# Patient Record
Sex: MALE | Race: BLACK OR AFRICAN AMERICAN | NOT HISPANIC OR LATINO
[De-identification: names, ages, dates, MRNs, and addresses within clinical notes are randomized per-mention and may not be internally consistent; named-entity substitution may affect disease eponyms.]

---

## 2019-03-21 PROBLEM — Z00.00 ENCOUNTER FOR PREVENTIVE HEALTH EXAMINATION: Status: ACTIVE | Noted: 2019-03-21

## 2019-04-02 ENCOUNTER — APPOINTMENT (OUTPATIENT)
Dept: MRI IMAGING | Facility: CLINIC | Age: 39
End: 2019-04-02
Payer: MEDICAID

## 2019-04-02 ENCOUNTER — OUTPATIENT (OUTPATIENT)
Dept: OUTPATIENT SERVICES | Facility: HOSPITAL | Age: 39
LOS: 1 days | End: 2019-04-02
Payer: COMMERCIAL

## 2019-04-02 DIAGNOSIS — Z00.8 ENCOUNTER FOR OTHER GENERAL EXAMINATION: ICD-10-CM

## 2019-04-02 PROCEDURE — 75561 CARDIAC MRI FOR MORPH W/DYE: CPT

## 2019-04-02 PROCEDURE — A9585: CPT

## 2019-04-02 PROCEDURE — 75561 CARDIAC MRI FOR MORPH W/DYE: CPT | Mod: 26

## 2019-12-01 ENCOUNTER — OUTPATIENT (OUTPATIENT)
Dept: OUTPATIENT SERVICES | Facility: HOSPITAL | Age: 39
LOS: 1 days | End: 2019-12-01
Payer: MEDICAID

## 2019-12-01 PROCEDURE — G9001: CPT

## 2019-12-04 ENCOUNTER — INPATIENT (INPATIENT)
Facility: HOSPITAL | Age: 39
LOS: 7 days | Discharge: HOME | DRG: 287 | End: 2019-12-12
Attending: HOSPITALIST | Admitting: HOSPITALIST
Payer: MEDICAID

## 2019-12-04 VITALS
SYSTOLIC BLOOD PRESSURE: 170 MMHG | OXYGEN SATURATION: 97 % | TEMPERATURE: 98 F | WEIGHT: 315 LBS | HEIGHT: 71 IN | DIASTOLIC BLOOD PRESSURE: 106 MMHG | RESPIRATION RATE: 18 BRPM | HEART RATE: 119 BPM

## 2019-12-04 DIAGNOSIS — I50.23 ACUTE ON CHRONIC SYSTOLIC (CONGESTIVE) HEART FAILURE: ICD-10-CM

## 2019-12-04 DIAGNOSIS — R06.02 SHORTNESS OF BREATH: ICD-10-CM

## 2019-12-04 DIAGNOSIS — E66.01 MORBID (SEVERE) OBESITY DUE TO EXCESS CALORIES: ICD-10-CM

## 2019-12-04 DIAGNOSIS — Z02.9 ENCOUNTER FOR ADMINISTRATIVE EXAMINATIONS, UNSPECIFIED: ICD-10-CM

## 2019-12-04 DIAGNOSIS — Z29.9 ENCOUNTER FOR PROPHYLACTIC MEASURES, UNSPECIFIED: ICD-10-CM

## 2019-12-04 DIAGNOSIS — R07.9 CHEST PAIN, UNSPECIFIED: ICD-10-CM

## 2019-12-04 DIAGNOSIS — I10 ESSENTIAL (PRIMARY) HYPERTENSION: ICD-10-CM

## 2019-12-04 DIAGNOSIS — I50.9 HEART FAILURE, UNSPECIFIED: ICD-10-CM

## 2019-12-04 DIAGNOSIS — J45.909 UNSPECIFIED ASTHMA, UNCOMPLICATED: ICD-10-CM

## 2019-12-04 LAB
ALBUMIN SERPL ELPH-MCNC: 3.9 G/DL — SIGNIFICANT CHANGE UP (ref 3.3–5)
ALP SERPL-CCNC: 72 U/L — SIGNIFICANT CHANGE UP (ref 40–120)
ALT FLD-CCNC: 46 U/L — HIGH (ref 10–45)
ANION GAP SERPL CALC-SCNC: 12 MMOL/L — SIGNIFICANT CHANGE UP (ref 5–17)
APTT BLD: 23 SEC — LOW (ref 27.5–36.3)
AST SERPL-CCNC: 31 U/L — SIGNIFICANT CHANGE UP (ref 10–40)
BASOPHILS # BLD AUTO: 0.03 K/UL — SIGNIFICANT CHANGE UP (ref 0–0.2)
BASOPHILS NFR BLD AUTO: 0.3 % — SIGNIFICANT CHANGE UP (ref 0–2)
BILIRUB SERPL-MCNC: 0.5 MG/DL — SIGNIFICANT CHANGE UP (ref 0.2–1.2)
BUN SERPL-MCNC: 11 MG/DL — SIGNIFICANT CHANGE UP (ref 7–23)
CALCIUM SERPL-MCNC: 9.1 MG/DL — SIGNIFICANT CHANGE UP (ref 8.4–10.5)
CHLORIDE SERPL-SCNC: 103 MMOL/L — SIGNIFICANT CHANGE UP (ref 96–108)
CO2 SERPL-SCNC: 26 MMOL/L — SIGNIFICANT CHANGE UP (ref 22–31)
CREAT SERPL-MCNC: 1.23 MG/DL — SIGNIFICANT CHANGE UP (ref 0.5–1.3)
EOSINOPHIL # BLD AUTO: 0.22 K/UL — SIGNIFICANT CHANGE UP (ref 0–0.5)
EOSINOPHIL NFR BLD AUTO: 2.1 % — SIGNIFICANT CHANGE UP (ref 0–6)
FLU A RESULT: SIGNIFICANT CHANGE UP
FLU A RESULT: SIGNIFICANT CHANGE UP
FLUAV AG NPH QL: SIGNIFICANT CHANGE UP
FLUBV AG NPH QL: SIGNIFICANT CHANGE UP
GLUCOSE SERPL-MCNC: 112 MG/DL — HIGH (ref 70–99)
HCT VFR BLD CALC: 45.3 % — SIGNIFICANT CHANGE UP (ref 39–50)
HGB BLD-MCNC: 14.5 G/DL — SIGNIFICANT CHANGE UP (ref 13–17)
IMM GRANULOCYTES NFR BLD AUTO: 0.6 % — SIGNIFICANT CHANGE UP (ref 0–1.5)
INR BLD: 1.08 RATIO — SIGNIFICANT CHANGE UP (ref 0.88–1.16)
LYMPHOCYTES # BLD AUTO: 2.25 K/UL — SIGNIFICANT CHANGE UP (ref 1–3.3)
LYMPHOCYTES # BLD AUTO: 21.2 % — SIGNIFICANT CHANGE UP (ref 13–44)
MCHC RBC-ENTMCNC: 28.7 PG — SIGNIFICANT CHANGE UP (ref 27–34)
MCHC RBC-ENTMCNC: 32 GM/DL — SIGNIFICANT CHANGE UP (ref 32–36)
MCV RBC AUTO: 89.5 FL — SIGNIFICANT CHANGE UP (ref 80–100)
MONOCYTES # BLD AUTO: 0.99 K/UL — HIGH (ref 0–0.9)
MONOCYTES NFR BLD AUTO: 9.3 % — SIGNIFICANT CHANGE UP (ref 2–14)
NEUTROPHILS # BLD AUTO: 7.06 K/UL — SIGNIFICANT CHANGE UP (ref 1.8–7.4)
NEUTROPHILS NFR BLD AUTO: 66.5 % — SIGNIFICANT CHANGE UP (ref 43–77)
NRBC # BLD: 0 /100 WBCS — SIGNIFICANT CHANGE UP (ref 0–0)
NT-PROBNP SERPL-SCNC: 1121 PG/ML — HIGH (ref 0–300)
PLATELET # BLD AUTO: 189 K/UL — SIGNIFICANT CHANGE UP (ref 150–400)
POTASSIUM SERPL-MCNC: 4.5 MMOL/L — SIGNIFICANT CHANGE UP (ref 3.5–5.3)
POTASSIUM SERPL-SCNC: 4.5 MMOL/L — SIGNIFICANT CHANGE UP (ref 3.5–5.3)
PROT SERPL-MCNC: 7.4 G/DL — SIGNIFICANT CHANGE UP (ref 6–8.3)
PROTHROM AB SERPL-ACNC: 12.5 SEC — SIGNIFICANT CHANGE UP (ref 10–12.9)
RBC # BLD: 5.06 M/UL — SIGNIFICANT CHANGE UP (ref 4.2–5.8)
RBC # FLD: 15.2 % — HIGH (ref 10.3–14.5)
RSV RESULT: SIGNIFICANT CHANGE UP
RSV RNA RESP QL NAA+PROBE: SIGNIFICANT CHANGE UP
SODIUM SERPL-SCNC: 141 MMOL/L — SIGNIFICANT CHANGE UP (ref 135–145)
TROPONIN T, HIGH SENSITIVITY RESULT: 30 NG/L — SIGNIFICANT CHANGE UP (ref 0–51)
TROPONIN T, HIGH SENSITIVITY RESULT: 33 NG/L — SIGNIFICANT CHANGE UP (ref 0–51)
WBC # BLD: 10.61 K/UL — HIGH (ref 3.8–10.5)
WBC # FLD AUTO: 10.61 K/UL — HIGH (ref 3.8–10.5)

## 2019-12-04 PROCEDURE — 99223 1ST HOSP IP/OBS HIGH 75: CPT | Mod: GC

## 2019-12-04 PROCEDURE — 93010 ELECTROCARDIOGRAM REPORT: CPT

## 2019-12-04 PROCEDURE — 99285 EMERGENCY DEPT VISIT HI MDM: CPT

## 2019-12-04 PROCEDURE — 71046 X-RAY EXAM CHEST 2 VIEWS: CPT | Mod: 26

## 2019-12-04 RX ORDER — FUROSEMIDE 40 MG
40 TABLET ORAL ONCE
Refills: 0 | Status: COMPLETED | OUTPATIENT
Start: 2019-12-04 | End: 2019-12-04

## 2019-12-04 RX ORDER — HYDRALAZINE HCL 50 MG
25 TABLET ORAL ONCE
Refills: 0 | Status: COMPLETED | OUTPATIENT
Start: 2019-12-04 | End: 2019-12-04

## 2019-12-04 RX ORDER — HYDRALAZINE HCL 50 MG
1 TABLET ORAL
Qty: 0 | Refills: 0 | DISCHARGE

## 2019-12-04 RX ORDER — LEVALBUTEROL 1.25 MG/.5ML
0.63 SOLUTION, CONCENTRATE RESPIRATORY (INHALATION) EVERY 4 HOURS
Refills: 0 | Status: DISCONTINUED | OUTPATIENT
Start: 2019-12-04 | End: 2019-12-06

## 2019-12-04 RX ORDER — LOSARTAN POTASSIUM 100 MG/1
1 TABLET, FILM COATED ORAL
Qty: 0 | Refills: 0 | DISCHARGE

## 2019-12-04 RX ORDER — CARVEDILOL PHOSPHATE 80 MG/1
1 CAPSULE, EXTENDED RELEASE ORAL
Qty: 0 | Refills: 0 | DISCHARGE

## 2019-12-04 RX ORDER — CARVEDILOL PHOSPHATE 80 MG/1
6.25 CAPSULE, EXTENDED RELEASE ORAL EVERY 12 HOURS
Refills: 0 | Status: DISCONTINUED | OUTPATIENT
Start: 2019-12-04 | End: 2019-12-05

## 2019-12-04 RX ORDER — HYDRALAZINE HCL 50 MG
50 TABLET ORAL EVERY 8 HOURS
Refills: 0 | Status: DISCONTINUED | OUTPATIENT
Start: 2019-12-04 | End: 2019-12-05

## 2019-12-04 RX ORDER — IPRATROPIUM/ALBUTEROL SULFATE 18-103MCG
3 AEROSOL WITH ADAPTER (GRAM) INHALATION ONCE
Refills: 0 | Status: COMPLETED | OUTPATIENT
Start: 2019-12-04 | End: 2019-12-04

## 2019-12-04 RX ORDER — LOSARTAN POTASSIUM 100 MG/1
100 TABLET, FILM COATED ORAL DAILY
Refills: 0 | Status: DISCONTINUED | OUTPATIENT
Start: 2019-12-04 | End: 2019-12-12

## 2019-12-04 RX ORDER — SPIRONOLACTONE 25 MG/1
25 TABLET, FILM COATED ORAL DAILY
Refills: 0 | Status: DISCONTINUED | OUTPATIENT
Start: 2019-12-04 | End: 2019-12-12

## 2019-12-04 RX ORDER — INFLUENZA VIRUS VACCINE 15; 15; 15; 15 UG/.5ML; UG/.5ML; UG/.5ML; UG/.5ML
0.5 SUSPENSION INTRAMUSCULAR ONCE
Refills: 0 | Status: DISCONTINUED | OUTPATIENT
Start: 2019-12-04 | End: 2019-12-12

## 2019-12-04 RX ORDER — HEPARIN SODIUM 5000 [USP'U]/ML
5000 INJECTION INTRAVENOUS; SUBCUTANEOUS EVERY 8 HOURS
Refills: 0 | Status: DISCONTINUED | OUTPATIENT
Start: 2019-12-04 | End: 2019-12-12

## 2019-12-04 RX ORDER — NITROGLYCERIN 6.5 MG
0.4 CAPSULE, EXTENDED RELEASE ORAL ONCE
Refills: 0 | Status: COMPLETED | OUTPATIENT
Start: 2019-12-04 | End: 2019-12-04

## 2019-12-04 RX ADMIN — SPIRONOLACTONE 25 MILLIGRAM(S): 25 TABLET, FILM COATED ORAL at 22:50

## 2019-12-04 RX ADMIN — Medication 3 MILLILITER(S): at 12:50

## 2019-12-04 RX ADMIN — Medication 50 MILLIGRAM(S): at 22:50

## 2019-12-04 RX ADMIN — LEVALBUTEROL 0.63 MILLIGRAM(S): 1.25 SOLUTION, CONCENTRATE RESPIRATORY (INHALATION) at 22:50

## 2019-12-04 RX ADMIN — CARVEDILOL PHOSPHATE 6.25 MILLIGRAM(S): 80 CAPSULE, EXTENDED RELEASE ORAL at 18:39

## 2019-12-04 RX ADMIN — Medication 50 MILLIGRAM(S): at 17:21

## 2019-12-04 RX ADMIN — LOSARTAN POTASSIUM 100 MILLIGRAM(S): 100 TABLET, FILM COATED ORAL at 18:39

## 2019-12-04 RX ADMIN — Medication 40 MILLIGRAM(S): at 13:08

## 2019-12-04 RX ADMIN — Medication 125 MILLIGRAM(S): at 13:08

## 2019-12-04 RX ADMIN — Medication 0.4 MILLIGRAM(S): at 13:26

## 2019-12-04 RX ADMIN — Medication 3 MILLILITER(S): at 12:57

## 2019-12-04 RX ADMIN — HEPARIN SODIUM 5000 UNIT(S): 5000 INJECTION INTRAVENOUS; SUBCUTANEOUS at 22:50

## 2019-12-04 NOTE — H&P ADULT - PROBLEM SELECTOR PLAN 1
-Pt with history of EF 22% complaining of sharp, nonraditing, exertional CP accompanied with SOB worsened with laying flat as well as 2+ bilateral pedal edema. [id number]. Most consistent with acute CHF exacerbation.   - Pt received 40mg IV lasix in ED -Pt with history of EF 22% complaining of sharp, nonraditing, exertional CP accompanied with SOB worsened with laying flat as well as 2+ bilateral pedal edema. [id number]. Most consistent with acute CHF exacerbation.   - Pt received 40mg IV lasix in ED will assess urine output and determine need for further IV lasix in AM  -will need right and left heart cath on admission as per cardiology   -obtain transthoracic echo to evaluate -Pt with history of EF 22% complaining of sharp, nonraditing, exertional CP accompanied with SOB worsened with laying flat as well as 2+ bilateral pedal edema. [id number]. Most consistent with acute CHF exacerbation.   - Pt received 40mg IV lasix in ED will assess urine output and determine need for further IV lasix in AM  -will need right and left heart cath on admission as per cardiology   -obtain transthoracic echo to evaluate  - continue home meds.

## 2019-12-04 NOTE — H&P ADULT - ATTENDING COMMENTS
Chart reviewed and patient examined at the bedside. Plan is to f/u TTE, continue Lasix 40mg IVP BID (and home medications: Coreg, Losartan, Hydralazine, and Spironolactone) w/ close monitoring of fluid status, renal function, electrolytes w/ cardiology on board to elucidate etiology of underlying depressed ejection fraction and optimization of medical therapy (+/- consideration of AICD); will also obtain CT Chest to clarify CXR findings in the setting of diffuse wheezing on examination and history of environmental exposures. Need to obtain further collateral from outpatient providers and to communicate plan of care in AM.

## 2019-12-04 NOTE — ED ADULT NURSE REASSESSMENT NOTE - NS ED NURSE REASSESS COMMENT FT1
Called xt [phone], spoke to resident re elevated BP, team 3 will be coming to consult with the pt in ED

## 2019-12-04 NOTE — ED ADULT NURSE REASSESSMENT NOTE - NS ED NURSE REASSESS COMMENT FT1
Pt received 1900 from [facility]. Pt remains hypertensive. NAD. ambulatory to restroom and returned from CT. Denies complaints. He is A&O x4 and well appearing .Admitted and awaiting bed. Aware of plan. Safety and comfort maintained. Will continue to monitor.

## 2019-12-04 NOTE — H&P ADULT - NSHPPHYSICALEXAM_GEN_ALL_CORE
VITALS: Vital Signs Last 24 Hrs  T(C): 36.7 (04 Dec 2019 10:50), Max: 36.7 (04 Dec 2019 10:35)  T(F): 98 (04 Dec 2019 10:50), Max: 98 (04 Dec 2019 10:35)  HR: 107 (04 Dec 2019 14:53) (107 - 119)  BP: 142/120 (04 Dec 2019 16:11) (142/120 - 173/116)  BP(mean): --  RR: 20 (04 Dec 2019 13:30) (14 - 24)  SpO2: 100% (04 Dec 2019 13:30) (96% - 100%)    GENERAL: NAD, lying in bed comfortably  HEAD:  Atraumatic, Normocephalic  EYES: EOMI, PERRLA, conjunctiva and sclera clear  ENT: Moist mucous membranes  NECK: Supple  CHEST/LUNG:  Wheezing in all lung fields, no crackles appreciated.   HEART: Regular rate and rhythm; No murmurs, rubs, or gallops  ABDOMEN: Bowel sounds present; Soft, Nontender, Nondistended. No hepatomegaly.   EXTREMITIES:  2+ pitting edema bilaterally. 2+ Peripheral Pulses, brisk capillary refill. No clubbing, cyanosis, or edema  NERVOUS SYSTEM:  Alert & Oriented X3, speech clear. No deficits. CN grossly intact  SKIN: No rashes or lesions Vital Signs Last 24 Hrs  T(F): 98 (04 Dec 2019 10:50), Max: 98 (04 Dec 2019 10:35)  HR: 107 (04 Dec 2019 14:53) (107 - 119)  BP: 142/120 (04 Dec 2019 16:11) (142/120 - 173/116)  RR: 20 (04 Dec 2019 13:30) (14 - 24)  SpO2: 100% (04 Dec 2019 13:30) (96% - 100%)    GENERAL: NAD, lying in bed comfortably  HEAD:  Atraumatic, Normocephalic  EYES: EOMI, PERRLA, conjunctiva and sclera clear  ENT: Moist mucous membranes  NECK: Supple  CHEST/LUNG:  Wheezing in all lung fields, no crackles appreciated.   HEART: Regular rate and rhythm; No murmurs, rubs, or gallops  ABDOMEN: Bowel sounds present; Soft, Nontender, Nondistended. No hepatomegaly.   EXTREMITIES:  2+ pitting edema bilaterally. 2+ Peripheral Pulses, brisk capillary refill. No clubbing, cyanosis, or edema  NERVOUS SYSTEM:  Alert & Oriented X3, speech clear. No deficits. CN grossly intact  SKIN: No rashes or lesions

## 2019-12-04 NOTE — CONSULT NOTE ADULT - NSHPATTENDINGPLANDISCUSS_GEN_ALL_CORE
Plan discussed with cardiology fellow & resident; patient seen and examined.       I was physically present for the key portions of the evaluation and management (E/M) service provided.    I agree with the above history, physical, and plan which I have reviewed and edited where appropriate.

## 2019-12-04 NOTE — H&P ADULT - HISTORY OF PRESENT ILLNESS
Pt is a 39 year old male with a PMHx of HTN, asthma, smoking, and EF of 22% presenting with 1 month of worsening SOB and CP. Pt describes a sharp, nonradiaiting, exertion CP accompanied with SOB. He is only able to ambulate 1 block or up 1 flight of stairs before having to rest. He states he is unable to lay flat due to worsening dyspnea and has been sleeping upright. He also endorses bilaterally pedal edema over the last 3 days as well as a recent URI. He states that this is his first time experiencing all these symptoms together. 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 He was incarcerated from [id number]. He states he has an inhaler but rarely if ever utilizes it. He smoked 1.5 packs for roughly 17 years stating that he recently quit, he denies any alcohol or illicit drug use. He denies any recent travel, sick contacts, or calf tenderness. 39yoM w/ PMHx significant for CHF (LVEF = 22% per MRI performed in 4/2019, unclear etiology, cardiac MRI ordered for evaluation for sarcoid?, no AICD), HTN, asthma (vs. COPD, not diagnosed but pt w/ smoking history: 1.5 packs for roughly 17 years stating that he recently quit), who presents with 1 month of worsening SOB and CP. Pt describes a sharp, nonradiaiting, exertion CP accompanied with SOB. He is only able to ambulate 1 block or up 1 flight of stairs before having to rest. He states he is unable to lay flat due to worsening dyspnea and has been sleeping upright. He also endorses bilaterally pedal edema over the last 3 days as well as a recent URI. He states that this is his first time experiencing all these symptoms together. He worked as a  and was exposed to dust and other hazards. He was incarcerated from [id number]. He states he has an inhaler but rarely if ever utilizes it. He denies any alcohol or illicit drug use. He denies any fever/chills, localizing signs/sx of infection, recent travel, sick contacts, or calf tenderness.

## 2019-12-04 NOTE — CONSULT NOTE ADULT - ASSESSMENT
39M w/ h/o htn, asthma, cxr in 2/2019 suggestive of sarcoid, and cardiomyopathy w/ EF 22% on cardiac MRI 4/2/19 p/w worsening SOB and CP of 2 m.o. duration. Pt has elevated BNP and stable trops with negative d-dimer. Cardiac MRI performed after cxr on 2/2019 is not indicative of sarcoid. Cannot r/o ischemic vs. nonischemic cause of HF in this pt with extensive smoking history.     Recommendations:    #1. HFrEF 39M w/ h/o htn, asthma, cxr in 2/2019 suggestive of sarcoid, and cardiomyopathy w/ EF 22% on cardiac MRI 4/2/19 p/w worsening SOB and CP of 2 m.o. duration. Pt has elevated BNP and stable trops with negative d-dimer, low likelihood of PE and ACS. Cardiac MRI performed after cxr on 2/2019 is not indicative of sarcoid. Cannot r/o ischemic vs. nonischemic cause of HF in this young pt with extensive smoking history.     Recommendations:    #1. HFrEF  Cause of reduced EF could be 2/2 ischemic vs. nonischemic etiology  -order ACE level, given pt's past CXR suspicious for sarcoid. CXR performed on this visit does not demonstrate hilar lymphadenopathy.   -perform TTE  -lasix 40 IV BID, pt needs to be diuresed so that he can lie flat  -c/w home medications: carvedilol 6.25 BID, losartan 100 qhs  -when pt is medically optimized and diuresed, will need right and left heart cath to evaluate for ischemic cause of heart failure  -can hold off on workup for nonischemic causes for now    #2 HTN  -c/w pt's home dose hydralazine 50 PO TID    Alhaji Taveras M.D.  Cardiology Resident 39M w/ h/o htn, asthma, cxr in 2/2019 suggestive of sarcoid, and cardiomyopathy w/ EF 22% on cardiac MRI 4/2/19.  P/W worsening SOB and CP of 2 m.o. duration. Pt has elevated BNP and stable trops with negative d-dimer, low likelihood of PE and ACS. Cardiac MRI performed after cxr on 2/2019 is not indicative of sarcoid.  Will need to ischemic cause in this young pt with extensive smoking history; could also be on basis of hypertensive dz.  No evidence of cardiac sarcoid reported on cardiac MRI.     Recommendations:    #1. HFrEF  Cause of reduced EF could be 2/2 ischemic vs. nonischemic etiology  -order ACE level, given pt's past CXR suspicious for sarcoid. CXR performed on this visit does not demonstrate hilar lymphadenopathy.   -perform TTE to assess myocardial and valve function  -Lasix 40 IV BID, pt needs to be diuresed so that he can lie flat  -c/w home medications: carvedilol 6.25 BID, losartan 100 qhs  -when pt is medically optimized and diuresed, will need right and left heart cath to evaluate for ischemic cause of heart failure  -can hold off on workup for nonischemic causes for now    #2 HTN  -c/w pt's home dose hydralazine 50 PO TID, losartan and Coreg      Alhaji Taveras M.D.  Cardiology Resident    Barrett Barrow M.D.  Cardiology Attending, Consult Service  [phone] or beeper     All Cardiology service information can be found 24/7 on [email or web address], password: [email or web address]

## 2019-12-04 NOTE — H&P ADULT - NSHPREVIEWOFSYSTEMS_GEN_ALL_CORE
REVIEW OF SYSTEMS:    CONSTITUTIONAL: No weakness, fevers or chills  EYES/ENT: No visual changes;  No dysphagia or throat pain   NECK: No pain or stiffness  RESPIRATORY: ++ cough, ++SOB, no wheezing, hemoptysis;   CARDIOVASCULAR: ++CP, ++ pedal edema  GASTROINTESTINAL: No abdominal or epigastric pain. No nausea, vomiting, or hematemesis; No diarrhea or constipation. No melena or hematochezia.  GENITOURINARY: No dysuria, frequency or hematuria  NEUROLOGICAL: No numbness or weakness  SKIN: No itching, burning, rashes, or lesions   All other review of systems is negative unless indicated above. CONSTITUTIONAL: No weakness, fevers or chills  EYES/ENT: No visual changes;  No dysphagia or throat pain   NECK: No pain or stiffness  RESPIRATORY: ++ cough, ++SOB, no wheezing, hemoptysis;   CARDIOVASCULAR: ++CP, ++ pedal edema  GASTROINTESTINAL: No abdominal or epigastric pain. No nausea, vomiting, or hematemesis; No diarrhea or constipation. No melena or hematochezia.  GENITOURINARY: No dysuria, frequency or hematuria  NEUROLOGICAL: No numbness or weakness  SKIN: No itching, burning, rashes, or lesions   All other review of systems is negative unless indicated above.

## 2019-12-04 NOTE — ED ADULT NURSE REASSESSMENT NOTE - NS ED NURSE REASSESS COMMENT FT1
pt. ambulated independently to bathroom twice, 2nd void post Lasix admin. Denies dizziness. Denies chest pain/ SOB/palpitations.

## 2019-12-04 NOTE — H&P ADULT - PROBLEM SELECTOR PLAN 3
-Pt complaining of sharp, nonraditing, exertional CP. Most likely 2/2 CHF exacerbation. -Pt complaining of sharp, nonraditing, exertional CP. Most likely 2/2 CHF exacerbation. Trops neg, EKG normal, low risk for MI. D-dimer WNL, low risk for PE.   - Cards with further assess with right and left heart cath and TTE.

## 2019-12-04 NOTE — H&P ADULT - PROBLEM SELECTOR PLAN 4
CHF vs sarcoid vs infection vs pulmonary hypertension. Unclear of etiology giving history of incarceration + environmental exposure. Chest x-ray shows no acute processes but is suspicious for hilar lymphadenopathy. Will further eval with CT chest.  - f/u ACE levels, HIV, quant.

## 2019-12-04 NOTE — ED ADULT NURSE NOTE - OBJECTIVE STATEMENT
The patient is a 39 year old male arriving from home accompanied by mother who presents to the ED with chief complaint of chest pain and SOB. Patient states that onset of SOB, dyspnea and chest pain began 4-5 weeks ago. Patient reports orthopnea, must sleep sitting up in chair and normally cannot sleep due to dyspnea.  Patient is a former pack and a half a day smoker for 17 years, quit 3 weeks ago. Wheezing noted over left lower lobe anteriorly. Posteriorly, bilateral rhonchi noted to upper and lower lung fields bilaterally. Patient reports chest pain 3/10.Patient's mother reports hyperpigmentation The patient is a 39 year old male arriving from home accompanied by mother who presents to the ED with chief complaint of chest pain and SOB. Patient states that onset of SOB, dyspnea and chest pain began 4-5 weeks ago. Patient reports orthopnea, must sleep sitting up in chair and normally cannot sleep due to dyspnea. Wheezing noted over left lower lobe anteriorly. Posteriorly, bilateral rhonchi noted to upper and lower lung fields bilaterally. Patient reports cough with yellow phlegm x 2 weeks.  Patient reports chest pain 3/10. Patient reports blurry vision has developed over the past few months. Patient reports he can not tolerate large meals as he experiences abdominal pain and tightness with eating and drinking large quantities. Patient endorses anxiety, reports racing heartbeat at night.  Patient is a former pack and a half a day smoker for 17 years, quit 3 weeks ago. Patient's mother reports new onset hyperpigmentation to patient's face and neck over the past few weeks.

## 2019-12-04 NOTE — ED ADULT NURSE REASSESSMENT NOTE - NS ED NURSE REASSESS COMMENT FT1
Attempt for phone handoff to 3 DSU, phone is ringing with no answer x3 min. Will reattempt for report.

## 2019-12-04 NOTE — PATIENT PROFILE ADULT - FALL HARM RISK TYPE OF ASSESSMENT
Patient accepted by [facility] for [place] [id number] services at [place]. Spoke with Sami Fenton from [employer] to inform her of pt's discharge today. No further needs identified. admission

## 2019-12-04 NOTE — H&P ADULT - PROBLEM SELECTOR PLAN 2
unclear if COPD given hx of 18 year smoking history, unclear if COPD given hx of 17 year smoking history. Pt complaining of SOB with exertion, PE demonstrates wheezing in all lung fields. Pt received 125mg solumedrol and 2 duonebs in ED. Will continue with duonebs and consider steroids after reassessment.

## 2019-12-04 NOTE — ED PROVIDER NOTE - ATTENDING CONTRIBUTION TO CARE
Nemes - 40yo M with PMH HTN, asthma, smoker (17 years; quit 3 weeks ago), presenting with CP, cough and SOB x 1 month. Pt reports substernal chest pain/pressure, worse with exertion. SOB also worse with exertion but occasionally SOB at rest as well. Per chart review and reports from patient, pt with h/o cardiomyopathy with EF of 22% and cardiac MRI consistent with sarcoid. No other stx LE swelling, calf pain. VS w tachycardia, diffuse wheezing, tachy cardiac rhythm, regular, no JVD, mild pedal edema. CHF vs asthma exacerbation vs PNA. Low suspicion for PE/ACS. Will get septic/cardiac labs, likely admit for high risk, further w/u

## 2019-12-04 NOTE — ED ADULT NURSE NOTE - TEMPLATE
Reason for Call:  Form, our goal is to have forms completed with 72 hours, however, some forms may require a visit or additional information.    Type of letter, form or note:  Home Health Certification    Who is the form from?: Home care    Where did the form come from: form was faxed in    What clinic location was the form placed at?: [facility]    Where the form was placed: Given to MA/RN    What number is listed as a contact on the form?:          Additional comments:     Call taken on 7/2/2019 at 3:23 PM by Danielle Solis         Respiratory

## 2019-12-04 NOTE — H&P ADULT - PROBLEM SELECTOR PLAN 7
·  Problem: Discharge planning issues.  Plan: 1.  Name of PCP: Dang Bonilla  2.  PCP Contacted on Admission: [ ] Y    [ ] N    3.  PCP contacted at Discharge: [ ] Y    [ ] N    [ ] N/A  4.  Post-Discharge Appointment Date and Location:  5.  Summary of Handoff given to PCP: - heparin subq for DVT prophylaxis

## 2019-12-04 NOTE — CONSULT NOTE ADULT - SUBJECTIVE AND OBJECTIVE BOX
Patient seen and evaluated at bedside    Chief Complaint: worsening dyspnea on exertion, SOB    HPI:    Pt is a 39M w/ h/o htn, asthma, cxr in 2/2019 suggestive of sarcoid, and cardiomyopathy w/ EF 22% on cardiac MRI 4/2/19 who p/w worsening SOB and CP of 2 m.o. duration. Pt states that he has had decreased exercise tolerance that started two months ago, is only able to walk one block. 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 Pt has also had chest pain for most of his life, has worsened over the last two months with exertion. CP is associated with dizziness and SOB, pt denies jaw tightness, radiation, or arm pain. Pain is of a sharp nature, worse with inspiration, and reproducible. Pt states that he also has upper respiratory symptoms.     Of note, pt has an inhaler at home for asthma but has not used it recently.      pt also endorses orthopnea, unable to tolerate reclining beyond a sitting position.       PMHx:   No pertinent past medical history      PSHx:   No significant past surgical history      Allergies:  No Known Allergies      Home Meds:    Current Medications:       FAMILY HISTORY:      Social History:  Smoking History:  Alcohol Use:  Drug Use:    REVIEW OF SYSTEMS:  CONSTITUTIONAL: No weakness, fevers or chills  EYES/ENT: No visual changes;  No dysphagia  NECK: No pain or stiffness  RESPIRATORY: No cough, wheezing, hemoptysis; No shortness of breath  CARDIOVASCULAR: No chest pain or palpitations; No lower extremity edema  GASTROINTESTINAL: No abdominal or epigastric pain. No nausea, vomiting, or hematemesis; No diarrhea or constipation. No melena or hematochezia.  BACK: No back pain  GENITOURINARY: No dysuria, frequency or hematuria  NEUROLOGICAL: No numbness or weakness  SKIN: No itching, burning, rashes, or lesions   All other review of systems is negative unless indicated above.    Physical Exam:  T(F): 98 (12-04), Max: 98 (12-04)  HR: 107 (12-04) (107 - 119)  BP: 142/120 (12-04) (142/120 - 173/116)  RR: 20 (12-04)  SpO2: 100% (12-04)  GENERAL: No acute distress, well-developed  HEAD:  Atraumatic, Normocephalic  ENT: EOMI, PERRLA, conjunctiva and sclera clear, Neck supple, No JVD, moist mucosa  CHEST/LUNG: Clear to auscultation bilaterally; No wheeze, equal breath sounds bilaterally   BACK: No spinal tenderness  HEART: Regular rate and rhythm; No murmurs, rubs, or gallops  ABDOMEN: Soft, Nontender, Nondistended; Bowel sounds present  EXTREMITIES:  No clubbing, cyanosis, or edema  PSYCH: Nl behavior, nl affect  NEUROLOGY: AAOx3, non-focal, cranial nerves intact  SKIN: Normal color, No rashes or lesions  LINES:    Labs:  reviewed                        14.5   10.61 )-----------( 189      ( 04 Dec 2019 11:21 )             45.3     12-04    141  |  103  |  11  ----------------------------<  112<H>  4.5   |  26  |  1.23    Ca    9.1      04 Dec 2019 11:21    TPro  7.4  /  Alb  3.9  /  TBili  0.5  /  DBili  x   /  AST  31  /  ALT  46<H>  /  AlkPhos  72  12-04    PT/INR - ( 04 Dec 2019 11:21 )   PT: 12.5 sec;   INR: 1.08 ratio         PTT - ( 04 Dec 2019 11:21 )  PTT:23.0 sec    CARDIAC MARKERS ( 04 Dec 2019 13:48 )  30 ng/L / x     / x     / x     / x     / x      CARDIAC MARKERS ( 04 Dec 2019 11:21 )  33 ng/L / x     / x     / x     / x     / x            Serum Pro-Brain Natriuretic Peptide: 1121 pg/mL (12-04 @ 11:21)            Cardiovascular Diagnostic Testing:    ECG:     Echo:     Stress Testing:    Cath:    Imaging:    CXR:  reviewed    Assessment and Recommendations: Patient seen and evaluated at bedside    Chief Complaint: worsening dyspnea on exertion, SOB    HPI:    Pt is a 39M w/ h/o htn, asthma, cxr in 2/2019 suggestive of sarcoid, and cardiomyopathy w/ EF 22% on cardiac MRI 4/2/19 who p/w worsening SOB and CP of 2 m.o. duration. Pt states that he has had decreased exercise tolerance that started two months ago, is only able to walk one block. 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 Pt has also had chest pain for most of his life, has worsened over the last two months with exertion. CP is associated with dizziness and SOB, pt denies jaw tightness, radiation, or arm pain. Pain is of a sharp nature, worse with inspiration, and reproducible. Pt states that he also has had upper respiratory symptoms for the past two weeks. Pt endorses orthopnea, unable to tolerate reclining beyond a sitting position. Of note, pt has an inhaler at home for asthma but has not used it recently.    ED course:   vitals: afebrile, , bp 173/116, rr 14, SaO2 96% on RA. Note that pt's BP on manual measurement was 145/120.   Pt was given lasix 40 IV, solumedrol 125, sublingual nitroglycerin, and duonebs x2.           PMHx:   As above      PSHx:   No significant past surgical history      Allergies:  No Known Allergies      Home Meds:  hydralazine 50 TID  lasix 40 PO 1.5 pills daily  carvedilol 6.25 BID  losartan 100 daily    Current Medications:       FAMILY HISTORY:      Social History: former  for 6 months  Smoking History: smoked 1.5 ppd for 17 years    REVIEW OF SYSTEMS:  CONSTITUTIONAL: No weakness, fevers or chills  EYES/ENT: No visual changes;  No dysphagia  NECK: No pain or stiffness  RESPIRATORY: No cough, wheezing, hemoptysis; No shortness of breath  CARDIOVASCULAR: No chest pain or palpitations; No lower extremity edema  GASTROINTESTINAL: No abdominal or epigastric pain. No nausea, vomiting, or hematemesis; No diarrhea or constipation. No melena or hematochezia.  BACK: No back pain  GENITOURINARY: No dysuria, frequency or hematuria  NEUROLOGICAL: No numbness or weakness  SKIN: No itching, burning, rashes, or lesions   All other review of systems is negative unless indicated above.    Physical Exam:  T(F): 98 (12-04), Max: 98 (12-04)  HR: 107 (12-04) (107 - 119)  BP: 142/120 (12-04) (142/120 - 173/116)  RR: 20 (12-04)  SpO2: 100% (12-04)  GENERAL: No acute distress, well-developed  HEAD:  Atraumatic, Normocephalic  ENT: EOMI, PERRLA, conjunctiva and sclera clear, Neck supple, No JVD, moist mucosa  CHEST/LUNG: Clear to auscultation bilaterally; No wheeze, equal breath sounds bilaterally   BACK: No spinal tenderness  HEART: Regular rate and rhythm; No murmurs, rubs, or gallops  ABDOMEN: Soft, Nontender, Nondistended; Bowel sounds present  EXTREMITIES:  No clubbing, cyanosis, or edema  PSYCH: Nl behavior, nl affect  NEUROLOGY: AAOx3, non-focal, cranial nerves intact  SKIN: Normal color, No rashes or lesions  LINES:    Labs:  reviewed                        14.5   10.61 )-----------( 189      ( 04 Dec 2019 11:21 )             45.3     12-04    141  |  103  |  11  ----------------------------<  112<H>  4.5   |  26  |  1.23    Ca    9.1      04 Dec 2019 11:21    TPro  7.4  /  Alb  3.9  /  TBili  0.5  /  DBili  x   /  AST  31  /  ALT  46<H>  /  AlkPhos  72  12-04    PT/INR - ( 04 Dec 2019 11:21 )   PT: 12.5 sec;   INR: 1.08 ratio         PTT - ( 04 Dec 2019 11:21 )  PTT:23.0 sec    CARDIAC MARKERS ( 04 Dec 2019 13:48 )  30 ng/L / x     / x     / x     / x     / x      CARDIAC MARKERS ( 04 Dec 2019 11:21 )  33 ng/L / x     / x     / x     / x     / x            Serum Pro-Brain Natriuretic Peptide: 1121 pg/mL (12-04 @ 11:21)            Cardiovascular Diagnostic Testing:    ECG:     Echo:     Stress Testing:    Cath:    Imaging:    CXR:  reviewed    Assessment and Recommendations: Patient seen and evaluated at bedside    Chief Complaint: worsening dyspnea on exertion, SOB    HPI:    Pt is a 39M w/ h/o htn, asthma, cxr in 2/2019 suggestive of sarcoid, and cardiomyopathy w/ EF 22% on cardiac MRI 4/2/19 who p/w worsening SOB and CP of 2 m.o. duration. Pt states that he has had decreased exercise tolerance that started two months ago, is only able to walk one block. 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 Pt has also had chest pain for most of his life, has worsened over the last two months with exertion. CP is associated with dizziness and SOB, pt denies jaw tightness, radiation, or arm pain. Pain is of a sharp nature, worse with inspiration, and reproducible. Pt states that he also has had upper respiratory symptoms for the past two weeks. Pt endorses orthopnea, unable to tolerate reclining beyond a sitting position. Of note, pt has an inhaler at home for asthma but has not used it recently.    ED course:   vitals: afebrile, , bp 173/116, rr 14, SaO2 96% on RA. Note that pt's BP on manual measurement was 145/120.   Pt was given lasix 40 IV, solumedrol 125, sublingual nitroglycerin, and duonebs x2.           PMHx:   As above      PSHx:   No significant past surgical history      Allergies:  No Known Allergies      Home Meds:  hydralazine 50 TID  lasix 40 PO 1.5 pills daily  carvedilol 6.25 BID  losartan 100 daily    Current Medications:       FAMILY HISTORY:      Social History: former  for 6 months  Smoking History: smoked 1.5 ppd for 17 years    REVIEW OF SYSTEMS:  CONSTITUTIONAL: No weakness, fevers or chills  EYES/ENT: No visual changes;  No dysphagia  NECK: No pain or stiffness  RESPIRATORY: +cough, wheezing, SOB, no hemoptysis  CARDIOVASCULAR: +chest pain, +LE edema  GASTROINTESTINAL: Mild abdominal discomfort. No change in appetite, nausea, vomiting, or hematemesis; No diarrhea or constipation. No melena or hematochezia.  BACK: No back pain  GENITOURINARY: No dysuria, frequency or hematuria  NEUROLOGICAL: No numbness or weakness  SKIN: No itching, burning, rashes, or lesions   All other review of systems is negative unless indicated above.    Physical Exam:  T(F): 98 (12-04), Max: 98 (12-04)  HR: 107 (12-04) (107 - 119)  BP: 142/120 (12-04) (142/120 - 173/116)  RR: 20 (12-04)  SpO2: 100% (12-04)  GENERAL: NAD, sitting up in bed, aox3  HEAD:  Atraumatic, Normocephalic  ENT: EOMI, PERRLA, conjunctiva and sclera clear, Neck supple, difficult to appreciate JVD because of body habitus, moist mucosa  CHEST/LUNG: diffuse wheezes, bibasilar crackles   BACK: No spinal tenderness  HEART: Regular rate and rhythm; No murmurs, rubs, or gallops  ABDOMEN: Soft, Nondistended, discomfort on palpation; Bowel sounds present  EXTREMITIES:  2+ pitting edema in LE b/l, warm, well perfused, dorsalis pedis and radial pulses intact b/l  PSYCH: Nl behavior, nl affect  NEUROLOGY: AAOx3, non-focal, cranial nerves intact  SKIN: Normal color, No rashes or lesions    Labs:  reviewed                        14.5   10.61 )-----------( 189      ( 04 Dec 2019 11:21 )             45.3     12-04    141  |  103  |  11  ----------------------------<  112<H>  4.5   |  26  |  1.23    Ca    9.1      04 Dec 2019 11:21    TPro  7.4  /  Alb  3.9  /  TBili  0.5  /  DBili  x   /  AST  31  /  ALT  46<H>  /  AlkPhos  72  12-04    PT/INR - ( 04 Dec 2019 11:21 )   PT: 12.5 sec;   INR: 1.08 ratio         PTT - ( 04 Dec 2019 11:21 )  PTT:23.0 sec    CARDIAC MARKERS ( 04 Dec 2019 13:48 )  30 ng/L / x     / x     / x     / x     / x      CARDIAC MARKERS ( 04 Dec 2019 11:21 )  33 ng/L / x     / x     / x     / x     / x            Serum Pro-Brain Natriuretic Peptide: 1121 pg/mL (12-04 @ 11:21)        ECG: sinus tachycardia w/ PVCs    CXR:  < from: Xray Chest 2 Views PA/Lat (12.04.19 @ 12:16) >  The mediastinal cardiac silhouette is normal.     The lungs are clear.     No acute osseous finding.    IMPRESSION:    No acute process.       Cardiac MR 4/2/19 performed at outside institution:  EF 22%, CO 6.5, CI 2.4  Global LV systolic dysfunction, no wall motion abnormalities. Normal RV, LA.   No valvular abnormalities Pt is a 39M w/ h/o htn, asthma, cxr in 2/2019 suggestive of sarcoid, and cardiomyopathy w/ EF 22% on cardiac MRI 4/2/19.  P/W worsening SOB and CP of 2 mo. duration. Pt states that he has had decreased exercise tolerance that started two months ago, is only able to walk one block. 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 Pt has also had chest pain for most of his life, has worsened over the last two months with exertion. CP is associated with dizziness and SOB, pt denies jaw tightness, radiation, or arm pain. Pain is of a sharp nature, worse with inspiration, and reproducible. Pt states that he also has had upper respiratory symptoms for the past two weeks. Pt endorses orthopnea, unable to tolerate reclining beyond a sitting position. Of note, pt has an inhaler at home for asthma but has not used it recently.      PMHx:   As above      PSHx:   No significant past surgical history      Allergies:  No Known Allergies      Home Meds:  hydralazine 50 TID  lasix 40 PO 1.5 pills daily  carvedilol 6.25 BID  losartan 100 daily      FAMILY HISTORY:  No heart dz.      Social History: former  for 6 months  Smoking History: smoked 1.5 ppd for 17 years, stopped 1 month ago    REVIEW OF SYSTEMS:  CONSTITUTIONAL: No weakness, fevers or chills  EYES/ENT: No visual changes;  No dysphagia  NECK: No pain or stiffness  RESPIRATORY: +cough, wheezing, SOB, no hemoptysis  CARDIOVASCULAR: +chest pain, +LE edema  GASTROINTESTINAL: Mild abdominal discomfort. No change in appetite, nausea, vomiting, or hematemesis; No diarrhea or constipation. No melena or hematochezia.  BACK: No back pain  GENITOURINARY: No dysuria, frequency or hematuria  NEUROLOGICAL: No numbness or weakness  SKIN: No itching, burning, rashes, or lesions   All other review of systems is negative unless indicated above.    Physical Exam:  T(F): 98 (12-04), Max: 98 (12-04)  HR: 107 (12-04) (107 - 119)  BP: 142/120 (12-04) (142/120 - 173/116)  RR: 20 (12-04)  SpO2: 100% (12-04)    GENERAL: NAD, sitting up in bed, aox3  HEAD:  Atraumatic, Normocephalic  ENT: EOMI, PERRLA, conjunctiva and sclera clear, Neck supple, difficult to appreciate JVD because of body habitus, moist mucosa  CHEST/LUNG: diffuse wheezes, bibasilar crackles   BACK: No spinal tenderness  HEART: Regular rate and rhythm; No murmurs, rubs, or gallops  ABDOMEN: Soft, Nondistended, discomfort on palpation; Bowel sounds present  EXTREMITIES:  2+ pitting edema in LE b/l, warm, well perfused, dorsalis pedis and radial pulses intact b/l  PSYCH: Nl behavior, nl affect  NEUROLOGY: AAOx3, non-focal, cranial nerves intact  SKIN: Normal color, No rashes or lesions    Labs:                        14.5   10.61 )-----------( 189      ( 04 Dec 2019 11:21 )             45.3     12-04  141  |  103  |  11  ----------------------------<  112<H>  4.5   |  26  |  1.23    Ca    9.1      04 Dec 2019 11:21    TPro  7.4  /  Alb  3.9  /  TBili  0.5  /  DBili  x   /  AST  31  /  ALT  46<H>  /  AlkPhos  72  12-04    PT/INR - ( 04 Dec 2019 11:21 )   PT: 12.5 sec;   INR: 1.08 ratio    PTT - ( 04 Dec 2019 11:21 )  PTT:23.0 sec    CARDIAC MARKERS ( 04 Dec 2019 13:48 )  30 ng/L / x     / x     / x     / x     / x      CARDIAC MARKERS ( 04 Dec 2019 11:21 )  33 ng/L / x     / x     / x     / x     / x        Serum Pro-Brain Natriuretic Peptide: 1121 pg/mL (12-04 @ 11:21)      ECG: sinus tachycardia w/ PVCs      Xray Chest 2 Views PA/Lat (12.04.19 @ 12:16) >  The mediastinal cardiac silhouette is normal.   The lungs are clear.   No acute osseous finding.  IMPRESSION:  No acute process.       Cardiac MR 4/2/19 performed at outside institution:  EF 22%, CO 6.5, CI 2.4  Global LV systolic dysfunction, no wall motion abnormalities. Normal RV, LA.   No valvular abnormalities

## 2019-12-04 NOTE — ED PROVIDER NOTE - CARE PLAN
Principal Discharge DX:	Chest pain  Secondary Diagnosis:	CHF exacerbation  Secondary Diagnosis:	Shortness of breath at rest

## 2019-12-04 NOTE — ED ADULT NURSE REASSESSMENT NOTE - NS ED NURSE REASSESS COMMENT FT1
Repeat trops sent to lab, new 22 gauge IV inserted to right hand. Patient medicated with Lasix, Solu-Medrol and Nitro. Patient given duoneb treatment. Mother at bedside. Plan of care: repeat BP q 15 minutes, review Trop, evaluate for admission. patient educated on expected s/e of Lasix, call bell w/in reach, patient oriented to bathroom. Patient educated as to expected s/e of Nitro SL. Patient resting in high Fowlers, provided pillow. safety and comfort maintained.

## 2019-12-04 NOTE — H&P ADULT - ASSESSMENT
Pt is a 39 year old male with a PMHx of HTN, asthma, smoking, and EF of 22% presenting with 1 month of worsening SOB and CP. Pt describes a sharp, nonradiaiting, exertion CP accompanied with SOB. Pt history, physical, lab, and imaging is most consistent with an concurrent asthma and CHF exacerbation 2/2 viral URI. 39yoM w/ PMHx significant for CHF (LVEF = 22% per MRI performed in 4/2019, unclear etiology, cardiac MRI ordered for evaluation for sarcoid?, no AICD), HTN, asthma (vs. COPD, not diagnosed but pt w/ smoking history: 1.5 packs for roughly 17 years stating that he recently quit), who presents with 1 month of worsening (exertional) SOB and CP w/ diffuse wheezing on examination. Pt admitted for CHF exacerbation vs. asthma/COPD exacerbation.

## 2019-12-04 NOTE — ED PROVIDER NOTE - OBJECTIVE STATEMENT
40yo M with PMH HTN, asthma, smoker (17 years; quit 3 weeks ago), presenting with CP and SOB x 1 month. Pt reports substernal chest pain/pressure, worse with exertion. SOB also worse with exertional but occasionally SOB at rest as well. Does not remember which BP meds he takes and last took 4 days ago. Does not remember cardiologist's name. Per chart review, pt with h/o cardiomyopathy with EF of 22% and cardiac MRI consistent with sarcoid. Pt denies any other medications other than for HTN. Denies any fever/chills, cough, recent illness, recent travel, abd pain, n/v/d, increased LE swelling, calf pain.    PCP Adv simran Leong 40yo M with PMH HTN, asthma, smoker (17 years; quit 3 weeks ago), presenting with CP and SOB x 1 month. Pt reports substernal chest pain/pressure, worse with exertion. SOB also worse with exertion but occasionally SOB at rest as well. Does not remember which BP meds he takes and last took 4 days ago. Does not remember cardiologist's name. Per chart review and reports from patient, pt with h/o cardiomyopathy with EF of 22% and cardiac MRI consistent with sarcoid. Pt denies any other medications other than for HTN. Denies any fever/chills, cough, recent illness, recent travel, abd pain, n/v/d, increased LE swelling, calf pain.    PCP Adv care Irene Leong

## 2019-12-04 NOTE — H&P ADULT - PROBLEM SELECTOR PLAN 8
·  Problem: Discharge planning issues.  Plan: 1.  Name of PCP: Dang Bonilla  2.  PCP Contacted on Admission: [ ] Y    [ ] N    3.  PCP contacted at Discharge: [ ] Y    [ ] N    [ ] N/A  4.  Post-Discharge Appointment Date and Location:  5.  Summary of Handoff given to PCP:

## 2019-12-04 NOTE — H&P ADULT - NSHPLABSRESULTS_GEN_ALL_CORE
141  |  103  |  11  ----------------------------<  112<H>  4.5   |  26  |  1.23    Ca    9.1      04 Dec 2019 11:21    TPro  7.4  /  Alb  3.9  /  TBili  0.5  /  DBili  x   /  AST  31  /  ALT  46<H>  /  AlkPhos  72  12-04                          14.5   10.61 )-----------( 189      ( 04 Dec 2019 11:21 )             45.3     EXAM:  XR CHEST PA LAT 2V                        PROCEDURE DATE:  12/04/2019      The mediastinal cardiac silhouette is normal.     The lungs are clear.     No acute osseous finding.    IMPRESSION:  No acute process.     EXAM:  MR CARD MORPH FUNCTION WAW IC    PROCEDURE DATE:  04/02/2019    INTERPRETATION:  CARDIAC MRI WITH AND WITHOUT CONTRAST    FINDINGS:  Cardiac morphology:  The cardiac chambers demonstrate normal atrioventricular and   ventriculoarterial concordance. The visualized thoracic aorta is normal   in course, caliber, and contour. The main pulmonary artery is normal in   size.  The systemic and pulmonary venous return appears to be normal.      VENTRICLES:     Left Ventricle:   There is normal LV size and depressed global systolic function. No   regional wall motion abnormalities are present. No thrombus is seen in   the left ventricle.  T2-weighted imaging demonstrates no high signal   intensity to suggest the presence of myocardial edema.  No delayed   enhancement of the LV myocardium is seen.    Absolute measure        LVEDV: 270 ml      LVESV: 209 ml      LVSV: 61 ml    LVEF: 22 % (normal > 54%)  Cardiac output: 6.5 L/min  Cardiac index: 2.4 L/min/m2   LV mass: 257 g  LV measurements: (I=Indexed to BSA)  LVEDVI: 99 ml/m2 (normal < 95)  LVESVI: 77 ml/m2  LVSVI: 23 ml/m2   LV mass indexed:95 g/m2 (normal < 80)  LV DILMA: 74 mm (normal < 62)  Anterior septal wall: 11 mm  Posterior lateral wall: 11 mm  LV ESD: 72 mm    Left atrium:   The left atrium is normal in size.    Right Ventricle:   There is normal RV size and normal global systolic function. There is no   fatty infiltration of the RV wall. No delayed enhancement is seen within   the right ventricular myocardium. There are no regional wall motion   abnormalities or focal aneurysmal motion.    Right atrium:   The right atrium is normal in size.     Aortic valve:   Quantification was not performed; however, qualitatively there are no   abnormal flow jets to suggest aortic stenosis or regurgitation.     Pulmonic valve: Quantification was not performed; however, qualitatively   there are no abnormal flow jets to suggest aortic stenosis or   regurgitation    Mitral valve:  No significant mitral stenosis or regurgitation.    Tricuspid valve:   No significant tricuspid stenosis or regurgitation.    Pericardium:   No pericardial thickening or pericardial effusion is identified.    Extracardiac findings:   The chest wall and mediastinum are unremarkable. No pleural effusions.    Limited evaluation of the lungs demonstrate no abnormal signal   characteristics.    IMPRESSION:  Normal LV size with globally depressed LV function. LVEF = 22%. No late   gadolinium enhancement to suggest scar. Labs and imaging obtained personally reviewed.    141  |  103  |  11  ----------------------------<  112<H>  4.5   |  26  |  1.23    Ca    9.1      04 Dec 2019 11:21    TPro  7.4  /  Alb  3.9  /  TBili  0.5  /  DBili  x   /  AST  31  /  ALT  46<H>  /  AlkPhos  72  12-04                        14.5   10.61 )-----------( 189      ( 04 Dec 2019 11:21 )             45.3     hsTrop = 33-->30   pBNP = 1121    FLU/RSV negative.       EXAM:  XR CHEST PA LAT 2V                        PROCEDURE DATE:  12/04/2019    The mediastinal cardiac silhouette is normal.   The lungs are clear.   No acute osseous finding.  IMPRESSION:  No acute process.     EXAM:  MR CARD MORPH FUNCTION WAW IC    PROCEDURE DATE:  04/02/2019    INTERPRETATION:  CARDIAC MRI WITH AND WITHOUT CONTRAST  FINDINGS:  Cardiac morphology:  The cardiac chambers demonstrate normal atrioventricular and ventriculoarterial concordance. The visualized thoracic aorta is normal in course, caliber, and contour. The main pulmonary artery is normal in size.  The systemic and pulmonary venous return appears to be normal.      VENTRICLES:   Left Ventricle: There is normal LV size and depressed global systolic function. No regional wall motion abnormalities are present. No thrombus is seen in the left ventricle.  T2-weighted imaging demonstrates no high signal intensity to suggest the presence of myocardial edema.  No delayed enhancement of the LV myocardium is seen.    Absolute measure        LVEDV: 270 ml      LVESV: 209 ml      LVSV: 61 ml    LVEF: 22 % (normal > 54%)  Cardiac output: 6.5 L/min  Cardiac index: 2.4 L/min/m2   LV mass: 257 g  LV measurements: (I=Indexed to BSA)  LVEDVI: 99 ml/m2 (normal < 95)  LVESVI: 77 ml/m2  LVSVI: 23 ml/m2   LV mass indexed:95 g/m2 (normal < 80)  LV DILMA: 74 mm (normal < 62)  Anterior septal wall: 11 mm  Posterior lateral wall: 11 mm  LV ESD: 72 mm    Left atrium: The left atrium is normal in size.  Right Ventricle: There is normal RV size and normal global systolic function. There is no fatty infiltration of the RV wall. No delayed enhancement is seen within the right ventricular myocardium. There are no regional wall motion abnormalities or focal aneurysmal motion.  Right atrium: The right atrium is normal in size.   Aortic valve: Quantification was not performed; however, qualitatively there are no abnormal flow jets to suggest aortic stenosis or regurgitation.   Pulmonic valve: Quantification was not performed; however, qualitatively there are no abnormal flow jets to suggest aortic stenosis or regurgitation  Mitral valve: No significant mitral stenosis or regurgitation.  Tricuspid valve: No significant tricuspid stenosis or regurgitation.  Pericardium: No pericardial thickening or pericardial effusion is identified.  Extracardiac findings: The chest wall and mediastinum are unremarkable. No pleural effusions.  Limited evaluation of the lungs demonstrate no abnormal signal characteristics.    IMPRESSION:  Normal LV size with globally depressed LV function. LVEF = 22%. No late gadolinium enhancement to suggest scar.

## 2019-12-04 NOTE — H&P ADULT - NSHPSOCIALHISTORY_GEN_ALL_CORE
Smokes 1.5 packs a day for 17 years, recently quit 1 month ago. Denies any alcohol or illicit drugs. Is sexually active with 1 partner, no barrier protection. Not currently working, worked as a .

## 2019-12-05 DIAGNOSIS — J44.9 CHRONIC OBSTRUCTIVE PULMONARY DISEASE, UNSPECIFIED: ICD-10-CM

## 2019-12-05 LAB
ALBUMIN SERPL ELPH-MCNC: 3.9 G/DL — SIGNIFICANT CHANGE UP (ref 3.3–5)
ALP SERPL-CCNC: 69 U/L — SIGNIFICANT CHANGE UP (ref 40–120)
ALT FLD-CCNC: 41 U/L — SIGNIFICANT CHANGE UP (ref 10–45)
ANION GAP SERPL CALC-SCNC: 13 MMOL/L — SIGNIFICANT CHANGE UP (ref 5–17)
AST SERPL-CCNC: 25 U/L — SIGNIFICANT CHANGE UP (ref 10–40)
BASOPHILS # BLD AUTO: 0.02 K/UL — SIGNIFICANT CHANGE UP (ref 0–0.2)
BASOPHILS NFR BLD AUTO: 0.1 % — SIGNIFICANT CHANGE UP (ref 0–2)
BILIRUB SERPL-MCNC: 0.7 MG/DL — SIGNIFICANT CHANGE UP (ref 0.2–1.2)
BUN SERPL-MCNC: 16 MG/DL — SIGNIFICANT CHANGE UP (ref 7–23)
CALCIUM SERPL-MCNC: 9.1 MG/DL — SIGNIFICANT CHANGE UP (ref 8.4–10.5)
CHLORIDE SERPL-SCNC: 104 MMOL/L — SIGNIFICANT CHANGE UP (ref 96–108)
CO2 SERPL-SCNC: 22 MMOL/L — SIGNIFICANT CHANGE UP (ref 22–31)
CREAT SERPL-MCNC: 1.04 MG/DL — SIGNIFICANT CHANGE UP (ref 0.5–1.3)
EOSINOPHIL # BLD AUTO: 0 K/UL — SIGNIFICANT CHANGE UP (ref 0–0.5)
EOSINOPHIL NFR BLD AUTO: 0 % — SIGNIFICANT CHANGE UP (ref 0–6)
GLUCOSE SERPL-MCNC: 141 MG/DL — HIGH (ref 70–99)
HCT VFR BLD CALC: 44.7 % — SIGNIFICANT CHANGE UP (ref 39–50)
HGB BLD-MCNC: 14.8 G/DL — SIGNIFICANT CHANGE UP (ref 13–17)
HIV 1+2 AB+HIV1 P24 AG SERPL QL IA: SIGNIFICANT CHANGE UP
IMM GRANULOCYTES NFR BLD AUTO: 0.6 % — SIGNIFICANT CHANGE UP (ref 0–1.5)
LYMPHOCYTES # BLD AUTO: 1.4 K/UL — SIGNIFICANT CHANGE UP (ref 1–3.3)
LYMPHOCYTES # BLD AUTO: 7.7 % — LOW (ref 13–44)
MAGNESIUM SERPL-MCNC: 2.1 MG/DL — SIGNIFICANT CHANGE UP (ref 1.6–2.6)
MCHC RBC-ENTMCNC: 28.9 PG — SIGNIFICANT CHANGE UP (ref 27–34)
MCHC RBC-ENTMCNC: 33.1 GM/DL — SIGNIFICANT CHANGE UP (ref 32–36)
MCV RBC AUTO: 87.3 FL — SIGNIFICANT CHANGE UP (ref 80–100)
MONOCYTES # BLD AUTO: 0.78 K/UL — SIGNIFICANT CHANGE UP (ref 0–0.9)
MONOCYTES NFR BLD AUTO: 4.3 % — SIGNIFICANT CHANGE UP (ref 2–14)
NEUTROPHILS # BLD AUTO: 15.99 K/UL — HIGH (ref 1.8–7.4)
NEUTROPHILS NFR BLD AUTO: 87.3 % — HIGH (ref 43–77)
NRBC # BLD: 0 /100 WBCS — SIGNIFICANT CHANGE UP (ref 0–0)
PHOSPHATE SERPL-MCNC: 4.2 MG/DL — SIGNIFICANT CHANGE UP (ref 2.5–4.5)
PLATELET # BLD AUTO: 230 K/UL — SIGNIFICANT CHANGE UP (ref 150–400)
POTASSIUM SERPL-MCNC: 4.7 MMOL/L — SIGNIFICANT CHANGE UP (ref 3.5–5.3)
POTASSIUM SERPL-SCNC: 4.7 MMOL/L — SIGNIFICANT CHANGE UP (ref 3.5–5.3)
PROT SERPL-MCNC: 7.4 G/DL — SIGNIFICANT CHANGE UP (ref 6–8.3)
RBC # BLD: 5.12 M/UL — SIGNIFICANT CHANGE UP (ref 4.2–5.8)
RBC # FLD: 15.3 % — HIGH (ref 10.3–14.5)
SODIUM SERPL-SCNC: 139 MMOL/L — SIGNIFICANT CHANGE UP (ref 135–145)
WBC # BLD: 18.3 K/UL — HIGH (ref 3.8–10.5)
WBC # FLD AUTO: 18.3 K/UL — HIGH (ref 3.8–10.5)

## 2019-12-05 PROCEDURE — 71250 CT THORAX DX C-: CPT | Mod: 26

## 2019-12-05 PROCEDURE — 93306 TTE W/DOPPLER COMPLETE: CPT | Mod: 26

## 2019-12-05 PROCEDURE — 99233 SBSQ HOSP IP/OBS HIGH 50: CPT | Mod: GC

## 2019-12-05 RX ORDER — HYDRALAZINE HCL 50 MG
75 TABLET ORAL EVERY 8 HOURS
Refills: 0 | Status: DISCONTINUED | OUTPATIENT
Start: 2019-12-05 | End: 2019-12-12

## 2019-12-05 RX ORDER — FUROSEMIDE 40 MG
40 TABLET ORAL
Refills: 0 | Status: DISCONTINUED | OUTPATIENT
Start: 2019-12-05 | End: 2019-12-12

## 2019-12-05 RX ORDER — CARVEDILOL PHOSPHATE 80 MG/1
12.5 CAPSULE, EXTENDED RELEASE ORAL EVERY 12 HOURS
Refills: 0 | Status: DISCONTINUED | OUTPATIENT
Start: 2019-12-05 | End: 2019-12-12

## 2019-12-05 RX ADMIN — CARVEDILOL PHOSPHATE 6.25 MILLIGRAM(S): 80 CAPSULE, EXTENDED RELEASE ORAL at 05:10

## 2019-12-05 RX ADMIN — LEVALBUTEROL 0.63 MILLIGRAM(S): 1.25 SOLUTION, CONCENTRATE RESPIRATORY (INHALATION) at 21:46

## 2019-12-05 RX ADMIN — CARVEDILOL PHOSPHATE 12.5 MILLIGRAM(S): 80 CAPSULE, EXTENDED RELEASE ORAL at 17:27

## 2019-12-05 RX ADMIN — Medication 75 MILLIGRAM(S): at 13:24

## 2019-12-05 RX ADMIN — Medication 25 MILLIGRAM(S): at 00:16

## 2019-12-05 RX ADMIN — Medication 40 MILLIGRAM(S): at 09:04

## 2019-12-05 RX ADMIN — Medication 75 MILLIGRAM(S): at 21:45

## 2019-12-05 RX ADMIN — HEPARIN SODIUM 5000 UNIT(S): 5000 INJECTION INTRAVENOUS; SUBCUTANEOUS at 13:24

## 2019-12-05 RX ADMIN — LEVALBUTEROL 0.63 MILLIGRAM(S): 1.25 SOLUTION, CONCENTRATE RESPIRATORY (INHALATION) at 13:24

## 2019-12-05 RX ADMIN — HEPARIN SODIUM 5000 UNIT(S): 5000 INJECTION INTRAVENOUS; SUBCUTANEOUS at 21:45

## 2019-12-05 RX ADMIN — HEPARIN SODIUM 5000 UNIT(S): 5000 INJECTION INTRAVENOUS; SUBCUTANEOUS at 05:10

## 2019-12-05 RX ADMIN — LEVALBUTEROL 0.63 MILLIGRAM(S): 1.25 SOLUTION, CONCENTRATE RESPIRATORY (INHALATION) at 05:11

## 2019-12-05 RX ADMIN — Medication 40 MILLIGRAM(S): at 17:28

## 2019-12-05 RX ADMIN — LEVALBUTEROL 0.63 MILLIGRAM(S): 1.25 SOLUTION, CONCENTRATE RESPIRATORY (INHALATION) at 18:00

## 2019-12-05 RX ADMIN — Medication 50 MILLIGRAM(S): at 05:10

## 2019-12-05 NOTE — PROVIDER CONTACT NOTE (OTHER) - ASSESSMENT
A&O x4.  /112, .  Patient received bedtime BP meds 1 hour ago with no improvement.  Patient c/o mild headache

## 2019-12-05 NOTE — PROVIDER CONTACT NOTE (OTHER) - BACKGROUND
Patient admitted for chest pain, SOB x1 month, and CHF exacerbation.  PMH of asthma, HF, HTN, smoking

## 2019-12-05 NOTE — PROVIDER CONTACT NOTE (OTHER) - ACTION/TREATMENT ORDERED:
MD notified and aware.  25 mg PO hydralazine x1 dose ordered.  Continue to monitor patient and maintain safety

## 2019-12-05 NOTE — PROGRESS NOTE ADULT - PROBLEM SELECTOR PLAN 1
-Pt with history of EF 22% complaining of sharp, nonraditing, exertional CP accompanied with SOB worsened with laying flat as well as 2+ bilateral pedal edema. SotZQM=[id number]. Most consistent with acute CHF exacerbation.   - Pt received 40mg IV lasix in ED will assess urine output and determine need for further IV lasix in AM  -will need right and left heart cath on admission as per cardiology   -obtain transthoracic echo to evaluate  - continue home meds. - Echo showed eccentric left ventricular hypertrophy (dilated left  ventricle with normal relative wall thickness). Moderate-severe global left ventricular systolic dysfunction. EF approximately 30% by visual estimation.  - Pt still SOB with 2+ pedal edema, additional 40mg lasix given. Assess urine output and determine need for further IV lasix in AM  -will need right and left heart cath on admission as per cardiology   - continue home meds.

## 2019-12-05 NOTE — PROGRESS NOTE ADULT - ASSESSMENT
39yoM w/ PMHx significant for CHF (LVEF = 22% per MRI performed in 4/2019, unclear etiology, cardiac MRI ordered for evaluation for sarcoid?, no AICD), HTN, asthma (vs. COPD, not diagnosed but pt w/ smoking history: 1.5 packs for roughly 17 years stating that he recently quit), who presents with 1 month of worsening (exertional) SOB and CP w/ diffuse wheezing on examination. Pt admitted for CHF exacerbation vs. asthma/COPD exacerbation.

## 2019-12-05 NOTE — PROGRESS NOTE ADULT - ASSESSMENT
39M w/ h/o htn, asthma, cxr in 2/2019 suggestive of sarcoid, and cardiomyopathy w/ EF 22% on cardiac MRI 4/2/19.  P/W worsening SOB and CP of 2 m.o. duration. Pt has elevated BNP and stable trops with negative d-dimer, low likelihood of PE and ACS. Cardiac MRI performed after cxr on 2/2019 is not indicative of sarcoid.  Will need to r/o ischemic cause in this young pt with extensive smoking history; could also be on basis of hypertensive dz.  No evidence of cardiac sarcoid reported on cardiac MRI.     Recommendations:    #1. HFrEF  Cause of reduced EF could be 2/2 ischemic vs. nonischemic etiology  -f/u ACE level and CT chest   -perform TTE to assess myocardial and valve function  -c/w Lasix 40 IV BID, pt diuresing well  -c/w home medications: carvedilol 6.25 BID, losartan 100 qhs  -c/w spironolactone 25  -strict I&Os, daily weights  -when pt is medically optimized and diuresed, will need right and left heart cath to evaluate for ischemic cause of heart failure  -can hold off on workup for nonischemic causes for now    #2 HTN  -c/w pt's home dose hydralazine 50 PO TID, losartan, and Coreg      Alhaji Taveras M.D.  Cardiology Resident 39 M w/ h/o htn, asthma, cxr in 2/2019 suggestive of sarcoid, and cardiomyopathy w/ EF 22% on cardiac MRI 4/2/19.  P/W worsening SOB and CP of 2 m.o. duration. Pt has elevated BNP and stable trops with negative d-dimer, low likelihood of PE and ACS. Cardiac MRI performed after cxr on 2/2019 is not indicative of sarcoid.  Will need to r/o ischemic cause in this young pt with extensive smoking history; could also be on basis of hypertensive dz.  No evidence of cardiac sarcoid reported on cardiac MRI.     Recommendations:  #1. HFrEF  - Cause of reduced EF could be 2/2 ischemic vs. nonischemic etiology (eg. hypertensive cardiomyopathy)  - f/u ACE level and CT chest   - c/w Lasix 40 IV BID, pt diuresing well  - c/w home medications: carvedilol 6.25 BID, losartan 00 qhs  - c/w spironolactone 25 mg qd  - continue strict I&Os, daily weights  - would order limited echo with Definity to further assess LV function  - when pt is medically optimized and diuresed, will need right and left heart cath to evaluate for ischemic cause of heart failure  - will hold off on workup for other nonischemic causes for now    #2 HTN  -c/w pt's home dose hydralazine 50 PO TID, losartan, and Coreg  - anticipate BP will also improve with ongoing diuresis.      Alhaji Taveras M.D.  Cardiology Resident    Barrett Barrow M.D.  Cardiology Attending, Consult Service  [phone] or beeper     All Cardiology service information can be found 24/7 on [email or web address], password: [email or web address]

## 2019-12-05 NOTE — DIETITIAN INITIAL EVALUATION ADULT. - PROBLEM SELECTOR PLAN 3
-Pt complaining of sharp, nonraditing, exertional CP. Most likely 2/2 CHF exacerbation. Trops neg, EKG normal, low risk for MI. D-dimer WNL, low risk for PE.   - Cards with further assess with right and left heart cath and TTE.

## 2019-12-05 NOTE — DIETITIAN INITIAL EVALUATION ADULT. - PROBLEM SELECTOR PLAN 2
unclear if COPD given hx of 17 year smoking history. Pt complaining of SOB with exertion, PE demonstrates wheezing in all lung fields. Pt received 125mg solumedrol and 2 duonebs in ED. Will continue with duonebs and consider steroids after reassessment.

## 2019-12-05 NOTE — CHART NOTE - NSCHARTNOTEFT_GEN_A_CORE
Upon Nutritional Assessment by the Registered Dietitian your patient was determined to meet criteria / has evidence of the following diagnosis/diagnoses:          [ ]  Mild Protein Calorie Malnutrition        [ ]  Moderate Protein Calorie Malnutrition        [ ] Severe Protein Calorie Malnutrition        [ ] Unspecified Protein Calorie Malnutrition        [ ] Underweight / BMI <19        [ x] Morbid Obesity / BMI > 40      Findings as based on:  [x ] Comprehensive nutrition assessment (BMI 45.3)  [ ] Nutrition Focused Physical Exam  [ ] Other:       Nutrition Plan/Recommendations:    continue DASH diet, recommend HgbA1c, monitor for pt acceptance to diet education      PROVIDER Section:     By signing this assessment you are acknowledging and agree with the diagnosis/diagnoses assigned by the Registered Dietitian    Comments:

## 2019-12-05 NOTE — DIETITIAN INITIAL EVALUATION ADULT. - OTHER INFO
Reason for admission : CP, SOB  Diet PTA : 3 meals daily without regard to sodium or fat content. he usually does not snack in between meals. he lives with his mother and grandmother and most times his mother prepares his meals.   Intake : >75%  Denies nausea/vomit :  Denies difficulty chewing /swallow :  Last BM : yesterday  NKFA  IBW +/- 10%=172pounds  Ht:71"  Usual Weight PTA: 316pounds  BMI: 45.3   Education Provided : pt refused diet ed related to current DASH diet  skin: no pressure injury  edema:+2 left foot, right foot

## 2019-12-05 NOTE — PROGRESS NOTE ADULT - PROBLEM SELECTOR PLAN 2
unclear if COPD given hx of 17 year smoking history. Pt complaining of SOB with exertion, PE demonstrates wheezing in all lung fields. Pt received 125mg solumedrol and 2 duonebs in ED. Will continue with duonebs and consider steroids after reassessment. -unclear if COPD given hx of 17 year smoking history. Pt complaining of SOB with exertion, PE demonstrates wheezing in all lung fields. Pt received 125mg solumedrol and 2 duonebs in ED. Will continue with duonebs and consider steroids after reassessment.  - Ct shows small area of scattered nodular opacity with a tree-in-bud configuration   of the left lower lobe, likely represents mucoid impacted distal airways and  Emphysema.

## 2019-12-05 NOTE — DIETITIAN INITIAL EVALUATION ADULT. - PROBLEM SELECTOR PLAN 1
-Pt with history of EF 22% complaining of sharp, nonraditing, exertional CP accompanied with SOB worsened with laying flat as well as 2+ bilateral pedal edema. [id number]=[id number]. Most consistent with acute CHF exacerbation.   - Pt received 40mg IV lasix in ED will assess urine output and determine need for further IV lasix in AM  -will need right and left heart cath on admission as per cardiology   -obtain transthoracic echo to evaluate  - continue home meds.

## 2019-12-05 NOTE — PROGRESS NOTE ADULT - SUBJECTIVE AND OBJECTIVE BOX
Overnight Events: no acute events overnight. Pt states that he feels improved, able to walk across his room. Has increased urinary output on lasix.          Review Of Systems:   CONSTITUTIONAL: no fevers or chills  EYES/ENT: No visual changes;  No vertigo or throat pain   NECK: No pain or stiffness  RESPIRATORY: +cough, SOB  CARDIOVASCULAR: +CP, improved from yesterday  GASTROINTESTINAL: No abdominal or epigastric pain. No nausea, vomiting. No diarrhea. No melena.  GENITOURINARY: No dysuria, frequency or hematuria  NEUROLOGICAL: No numbness or weakness  SKIN: No itching, burning, rashes, or lesions   All other review of systems is negative unless indicated above.     Current Meds:  carvedilol 12.5 milliGRAM(s) Oral every 12 hours  furosemide   Injectable 40 milliGRAM(s) IV Push two times a day  heparin  Injectable 5000 Unit(s) SubCutaneous every 8 hours  hydrALAZINE 75 milliGRAM(s) Oral every 8 hours  influenza   Vaccine 0.5 milliLiter(s) IntraMuscular once  levalbuterol Inhalation 0.63 milliGRAM(s) Inhalation every 4 hours  losartan 100 milliGRAM(s) Oral daily  spironolactone 25 milliGRAM(s) Oral daily      Vitals:  T(F): 97.5 (12-05), Max: 98.2 (12-04)  HR: 102 (12-05) (96 - 119)  BP: 134/102 (12-05) (134/102 - 173/116)  RR: 18 (12-05)  SpO2: 95% (12-05)  I&O's Summary    04 Dec 2019 07:01  -  05 Dec 2019 07:00  --------------------------------------------------------  IN: 600 mL / OUT: 0 mL / NET: 600 mL        Physical Exam:  Appearance: No acute distress; well appearing  Eyes: PERRL, EOMI, pink conjunctiva  HEENT: Normal oral mucosa  Cardiovascular: RRR, S1, S2, no murmurs, rubs, or gallops; cannot appreciate JVD because of body habitus  Respiratory: poor inspiratory effort, decreased air entry, bibasilar crackles  Gastrointestinal: soft, non-tender, non-distended with normal bowel sounds  Musculoskeletal: No clubbing; no joint deformity   Extremities: 1+ pitting edema in LE b/l, warm, well perfused, dorsalis pedis pulses intact  Neurologic: Non-focal  Lymphatic: No lymphadenopathy  Psychiatry: AAOx3, mood & affect appropriate  Skin: No rashes, ecchymoses, or cyanosis                          14.8   18.30 )-----------( 230      ( 05 Dec 2019 07:20 )             44.7     12-05    139  |  104  |  16  ----------------------------<  141<H>  4.7   |  22  |  1.04    Ca    9.1      05 Dec 2019 07:20  Phos  4.2     12-05  Mg     2.1     12-05    TPro  7.4  /  Alb  3.9  /  TBili  0.7  /  DBili  x   /  AST  25  /  ALT  41  /  AlkPhos  69  12-05    PT/INR - ( 04 Dec 2019 11:21 )   PT: 12.5 sec;   INR: 1.08 ratio         PTT - ( 04 Dec 2019 11:21 )  PTT:23.0 sec  CARDIAC MARKERS ( 04 Dec 2019 13:48 )  30 ng/L / x     / x     / x     / x     / x      CARDIAC MARKERS ( 04 Dec 2019 11:21 )  33 ng/L / x     / x     / x     / x     / x          Serum Pro-Brain Natriuretic Peptide: 1121 pg/mL (12-04 @ 11:21)          New ECG(s): Personally reviewed      Interpretation of Telemetry: sinus tachycardia , PVCs, couplets, trigeminy Overnight Events: no acute events overnight. Pt states that he feels improved, able to walk across his room. Has increased urinary output on lasix.          Review Of Systems:   CONSTITUTIONAL: no fevers or chills  EYES/ENT: No visual changes;  No vertigo or throat pain   NECK: No pain or stiffness  RESPIRATORY: +cough, SOB  CARDIOVASCULAR: +CP, improved from yesterday  GASTROINTESTINAL: No abdominal or epigastric pain. No nausea, vomiting. No diarrhea. No melena.  GENITOURINARY: No dysuria, frequency or hematuria  NEUROLOGICAL: No numbness or weakness  SKIN: No itching, burning, rashes, or lesions   All other review of systems is negative unless indicated above.     Current Meds:  carvedilol 12.5 milliGRAM(s) Oral every 12 hours  furosemide   Injectable 40 milliGRAM(s) IV Push two times a day  heparin  Injectable 5000 Unit(s) SubCutaneous every 8 hours  hydrALAZINE 75 milliGRAM(s) Oral every 8 hours  influenza   Vaccine 0.5 milliLiter(s) IntraMuscular once  levalbuterol Inhalation 0.63 milliGRAM(s) Inhalation every 4 hours  losartan 100 milliGRAM(s) Oral daily  spironolactone 25 milliGRAM(s) Oral daily      Vitals:  T(F): 97.5 (12-05), Max: 98.2 (12-04)  HR: 102 (12-05) (96 - 119)  BP: 134/102 (12-05) (134/102 - 173/116)  RR: 18 (12-05)  SpO2: 95% (12-05)      Physical Exam:  Appearance: No acute distress; well appearing  Eyes: PERRL, EOMI, pink conjunctiva  HEENT: Normal oral mucosa  Cardiovascular: RRR, S1, S2, no murmurs, rubs, or gallops; cannot appreciate JVD because of body habitus  Respiratory: poor inspiratory effort, decreased air entry, bibasilar crackles  Gastrointestinal: soft, non-tender, non-distended with normal bowel sounds  Musculoskeletal: No clubbing; no joint deformity   Extremities: 1+ pitting edema in LE b/l, warm, well perfused, dorsalis pedis pulses intact  Neurologic: Non-focal  Lymphatic: No lymphadenopathy  Psychiatry: AAOx3, mood & affect appropriate  Skin: No rashes, ecchymoses, or cyanosis      LABS:                      14.8   18.30 )-----------( 230      ( 05 Dec 2019 07:20 )             44.7     12-05    139  |  104  |  16  ----------------------------<  141<H>  4.7   |  22  |  1.04    Ca    9.1      05 Dec 2019 07:20  Phos  4.2     12-05  Mg     2.1     12-05    TPro  7.4  /  Alb  3.9  /  TBili  0.7  /  DBili  x   /  AST  25  /  ALT  41  /  AlkPhos  69  12-05    PT/INR - ( 04 Dec 2019 11:21 )   PT: 12.5 sec;   INR: 1.08 ratio    PTT - ( 04 Dec 2019 11:21 )  PTT:23.0 sec    CARDIAC MARKERS ( 04 Dec 2019 13:48 )  30 ng/L / x     / x     / x     / x     / x      CARDIAC MARKERS ( 04 Dec 2019 11:21 )  33 ng/L / x     / x     / x     / x     / x        Serum Pro-Brain Natriuretic Peptide: 1121 pg/mL (12-04 @ 11:21)    Interpretation of Telemetry: sinus tachycardia , PVCs, couplets, trigeminy Overnight Events: no acute events overnight. Pt states that he feels improved, able to walk across his room. Has increased urinary output on lasix.            Review Of Systems:   CONSTITUTIONAL: no fevers or chills  EYES/ENT: No visual changes;  No vertigo or throat pain   NECK: No pain or stiffness  RESPIRATORY: +cough, SOB  CARDIOVASCULAR: +CP, improved from yesterday  GASTROINTESTINAL: No abdominal or epigastric pain. No nausea, vomiting. No diarrhea. No melena.  GENITOURINARY: No dysuria, frequency or hematuria  NEUROLOGICAL: No numbness or weakness  SKIN: No itching, burning, rashes, or lesions   All other review of systems is negative unless indicated above.     Current Meds:  carvedilol 12.5 milliGRAM(s) Oral every 12 hours  furosemide   Injectable 40 milliGRAM(s) IV Push two times a day  heparin  Injectable 5000 Unit(s) SubCutaneous every 8 hours  hydrALAZINE 75 milliGRAM(s) Oral every 8 hours  influenza   Vaccine 0.5 milliLiter(s) IntraMuscular once  levalbuterol Inhalation 0.63 milliGRAM(s) Inhalation every 4 hours  losartan 100 milliGRAM(s) Oral daily  spironolactone 25 milliGRAM(s) Oral daily      Vitals:  T(F): 97.5 (12-05), Max: 98.2 (12-04)  HR: 102 (12-05) (96 - 119)  BP: 134/102 (12-05) (134/102 - 173/116)  RR: 18 (12-05)  SpO2: 95% (12-05)      Physical Exam:  Appearance: No acute distress; well appearing  Eyes: PERRL, EOMI, pink conjunctiva  HEENT: Normal oral mucosa  Cardiovascular: RRR, S1, S2, no murmurs, rubs, or gallops; cannot appreciate JVD because of body habitus  Respiratory: poor inspiratory effort, decreased air entry, bibasilar crackles  Gastrointestinal: soft, non-tender, non-distended with normal bowel sounds  Musculoskeletal: No clubbing; no joint deformity   Extremities: 1+ pitting edema in LE b/l, warm, well perfused, dorsalis pedis pulses intact  Neurologic: Non-focal  Lymphatic: No lymphadenopathy  Psychiatry: AAOx3, mood & affect appropriate  Skin: No rashes, ecchymoses, or cyanosis      I&O's Summary    04 Dec 2019 07:01  -  05 Dec 2019 07:00  --------------------------------------------------------  IN: 600 mL / OUT: 0 mL / NET: 600 mL    05 Dec 2019 07:01  -  05 Dec 2019 16:41  --------------------------------------------------------  IN: 240 mL / OUT: 2275 mL / NET: -2035 mL        LABS:                      14.8   18.30 )-----------( 230      ( 05 Dec 2019 07:20 )             44.7     12-05    139  |  104  |  16  ----------------------------<  141<H>  4.7   |  22  |  1.04    Ca    9.1      05 Dec 2019 07:20  Phos  4.2     12-05  Mg     2.1     12-05    TPro  7.4  /  Alb  3.9  /  TBili  0.7  /  DBili  x   /  AST  25  /  ALT  41  /  AlkPhos  69  12-05    PT/INR - ( 04 Dec 2019 11:21 )   PT: 12.5 sec;   INR: 1.08 ratio    PTT - ( 04 Dec 2019 11:21 )  PTT:23.0 sec    CARDIAC MARKERS ( 04 Dec 2019 13:48 )  30 ng/L / x     / x     / x     / x     / x      CARDIAC MARKERS ( 04 Dec 2019 11:21 )  33 ng/L / x     / x     / x     / x     / x        Serum Pro-Brain Natriuretic Peptide: 1121 pg/mL (12-04 @ 11:21)      Interpretation of Telemetry: sinus tachycardia , PVCs, couplets, trigeminy      Transthoracic Echocardiogram (12.05.19 @ 10:27) >      Patient name: JOSE ARMANDO SANTIAGO  YOB: 1980   Age: 39 (M)   MR#: [id number]  Study Date: 12/5/2019  [facility]  Study quality: Technically fair  Referring Physician: Evin Hooper MD  ---------------  Dimensions:    Normal Values:  LA:     5.1    2.0 - 4.0 cm  Ao:     3.6    2.0 - 3.8 cm  SEPTUM: 1.3    0.6 - 1.2 cm  PWT:    0.9    0.6 - 1.1 cm  LVIDd:  6.5    3.0 - 5.6 cm  LVIDs:  5.5    1.8 - 4.0 cm  Derived variables:  LVMI: 125 g/m2  RWT: 0.27  Fractional short: 15 %  EF (Visual Estimate): 30 %  ------------------------------------------------------------------------  Observations:  Mitral Valve: Tethered mitral valve leaflets with normal opening. Mild mitral regurgitation.  Aortic Valve/Aorta: Normal trileaflet aortic valve. No aortic valve regurgitation seen.   Aortic Annulus: 2.1 cm. Aortic Root: 3.6 cm. LVOT diameter: 2.4 cm.  Left Atrium: Normal left atrium.  LA volume index = 29 cc/m2.  Left Ventricle: Moderate-severe global left ventricular systolic dysfunction. EF approximately 30% by visual estimation. Recommend repeat imaging with intravenous echo contrast for EF assessment if clinically indicated. There is a false tendon in the LV cavity (normal variant).  Eccentric left ventricular hypertrophy (dilated left ventricle with normal relative wall thickness).  Right Heart: Normal right atrium. The right ventricle is not well visualized; grossly normal right ventricular size with decreased systolic function. Tricuspid valve not well visualized, probably normal. Mild tricuspid regurgitation. Normal pulmonic valve. Minimal pulmonic regurgitation.   Pericardium/Pleura: Normal pericardium with no pericardial effusion.  Hemodynamic: Estimated right atrial pressure is 8 mm Hg. Estimated right ventricular systolic pressure equals 29 mmHg, assuming right atrial pressure equals 8 mm Hg, consistent with normal pulmonary pressures.  ------------------------------------------------------------------------  Conclusions:  1. Tethered mitral valve leaflets with normal opening. Mild mitral regurgitation.  2. Normal trileaflet aortic valve. No aortic valve regurgitation seen.  3. Eccentric left ventricular hypertrophy (dilated left ventricle with normal relative wall thickness).  4. Moderate-severe global left ventricular systolic dysfunction. EF approximately 30% by visual estimation. Recommend repeat imaging with intravenous echo contrast for EF assessment if clinically indicated. There is a false tendon in the LV cavity (normal variant).   5. The right ventricle is not well visualized; grossly normal right ventricular size with decreased systolic function.  *** No previous Echo exam.  ------------------------------------------------------------------------  Confirmed on  12/5/2019 - 13:45:53 by Isabell Armstrong M.D.  ------------------------------------------------------------------------

## 2019-12-05 NOTE — DIETITIAN INITIAL EVALUATION ADULT. - PERTINENT MEDS FT
carvedilol 12.5  furosemide   Injectable 40  heparin  Injectable 5000  hydrALAZINE 75  influenza   Vaccine 0.5  levalbuterol Inhalation 0.63  losartan 100  spironolactone 25

## 2019-12-05 NOTE — PROGRESS NOTE ADULT - PROBLEM SELECTOR PLAN 4
CHF vs sarcoid vs infection vs pulmonary hypertension. Unclear of etiology giving history of incarceration + environmental exposure. Chest x-ray shows no acute processes but is suspicious for hilar lymphadenopathy. Will further eval with CT chest.  - f/u ACE levels, HIV, quant. CHF vs sarcoid vs infection vs pulmonary hypertension. Unclear of etiology giving history of incarceration + environmental exposure. Chest x-ray shows no acute processes but is suspicious for hilar lymphadenopathy.   - Ct shows small area of scattered nodular opacity with a tree-in-bud configuration   of the left lower lobe, likely represents mucoid impacted distal airways and  Emphysema.  - HIV neg.   - f/u ACE levels + quant.

## 2019-12-05 NOTE — PROGRESS NOTE ADULT - SUBJECTIVE AND OBJECTIVE BOX
PROGRESS NOTE:   Authoted by Jacob Soares MS4,  Pager [phone]    Patient is a 39y old  Male who presents with a chief complaint of CP, SOB (04 Dec 2019 17:14)      SUBJECTIVE / OVERNIGHT EVENTS:  Patient seen and examained at bedside.     ADDITIONAL REVIEW OF SYSTEMS:    MEDICATIONS  (STANDING):  carvedilol 6.25 milliGRAM(s) Oral every 12 hours  heparin  Injectable 5000 Unit(s) SubCutaneous every 8 hours  hydrALAZINE 50 milliGRAM(s) Oral every 8 hours  influenza   Vaccine 0.5 milliLiter(s) IntraMuscular once  levalbuterol Inhalation 0.63 milliGRAM(s) Inhalation every 4 hours  losartan 100 milliGRAM(s) Oral daily  spironolactone 25 milliGRAM(s) Oral daily    MEDICATIONS  (PRN):      CAPILLARY BLOOD GLUCOSE        I&O's Summary    04 Dec 2019 07:01  -  05 Dec 2019 07:00  --------------------------------------------------------  IN: 600 mL / OUT: 0 mL / NET: 600 mL        PHYSICAL EXAM:  Vital Signs Last 24 Hrs  T(C): 36.4 (05 Dec 2019 04:56), Max: 36.8 (04 Dec 2019 17:55)  T(F): 97.5 (05 Dec 2019 04:56), Max: 98.2 (04 Dec 2019 17:55)  HR: 96 (05 Dec 2019 04:56) (96 - 119)  BP: 150/110 (05 Dec 2019 04:56) (142/120 - 173/116)  BP(mean): --  RR: 18 (05 Dec 2019 04:56) (14 - 24)  SpO2: 98% (05 Dec 2019 04:56) (95% - 100%)    CONSTITUTIONAL: NAD, well-developed  RESPIRATORY: Normal respiratory effort; lungs are clear to auscultation bilaterally  CARDIOVASCULAR: Regular rate and rhythm, normal S1 and S2, no murmur/rub/gallop; No lower extremity edema; Peripheral pulses are 2+ bilaterally  ABDOMEN: Nontender to palpation, normoactive bowel sounds, no rebound/guarding; No hepatosplenomegaly  MUSCLOSKELETAL: no clubbing or cyanosis of digits; no joint swelling or tenderness to palpation  PSYCH: A+O to person, place, and time; affect appropriate    LABS:                        14.5   10.61 )-----------( 189      ( 04 Dec 2019 11:21 )             45.3     12-04    141  |  103  |  11  ----------------------------<  112<H>  4.5   |  26  |  1.23    Ca    9.1      04 Dec 2019 11:21    TPro  7.4  /  Alb  3.9  /  TBili  0.5  /  DBili  x   /  AST  31  /  ALT  46<H>  /  AlkPhos  72  12-04    PT/INR - ( 04 Dec 2019 11:21 )   PT: 12.5 sec;   INR: 1.08 ratio         PTT - ( 04 Dec 2019 11:21 )  PTT:23.0 sec            RADIOLOGY & ADDITIONAL TESTS:  Results Reviewed:   Imaging Personally Reviewed:  Electrocardiogram Personally Reviewed:    COORDINATION OF CARE:  Care Discussed with Consultants/Other Providers [Y/N]:  Prior or Outpatient Records Reviewed [Y/N]: PROGRESS NOTE:   Authoted by Jacob Soares MS4,  Pager [phone]    Patient is a 39y old  Male who presents with a chief complaint of CP, SOB (04 Dec 2019 17:14)      SUBJECTIVE / OVERNIGHT EVENTS:  Patient seen and examined at bedside. Patient seen and examined at bedside. Pt states he feels slightly improved, still having some SOB and CP. He has not been urinating very much. Denies any lightheadedness, fevers, abdominal pain, urinary changes, bowel changes, or calf tenderness     ADDITIONAL REVIEW OF SYSTEMS:    MEDICATIONS  (STANDING):  carvedilol 6.25 milliGRAM(s) Oral every 12 hours  heparin  Injectable 5000 Unit(s) SubCutaneous every 8 hours  hydrALAZINE 50 milliGRAM(s) Oral every 8 hours  influenza   Vaccine 0.5 milliLiter(s) IntraMuscular once  levalbuterol Inhalation 0.63 milliGRAM(s) Inhalation every 4 hours  losartan 100 milliGRAM(s) Oral daily  spironolactone 25 milliGRAM(s) Oral daily    MEDICATIONS  (PRN):      CAPILLARY BLOOD GLUCOSE        I&O's Summary    04 Dec 2019 07:01  -  05 Dec 2019 07:00  --------------------------------------------------------  IN: 600 mL / OUT: 0 mL / NET: 600 mL        PHYSICAL EXAM:  Vital Signs Last 24 Hrs  T(C): 36.4 (05 Dec 2019 04:56), Max: 36.8 (04 Dec 2019 17:55)  T(F): 97.5 (05 Dec 2019 04:56), Max: 98.2 (04 Dec 2019 17:55)  HR: 96 (05 Dec 2019 04:56) (96 - 119)  BP: 150/110 (05 Dec 2019 04:56) (142/120 - 173/116)  BP(mean): --  RR: 18 (05 Dec 2019 04:56) (14 - 24)  SpO2: 98% (05 Dec 2019 04:56) (95% - 100%)    CONSTITUTIONAL: NAD, well-developed  RESPIRATORY: Normal respiratory effort; lungs are clear to auscultation bilaterally  CARDIOVASCULAR: Regular rate and rhythm, normal S1 and S2, no murmur/rub/gallop; No lower extremity edema; Peripheral pulses are 2+ bilaterally  ABDOMEN: Nontender to palpation, normoactive bowel sounds, no rebound/guarding; No hepatosplenomegaly  MUSCLOSKELETAL: no clubbing or cyanosis of digits; no joint swelling or tenderness to palpation  PSYCH: A+O to person, place, and time; affect appropriate    LABS:                        14.5   10.61 )-----------( 189      ( 04 Dec 2019 11:21 )             45.3     12-04    141  |  103  |  11  ----------------------------<  112<H>  4.5   |  26  |  1.23    Ca    9.1      04 Dec 2019 11:21    TPro  7.4  /  Alb  3.9  /  TBili  0.5  /  DBili  x   /  AST  31  /  ALT  46<H>  /  AlkPhos  72  12-04    PT/INR - ( 04 Dec 2019 11:21 )   PT: 12.5 sec;   INR: 1.08 ratio         PTT - ( 04 Dec 2019 11:21 )  PTT:23.0 sec            RADIOLOGY & ADDITIONAL TESTS:  Results Reviewed:   Imaging Personally Reviewed:  Electrocardiogram Personally Reviewed:    COORDINATION OF CARE:  Care Discussed with Consultants/Other Providers [Y/N]:  Prior or Outpatient Records Reviewed [Y/N]: PROGRESS NOTE:   Authoted by Jacob Soares MS4,  Pager [phone]    Patient is a 39y old  Male who presents with a chief complaint of CP, SOB (04 Dec 2019 17:14)      SUBJECTIVE / OVERNIGHT EVENTS:  Patient seen and examined at bedside. Patient seen and examined at bedside. Pt states he feels slightly improved, still having some SOB and CP. He has not been urinating very much. Denies any lightheadedness, fevers, abdominal pain, urinary changes, bowel changes, or calf tenderness     ADDITIONAL REVIEW OF SYSTEMS:  CONSTITUTIONAL: No weakness, fevers or chills  EYES/ENT: No visual changes;  No throat pain   NECK: No pain or stiffness  RESPIRATORY: +mild cough/SOB, No wheezing, hemoptysis;  CARDIOVASCULAR: +mild chest pain, no palpitations  GASTROINTESTINAL: No abdominal or epigastric pain. No nausea, vomiting, or hematemesis; No diarrhea or constipation. No melena or hematochezia.  GENITOURINARY: No dysuria, frequency or hematuria  NEUROLOGICAL: No numbness or weakness  SKIN: No itching, burning, rashes, or lesions   All other review of systems is negative unless indicated above.        MEDICATIONS  (STANDING):  carvedilol 6.25 milliGRAM(s) Oral every 12 hours  heparin  Injectable 5000 Unit(s) SubCutaneous every 8 hours  hydrALAZINE 50 milliGRAM(s) Oral every 8 hours  influenza   Vaccine 0.5 milliLiter(s) IntraMuscular once  levalbuterol Inhalation 0.63 milliGRAM(s) Inhalation every 4 hours  losartan 100 milliGRAM(s) Oral daily  spironolactone 25 milliGRAM(s) Oral daily    MEDICATIONS  (PRN):      CAPILLARY BLOOD GLUCOSE        I&O's Summary    04 Dec 2019 07:01  -  05 Dec 2019 07:00  --------------------------------------------------------  IN: 600 mL / OUT: 0 mL / NET: 600 mL        PHYSICAL EXAM:  Vital Signs Last 24 Hrs  T(C): 36.4 (05 Dec 2019 04:56), Max: 36.8 (04 Dec 2019 17:55)  T(F): 97.5 (05 Dec 2019 04:56), Max: 98.2 (04 Dec 2019 17:55)  HR: 96 (05 Dec 2019 04:56) (96 - 119)  BP: 150/110 (05 Dec 2019 04:56) (142/120 - 173/116)  BP(mean): --  RR: 18 (05 Dec 2019 04:56) (14 - 24)  SpO2: 98% (05 Dec 2019 04:56) (95% - 100%)    CONSTITUTIONAL: NAD, well-developed  RESPIRATORY: Bilateral wheezing throughout.  CARDIOVASCULAR: Regular rate and rhythm, normal S1 and S2, no murmur/rub/gallop; 2+ bilateral pedal edema. Peripheral pulses are 2+ bilaterally  ABDOMEN: Nontender to palpation, normoactive bowel sounds, no rebound/guarding; No hepatosplenomegaly  MUSCLOSKELETAL: no clubbing or cyanosis of digits; no joint swelling or tenderness to palpation  PSYCH: A+O to person, place, and time; affect appropriate  Neuro: PERRLA, CN grossly intact    LABS:                        14.5   10.61 )-----------( 189      ( 04 Dec 2019 11:21 )             45.3     12-04    141  |  103  |  11  ----------------------------<  112<H>  4.5   |  26  |  1.23    Ca    9.1      04 Dec 2019 11:21    TPro  7.4  /  Alb  3.9  /  TBili  0.5  /  DBili  x   /  AST  31  /  ALT  46<H>  /  AlkPhos  72  12-04    PT/INR - ( 04 Dec 2019 11:21 )   PT: 12.5 sec;   INR: 1.08 ratio         PTT - ( 04 Dec 2019 11:21 )  PTT:23.0 sec            RADIOLOGY & ADDITIONAL TESTS:  Results Reviewed:   Imaging Personally Reviewed:  Electrocardiogram Personally Reviewed:    COORDINATION OF CARE:  Care Discussed with Consultants/Other Providers [Y/N]:  Prior or Outpatient Records Reviewed [Y/N]:

## 2019-12-05 NOTE — PROGRESS NOTE ADULT - PROBLEM SELECTOR PLAN 3
-Pt complaining of sharp, nonraditing, exertional CP. Most likely 2/2 CHF exacerbation. Trops neg, EKG normal, low risk for MI. D-dimer WNL, low risk for PE.   - Cards with further assess with right and left heart cath and TTE. -Pt complaining of sharp, nonraditing, exertional CP. Most likely 2/2 CHF exacerbation. Trops neg, EKG normal, low risk for MI. D-dimer WNL, low risk for PE.   - Cards with further assess with right and left heart cath

## 2019-12-06 DIAGNOSIS — Z71.89 OTHER SPECIFIED COUNSELING: ICD-10-CM

## 2019-12-06 LAB
ACE SERPL-CCNC: 56 U/L — SIGNIFICANT CHANGE UP (ref 14–82)
ALBUMIN SERPL ELPH-MCNC: 3.9 G/DL — SIGNIFICANT CHANGE UP (ref 3.3–5)
ALP SERPL-CCNC: 62 U/L — SIGNIFICANT CHANGE UP (ref 40–120)
ALT FLD-CCNC: 33 U/L — SIGNIFICANT CHANGE UP (ref 10–45)
ANION GAP SERPL CALC-SCNC: 13 MMOL/L — SIGNIFICANT CHANGE UP (ref 5–17)
AST SERPL-CCNC: 23 U/L — SIGNIFICANT CHANGE UP (ref 10–40)
BASOPHILS # BLD AUTO: 0.02 K/UL — SIGNIFICANT CHANGE UP (ref 0–0.2)
BASOPHILS NFR BLD AUTO: 0.1 % — SIGNIFICANT CHANGE UP (ref 0–2)
BILIRUB SERPL-MCNC: 0.7 MG/DL — SIGNIFICANT CHANGE UP (ref 0.2–1.2)
BUN SERPL-MCNC: 22 MG/DL — SIGNIFICANT CHANGE UP (ref 7–23)
CALCIUM SERPL-MCNC: 8.8 MG/DL — SIGNIFICANT CHANGE UP (ref 8.4–10.5)
CHLORIDE SERPL-SCNC: 100 MMOL/L — SIGNIFICANT CHANGE UP (ref 96–108)
CO2 SERPL-SCNC: 25 MMOL/L — SIGNIFICANT CHANGE UP (ref 22–31)
CREAT SERPL-MCNC: 1.25 MG/DL — SIGNIFICANT CHANGE UP (ref 0.5–1.3)
EOSINOPHIL # BLD AUTO: 0.11 K/UL — SIGNIFICANT CHANGE UP (ref 0–0.5)
EOSINOPHIL NFR BLD AUTO: 0.8 % — SIGNIFICANT CHANGE UP (ref 0–6)
GLUCOSE SERPL-MCNC: 108 MG/DL — HIGH (ref 70–99)
HBA1C BLD-MCNC: 6.4 % — HIGH (ref 4–5.6)
HCT VFR BLD CALC: 45.8 % — SIGNIFICANT CHANGE UP (ref 39–50)
HGB BLD-MCNC: 15.2 G/DL — SIGNIFICANT CHANGE UP (ref 13–17)
IMM GRANULOCYTES NFR BLD AUTO: 0.5 % — SIGNIFICANT CHANGE UP (ref 0–1.5)
LYMPHOCYTES # BLD AUTO: 1.95 K/UL — SIGNIFICANT CHANGE UP (ref 1–3.3)
LYMPHOCYTES # BLD AUTO: 13.3 % — SIGNIFICANT CHANGE UP (ref 13–44)
MAGNESIUM SERPL-MCNC: 2.3 MG/DL — SIGNIFICANT CHANGE UP (ref 1.6–2.6)
MCHC RBC-ENTMCNC: 28.8 PG — SIGNIFICANT CHANGE UP (ref 27–34)
MCHC RBC-ENTMCNC: 33.2 GM/DL — SIGNIFICANT CHANGE UP (ref 32–36)
MCV RBC AUTO: 86.9 FL — SIGNIFICANT CHANGE UP (ref 80–100)
MONOCYTES # BLD AUTO: 1.08 K/UL — HIGH (ref 0–0.9)
MONOCYTES NFR BLD AUTO: 7.4 % — SIGNIFICANT CHANGE UP (ref 2–14)
NEUTROPHILS # BLD AUTO: 11.39 K/UL — HIGH (ref 1.8–7.4)
NEUTROPHILS NFR BLD AUTO: 77.9 % — HIGH (ref 43–77)
NRBC # BLD: 0 /100 WBCS — SIGNIFICANT CHANGE UP (ref 0–0)
PHOSPHATE SERPL-MCNC: 4.5 MG/DL — SIGNIFICANT CHANGE UP (ref 2.5–4.5)
PLATELET # BLD AUTO: 248 K/UL — SIGNIFICANT CHANGE UP (ref 150–400)
POTASSIUM SERPL-MCNC: 4.1 MMOL/L — SIGNIFICANT CHANGE UP (ref 3.5–5.3)
POTASSIUM SERPL-SCNC: 4.1 MMOL/L — SIGNIFICANT CHANGE UP (ref 3.5–5.3)
PROT SERPL-MCNC: 7.1 G/DL — SIGNIFICANT CHANGE UP (ref 6–8.3)
RBC # BLD: 5.27 M/UL — SIGNIFICANT CHANGE UP (ref 4.2–5.8)
RBC # FLD: 15.1 % — HIGH (ref 10.3–14.5)
SODIUM SERPL-SCNC: 138 MMOL/L — SIGNIFICANT CHANGE UP (ref 135–145)
WBC # BLD: 14.62 K/UL — HIGH (ref 3.8–10.5)
WBC # FLD AUTO: 14.62 K/UL — HIGH (ref 3.8–10.5)

## 2019-12-06 PROCEDURE — 99233 SBSQ HOSP IP/OBS HIGH 50: CPT | Mod: GC

## 2019-12-06 PROCEDURE — 99232 SBSQ HOSP IP/OBS MODERATE 35: CPT | Mod: GC

## 2019-12-06 RX ORDER — IPRATROPIUM/ALBUTEROL SULFATE 18-103MCG
3 AEROSOL WITH ADAPTER (GRAM) INHALATION EVERY 6 HOURS
Refills: 0 | Status: DISCONTINUED | OUTPATIENT
Start: 2019-12-06 | End: 2019-12-07

## 2019-12-06 RX ORDER — ISOSORBIDE DINITRATE 5 MG/1
10 TABLET ORAL THREE TIMES A DAY
Refills: 0 | Status: DISCONTINUED | OUTPATIENT
Start: 2019-12-06 | End: 2019-12-12

## 2019-12-06 RX ADMIN — Medication 40 MILLIGRAM(S): at 05:24

## 2019-12-06 RX ADMIN — ISOSORBIDE DINITRATE 10 MILLIGRAM(S): 5 TABLET ORAL at 14:31

## 2019-12-06 RX ADMIN — LEVALBUTEROL 0.63 MILLIGRAM(S): 1.25 SOLUTION, CONCENTRATE RESPIRATORY (INHALATION) at 05:25

## 2019-12-06 RX ADMIN — Medication 40 MILLIGRAM(S): at 17:15

## 2019-12-06 RX ADMIN — Medication 75 MILLIGRAM(S): at 14:21

## 2019-12-06 RX ADMIN — Medication 3 MILLILITER(S): at 17:17

## 2019-12-06 RX ADMIN — LEVALBUTEROL 0.63 MILLIGRAM(S): 1.25 SOLUTION, CONCENTRATE RESPIRATORY (INHALATION) at 02:44

## 2019-12-06 RX ADMIN — ISOSORBIDE DINITRATE 10 MILLIGRAM(S): 5 TABLET ORAL at 21:10

## 2019-12-06 RX ADMIN — CARVEDILOL PHOSPHATE 12.5 MILLIGRAM(S): 80 CAPSULE, EXTENDED RELEASE ORAL at 17:15

## 2019-12-06 RX ADMIN — LOSARTAN POTASSIUM 100 MILLIGRAM(S): 100 TABLET, FILM COATED ORAL at 05:24

## 2019-12-06 RX ADMIN — Medication 75 MILLIGRAM(S): at 21:10

## 2019-12-06 RX ADMIN — HEPARIN SODIUM 5000 UNIT(S): 5000 INJECTION INTRAVENOUS; SUBCUTANEOUS at 21:10

## 2019-12-06 RX ADMIN — Medication 3 MILLILITER(S): at 12:42

## 2019-12-06 RX ADMIN — HEPARIN SODIUM 5000 UNIT(S): 5000 INJECTION INTRAVENOUS; SUBCUTANEOUS at 05:24

## 2019-12-06 RX ADMIN — CARVEDILOL PHOSPHATE 12.5 MILLIGRAM(S): 80 CAPSULE, EXTENDED RELEASE ORAL at 05:25

## 2019-12-06 RX ADMIN — Medication 3 MILLILITER(S): at 23:31

## 2019-12-06 RX ADMIN — Medication 40 MILLIGRAM(S): at 10:46

## 2019-12-06 RX ADMIN — HEPARIN SODIUM 5000 UNIT(S): 5000 INJECTION INTRAVENOUS; SUBCUTANEOUS at 14:21

## 2019-12-06 RX ADMIN — SPIRONOLACTONE 25 MILLIGRAM(S): 25 TABLET, FILM COATED ORAL at 05:24

## 2019-12-06 RX ADMIN — Medication 75 MILLIGRAM(S): at 05:25

## 2019-12-06 NOTE — PROGRESS NOTE ADULT - PROBLEM SELECTOR PLAN 4
CHF vs sarcoid vs infection vs pulmonary hypertension. Unclear of etiology giving history of incarceration + environmental exposure. Chest x-ray shows no acute processes but is suspicious for hilar lymphadenopathy.   - Ct shows small area of scattered nodular opacity with a tree-in-bud configuration   of the left lower lobe, likely represents mucoid impacted distal airways and  Emphysema.  - HIV neg.   - f/u ACE levels + quant.

## 2019-12-06 NOTE — PROGRESS NOTE ADULT - PROBLEM SELECTOR PLAN 6
- Fall precautions. - f/u A1c and lipid panel - A1c= 6.4, prediabetic diet and exercise  - f/u lipid panel

## 2019-12-06 NOTE — PROGRESS NOTE ADULT - SUBJECTIVE AND OBJECTIVE BOX
24H hour events:     MEDICATIONS:  carvedilol 12.5 milliGRAM(s) Oral every 12 hours  furosemide   Injectable 40 milliGRAM(s) IV Push two times a day  heparin  Injectable 5000 Unit(s) SubCutaneous every 8 hours  hydrALAZINE 75 milliGRAM(s) Oral every 8 hours  losartan 100 milliGRAM(s) Oral daily  spironolactone 25 milliGRAM(s) Oral daily      levalbuterol Inhalation 0.63 milliGRAM(s) Inhalation every 4 hours          influenza   Vaccine 0.5 milliLiter(s) IntraMuscular once        PHYSICAL EXAM:  T(C): 36.4 (12-06-19 @ 04:56), Max: 36.6 (12-05-19 @ 20:00)  HR: 86 (12-06-19 @ 04:56) (79 - 102)  BP: 129/67 (12-06-19 @ 04:56) (115/77 - 135/102)  RR: 18 (12-06-19 @ 04:56) (18 - 18)  SpO2: 96% (12-06-19 @ 04:56) (95% - 97%)  CVP(mm Hg): --  I&O's Summary    05 Dec 2019 07:01  -  06 Dec 2019 07:00  --------------------------------------------------------  IN: 290 mL / OUT: 3125 mL / NET: -2835 mL        Appearance: Normal	  HEENT:   Normal oral mucosa  Cardiovascular: normal rate, regular rhythm, audible S1 and S2, no murmurs, rubs, or gallops, no JVD, no edema  Respiratory: Lungs clear to auscultation	  Psychiatry: Mood & affect appropriate  Gastrointestinal:  Soft  Skin: No rashes, No ecchymoses, No cyanosis	  Neurologic: Non-focal  Extremities: No clubbing, cyanosis or edema  Vascular: Peripheral pulses palpable         LABS:	 	    CBC Full  -  ( 06 Dec 2019 06:54 )  WBC Count : 14.62 K/uL  Hemoglobin : 15.2 g/dL  Hematocrit : 45.8 %  Platelet Count - Automated : 248 K/uL  Mean Cell Volume : 86.9 fl  Mean Cell Hemoglobin : 28.8 pg  Mean Cell Hemoglobin Concentration : 33.2 gm/dL  Auto Neutrophil # : 11.39 K/uL  Auto Lymphocyte # : 1.95 K/uL  Auto Monocyte # : 1.08 K/uL  Auto Eosinophil # : 0.11 K/uL  Auto Basophil # : 0.02 K/uL  Auto Neutrophil % : 77.9 %  Auto Lymphocyte % : 13.3 %  Auto Monocyte % : 7.4 %  Auto Eosinophil % : 0.8 %  Auto Basophil % : 0.1 %    12-06    138  |  100  |  22  ----------------------------<  108<H>  4.1   |  25  |  1.25  12-05    139  |  104  |  16  ----------------------------<  141<H>  4.7   |  22  |  1.04    Ca    8.8      06 Dec 2019 06:54  Ca    9.1      05 Dec 2019 07:20  Phos  4.5     12-06  Phos  4.2     12-05  Mg     2.3     12-06  Mg     2.1     12-05    TPro  7.1  /  Alb  3.9  /  TBili  0.7  /  DBili  x   /  AST  23  /  ALT  33  /  AlkPhos  62  12-06  TPro  7.4  /  Alb  3.9  /  TBili  0.7  /  DBili  x   /  AST  25  /  ALT  41  /  AlkPhos  69  12-05      proBNP: Serum Pro-Brain Natriuretic Peptide: 1121 pg/mL (12-04-19 @ 11:21)        TELEMETRY: 	        PREVIOUS DIAGNOSTIC TESTING:    [ ] Echocardiogram:  [ ]  Catheterization:  [ ] Stress Test:  	  	  ASSESSMENT/PLAN: 39 M w/ h/o htn, asthma, cxr in 2/2019 suggestive of sarcoid, and cardiomyopathy w/ EF 22% on cardiac MRI 4/2/19.  P/W worsening SOB and CP of 2 m.o. duration. Pt has elevated BNP and stable trops with negative d-dimer, low likelihood of PE and ACS. Cardiac MRI performed after cxr on 2/2019 is not indicative of sarcoid.  Will need to r/o ischemic cause in this young pt with extensive smoking history; could also be on basis of hypertensive dz.  No evidence of cardiac sarcoid reported on cardiac MRI.     1) HFrEF  - Cause of reduced EF could be 2/2 ischemic vs. nonischemic etiology (eg. hypertensive cardiomyopathy)  - f/u ACE level and CT chest   - c/w Lasix 40 IV BID, pt diuresing well  - c/w home medications: carvedilol 6.25 BID, losartan  - c/w spironolactone 25 mg qd  - continue strict I&Os, daily weights  - would order limited echo with Definity to further assess LV function  - when pt is medically optimized and diuresed, will need right and left heart cath to evaluate for ischemic cause of heart failure  - will hold off on workup for other nonischemic causes for now    2) HTN  -c/w pt's home dose hydralazine 50 PO TID, losartan, and Coreg  - anticipate BP will also improve with ongoing diuresis.  	        Cameron Jeong  Cardiology Fellow  Please feel free to contact me at [phone] (text or call) at any time.   NOTE: All Cardiology Service and Contact information can now be found at [email or web address], password: [email or web address] 24H hour events:     MEDICATIONS:  carvedilol 12.5 milliGRAM(s) Oral every 12 hours  furosemide   Injectable 40 milliGRAM(s) IV Push two times a day  heparin  Injectable 5000 Unit(s) SubCutaneous every 8 hours  hydrALAZINE 75 milliGRAM(s) Oral every 8 hours  losartan 100 milliGRAM(s) Oral daily  spironolactone 25 milliGRAM(s) Oral daily      levalbuterol Inhalation 0.63 milliGRAM(s) Inhalation every 4 hours          influenza   Vaccine 0.5 milliLiter(s) IntraMuscular once        PHYSICAL EXAM:  T(C): 36.4 (12-06-19 @ 04:56), Max: 36.6 (12-05-19 @ 20:00)  HR: 86 (12-06-19 @ 04:56) (79 - 102)  BP: 129/67 (12-06-19 @ 04:56) (115/77 - 135/102)  RR: 18 (12-06-19 @ 04:56) (18 - 18)  SpO2: 96% (12-06-19 @ 04:56) (95% - 97%)  I&O's Summary    05 Dec 2019 07:01  -  06 Dec 2019 07:00  --------------------------------------------------------  IN: 290 mL / OUT: 3125 mL / NET: -2835 mL        Appearance: Normal	  HEENT:   Normal oral mucosa  Cardiovascular: normal rate, regular rhythm, audible S1 and S2, no murmurs, rubs, or gallops, no JVD, no edema  Respiratory: Lungs clear to auscultation	  Psychiatry: Mood & affect appropriate  Gastrointestinal:  Soft  Skin: No rashes, No ecchymoses, No cyanosis	  Neurologic: Non-focal  Extremities: No clubbing, cyanosis or edema  Vascular: Peripheral pulses palpable         LABS:	 	    CBC Full  -  ( 06 Dec 2019 06:54 )  WBC Count : 14.62 K/uL  Hemoglobin : 15.2 g/dL  Hematocrit : 45.8 %  Platelet Count - Automated : 248 K/uL  Mean Cell Volume : 86.9 fl  Mean Cell Hemoglobin : 28.8 pg  Mean Cell Hemoglobin Concentration : 33.2 gm/dL  Auto Neutrophil # : 11.39 K/uL  Auto Lymphocyte # : 1.95 K/uL  Auto Monocyte # : 1.08 K/uL  Auto Eosinophil # : 0.11 K/uL  Auto Basophil # : 0.02 K/uL  Auto Neutrophil % : 77.9 %  Auto Lymphocyte % : 13.3 %  Auto Monocyte % : 7.4 %  Auto Eosinophil % : 0.8 %  Auto Basophil % : 0.1 %    12-06    138  |  100  |  22  ----------------------------<  108<H>  4.1   |  25  |  1.25  12-05    139  |  104  |  16  ----------------------------<  141<H>  4.7   |  22  |  1.04    Ca    8.8      06 Dec 2019 06:54  Ca    9.1      05 Dec 2019 07:20  Phos  4.5     12-06  Phos  4.2     12-05  Mg     2.3     12-06  Mg     2.1     12-05    TPro  7.1  /  Alb  3.9  /  TBili  0.7  /  DBili  x   /  AST  23  /  ALT  33  /  AlkPhos  62  12-06  TPro  7.4  /  Alb  3.9  /  TBili  0.7  /  DBili  x   /  AST  25  /  ALT  41  /  AlkPhos  69  12-05      proBNP: Serum Pro-Brain Natriuretic Peptide: 1121 pg/mL (12-04-19 @ 11:21)        TELEMETRY: 	        	  ASSESSMENT/PLAN: 39 M w/ h/o htn, asthma, cxr in 2/2019 suggestive of sarcoid, and cardiomyopathy w/ EF 22% on cardiac MRI 4/2/19.  P/W worsening SOB and CP of 2 m.o. duration. Pt has elevated BNP and stable trops with negative d-dimer, low likelihood of PE and ACS. Cardiac MRI performed after cxr on 2/2019 is not indicative of sarcoid.  Will need to r/o ischemic cause in this young pt with extensive smoking history; could also be on basis of hypertensive dz.  No evidence of cardiac sarcoid reported on cardiac MRI.     1) HFrEF  - c/w Lasix 40 IV BID  - c/w home medications: carvedilol 6.25 BID, losartan  - c/w spironolactone 25 mg qd  - continue strict I&Os, daily weights  - would hold off on limited echo with Definity to further assess LV function as it would likely not lead to change in management at this time   - when pt is medically optimized and diuresed, will need right and left heart cath to evaluate for ischemic cause of heart failure  - will hold off on workup for other nonischemic causes for now    2) HTN  -c/w pt's home dose hydralazine 50 PO TID, losartan, and Coreg  - anticipate BP will also improve with ongoing diuresis.  	        Cameron Jeong  Cardiology Fellow  Please feel free to contact me at [phone] (text or call) at any time.   NOTE: All Cardiology Service and Contact information can now be found at [email or web address], password: [email or web address] 24H hour events: No acute events overnight. Reports improvement in respiratory symptoms.    MEDICATIONS:  carvedilol 12.5 milliGRAM(s) Oral every 12 hours  furosemide   Injectable 40 milliGRAM(s) IV Push two times a day  heparin  Injectable 5000 Unit(s) SubCutaneous every 8 hours  hydrALAZINE 75 milliGRAM(s) Oral every 8 hours  losartan 100 milliGRAM(s) Oral daily  spironolactone 25 milliGRAM(s) Oral daily  levalbuterol Inhalation 0.63 milliGRAM(s) Inhalation every 4 hours  influenza   Vaccine 0.5 milliLiter(s) IntraMuscular once        PHYSICAL EXAM:  T(C): 36.4 (12-06-19 @ 04:56), Max: 36.6 (12-05-19 @ 20:00)  HR: 86 (12-06-19 @ 04:56) (79 - 102)  BP: 129/67 (12-06-19 @ 04:56) (115/77 - 135/102)  RR: 18 (12-06-19 @ 04:56) (18 - 18)  SpO2: 96% (12-06-19 @ 04:56) (95% - 97%)  I&O's Summary    05 Dec 2019 07:01  -  06 Dec 2019 07:00  --------------------------------------------------------  IN: 290 mL / OUT: 3125 mL / NET: -2835 mL        Appearance: Normal	  HEENT:   Normal oral mucosa  Cardiovascular: normal rate, regular rhythm, audible S1 and S2, no murmurs, rubs, or gallops, JVP moderately elevated   Respiratory: Lungs clear to auscultation	  Psychiatry: Mood & affect appropriate  Gastrointestinal:  Soft  Skin: No rashes, No ecchymoses, No cyanosis	  Neurologic: Non-focal  Extremities: Trace LE pitting edema  Vascular: Peripheral pulses palpable         LABS:	 	    CBC Full  -  ( 06 Dec 2019 06:54 )  WBC Count : 14.62 K/uL  Hemoglobin : 15.2 g/dL  Hematocrit : 45.8 %  Platelet Count - Automated : 248 K/uL  Mean Cell Volume : 86.9 fl  Mean Cell Hemoglobin : 28.8 pg  Mean Cell Hemoglobin Concentration : 33.2 gm/dL  Auto Neutrophil # : 11.39 K/uL  Auto Lymphocyte # : 1.95 K/uL  Auto Monocyte # : 1.08 K/uL  Auto Eosinophil # : 0.11 K/uL  Auto Basophil # : 0.02 K/uL  Auto Neutrophil % : 77.9 %  Auto Lymphocyte % : 13.3 %  Auto Monocyte % : 7.4 %  Auto Eosinophil % : 0.8 %  Auto Basophil % : 0.1 %    12-06    138  |  100  |  22  ----------------------------<  108<H>  4.1   |  25  |  1.25  12-05    139  |  104  |  16  ----------------------------<  141<H>  4.7   |  22  |  1.04    Ca    8.8      06 Dec 2019 06:54  Ca    9.1      05 Dec 2019 07:20  Phos  4.5     12-06  Phos  4.2     12-05  Mg     2.3     12-06  Mg     2.1     12-05    TPro  7.1  /  Alb  3.9  /  TBili  0.7  /  DBili  x   /  AST  23  /  ALT  33  /  AlkPhos  62  12-06  TPro  7.4  /  Alb  3.9  /  TBili  0.7  /  DBili  x   /  AST  25  /  ALT  41  /  AlkPhos  69  12-05      proBNP: Serum Pro-Brain Natriuretic Peptide: 1121 pg/mL (12-04-19 @ 11:21)        TELEMETRY: Sinus Rhythm in 	        	  ASSESSMENT/PLAN: 39 M w/ h/o htn, asthma, cxr in 2/2019 suggestive of sarcoid, and cardiomyopathy w/ EF 22% on cardiac MRI 4/2/19, though the cause of his cardiomyopathy is unclear. He presented Acute on Chronic Systolic HF, and has responded favorable to diuretics, though he still has evidence to suggest elevated filling pressures.    1) Acute on Chronic Systolic HF  - start Isosorbide Dinitrate 10 mg PO TID   - continue Lasix 40 IV BID  - continue carvedilol 12.5 BID, losartan 100 mg PO Daily   - continue spironolactone 25 mg qd  - continue strict I/Os, daily standing weights  - suggest limited echo with contrast to further assess LV function as it may helpful to assess for recovery and for consideration of AICD implantation   - when optimized and diuresed, will need right and left heart cath to evaluate for ischemic cause of heart failure    2) HTN  - continue medical therapy as above, will likely further titrate as inpatient       Cameron Jeong  Cardiology Fellow  Please feel free to contact me at [phone] (text or call) at any time.   NOTE: All Cardiology Service and Contact information can now be found at [email or web address], password: [email or web address] 24H hour events: No acute events overnight. Reports improvement in respiratory symptoms.    MEDICATIONS:  carvedilol 12.5 milliGRAM(s) Oral every 12 hours  furosemide   Injectable 40 milliGRAM(s) IV Push two times a day  heparin  Injectable 5000 Unit(s) SubCutaneous every 8 hours  hydrALAZINE 75 milliGRAM(s) Oral every 8 hours  losartan 100 milliGRAM(s) Oral daily  spironolactone 25 milliGRAM(s) Oral daily  levalbuterol Inhalation 0.63 milliGRAM(s) Inhalation every 4 hours  influenza   Vaccine 0.5 milliLiter(s) IntraMuscular once        PHYSICAL EXAM:  T(C): 36.4 (12-06-19 @ 04:56), Max: 36.6 (12-05-19 @ 20:00)  HR: 86 (12-06-19 @ 04:56) (79 - 102)  BP: 129/67 (12-06-19 @ 04:56) (115/77 - 135/102)  RR: 18 (12-06-19 @ 04:56) (18 - 18)  SpO2: 96% (12-06-19 @ 04:56) (95% - 97%)  I&O's Summary    05 Dec 2019 07:01  -  06 Dec 2019 07:00  --------------------------------------------------------  IN: 290 mL / OUT: 3125 mL / NET: -2835 mL        Appearance: Normal	  HEENT:   Normal oral mucosa  Cardiovascular: normal rate, regular rhythm, audible S1 and S2, no murmurs, rubs, or gallops, JVP moderately elevated   Respiratory: Lungs clear to auscultation	  Psychiatry: Mood & affect appropriate  Gastrointestinal:  Soft  Skin: No rashes, No ecchymoses, No cyanosis	  Neurologic: Non-focal  Extremities: Trace LE pitting edema  Vascular: Peripheral pulses palpable         LABS:	 	    CBC Full  -  ( 06 Dec 2019 06:54 )  WBC Count : 14.62 K/uL  Hemoglobin : 15.2 g/dL  Hematocrit : 45.8 %  Platelet Count - Automated : 248 K/uL  Mean Cell Volume : 86.9 fl  Mean Cell Hemoglobin : 28.8 pg  Mean Cell Hemoglobin Concentration : 33.2 gm/dL  Auto Neutrophil # : 11.39 K/uL  Auto Lymphocyte # : 1.95 K/uL  Auto Monocyte # : 1.08 K/uL  Auto Eosinophil # : 0.11 K/uL  Auto Basophil # : 0.02 K/uL  Auto Neutrophil % : 77.9 %  Auto Lymphocyte % : 13.3 %  Auto Monocyte % : 7.4 %  Auto Eosinophil % : 0.8 %  Auto Basophil % : 0.1 %    12-06    138  |  100  |  22  ----------------------------<  108<H>  4.1   |  25  |  1.25  12-05    139  |  104  |  16  ----------------------------<  141<H>  4.7   |  22  |  1.04    Ca    8.8      06 Dec 2019 06:54  Ca    9.1      05 Dec 2019 07:20  Phos  4.5     12-06  Phos  4.2     12-05  Mg     2.3     12-06  Mg     2.1     12-05    TPro  7.1  /  Alb  3.9  /  TBili  0.7  /  DBili  x   /  AST  23  /  ALT  33  /  AlkPhos  62  12-06  TPro  7.4  /  Alb  3.9  /  TBili  0.7  /  DBili  x   /  AST  25  /  ALT  41  /  AlkPhos  69  12-05      proBNP: Serum Pro-Brain Natriuretic Peptide: 1121 pg/mL (12-04-19 @ 11:21)        TELEMETRY: Sinus Rhythm in 70s - 100s, PVCs, Trigeminy 	        	  ASSESSMENT/PLAN: 39 M w/ h/o htn, asthma, cxr in 2/2019 suggestive of sarcoid, and cardiomyopathy w/ EF 22% on cardiac MRI 4/2/19, though the cause of his cardiomyopathy is unclear. He presented Acute on Chronic Systolic HF, and has responded favorable to diuretics, though he still has evidence to suggest elevated filling pressures.    1) Acute on Chronic Systolic HF  - start Isosorbide Dinitrate 10 mg PO TID   - continue Lasix 40 IV BID  - continue carvedilol 12.5 BID, losartan 100 mg PO Daily   - continue spironolactone 25 mg qd  - continue strict I/Os, daily standing weights  - suggest limited echo with contrast to further assess LV function as it may helpful to assess for recovery and for consideration of AICD implantation   - when optimized and diuresed, will need right and left heart cath to evaluate for ischemic cause of heart failure    2) HTN  - continue medical therapy as above, will likely further titrate as inpatient       Cameron Jeong  Cardiology Fellow  Please feel free to contact me at [phone] (text or call) at any time.   NOTE: All Cardiology Service and Contact information can now be found at [email or web address], password: [email or web address] 24H hour events: No acute events overnight. Reports improvement in respiratory symptoms.    MEDICATIONS:  carvedilol 12.5 milliGRAM(s) Oral every 12 hours  furosemide   Injectable 40 milliGRAM(s) IV Push two times a day  heparin  Injectable 5000 Unit(s) SubCutaneous every 8 hours  hydrALAZINE 75 milliGRAM(s) Oral every 8 hours  losartan 100 milliGRAM(s) Oral daily  spironolactone 25 milliGRAM(s) Oral daily  levalbuterol Inhalation 0.63 milliGRAM(s) Inhalation every 4 hours  influenza   Vaccine 0.5 milliLiter(s) IntraMuscular once        PHYSICAL EXAM:  T(C): 36.4 (12-06-19 @ 04:56), Max: 36.6 (12-05-19 @ 20:00)  HR: 86 (12-06-19 @ 04:56) (79 - 102)  BP: 129/67 (12-06-19 @ 04:56) (115/77 - 135/102)  RR: 18 (12-06-19 @ 04:56) (18 - 18)  SpO2: 96% (12-06-19 @ 04:56) (95% - 97%)    Appearance: Normal	  HEENT:   Normal oral mucosa  Cardiovascular: normal rate, regular rhythm, audible S1 and S2, no murmurs, rubs, or gallops, JVP moderately elevated   Respiratory: Lungs clear to auscultation	  Psychiatry: Mood & affect appropriate  Gastrointestinal:  Soft  Skin: No rashes, No ecchymoses, No cyanosis	  Neurologic: Non-focal  Extremities: Trace LE pitting edema  Vascular: Peripheral pulses palpable       TELEMETRY: Sinus Rhythm in 70s - 100s, PVCs, Trigeminy 	        I&O's Summary    05 Dec 2019 07:01  -  06 Dec 2019 07:00  --------------------------------------------------------  IN: 290 mL / OUT: 3125 mL / NET: -2835 mL      LABS:	 	    CBC Full  -  ( 06 Dec 2019 06:54 )  WBC Count : 14.62 K/uL  Hemoglobin : 15.2 g/dL  Hematocrit : 45.8 %  Platelet Count - Automated : 248 K/uL  Mean Cell Volume : 86.9 fl  Mean Cell Hemoglobin : 28.8 pg  Mean Cell Hemoglobin Concentration : 33.2 gm/dL  Auto Neutrophil # : 11.39 K/uL  Auto Lymphocyte # : 1.95 K/uL  Auto Monocyte # : 1.08 K/uL  Auto Eosinophil # : 0.11 K/uL  Auto Basophil # : 0.02 K/uL  Auto Neutrophil % : 77.9 %  Auto Lymphocyte % : 13.3 %  Auto Monocyte % : 7.4 %  Auto Eosinophil % : 0.8 %  Auto Basophil % : 0.1 %    12-06  138  |  100  |  22  ----------------------------<  108<H>  4.1   |  25  |  1.25    12-05  139  |  104  |  16  ----------------------------<  141<H>  4.7   |  22  |  1.04    Ca    8.8      06 Dec 2019 06:54  Ca    9.1      05 Dec 2019 07:20    Phos  4.5     12-06  Phos  4.2     12-05    Mg     2.3     12-06  Mg     2.1     12-05    TPro  7.1  /  Alb  3.9  /  TBili  0.7  /  DBili  x   /  AST  23  /  ALT  33  /  AlkPhos  62  12-06  TPro  7.4  /  Alb  3.9  /  TBili  0.7  /  DBili  x   /  AST  25  /  ALT  41  /  AlkPhos  69  12-05    proBNP: Serum Pro-Brain Natriuretic Peptide: 1121 pg/mL (12-04-19 @ 11:21)          ASSESSMENT/PLAN:   39 M w/ h/o htn, asthma, cxr in 2/2019 suggestive of sarcoid, and cardiomyopathy w/ EF 22% on cardiac MRI 4/2/19, though the cause of his cardiomyopathy is unclear.   He presented Acute on Chronic Systolic HF, and has responded favorably to diuretics, though he still has evidence to suggest elevated filling pressures.    1) Acute on Chronic Systolic HF  - start isosorbide dinitrate 10 mg PO TID   - continue Lasix 40 IV BID  - continue carvedilol 12.5 BID, losartan 100 mg PO Daily   - continue spironolactone 25 mg qd  - continue strict I/Os, daily standing weights  - suggest limited echo with contrast to further assess LV function as it may helpful to assess for recovery and for consideration of AICD implantation   - when optimized and diuresed, will need right and left heart cath to evaluate for ischemic cause of heart failure    2) HTN  - continue medical therapy as above, will likely further titrate as inpatient       Cameron Jeong  Cardiology Fellow  Please feel free to contact me at [phone] (text or call) at any time.       Barrett Barrow M.D.  Cardiology Attending, Consult Service  [phone] or beeper     All Cardiology service information can be found 24/7 on [email or web address], password: [email or web address]

## 2019-12-06 NOTE — PROGRESS NOTE ADULT - PROBLEM SELECTOR PLAN 3
-Pt complaining of sharp, nonraditing, exertional CP. Most likely 2/2 CHF exacerbation. Trops neg, EKG normal, low risk for MI. D-dimer WNL, low risk for PE.   - Cards with further assess with right and left heart cath

## 2019-12-06 NOTE — PROGRESS NOTE ADULT - SUBJECTIVE AND OBJECTIVE BOX
PROGRESS NOTE:   Authoted by Jacob Soares MS4,  Pager [phone]    Patient is a 39y old  Male who presents with a chief complaint of CP, SOB (05 Dec 2019 09:56)      SUBJECTIVE / OVERNIGHT EVENTS:  Patient seen and examined at bedside. Pt states he feels slightly improved, still having some SOB and CP. He has not been urinating very much. Denies any lightheadedness, fevers, abdominal pain, urinary changes, bowel changes, or calf tenderness     ADDITIONAL REVIEW OF SYSTEMS:    MEDICATIONS  (STANDING):  carvedilol 12.5 milliGRAM(s) Oral every 12 hours  furosemide   Injectable 40 milliGRAM(s) IV Push two times a day  heparin  Injectable 5000 Unit(s) SubCutaneous every 8 hours  hydrALAZINE 75 milliGRAM(s) Oral every 8 hours  influenza   Vaccine 0.5 milliLiter(s) IntraMuscular once  levalbuterol Inhalation 0.63 milliGRAM(s) Inhalation every 4 hours  losartan 100 milliGRAM(s) Oral daily  spironolactone 25 milliGRAM(s) Oral daily    MEDICATIONS  (PRN):      CAPILLARY BLOOD GLUCOSE        I&O's Summary    05 Dec 2019 07:01  -  06 Dec 2019 07:00  --------------------------------------------------------  IN: 290 mL / OUT: 3125 mL / NET: -2835 mL        PHYSICAL EXAM:  Vital Signs Last 24 Hrs  T(C): 36.4 (06 Dec 2019 04:56), Max: 36.6 (05 Dec 2019 20:00)  T(F): 97.5 (06 Dec 2019 04:56), Max: 97.9 (05 Dec 2019 20:00)  HR: 86 (06 Dec 2019 04:56) (79 - 102)  BP: 129/67 (06 Dec 2019 04:56) (115/77 - 135/102)  BP(mean): --  RR: 18 (06 Dec 2019 04:56) (18 - 18)  SpO2: 96% (06 Dec 2019 04:56) (95% - 97%)    CONSTITUTIONAL: NAD, well-developed  RESPIRATORY: Normal respiratory effort; lungs are clear to auscultation bilaterally  CARDIOVASCULAR: Regular rate and rhythm, normal S1 and S2, no murmur/rub/gallop; No lower extremity edema; Peripheral pulses are 2+ bilaterally  ABDOMEN: Nontender to palpation, normoactive bowel sounds, no rebound/guarding; No hepatosplenomegaly  MUSCLOSKELETAL: no clubbing or cyanosis of digits; no joint swelling or tenderness to palpation  PSYCH: A+O to person, place, and time; affect appropriate    LABS:                        15.2   14.62 )-----------( 248      ( 06 Dec 2019 06:54 )             45.8     12-06    138  |  100  |  22  ----------------------------<  108<H>  4.1   |  25  |  1.25    Ca    8.8      06 Dec 2019 06:54  Phos  4.5     12-06  Mg     2.3     12-06    TPro  7.1  /  Alb  3.9  /  TBili  0.7  /  DBili  x   /  AST  23  /  ALT  33  /  AlkPhos  62  12-06    PT/INR - ( 04 Dec 2019 11:21 )   PT: 12.5 sec;   INR: 1.08 ratio         PTT - ( 04 Dec 2019 11:21 )  PTT:23.0 sec            RADIOLOGY & ADDITIONAL TESTS:  Results Reviewed:   Imaging Personally Reviewed:  Electrocardiogram Personally Reviewed:    COORDINATION OF CARE:  Care Discussed with Consultants/Other Providers [Y/N]:  Prior or Outpatient Records Reviewed [Y/N]: PROGRESS NOTE:   Authoted by Jacob Soares MS4,  Pager [phone]    Patient is a 39y old  Male who presents with a chief complaint of CP, SOB (05 Dec 2019 09:56)      SUBJECTIVE / OVERNIGHT EVENTS:  Patient seen and examined at bedside. Pt states he feels more improved from yesterday and is urinating much more. He has not been urinating very much. Denies any lightheadedness, fevers, abdominal pain, urinary changes, bowel changes, or calf tenderness     ADDITIONAL REVIEW OF SYSTEMS:    CONSTITUTIONAL: No weakness, fevers or chills  EYES/ENT: No visual changes;  No throat pain   NECK: No pain or stiffness  RESPIRATORY: +mild cough/SOB, No wheezing, hemoptysis;  CARDIOVASCULAR: +mild chest pain, no palpitations  GASTROINTESTINAL: No abdominal or epigastric pain. No nausea, vomiting, or hematemesis; No diarrhea or constipation. No melena or hematochezia.  GENITOURINARY: No dysuria, frequency or hematuria  NEUROLOGICAL: No numbness or weakness  SKIN: No itching, burning, rashes, or lesions   All other review of systems is negative unless indicated above.    MEDICATIONS  (STANDING):  carvedilol 12.5 milliGRAM(s) Oral every 12 hours  furosemide   Injectable 40 milliGRAM(s) IV Push two times a day  heparin  Injectable 5000 Unit(s) SubCutaneous every 8 hours  hydrALAZINE 75 milliGRAM(s) Oral every 8 hours  influenza   Vaccine 0.5 milliLiter(s) IntraMuscular once  levalbuterol Inhalation 0.63 milliGRAM(s) Inhalation every 4 hours  losartan 100 milliGRAM(s) Oral daily  spironolactone 25 milliGRAM(s) Oral daily    MEDICATIONS  (PRN):      CAPILLARY BLOOD GLUCOSE        I&O's Summary    05 Dec 2019 07:01  -  06 Dec 2019 07:00  --------------------------------------------------------  IN: 290 mL / OUT: 3125 mL / NET: -2835 mL        PHYSICAL EXAM:  Vital Signs Last 24 Hrs  T(C): 36.4 (06 Dec 2019 04:56), Max: 36.6 (05 Dec 2019 20:00)  T(F): 97.5 (06 Dec 2019 04:56), Max: 97.9 (05 Dec 2019 20:00)  HR: 86 (06 Dec 2019 04:56) (79 - 102)  BP: 129/67 (06 Dec 2019 04:56) (115/77 - 135/102)  BP(mean): --  RR: 18 (06 Dec 2019 04:56) (18 - 18)  SpO2: 96% (06 Dec 2019 04:56) (95% - 97%)    CONSTITUTIONAL: NAD, well-developed  RESPIRATORY:   CARDIOVASCULAR: Regular rate and rhythm, normal S1 and S2, no murmur/rub/gallop; No lower extremity edema; Peripheral pulses are 2+ bilaterally  ABDOMEN: Nontender to palpation, normoactive bowel sounds, no rebound/guarding; No hepatosplenomegaly  MUSCLOSKELETAL: no clubbing or cyanosis of digits; no joint swelling or tenderness to palpation  PSYCH: A+O to person, place, and time; affect appropriate      LABS:                        15.2   14.62 )-----------( 248      ( 06 Dec 2019 06:54 )             45.8     12-06    138  |  100  |  22  ----------------------------<  108<H>  4.1   |  25  |  1.25    Ca    8.8      06 Dec 2019 06:54  Phos  4.5     12-06  Mg     2.3     12-06    TPro  7.1  /  Alb  3.9  /  TBili  0.7  /  DBili  x   /  AST  23  /  ALT  33  /  AlkPhos  62  12-06    PT/INR - ( 04 Dec 2019 11:21 )   PT: 12.5 sec;   INR: 1.08 ratio         PTT - ( 04 Dec 2019 11:21 )  PTT:23.0 sec            RADIOLOGY & ADDITIONAL TESTS:  Results Reviewed:   Imaging Personally Reviewed:  Electrocardiogram Personally Reviewed:    COORDINATION OF CARE:  Care Discussed with Consultants/Other Providers [Y/N]:  Prior or Outpatient Records Reviewed [Y/N]: PROGRESS NOTE:   Authoted by Jacob Soares MS4,  Pager [phone]    Patient is a 39y old  Male who presents with a chief complaint of CP, SOB (05 Dec 2019 09:56)      SUBJECTIVE / OVERNIGHT EVENTS:  Patient seen and examined at bedside. Pt states he feels more improved from yesterday and is urinating much more. He still endorses mild CP/SOB that improved slightly since admission. Denies any lightheadedness, fevers, abdominal pain, urinary changes, bowel changes, or calf tenderness     ADDITIONAL REVIEW OF SYSTEMS:    CONSTITUTIONAL: No weakness, fevers or chills  EYES/ENT: No visual changes;  No throat pain   NECK: No pain or stiffness  RESPIRATORY: +mild cough/SOB, No wheezing, hemoptysis;  CARDIOVASCULAR: +mild chest pain, no palpitations  GASTROINTESTINAL: No abdominal or epigastric pain. No nausea, vomiting, or hematemesis; No diarrhea or constipation. No melena or hematochezia.  GENITOURINARY: No dysuria, frequency or hematuria  NEUROLOGICAL: No numbness or weakness  SKIN: No itching, burning, rashes, or lesions   All other review of systems is negative unless indicated above.    MEDICATIONS  (STANDING):  carvedilol 12.5 milliGRAM(s) Oral every 12 hours  furosemide   Injectable 40 milliGRAM(s) IV Push two times a day  heparin  Injectable 5000 Unit(s) SubCutaneous every 8 hours  hydrALAZINE 75 milliGRAM(s) Oral every 8 hours  influenza   Vaccine 0.5 milliLiter(s) IntraMuscular once  levalbuterol Inhalation 0.63 milliGRAM(s) Inhalation every 4 hours  losartan 100 milliGRAM(s) Oral daily  spironolactone 25 milliGRAM(s) Oral daily    MEDICATIONS  (PRN):      CAPILLARY BLOOD GLUCOSE        I&O's Summary    05 Dec 2019 07:01  -  06 Dec 2019 07:00  --------------------------------------------------------  IN: 290 mL / OUT: 3125 mL / NET: -2835 mL        PHYSICAL EXAM:  Vital Signs Last 24 Hrs  T(C): 36.4 (06 Dec 2019 04:56), Max: 36.6 (05 Dec 2019 20:00)  T(F): 97.5 (06 Dec 2019 04:56), Max: 97.9 (05 Dec 2019 20:00)  HR: 86 (06 Dec 2019 04:56) (79 - 102)  BP: 129/67 (06 Dec 2019 04:56) (115/77 - 135/102)  BP(mean): --  RR: 18 (06 Dec 2019 04:56) (18 - 18)  SpO2: 96% (06 Dec 2019 04:56) (95% - 97%)    CONSTITUTIONAL: NAD, well-developed  RESPIRATORY: Mild wheezing throughout, improved from admission. No crackles.   CARDIOVASCULAR: Regular rate and rhythm, normal S1 and S2, no murmur/rub/gallop; Peripheral pulses are 2+ bilaterally. Bilaterally 1+ pedal edema, improved from yesterday   ABDOMEN: Nontender to palpation, normoactive bowel sounds, no rebound/guarding; No hepatosplenomegaly  MUSCLOSKELETAL: no clubbing or cyanosis of digits; no joint swelling or tenderness to palpation  PSYCH: A+O to person, place, and time; affect appropriate  Neuro: PERRLA, CN grossly intact      LABS:                        15.2   14.62 )-----------( 248      ( 06 Dec 2019 06:54 )             45.8     12-06    138  |  100  |  22  ----------------------------<  108<H>  4.1   |  25  |  1.25    Ca    8.8      06 Dec 2019 06:54  Phos  4.5     12-06  Mg     2.3     12-06    TPro  7.1  /  Alb  3.9  /  TBili  0.7  /  DBili  x   /  AST  23  /  ALT  33  /  AlkPhos  62  12-06    PT/INR - ( 04 Dec 2019 11:21 )   PT: 12.5 sec;   INR: 1.08 ratio         PTT - ( 04 Dec 2019 11:21 )  PTT:23.0 sec            RADIOLOGY & ADDITIONAL TESTS:  Results Reviewed:   Imaging Personally Reviewed:  Electrocardiogram Personally Reviewed:    COORDINATION OF CARE:  Care Discussed with Consultants/Other Providers [Y/N]:  Prior or Outpatient Records Reviewed [Y/N]: PROGRESS NOTE:   Authoted by Jacob Soares MS4,  Pager [phone]    Patient is a 39y old  Male who presents with a chief complaint of CP, SOB (05 Dec 2019 09:56)      SUBJECTIVE / OVERNIGHT EVENTS:  Patient seen and examined at bedside. Pt states he feels more improved from yesterday and is urinating more. He still endorses mild CP/SOB that improved slightly since admission. Denies any lightheadedness, fevers, abdominal pain, urinary changes, bowel changes, or calf tenderness     ADDITIONAL REVIEW OF SYSTEMS:    CONSTITUTIONAL: No weakness, fevers or chills  EYES/ENT: No visual changes;  No throat pain   NECK: No pain or stiffness  RESPIRATORY: +mild cough/SOB, no hemoptysis;  CARDIOVASCULAR: +mild chest pain, no palpitations  GASTROINTESTINAL: No abdominal or epigastric pain. No nausea, vomiting, or hematemesis; No diarrhea or constipation. No melena or hematochezia.  GENITOURINARY: No dysuria, frequency or hematuria  NEUROLOGICAL: No numbness or weakness  SKIN: No itching, burning, rashes, or lesions   All other review of systems is negative unless indicated above.    MEDICATIONS  (STANDING):  carvedilol 12.5 milliGRAM(s) Oral every 12 hours  furosemide   Injectable 40 milliGRAM(s) IV Push two times a day  heparin  Injectable 5000 Unit(s) SubCutaneous every 8 hours  hydrALAZINE 75 milliGRAM(s) Oral every 8 hours  influenza   Vaccine 0.5 milliLiter(s) IntraMuscular once  levalbuterol Inhalation 0.63 milliGRAM(s) Inhalation every 4 hours  losartan 100 milliGRAM(s) Oral daily  spironolactone 25 milliGRAM(s) Oral daily    MEDICATIONS  (PRN):      CAPILLARY BLOOD GLUCOSE        I&O's Summary    05 Dec 2019 07:01  -  06 Dec 2019 07:00  --------------------------------------------------------  IN: 290 mL / OUT: 3125 mL / NET: -2835 mL        PHYSICAL EXAM:  Vital Signs Last 24 Hrs  T(C): 36.4 (06 Dec 2019 04:56), Max: 36.6 (05 Dec 2019 20:00)  T(F): 97.5 (06 Dec 2019 04:56), Max: 97.9 (05 Dec 2019 20:00)  HR: 86 (06 Dec 2019 04:56) (79 - 102)  BP: 129/67 (06 Dec 2019 04:56) (115/77 - 135/102)  BP(mean): --  RR: 18 (06 Dec 2019 04:56) (18 - 18)  SpO2: 96% (06 Dec 2019 04:56) (95% - 97%)    CONSTITUTIONAL: NAD, well-developed  RESPIRATORY: Wheezing throughout, little improvement from admission. No crackles.   CARDIOVASCULAR: Regular rate and rhythm, normal S1 and S2, no murmur/rub/gallop; Peripheral pulses are 2+ bilaterally. Bilaterally 1+ pedal edema, improved from yesterday   ABDOMEN: Nontender to palpation, normoactive bowel sounds, no rebound/guarding; No hepatosplenomegaly  MUSCLOSKELETAL: no clubbing or cyanosis of digits; no joint swelling or tenderness to palpation  PSYCH: A+O to person, place, and time; affect appropriate  Neuro: LAUREEN ROUSE grossly intact      LABS:                        15.2   14.62 )-----------( 248      ( 06 Dec 2019 06:54 )             45.8     12-06    138  |  100  |  22  ----------------------------<  108<H>  4.1   |  25  |  1.25    Ca    8.8      06 Dec 2019 06:54  Phos  4.5     12-06  Mg     2.3     12-06    TPro  7.1  /  Alb  3.9  /  TBili  0.7  /  DBili  x   /  AST  23  /  ALT  33  /  AlkPhos  62  12-06    PT/INR - ( 04 Dec 2019 11:21 )   PT: 12.5 sec;   INR: 1.08 ratio         PTT - ( 04 Dec 2019 11:21 )  PTT:23.0 sec      < from: Transthoracic Echocardiogram (12.05.19 @ 10:27) >  PROCEDURE: Transthoracic echocardiogram with 2-D, M-Mode  and complete spectral and color flow Doppler.  INDICATION: Dyspnea, unspecified (R06.00)  ------------------------------------------------------------------------  Dimensions:    Normal Values:  LA:     5.1    2.0 - 4.0 cm  Ao:     3.6    2.0 - 3.8 cm  SEPTUM: 1.3    0.6 - 1.2 cm  PWT:    0.9    0.6 - 1.1 cm  LVIDd:  6.5    3.0 - 5.6 cm  LVIDs:  5.5    1.8 - 4.0 cm  Derived variables:  LVMI: 125 g/m2  RWT: 0.27  Fractional short: 15 %  EF (Visual Estimate): 30 %  ------------------------------------------------------------------------  Observations:  Mitral Valve: Tethered mitral valve leaflets with normal  opening. Mild mitral regurgitation.  Aortic Valve/Aorta: Normal trileaflet aortic valve. No  aortic valve regurgitation seen.  Aortic Annulus: 2.1 cm.  Aortic Root: 3.6 cm.  LVOT diameter: 2.4 cm.  Left Atrium: Normal left atrium.  LA volume index = 29  cc/m2.  Left Ventricle: Moderate-severe global left ventricular  systolic dysfunction. EF approximately 30% by visual  estimation. Recommend repeat imaging with intravenous echo  contrast forEF assessment if clinically indicated. There  is a false tendon in the LV cavity (normal variant).  Eccentric left ventricular hypertrophy (dilated left  ventricle with normal relative wall thickness).  Right Heart: Normal right atrium. The right ventricle is  not well visualized; grossly normal right ventricular size  with decreased systolic function. Tricuspid valve not well  visualized, probably normal. Mild tricuspid regurgitation.  Normal pulmonic valve. Minimal pulmonic regurgitation.  Pericardium/Pleura: Normal pericardium with no pericardial  effusion.  Hemodynamic: Estimated right atrial pressure is 8 mm Hg.  Estimated right ventricular systolic pressure equals 29 mm  Hg, assuming right atrial pressure equals 8 mm Hg,  consistent with normal pulmonary pressures.  ------------------------------------------------------------------------  Conclusions:  1. Tethered mitral valve leaflets with normal opening. Mild  mitral regurgitation.  2. Normal trileaflet aortic valve. No aortic valve  regurgitation seen.  3. Eccentric left ventricular hypertrophy (dilated left  ventricle with normal relative wall thickness).  4. Moderate-severe global left ventricular systolic  dysfunction. EF approximately 30% by visual estimation.  Recommend repeat imaging with intravenous echo contrast for  EF assessment if clinically indicated. There is a false  tendon in the LV cavity (normal variant).  5. The right ventricle is not well visualized; grossly  normal right ventricular size with decreasedsystolic  function.  *** No previous Echo exam.    < end of copied text >        RADIOLOGY & ADDITIONAL TESTS:  Results Reviewed:   Imaging Personally Reviewed:  Electrocardiogram Personally Reviewed:    COORDINATION OF CARE:  Care Discussed with Consultants/Other Providers [Y/N]:  Prior or Outpatient Records Reviewed [Y/N]:

## 2019-12-06 NOTE — PROGRESS NOTE ADULT - PROBLEM SELECTOR PLAN 2
-unclear if COPD given hx of 17 year smoking history. Pt complaining of SOB with exertion, PE demonstrates wheezing in all lung fields. Pt received 125mg solumedrol and 2 duonebs in ED. Will continue with duonebs and consider steroids after reassessment.  - Ct shows small area of scattered nodular opacity with a tree-in-bud configuration   of the left lower lobe, likely represents mucoid impacted distal airways and  Emphysema. -unclear if COPD given hx of 17 year smoking history. Pt complaining of SOB with exertion, PE demonstrates wheezing in all lung fields. Pt received 125mg solumedrol and 2 duonebs in ED. Will continue with duonebs, no need for steroids.   - Ct shows small area of scattered nodular opacity with a tree-in-bud configuration   of the left lower lobe, likely represents mucoid impacted distal airways and  Emphysema. -unclear if COPD given hx of 17 year smoking history. Pt complaining of SOB with exertion, PE demonstrates wheezing in all lung fields. Pt received 125mg solumedrol and 2 duonebs in ED. Pt still wheezing in all lung fields despite B2 agonist will add on steroids.   - Ct shows small area of scattered nodular opacity with a tree-in-bud configuration   of the left lower lobe, likely represents mucoid impacted distal airways and  Emphysema. -unclear if COPD given hx of 17 year smoking history. Pt complaining of SOB with exertion, PE demonstrates wheezing in all lung fields. Pt received 125mg solumedrol and 2 duonebs in ED. Pt still wheezing in all lung fields, will give duonebs and add on solumedrol.   - Ct shows small area of scattered nodular opacity with a tree-in-bud configuration   of the left lower lobe, likely represents mucoid impacted distal airways and  Emphysema.

## 2019-12-06 NOTE — PROGRESS NOTE ADULT - NSHPATTENDINGPLANDISCUSS_GEN_ALL_CORE
cardiology resident and fellow; patient seen and examined.  Hx., exam and labs as above.  I agree with the assessment and recommendations.
cardiology fellow; patient seen and examined.       I was physically present for the key portions of the evaluation and management (E/M) service provided.    I agree with the above history, physical, and plan which I have reviewed and edited where appropriate.

## 2019-12-06 NOTE — PROGRESS NOTE ADULT - PROBLEM SELECTOR PLAN 1
- Echo showed eccentric left ventricular hypertrophy (dilated left  ventricle with normal relative wall thickness). Moderate-severe global left ventricular systolic dysfunction. EF approximately 30% by visual estimation.  - Pt still SOB with 2+ pedal edema, additional 40mg lasix given. Assess urine output and determine need for further IV lasix in AM  -will need right and left heart cath on admission as per cardiology   - continue home meds. - Echo showed eccentric left ventricular hypertrophy (dilated left  ventricle with normal relative wall thickness). Moderate-severe global left ventricular systolic dysfunction. EF approximately 30% by visual estimation.  - Pt fluid status improving, 1+ pedal edema. Will continue 40 IV Lasix.   -will need right and left heart cath on admission as per cardiology when pt is medically optimized and diuresed  - continue home meds. - Echo showed eccentric left ventricular hypertrophy (dilated left  ventricle with normal relative wall thickness). Moderate-severe global left ventricular systolic dysfunction. EF approximately 30% by visual estimation.  - Pt fluid status improving, 1+ pedal edema. Will continue 40 IV Lasix.   -will need right and left heart cath on admission as per cardiology when pt is medically optimized and diuresed. Hoping for procedure on Monday.   - continue home meds.

## 2019-12-07 LAB
ALBUMIN SERPL ELPH-MCNC: 3.6 G/DL — SIGNIFICANT CHANGE UP (ref 3.3–5)
ALP SERPL-CCNC: 61 U/L — SIGNIFICANT CHANGE UP (ref 40–120)
ALT FLD-CCNC: 28 U/L — SIGNIFICANT CHANGE UP (ref 10–45)
ANION GAP SERPL CALC-SCNC: 11 MMOL/L — SIGNIFICANT CHANGE UP (ref 5–17)
AST SERPL-CCNC: 14 U/L — SIGNIFICANT CHANGE UP (ref 10–40)
BASOPHILS # BLD AUTO: 0.02 K/UL — SIGNIFICANT CHANGE UP (ref 0–0.2)
BASOPHILS NFR BLD AUTO: 0.2 % — SIGNIFICANT CHANGE UP (ref 0–2)
BILIRUB SERPL-MCNC: 0.5 MG/DL — SIGNIFICANT CHANGE UP (ref 0.2–1.2)
BUN SERPL-MCNC: 22 MG/DL — SIGNIFICANT CHANGE UP (ref 7–23)
CALCIUM SERPL-MCNC: 9.2 MG/DL — SIGNIFICANT CHANGE UP (ref 8.4–10.5)
CHLORIDE SERPL-SCNC: 100 MMOL/L — SIGNIFICANT CHANGE UP (ref 96–108)
CHOLEST SERPL-MCNC: 136 MG/DL — SIGNIFICANT CHANGE UP (ref 10–199)
CO2 SERPL-SCNC: 26 MMOL/L — SIGNIFICANT CHANGE UP (ref 22–31)
CREAT SERPL-MCNC: 1.22 MG/DL — SIGNIFICANT CHANGE UP (ref 0.5–1.3)
EOSINOPHIL # BLD AUTO: 0.14 K/UL — SIGNIFICANT CHANGE UP (ref 0–0.5)
EOSINOPHIL NFR BLD AUTO: 1.2 % — SIGNIFICANT CHANGE UP (ref 0–6)
GAMMA INTERFERON BACKGROUND BLD IA-ACNC: 0.01 IU/ML — SIGNIFICANT CHANGE UP
GLUCOSE SERPL-MCNC: 114 MG/DL — HIGH (ref 70–99)
HCT VFR BLD CALC: 44 % — SIGNIFICANT CHANGE UP (ref 39–50)
HDLC SERPL-MCNC: 42 MG/DL — SIGNIFICANT CHANGE UP
HGB BLD-MCNC: 14.5 G/DL — SIGNIFICANT CHANGE UP (ref 13–17)
IMM GRANULOCYTES NFR BLD AUTO: 0.8 % — SIGNIFICANT CHANGE UP (ref 0–1.5)
LIPID PNL WITH DIRECT LDL SERPL: 82 MG/DL — SIGNIFICANT CHANGE UP
LYMPHOCYTES # BLD AUTO: 2.37 K/UL — SIGNIFICANT CHANGE UP (ref 1–3.3)
LYMPHOCYTES # BLD AUTO: 20.1 % — SIGNIFICANT CHANGE UP (ref 13–44)
M TB IFN-G BLD-IMP: NEGATIVE — SIGNIFICANT CHANGE UP
M TB IFN-G CD4+ BCKGRND COR BLD-ACNC: 0 IU/ML — SIGNIFICANT CHANGE UP
M TB IFN-G CD4+CD8+ BCKGRND COR BLD-ACNC: 0 IU/ML — SIGNIFICANT CHANGE UP
MAGNESIUM SERPL-MCNC: 2.2 MG/DL — SIGNIFICANT CHANGE UP (ref 1.6–2.6)
MCHC RBC-ENTMCNC: 28.9 PG — SIGNIFICANT CHANGE UP (ref 27–34)
MCHC RBC-ENTMCNC: 33 GM/DL — SIGNIFICANT CHANGE UP (ref 32–36)
MCV RBC AUTO: 87.6 FL — SIGNIFICANT CHANGE UP (ref 80–100)
MONOCYTES # BLD AUTO: 1.1 K/UL — HIGH (ref 0–0.9)
MONOCYTES NFR BLD AUTO: 9.3 % — SIGNIFICANT CHANGE UP (ref 2–14)
NEUTROPHILS # BLD AUTO: 8.1 K/UL — HIGH (ref 1.8–7.4)
NEUTROPHILS NFR BLD AUTO: 68.4 % — SIGNIFICANT CHANGE UP (ref 43–77)
NRBC # BLD: 0 /100 WBCS — SIGNIFICANT CHANGE UP (ref 0–0)
PHOSPHATE SERPL-MCNC: 4.8 MG/DL — HIGH (ref 2.5–4.5)
PLATELET # BLD AUTO: 244 K/UL — SIGNIFICANT CHANGE UP (ref 150–400)
POTASSIUM SERPL-MCNC: 3.8 MMOL/L — SIGNIFICANT CHANGE UP (ref 3.5–5.3)
POTASSIUM SERPL-SCNC: 3.8 MMOL/L — SIGNIFICANT CHANGE UP (ref 3.5–5.3)
PROT SERPL-MCNC: 7.1 G/DL — SIGNIFICANT CHANGE UP (ref 6–8.3)
QUANT TB PLUS MITOGEN MINUS NIL: 3.02 IU/ML — SIGNIFICANT CHANGE UP
RBC # BLD: 5.02 M/UL — SIGNIFICANT CHANGE UP (ref 4.2–5.8)
RBC # FLD: 15.2 % — HIGH (ref 10.3–14.5)
SODIUM SERPL-SCNC: 137 MMOL/L — SIGNIFICANT CHANGE UP (ref 135–145)
TOTAL CHOLESTEROL/HDL RATIO MEASUREMENT: 3.2 RATIO — LOW (ref 3.4–9.6)
TRIGL SERPL-MCNC: 62 MG/DL — SIGNIFICANT CHANGE UP (ref 10–149)
WBC # BLD: 11.82 K/UL — HIGH (ref 3.8–10.5)
WBC # FLD AUTO: 11.82 K/UL — HIGH (ref 3.8–10.5)

## 2019-12-07 PROCEDURE — 99233 SBSQ HOSP IP/OBS HIGH 50: CPT

## 2019-12-07 RX ORDER — IPRATROPIUM/ALBUTEROL SULFATE 18-103MCG
3 AEROSOL WITH ADAPTER (GRAM) INHALATION ONCE
Refills: 0 | Status: COMPLETED | OUTPATIENT
Start: 2019-12-07 | End: 2019-12-07

## 2019-12-07 RX ORDER — BUDESONIDE, MICRONIZED 100 %
0.25 POWDER (GRAM) MISCELLANEOUS
Refills: 0 | Status: DISCONTINUED | OUTPATIENT
Start: 2019-12-07 | End: 2019-12-12

## 2019-12-07 RX ORDER — LEVALBUTEROL 1.25 MG/.5ML
0.63 SOLUTION, CONCENTRATE RESPIRATORY (INHALATION) EVERY 4 HOURS
Refills: 0 | Status: DISCONTINUED | OUTPATIENT
Start: 2019-12-07 | End: 2019-12-08

## 2019-12-07 RX ORDER — FUROSEMIDE 40 MG
20 TABLET ORAL ONCE
Refills: 0 | Status: COMPLETED | OUTPATIENT
Start: 2019-12-07 | End: 2019-12-07

## 2019-12-07 RX ADMIN — CARVEDILOL PHOSPHATE 12.5 MILLIGRAM(S): 80 CAPSULE, EXTENDED RELEASE ORAL at 05:02

## 2019-12-07 RX ADMIN — HEPARIN SODIUM 5000 UNIT(S): 5000 INJECTION INTRAVENOUS; SUBCUTANEOUS at 13:40

## 2019-12-07 RX ADMIN — Medication 40 MILLIGRAM(S): at 05:02

## 2019-12-07 RX ADMIN — SPIRONOLACTONE 25 MILLIGRAM(S): 25 TABLET, FILM COATED ORAL at 05:02

## 2019-12-07 RX ADMIN — HEPARIN SODIUM 5000 UNIT(S): 5000 INJECTION INTRAVENOUS; SUBCUTANEOUS at 22:18

## 2019-12-07 RX ADMIN — HEPARIN SODIUM 5000 UNIT(S): 5000 INJECTION INTRAVENOUS; SUBCUTANEOUS at 05:03

## 2019-12-07 RX ADMIN — Medication 0.25 MILLIGRAM(S): at 09:17

## 2019-12-07 RX ADMIN — LEVALBUTEROL 0.63 MILLIGRAM(S): 1.25 SOLUTION, CONCENTRATE RESPIRATORY (INHALATION) at 18:58

## 2019-12-07 RX ADMIN — LOSARTAN POTASSIUM 100 MILLIGRAM(S): 100 TABLET, FILM COATED ORAL at 05:02

## 2019-12-07 RX ADMIN — Medication 75 MILLIGRAM(S): at 05:02

## 2019-12-07 RX ADMIN — Medication 75 MILLIGRAM(S): at 13:41

## 2019-12-07 RX ADMIN — CARVEDILOL PHOSPHATE 12.5 MILLIGRAM(S): 80 CAPSULE, EXTENDED RELEASE ORAL at 18:58

## 2019-12-07 RX ADMIN — LEVALBUTEROL 0.63 MILLIGRAM(S): 1.25 SOLUTION, CONCENTRATE RESPIRATORY (INHALATION) at 13:41

## 2019-12-07 RX ADMIN — Medication 20 MILLIGRAM(S): at 09:17

## 2019-12-07 RX ADMIN — ISOSORBIDE DINITRATE 10 MILLIGRAM(S): 5 TABLET ORAL at 22:17

## 2019-12-07 RX ADMIN — Medication 75 MILLIGRAM(S): at 22:17

## 2019-12-07 RX ADMIN — Medication 0.25 MILLIGRAM(S): at 18:58

## 2019-12-07 RX ADMIN — ISOSORBIDE DINITRATE 10 MILLIGRAM(S): 5 TABLET ORAL at 05:02

## 2019-12-07 RX ADMIN — Medication 40 MILLIGRAM(S): at 05:03

## 2019-12-07 RX ADMIN — Medication 3 MILLILITER(S): at 05:02

## 2019-12-07 RX ADMIN — Medication 40 MILLIGRAM(S): at 18:58

## 2019-12-07 RX ADMIN — ISOSORBIDE DINITRATE 10 MILLIGRAM(S): 5 TABLET ORAL at 13:41

## 2019-12-07 NOTE — PROGRESS NOTE ADULT - PROBLEM SELECTOR PLAN 1
- Echo showed eccentric left ventricular hypertrophy (dilated left  ventricle with normal relative wall thickness). Moderate-severe global left ventricular systolic dysfunction. EF approximately 30% by visual estimation.  - Pt fluid status improving, 1+ pedal edema. Will continue 40 IV Lasix.   -will need right and left heart cath on admission as per cardiology when pt is medically optimized and diuresed. Hoping for procedure on Monday.   - continue home meds. - Echo showed eccentric left ventricular hypertrophy (dilated left  ventricle with normal relative wall thickness). Moderate-severe global left ventricular systolic dysfunction. EF approximately 30% by visual estimation.  - on 40 IV lasix bid. gave additional 20 IV lasix in am on account of wheezing and crackles on lung auscultation  -will need right and left heart cath on admission per cardiology when pt is medically optimized and diuresed. Hoping for procedure on Monday.   - started on isordil

## 2019-12-07 NOTE — PROGRESS NOTE ADULT - PROBLEM SELECTOR PLAN 2
-unclear if COPD given hx of 17 year smoking history. Pt complaining of SOB with exertion, PE demonstrates wheezing in all lung fields. Pt received 125mg solumedrol and 2 duonebs in ED. Pt still wheezing in all lung fields, will give duonebs and add on solumedrol.   - Ct shows small area of scattered nodular opacity with a tree-in-bud configuration   of the left lower lobe, likely represents mucoid impacted distal airways and  Emphysema. -possible underlying COPD given 17 year smoking history. S/p 125mg  IV solumedrol in ED.   - pt still wheezing today. started on budesonide inhaler  - c/w prednisone 40 mg  - Ct shows small area of scattered nodular opacity with a tree-in-bud configuration   of the left lower lobe, likely represents mucoid impacted distal airways and  Emphysema.

## 2019-12-07 NOTE — PROGRESS NOTE ADULT - SUBJECTIVE AND OBJECTIVE BOX
PROGRESS NOTE:     Patient is a 39y old  Male who presents with a chief complaint of CP, SOB (06 Dec 2019 07:45)    SUBJECTIVE / OVERNIGHT EVENTS: No events overnight.     ADDITIONAL REVIEW OF SYSTEMS:    MEDICATIONS  (STANDING):  albuterol/ipratropium for Nebulization 3 milliLiter(s) Nebulizer every 6 hours  carvedilol 12.5 milliGRAM(s) Oral every 12 hours  furosemide   Injectable 40 milliGRAM(s) IV Push two times a day  heparin  Injectable 5000 Unit(s) SubCutaneous every 8 hours  hydrALAZINE 75 milliGRAM(s) Oral every 8 hours  influenza   Vaccine 0.5 milliLiter(s) IntraMuscular once  isosorbide   dinitrate Tablet (ISORDIL) 10 milliGRAM(s) Oral three times a day  losartan 100 milliGRAM(s) Oral daily  predniSONE   Tablet 40 milliGRAM(s) Oral daily  spironolactone 25 milliGRAM(s) Oral daily    MEDICATIONS  (PRN):      CAPILLARY BLOOD GLUCOSE      POCT Blood Glucose.: 118 mg/dL (06 Dec 2019 17:20)    I&O's Summary    06 Dec 2019 07:01  -  07 Dec 2019 07:00  --------------------------------------------------------  IN: 680 mL / OUT: 1400 mL / NET: -720 mL        PHYSICAL EXAM:  Vital Signs Last 24 Hrs  T(C): 36.2 (07 Dec 2019 05:01), Max: 36.6 (06 Dec 2019 20:08)  T(F): 97.2 (07 Dec 2019 05:01), Max: 97.9 (06 Dec 2019 20:08)  HR: 86 (07 Dec 2019 05:01) (86 - 101)  BP: 134/83 (07 Dec 2019 05:01) (105/73 - 157/87)  BP(mean): --  RR: 18 (07 Dec 2019 05:01) (18 - 18)  SpO2: 96% (07 Dec 2019 05:01) (90% - 96%)    CONSTITUTIONAL: NAD, well-developed  RESPIRATORY: Normal respiratory effort; lungs are clear to auscultation bilaterally  CARDIOVASCULAR: Regular rate and rhythm, normal S1 and S2, no murmur/rub/gallop; No lower extremity edema; Peripheral pulses are 2+ bilaterally  ABDOMEN: Nontender to palpation, normoactive bowel sounds, no rebound/guarding; No hepatosplenomegaly  MUSCLOSKELETAL: no clubbing or cyanosis of digits; no joint swelling or tenderness to palpation  PSYCH: A+O to person, place, and time; affect appropriate    LABS:                        15.2   14.62 )-----------( 248      ( 06 Dec 2019 06:54 )             45.8     12-06    138  |  100  |  22  ----------------------------<  108<H>  4.1   |  25  |  1.25    Ca    8.8      06 Dec 2019 06:54  Phos  4.5     12-06  Mg     2.3     12-06    TPro  7.1  /  Alb  3.9  /  TBili  0.7  /  DBili  x   /  AST  23  /  ALT  33  /  AlkPhos  62  12-06                RADIOLOGY & ADDITIONAL TESTS:  Results Reviewed:   Imaging Personally Reviewed:  Electrocardiogram Personally Reviewed:    COORDINATION OF CARE:  Care Discussed with Consultants/Other Providers [Y/N]:  Prior or Outpatient Records Reviewed [Y/N]: PROGRESS NOTE:     Patient is a 39y old  Male who presents with a chief complaint of CP, SOB (06 Dec 2019 07:45)    SUBJECTIVE / OVERNIGHT EVENTS: Pt endorsing SOB this am. Sitting in chair because not tolerating lying in bed. Has ALAS. Denies current CP, palpitations. Good UOP. Pt requesting to be placed back on xopenex as he states the duonebs are not helping him as much as the xopenex.    ADDITIONAL REVIEW OF SYSTEMS: otherwise negative    MEDICATIONS  (STANDING):  albuterol/ipratropium for Nebulization 3 milliLiter(s) Nebulizer every 6 hours  carvedilol 12.5 milliGRAM(s) Oral every 12 hours  furosemide   Injectable 40 milliGRAM(s) IV Push two times a day  heparin  Injectable 5000 Unit(s) SubCutaneous every 8 hours  hydrALAZINE 75 milliGRAM(s) Oral every 8 hours  influenza   Vaccine 0.5 milliLiter(s) IntraMuscular once  isosorbide   dinitrate Tablet (ISORDIL) 10 milliGRAM(s) Oral three times a day  losartan 100 milliGRAM(s) Oral daily  predniSONE   Tablet 40 milliGRAM(s) Oral daily  spironolactone 25 milliGRAM(s) Oral daily    MEDICATIONS  (PRN):      CAPILLARY BLOOD GLUCOSE      POCT Blood Glucose.: 118 mg/dL (06 Dec 2019 17:20)    I&O's Summary    06 Dec 2019 07:01  -  07 Dec 2019 07:00  --------------------------------------------------------  IN: 680 mL / OUT: 1400 mL / NET: -720 mL        PHYSICAL EXAM:  Vital Signs Last 24 Hrs  T(C): 36.2 (07 Dec 2019 05:01), Max: 36.6 (06 Dec 2019 20:08)  T(F): 97.2 (07 Dec 2019 05:01), Max: 97.9 (06 Dec 2019 20:08)  HR: 86 (07 Dec 2019 05:01) (86 - 101)  BP: 134/83 (07 Dec 2019 05:01) (105/73 - 157/87)  BP(mean): --  RR: 18 (07 Dec 2019 05:01) (18 - 18)  SpO2: 96% (07 Dec 2019 05:01) (90% - 96%)    CONSTITUTIONAL: NAD, well-developed  RESPIRATORY: Diffuse b/l anterior and posterior wheezes. bibasilar crackles  CARDIOVASCULAR: Regular rate and rhythm, normal S1 and S2, no murmur/rub/gallop; 2+ LE edema  ABDOMEN: Nontender to palpation, normoactive bowel sounds, no rebound/guarding; No hepatosplenomegaly  MSK: no clubbing or cyanosis of digits; no joint swelling or tenderness to palpation  PSYCH: A+O to person, place, and time; affect appropriate    LABS:                        15.2   14.62 )-----------( 248      ( 06 Dec 2019 06:54 )             45.8     12-06    138  |  100  |  22  ----------------------------<  108<H>  4.1   |  25  |  1.25    Ca    8.8      06 Dec 2019 06:54  Phos  4.5     12-06  Mg     2.3     12-06    TPro  7.1  /  Alb  3.9  /  TBili  0.7  /  DBili  x   /  AST  23  /  ALT  33  /  AlkPhos  62  12-06                RADIOLOGY & ADDITIONAL TESTS:  Results Reviewed:   Imaging Personally Reviewed:  Electrocardiogram Personally Reviewed:    COORDINATION OF CARE:  Care Discussed with Consultants/Other Providers [Y/N]:  Prior or Outpatient Records Reviewed [Y/N]:

## 2019-12-08 LAB
ANION GAP SERPL CALC-SCNC: 12 MMOL/L — SIGNIFICANT CHANGE UP (ref 5–17)
BUN SERPL-MCNC: 22 MG/DL — SIGNIFICANT CHANGE UP (ref 7–23)
CALCIUM SERPL-MCNC: 9.2 MG/DL — SIGNIFICANT CHANGE UP (ref 8.4–10.5)
CHLORIDE SERPL-SCNC: 101 MMOL/L — SIGNIFICANT CHANGE UP (ref 96–108)
CO2 SERPL-SCNC: 24 MMOL/L — SIGNIFICANT CHANGE UP (ref 22–31)
CREAT SERPL-MCNC: 1.12 MG/DL — SIGNIFICANT CHANGE UP (ref 0.5–1.3)
GLUCOSE SERPL-MCNC: 213 MG/DL — HIGH (ref 70–99)
HCT VFR BLD CALC: 46.5 % — SIGNIFICANT CHANGE UP (ref 39–50)
HGB BLD-MCNC: 15.5 G/DL — SIGNIFICANT CHANGE UP (ref 13–17)
MAGNESIUM SERPL-MCNC: 2.2 MG/DL — SIGNIFICANT CHANGE UP (ref 1.6–2.6)
MCHC RBC-ENTMCNC: 29.2 PG — SIGNIFICANT CHANGE UP (ref 27–34)
MCHC RBC-ENTMCNC: 33.3 GM/DL — SIGNIFICANT CHANGE UP (ref 32–36)
MCV RBC AUTO: 87.6 FL — SIGNIFICANT CHANGE UP (ref 80–100)
NRBC # BLD: 0 /100 WBCS — SIGNIFICANT CHANGE UP (ref 0–0)
PHOSPHATE SERPL-MCNC: 4.4 MG/DL — SIGNIFICANT CHANGE UP (ref 2.5–4.5)
PLATELET # BLD AUTO: 266 K/UL — SIGNIFICANT CHANGE UP (ref 150–400)
POTASSIUM SERPL-MCNC: 4 MMOL/L — SIGNIFICANT CHANGE UP (ref 3.5–5.3)
POTASSIUM SERPL-SCNC: 4 MMOL/L — SIGNIFICANT CHANGE UP (ref 3.5–5.3)
RBC # BLD: 5.31 M/UL — SIGNIFICANT CHANGE UP (ref 4.2–5.8)
RBC # FLD: 15 % — HIGH (ref 10.3–14.5)
SODIUM SERPL-SCNC: 137 MMOL/L — SIGNIFICANT CHANGE UP (ref 135–145)
WBC # BLD: 14.15 K/UL — HIGH (ref 3.8–10.5)
WBC # FLD AUTO: 14.15 K/UL — HIGH (ref 3.8–10.5)

## 2019-12-08 PROCEDURE — 99233 SBSQ HOSP IP/OBS HIGH 50: CPT | Mod: GC

## 2019-12-08 RX ORDER — LEVALBUTEROL 1.25 MG/.5ML
0.63 SOLUTION, CONCENTRATE RESPIRATORY (INHALATION) EVERY 6 HOURS
Refills: 0 | Status: DISCONTINUED | OUTPATIENT
Start: 2019-12-08 | End: 2019-12-09

## 2019-12-08 RX ORDER — ACETAMINOPHEN 500 MG
650 TABLET ORAL ONCE
Refills: 0 | Status: COMPLETED | OUTPATIENT
Start: 2019-12-08 | End: 2019-12-08

## 2019-12-08 RX ADMIN — ISOSORBIDE DINITRATE 10 MILLIGRAM(S): 5 TABLET ORAL at 21:19

## 2019-12-08 RX ADMIN — Medication 650 MILLIGRAM(S): at 22:52

## 2019-12-08 RX ADMIN — Medication 75 MILLIGRAM(S): at 21:19

## 2019-12-08 RX ADMIN — LOSARTAN POTASSIUM 100 MILLIGRAM(S): 100 TABLET, FILM COATED ORAL at 05:41

## 2019-12-08 RX ADMIN — LEVALBUTEROL 0.63 MILLIGRAM(S): 1.25 SOLUTION, CONCENTRATE RESPIRATORY (INHALATION) at 22:48

## 2019-12-08 RX ADMIN — Medication 40 MILLIGRAM(S): at 17:16

## 2019-12-08 RX ADMIN — Medication 75 MILLIGRAM(S): at 05:41

## 2019-12-08 RX ADMIN — ISOSORBIDE DINITRATE 10 MILLIGRAM(S): 5 TABLET ORAL at 05:41

## 2019-12-08 RX ADMIN — LEVALBUTEROL 0.63 MILLIGRAM(S): 1.25 SOLUTION, CONCENTRATE RESPIRATORY (INHALATION) at 01:07

## 2019-12-08 RX ADMIN — Medication 650 MILLIGRAM(S): at 06:59

## 2019-12-08 RX ADMIN — Medication 75 MILLIGRAM(S): at 13:10

## 2019-12-08 RX ADMIN — Medication 650 MILLIGRAM(S): at 03:23

## 2019-12-08 RX ADMIN — Medication 40 MILLIGRAM(S): at 05:41

## 2019-12-08 RX ADMIN — CARVEDILOL PHOSPHATE 12.5 MILLIGRAM(S): 80 CAPSULE, EXTENDED RELEASE ORAL at 17:15

## 2019-12-08 RX ADMIN — Medication 40 MILLIGRAM(S): at 05:40

## 2019-12-08 RX ADMIN — Medication 650 MILLIGRAM(S): at 23:25

## 2019-12-08 RX ADMIN — Medication 0.25 MILLIGRAM(S): at 17:15

## 2019-12-08 RX ADMIN — ISOSORBIDE DINITRATE 10 MILLIGRAM(S): 5 TABLET ORAL at 13:10

## 2019-12-08 RX ADMIN — CARVEDILOL PHOSPHATE 12.5 MILLIGRAM(S): 80 CAPSULE, EXTENDED RELEASE ORAL at 05:40

## 2019-12-08 RX ADMIN — LEVALBUTEROL 0.63 MILLIGRAM(S): 1.25 SOLUTION, CONCENTRATE RESPIRATORY (INHALATION) at 17:15

## 2019-12-08 RX ADMIN — Medication 650 MILLIGRAM(S): at 07:59

## 2019-12-08 RX ADMIN — Medication 650 MILLIGRAM(S): at 03:53

## 2019-12-08 RX ADMIN — SPIRONOLACTONE 25 MILLIGRAM(S): 25 TABLET, FILM COATED ORAL at 05:40

## 2019-12-08 NOTE — PROGRESS NOTE ADULT - PROBLEM SELECTOR PLAN 4
CHF vs sarcoid vs infection vs pulmonary hypertension. Unclear of etiology giving history of incarceration + environmental exposure. Chest x-ray shows no acute processes but is suspicious for hilar lymphadenopathy.   - Ct shows small area of scattered nodular opacity with a tree-in-bud configuration   of the left lower lobe, likely represents mucoid impacted distal airways and  Emphysema.  - HIV neg.   - ACE WNL  - quant neg

## 2019-12-08 NOTE — PROGRESS NOTE ADULT - ASSESSMENT
39 M former smoker w/ h/o HTN (unclear if controlled, pt reports noncompliance), asthma (was not on inhalers growing up), Incarceration for 12 years, and relatively new Dx of cardiomyopathy w/ EF 22% on cardiac MRI 4/2/19, though the cause of his cardiomyopathy is unclear. He presented Acute on Chronic Systolic HF, and has responded favorably to diuretics, though he still has evidence to suggest elevated filling pressures today.     #Acute on Chronic Systolic HFrEF   - Possible etiologies include, but are not limited to, ischemic cardiac disease or hypertensive heart disease (long history)   - Less likely sarcoid given no LAD on CT chest, however, this does not rule it out.    - C/w isosorbide dinitrate 10 mg PO TID   - continue Lasix 40 IV BID  - continue carvedilol 12.5 BID, losartan 100 mg PO Daily   - continue spironolactone 25 mg qd  - Check Strict I/Os, daily standing weights -> they have not been being done accurately.   - suggest limited echo with contrast to further assess LV function as it may helpful to assess for recovery and for consideration of AICD implantation   - when optimized and diuresed, will need right and left heart cath to evaluate for ischemic cause of heart failure    Cristian Burch MD  Cardiology Fellow - PGY 5  Text or Call: [phone]  For all New Consults and Questions:  [email or web address]   Login: [email or web address]

## 2019-12-08 NOTE — PROVIDER CONTACT NOTE (OTHER) - ACTION/TREATMENT ORDERED:
As per MD Montero pt can be taken off tele for the time being, MD montero will come to assess patient. Ok to take patient off tele for the time being.

## 2019-12-08 NOTE — PROGRESS NOTE ADULT - PROBLEM SELECTOR PLAN 1
- Echo showed eccentric left ventricular hypertrophy (dilated left  ventricle with normal relative wall thickness). Moderate-severe global left ventricular systolic dysfunction. EF approximately 30% by visual estimation.  - on 40 IV lasix bid. gave additional 20 IV lasix in am on account of wheezing and crackles on lung auscultation  -will need right and left heart cath on admission per cardiology when pt is medically optimized and diuresed. Hoping for procedure on Monday.   - started on isordil

## 2019-12-08 NOTE — PROGRESS NOTE ADULT - SUBJECTIVE AND OBJECTIVE BOX
Patient seen and examined at bedside.    Overnight Events: Pt feeling SOB after getting nebulizer treatment yesterday.       REVIEW OF SYSTEMS:  Constitutional:     [ ] negative [ ] fevers [ ] chills [ ] weight loss [ ] weight gain  HEENT:                  [ ] negative [ ] dry eyes [ ] eye irritation [ ] postnasal drip [ ] nasal congestion  CV:                         [ ] negative  [ ] chest pain [ ] orthopnea [ ] palpitations [ ] murmur  Resp:                     [ ] negative [ ] cough [ ] shortness of breath [ ] dyspnea [ ] wheezing [ ] sputum [ ]hemoptysis  GI:                          [ ] negative [ ] nausea [ ] vomiting [ ] diarrhea [ ] constipation [ ] abd pain [ ] dysphagia   :                        [ ] negative [ ] dysuria [ ] nocturia [ ] hematuria [ ] increased urinary frequency  Musculoskeletal: [ ] negative [ ] back pain [ ] myalgias [ ] arthralgias [ ] fracture  Skin:                       [ ] negative [ ] rash [ ] itch  Neurological:        [ ] negative [ ] headache [ ] dizziness [ ] syncope [ ] weakness [ ] numbness  Psychiatric:           [ ] negative [ ] anxiety [ ] depression  Endocrine:            [ ] negative [ ] diabetes [ ] thyroid problem  Heme/Lymph:      [ ] negative [ ] anemia [ ] bleeding problem  Allergic/Immune: [ ] negative [ ] itchy eyes [ ] nasal discharge [ ] hives [ ] angioedema    [x ] All other systems negative  [ ] Unable to assess ROS due to    Current Meds:  buDESOnide    Inhalation Suspension 0.25 milliGRAM(s) Inhalation two times a day  carvedilol 12.5 milliGRAM(s) Oral every 12 hours  furosemide   Injectable 40 milliGRAM(s) IV Push two times a day  heparin  Injectable 5000 Unit(s) SubCutaneous every 8 hours  hydrALAZINE 75 milliGRAM(s) Oral every 8 hours  influenza   Vaccine 0.5 milliLiter(s) IntraMuscular once  isosorbide   dinitrate Tablet (ISORDIL) 10 milliGRAM(s) Oral three times a day  levalbuterol Inhalation 0.63 milliGRAM(s) Inhalation every 4 hours  losartan 100 milliGRAM(s) Oral daily  predniSONE   Tablet 40 milliGRAM(s) Oral daily  spironolactone 25 milliGRAM(s) Oral daily      PAST MEDICAL & SURGICAL HISTORY:  Congestive heart failure (CHF)  Asthma  HTN (hypertension)  No significant past surgical history      Vitals:  T(F): 98 (12-08), Max: 98 ()  HR: 84 () (84 - 105)  BP: 135/68 () (113/65 - 145/80)  RR: 18 ()  SpO2: 96% ()  I&O's Summary    07 Dec 2019 07:  -  08 Dec 2019 07:00  --------------------------------------------------------  IN: 360 mL / OUT: 0 mL / NET: 360 mL    08 Dec 2019 07:  -  08 Dec 2019 09:38  --------------------------------------------------------  IN: 240 mL / OUT: 0 mL / NET: 240 mL        Physical Exam:  Appearance: No acute distress; well appearing  Eyes: PERRL, EOMI, pink conjunctiva  HENT: Normal oral mucosa  Cardiovascular: RRR, S1, S2, no murmurs, rubs, or gallops; 1+ LE edema; elevated JVP  Respiratory: decreased BS at the bases.   Gastrointestinal: soft, non-tender, non-distended with normal bowel sounds  Musculoskeletal: No clubbing; no joint deformity   Neurologic: Non-focal  Lymphatic: No lymphadenopathy  Psychiatry: AAOx3, mood & affect appropriate  Skin: No rashes, ecchymoses, or cyanosis                          15.5   14.15 )-----------( 266      ( 08 Dec 2019 07:09 )             46.5     12    137  |  101  |  22  ----------------------------<  213<H>  4.0   |  24  |  1.12    Ca    9.2      08 Dec 2019 07:09  Phos  4.4     1208  Mg     2.2         TPro  7.1  /  Alb  3.6  /  TBili  0.5  /  DBili  x   /  AST  14  /  ALT  28  /  AlkPhos  61  12          Serum Pro-Brain Natriuretic Peptide: 1121 pg/mL ( @ 11:21)    Total Cholesterol: 136  LDL: 82  HDL: 42  T        New ECG(s): Personally reviewed    Echo:  < from: Transthoracic Echocardiogram (19 @ 10:27) >  1. Tethered mitral valve leaflets with normal opening. Mild  mitral regurgitation.  2. Normal trileaflet aortic valve. No aortic valve  regurgitation seen.  3. Eccentric left ventricular hypertrophy (dilated left  ventricle with normal relative wall thickness).  4. Moderate-severe global left ventricular systolic  dysfunction. EF approximately 30% by visual estimation.  Recommend repeat imaging with intravenous echo contrast for  EF assessment if clinically indicated. There is a false  tendon in the LV cavity (normal variant).  5. The right ventricle is not well visualized; grossly  normal right ventricular size with decreasedsystolic  function.    < end of copied text >    Imaging:  < from: CT Chest No Cont (19 @ 10:15) >    Small area of scattered nodular opacity with a tree-in-bud configuration   of the left lower lobe, likely represents mucoid impacted distal airways.    Emphysema.    < end of copied text >    Interpretation of Telemetry: Sinus 90 - 100

## 2019-12-08 NOTE — PROGRESS NOTE ADULT - PROBLEM SELECTOR PLAN 2
-possible underlying COPD given 17 year smoking history. S/p 125mg  IV solumedrol in ED.   - pt still wheezing today. started on budesonide inhaler  - c/w prednisone 40 mg  - Ct shows small area of scattered nodular opacity with a tree-in-bud configuration   of the left lower lobe, likely represents mucoid impacted distal airways and  Emphysema.

## 2019-12-08 NOTE — PROGRESS NOTE ADULT - SUBJECTIVE AND OBJECTIVE BOX
PROGRESS NOTE:   Authoted by Jacob Soares MS4,  Pager [phone]    Patient is a 39y old  Male who presents with a chief complaint of CP, SOB (08 Dec 2019 09:38)      SUBJECTIVE / OVERNIGHT EVENTS:  Patient seen and examained at bedside. Pt still complaining of SOB.     ADDITIONAL REVIEW OF SYSTEMS:    MEDICATIONS  (STANDING):  buDESOnide    Inhalation Suspension 0.25 milliGRAM(s) Inhalation two times a day  carvedilol 12.5 milliGRAM(s) Oral every 12 hours  furosemide   Injectable 40 milliGRAM(s) IV Push two times a day  heparin  Injectable 5000 Unit(s) SubCutaneous every 8 hours  hydrALAZINE 75 milliGRAM(s) Oral every 8 hours  influenza   Vaccine 0.5 milliLiter(s) IntraMuscular once  isosorbide   dinitrate Tablet (ISORDIL) 10 milliGRAM(s) Oral three times a day  losartan 100 milliGRAM(s) Oral daily  predniSONE   Tablet 40 milliGRAM(s) Oral daily  spironolactone 25 milliGRAM(s) Oral daily    MEDICATIONS  (PRN):      CAPILLARY BLOOD GLUCOSE        I&O's Summary    07 Dec 2019 07:01  -  08 Dec 2019 07:00  --------------------------------------------------------  IN: 360 mL / OUT: 0 mL / NET: 360 mL    08 Dec 2019 07:01  -  08 Dec 2019 10:33  --------------------------------------------------------  IN: 240 mL / OUT: 0 mL / NET: 240 mL        PHYSICAL EXAM:  Vital Signs Last 24 Hrs  T(C): 36.7 (08 Dec 2019 05:05), Max: 36.7 (08 Dec 2019 05:05)  T(F): 98 (08 Dec 2019 05:05), Max: 98 (08 Dec 2019 05:05)  HR: 84 (08 Dec 2019 05:05) (84 - 105)  BP: 135/68 (08 Dec 2019 05:05) (113/65 - 145/80)  BP(mean): --  RR: 18 (08 Dec 2019 05:05) (18 - 18)  SpO2: 96% (08 Dec 2019 05:05) (94% - 96%)    CONSTITUTIONAL: NAD, well-developed  RESPIRATORY: Normal respiratory effort; lungs are clear to auscultation bilaterally  CARDIOVASCULAR: Regular rate and rhythm, normal S1 and S2, no murmur/rub/gallop; No lower extremity edema; Peripheral pulses are 2+ bilaterally  ABDOMEN: Nontender to palpation, normoactive bowel sounds, no rebound/guarding; No hepatosplenomegaly  MUSCLOSKELETAL: no clubbing or cyanosis of digits; no joint swelling or tenderness to palpation  PSYCH: A+O to person, place, and time; affect appropriate    LABS:                        15.5   14.15 )-----------( 266      ( 08 Dec 2019 07:09 )             46.5     12-08    137  |  101  |  22  ----------------------------<  213<H>  4.0   |  24  |  1.12    Ca    9.2      08 Dec 2019 07:09  Phos  4.4     12-08  Mg     2.2     12-08    TPro  7.1  /  Alb  3.6  /  TBili  0.5  /  DBili  x   /  AST  14  /  ALT  28  /  AlkPhos  61  12-07                RADIOLOGY & ADDITIONAL TESTS:  Results Reviewed:   Imaging Personally Reviewed:  Electrocardiogram Personally Reviewed:    COORDINATION OF CARE:  Care Discussed with Consultants/Other Providers [Y/N]:  Prior or Outpatient Records Reviewed [Y/N]: PROGRESS NOTE:   Authoted by Jacob Soares MS4,  Pager [phone]    Patient is a 39y old  Male who presents with a chief complaint of CP, SOB (08 Dec 2019 09:38)      SUBJECTIVE / OVERNIGHT EVENTS:  Patient seen and examained at bedside. Pt still complaining of SOB, stating that nebulizers make it worse. Says CP has resolved. Is urinating well. Denies any other complications outside of SOB.     ADDITIONAL REVIEW OF SYSTEMS:  CONSTITUTIONAL: No weakness, fevers or chills  EYES/ENT: No visual changes;  No throat pain   NECK: No pain or stiffness  RESPIRATORY: +mild cough/SOB, no hemoptysis;  CARDIOVASCULAR: +mild chest pain, no palpitations  GASTROINTESTINAL: No abdominal or epigastric pain. No nausea, vomiting, or hematemesis; No diarrhea or constipation. No melena or hematochezia.  GENITOURINARY: No dysuria, frequency or hematuria  NEUROLOGICAL: No numbness or weakness  SKIN: No itching, burning, rashes, or lesions   All other review of systems is negative unless indicated above.    MEDICATIONS  (STANDING):  buDESOnide    Inhalation Suspension 0.25 milliGRAM(s) Inhalation two times a day  carvedilol 12.5 milliGRAM(s) Oral every 12 hours  furosemide   Injectable 40 milliGRAM(s) IV Push two times a day  heparin  Injectable 5000 Unit(s) SubCutaneous every 8 hours  hydrALAZINE 75 milliGRAM(s) Oral every 8 hours  influenza   Vaccine 0.5 milliLiter(s) IntraMuscular once  isosorbide   dinitrate Tablet (ISORDIL) 10 milliGRAM(s) Oral three times a day  losartan 100 milliGRAM(s) Oral daily  predniSONE   Tablet 40 milliGRAM(s) Oral daily  spironolactone 25 milliGRAM(s) Oral daily    MEDICATIONS  (PRN):      CAPILLARY BLOOD GLUCOSE        I&O's Summary    07 Dec 2019 07:01  -  08 Dec 2019 07:00  --------------------------------------------------------  IN: 360 mL / OUT: 0 mL / NET: 360 mL    08 Dec 2019 07:01  -  08 Dec 2019 10:33  --------------------------------------------------------  IN: 240 mL / OUT: 0 mL / NET: 240 mL        PHYSICAL EXAM:  Vital Signs Last 24 Hrs  T(C): 36.7 (08 Dec 2019 05:05), Max: 36.7 (08 Dec 2019 05:05)  T(F): 98 (08 Dec 2019 05:05), Max: 98 (08 Dec 2019 05:05)  HR: 84 (08 Dec 2019 05:05) (84 - 105)  BP: 135/68 (08 Dec 2019 05:05) (113/65 - 145/80)  BP(mean): --  RR: 18 (08 Dec 2019 05:05) (18 - 18)  SpO2: 96% (08 Dec 2019 05:05) (94% - 96%)    CONSTITUTIONAL: NAD, well-developed  RESPIRATORY: Mild Wheezing throughout. No crackles.   CARDIOVASCULAR: Regular rate and rhythm, normal S1 and S2, no murmur/rub/gallop; Peripheral pulses are 2+ bilaterally. Bilaterally 1+ pedal edema   ABDOMEN: Nontender to palpation, normoactive bowel sounds, no rebound/guarding; No hepatosplenomegaly  MUSCLOSKELETAL: no clubbing or cyanosis of digits; no joint swelling or tenderness to palpation  PSYCH: A+O to person, place, and time; affect appropriate  Neuro: PERRLA, CN grossly intact      LABS:                        15.5   14.15 )-----------( 266      ( 08 Dec 2019 07:09 )             46.5     12-08    137  |  101  |  22  ----------------------------<  213<H>  4.0   |  24  |  1.12    Ca    9.2      08 Dec 2019 07:09  Phos  4.4     12-08  Mg     2.2     12-08    TPro  7.1  /  Alb  3.6  /  TBili  0.5  /  DBili  x   /  AST  14  /  ALT  28  /  AlkPhos  61  12-07                RADIOLOGY & ADDITIONAL TESTS:  Results Reviewed:   Imaging Personally Reviewed:  Electrocardiogram Personally Reviewed:    COORDINATION OF CARE:  Care Discussed with Consultants/Other Providers [Y/N]:  Prior or Outpatient Records Reviewed [Y/N]:

## 2019-12-08 NOTE — PROVIDER CONTACT NOTE (OTHER) - ASSESSMENT
Patient is A&Ox4. VSS. Pt denies SOB, chest pain, or dizziness, and is free from cardiac episodes. NSR on cardiac monitor. Patient educated on importance of tele monitoring and risks of not being on tele. Patient continues to refuse tele monitoring.

## 2019-12-09 LAB
ANION GAP SERPL CALC-SCNC: 13 MMOL/L — SIGNIFICANT CHANGE UP (ref 5–17)
BUN SERPL-MCNC: 21 MG/DL — SIGNIFICANT CHANGE UP (ref 7–23)
CALCIUM SERPL-MCNC: 9.7 MG/DL — SIGNIFICANT CHANGE UP (ref 8.4–10.5)
CHLORIDE SERPL-SCNC: 99 MMOL/L — SIGNIFICANT CHANGE UP (ref 96–108)
CO2 SERPL-SCNC: 28 MMOL/L — SIGNIFICANT CHANGE UP (ref 22–31)
CREAT SERPL-MCNC: 1.25 MG/DL — SIGNIFICANT CHANGE UP (ref 0.5–1.3)
GLUCOSE SERPL-MCNC: 108 MG/DL — HIGH (ref 70–99)
HCT VFR BLD CALC: 50.6 % — HIGH (ref 39–50)
HGB BLD-MCNC: 16 G/DL — SIGNIFICANT CHANGE UP (ref 13–17)
MAGNESIUM SERPL-MCNC: 2.3 MG/DL — SIGNIFICANT CHANGE UP (ref 1.6–2.6)
MCHC RBC-ENTMCNC: 28.1 PG — SIGNIFICANT CHANGE UP (ref 27–34)
MCHC RBC-ENTMCNC: 31.6 GM/DL — LOW (ref 32–36)
MCV RBC AUTO: 88.9 FL — SIGNIFICANT CHANGE UP (ref 80–100)
NRBC # BLD: 0 /100 WBCS — SIGNIFICANT CHANGE UP (ref 0–0)
NT-PROBNP SERPL-SCNC: 344 PG/ML — HIGH (ref 0–300)
PHOSPHATE SERPL-MCNC: 4.9 MG/DL — HIGH (ref 2.5–4.5)
PLATELET # BLD AUTO: 302 K/UL — SIGNIFICANT CHANGE UP (ref 150–400)
POTASSIUM SERPL-MCNC: 4 MMOL/L — SIGNIFICANT CHANGE UP (ref 3.5–5.3)
POTASSIUM SERPL-SCNC: 4 MMOL/L — SIGNIFICANT CHANGE UP (ref 3.5–5.3)
RBC # BLD: 5.69 M/UL — SIGNIFICANT CHANGE UP (ref 4.2–5.8)
RBC # FLD: 15.2 % — HIGH (ref 10.3–14.5)
SODIUM SERPL-SCNC: 140 MMOL/L — SIGNIFICANT CHANGE UP (ref 135–145)
WBC # BLD: 13.2 K/UL — HIGH (ref 3.8–10.5)
WBC # FLD AUTO: 13.2 K/UL — HIGH (ref 3.8–10.5)

## 2019-12-09 PROCEDURE — 99233 SBSQ HOSP IP/OBS HIGH 50: CPT | Mod: GC

## 2019-12-09 PROCEDURE — 99233 SBSQ HOSP IP/OBS HIGH 50: CPT

## 2019-12-09 RX ORDER — LEVALBUTEROL 1.25 MG/.5ML
0.63 SOLUTION, CONCENTRATE RESPIRATORY (INHALATION) EVERY 6 HOURS
Refills: 0 | Status: DISCONTINUED | OUTPATIENT
Start: 2019-12-09 | End: 2019-12-12

## 2019-12-09 RX ORDER — IPRATROPIUM/ALBUTEROL SULFATE 18-103MCG
3 AEROSOL WITH ADAPTER (GRAM) INHALATION EVERY 6 HOURS
Refills: 0 | Status: DISCONTINUED | OUTPATIENT
Start: 2019-12-09 | End: 2019-12-09

## 2019-12-09 RX ORDER — ACETAMINOPHEN 500 MG
650 TABLET ORAL ONCE
Refills: 0 | Status: COMPLETED | OUTPATIENT
Start: 2019-12-09 | End: 2019-12-09

## 2019-12-09 RX ADMIN — Medication 75 MILLIGRAM(S): at 21:23

## 2019-12-09 RX ADMIN — CARVEDILOL PHOSPHATE 12.5 MILLIGRAM(S): 80 CAPSULE, EXTENDED RELEASE ORAL at 17:25

## 2019-12-09 RX ADMIN — Medication 650 MILLIGRAM(S): at 07:00

## 2019-12-09 RX ADMIN — ISOSORBIDE DINITRATE 10 MILLIGRAM(S): 5 TABLET ORAL at 21:23

## 2019-12-09 RX ADMIN — LEVALBUTEROL 0.63 MILLIGRAM(S): 1.25 SOLUTION, CONCENTRATE RESPIRATORY (INHALATION) at 05:42

## 2019-12-09 RX ADMIN — Medication 40 MILLIGRAM(S): at 17:26

## 2019-12-09 RX ADMIN — ISOSORBIDE DINITRATE 10 MILLIGRAM(S): 5 TABLET ORAL at 13:34

## 2019-12-09 RX ADMIN — Medication 0.25 MILLIGRAM(S): at 05:42

## 2019-12-09 RX ADMIN — Medication 75 MILLIGRAM(S): at 05:46

## 2019-12-09 RX ADMIN — CARVEDILOL PHOSPHATE 12.5 MILLIGRAM(S): 80 CAPSULE, EXTENDED RELEASE ORAL at 05:42

## 2019-12-09 RX ADMIN — SPIRONOLACTONE 25 MILLIGRAM(S): 25 TABLET, FILM COATED ORAL at 05:42

## 2019-12-09 RX ADMIN — Medication 40 MILLIGRAM(S): at 05:45

## 2019-12-09 RX ADMIN — LOSARTAN POTASSIUM 100 MILLIGRAM(S): 100 TABLET, FILM COATED ORAL at 05:42

## 2019-12-09 RX ADMIN — Medication 40 MILLIGRAM(S): at 05:42

## 2019-12-09 RX ADMIN — Medication 75 MILLIGRAM(S): at 13:34

## 2019-12-09 RX ADMIN — Medication 650 MILLIGRAM(S): at 06:31

## 2019-12-09 RX ADMIN — ISOSORBIDE DINITRATE 10 MILLIGRAM(S): 5 TABLET ORAL at 05:41

## 2019-12-09 NOTE — PROGRESS NOTE ADULT - ASSESSMENT
39M former smoker w/ h/o htn, asthma, cxr in 2/2019 suggestive of sarcoid, and cardiomyopathy w/ EF 22% on cardiac MRI 4/2/19, though the cause of his cardiomyopathy is unclear.   He presented w/ Acute on Chronic Systolic HF, and has responded favorably to diuretics, though he still has evidence to suggest elevated filling pressures.     #Acute on Chronic Systolic HFrEF   - Possible etiologies include, but are not limited to, ischemic cardiac disease or hypertensive heart disease (long history)   - Less likely sarcoid given no LAD on CT chest, however, this does not rule it out.    - C/w isosorbide dinitrate 10 mg PO TID   - continue Lasix 40 IV BID  - continue carvedilol 12.5 BID   - losartan 100 mg PO Daily   - continue spironolactone 25 mg qd  - c/w hydralazine 75 TID  - Check Strict I/Os, daily standing weights -> they have not been being done accurately.   - suggest limited echo with contrast to further assess LV function as it may helpful to assess for recovery and for consideration of AICD implantation   - when optimized and diuresed, will need right and left heart cath to evaluate for ischemic cause of heart failure    Alhaji Taveras M.D.  Cardiology Resident      Patient seen and examined at bedside. Note is preliminary until signed by attending. 39M former smoker w/ h/o htn, asthma, cxr in 2/2019 suggestive of sarcoid, and cardiomyopathy w/ EF 22% on cardiac MRI 4/2/19, though the cause of his cardiomyopathy is unclear.   He presented w/ Acute on Chronic Systolic HF, and has responded favorably to diuretics, though he still has evidence to suggest elevated filling pressures.     #Acute on Chronic Systolic HFrEF   - Possible etiologies include, but are not limited to, ischemic cardiac disease or hypertensive heart disease (long history)   - Less likely sarcoid given no LAD on CT chest, however, this does not rule it out.    - C/w isosorbide dinitrate 10 mg PO TID   - continue Lasix 40 IV BID  - continue carvedilol 12.5 BID   - losartan 100 mg PO Daily   - continue spironolactone 25 mg qd  - c/w hydralazine 75 TID  - Strict I/Os, daily standing weights   - Please order another pro BNP to reevaluate progress of pt's diuresis. If unchanged or increased from value at admission, can increase lasix dose to 60 IV BID.   - suggest limited echo with contrast to further assess LV function as it may helpful to assess for recovery and for consideration of AICD implantation   - when optimized and diuresed, will need right and left heart cath to evaluate for ischemic cause of heart failure    Alhaji Taveras M.D.  Cardiology Resident      Patient seen and examined at bedside. Note is preliminary until signed by attending.

## 2019-12-09 NOTE — PROGRESS NOTE ADULT - SUBJECTIVE AND OBJECTIVE BOX
Overnight Events: No acute events overnight. Pt states that he feels congested, endorses mild increase in leg swelling today. Endorses having good urine output.          Review Of Systems:   CONSTITUTIONAL: no fevers or chills  EYES/ENT: No visual changes;  No vertigo or throat pain   NECK: No pain or stiffness  RESPIRATORY: +SOB, wheezing.   CARDIOVASCULAR: No chest pain or palpitations  GASTROINTESTINAL: No abdominal or epigastric pain. No nausea, vomiting. No diarrhea. No melena.  GENITOURINARY: No dysuria, frequency or hematuria  NEUROLOGICAL: No numbness or weakness  SKIN: No itching, burning, rashes, or lesions   All other review of systems is negative unless indicated above.     Current Meds:  albuterol/ipratropium for Nebulization 3 milliLiter(s) Nebulizer every 6 hours  buDESOnide    Inhalation Suspension 0.25 milliGRAM(s) Inhalation two times a day  carvedilol 12.5 milliGRAM(s) Oral every 12 hours  furosemide   Injectable 40 milliGRAM(s) IV Push two times a day  heparin  Injectable 5000 Unit(s) SubCutaneous every 8 hours  hydrALAZINE 75 milliGRAM(s) Oral every 8 hours  influenza   Vaccine 0.5 milliLiter(s) IntraMuscular once  isosorbide   dinitrate Tablet (ISORDIL) 10 milliGRAM(s) Oral three times a day  losartan 100 milliGRAM(s) Oral daily  predniSONE   Tablet 40 milliGRAM(s) Oral daily  spironolactone 25 milliGRAM(s) Oral daily      Vitals:  T(F): 97.5 (12-09), Max: 97.5 (12-09)  HR: 94 (12-09) (94 - 102)  BP: 132/94 (12-09) (127/88 - 138/96)  RR: 18 (12-09)  SpO2: 96% (12-09)  I&O's Summary    08 Dec 2019 07:01  -  09 Dec 2019 07:00  --------------------------------------------------------  IN: 1170 mL / OUT: 0 mL / NET: 1170 mL        Physical Exam:  Appearance: No acute distress; well appearing, aox3, sitting upright in chair.   Eyes: PERRL, EOMI, pink conjunctiva  HEENT: Normal oral mucosa  Cardiovascular: RRR, S1, S2, no murmurs, rubs, or gallops; cannot assess JVD 2/2 body habitus  Respiratory: diffuse wheeze  Gastrointestinal: soft, non-tender, non-distended with normal bowel sounds  Musculoskeletal: No clubbing; no joint deformity   Extremities: 1+ pitting edema in LE b/l, warm, well perfused, dorsalis pedis pulses intact  Neurologic: Non-focal  Lymphatic: No lymphadenopathy  Psychiatry: AAOx3, mood & affect appropriate  Skin: No rashes, ecchymoses, or cyanosis                          16.0   13.20 )-----------( 302      ( 09 Dec 2019 06:50 )             50.6     12-09    140  |  99  |  21  ----------------------------<  108<H>  4.0   |  28  |  1.25    Ca    9.7      09 Dec 2019 06:44  Phos  4.9     12-09  Mg     2.3     12-09        CARDIAC MARKERS ( 04 Dec 2019 13:48 )  30 ng/L / x     / x     / x     / x     / x      CARDIAC MARKERS ( 04 Dec 2019 11:21 )  33 ng/L / x     / x     / x     / x     / x          Serum Pro-Brain Natriuretic Peptide: 1121 pg/mL (12-04 @ 11:21)          New ECG(s): Personally reviewed Overnight Events: No acute events overnight. Pt states that he feels congested, endorses mild increase in leg swelling today. Endorses having good urine output.          Review Of Systems:   CONSTITUTIONAL: no fevers or chills  EYES/ENT: No visual changes;  No vertigo or throat pain   NECK: No pain or stiffness  RESPIRATORY: +SOB, wheezing.   CARDIOVASCULAR: No chest pain or palpitations  GASTROINTESTINAL: No abdominal or epigastric pain. No nausea, vomiting. No diarrhea. No melena.  GENITOURINARY: No dysuria, frequency or hematuria  NEUROLOGICAL: No numbness or weakness  SKIN: No itching, burning, rashes, or lesions   All other review of systems is negative unless indicated above.     Current Meds:  albuterol/ipratropium for Nebulization 3 milliLiter(s) Nebulizer every 6 hours  buDESOnide    Inhalation Suspension 0.25 milliGRAM(s) Inhalation two times a day  carvedilol 12.5 milliGRAM(s) Oral every 12 hours  furosemide   Injectable 40 milliGRAM(s) IV Push two times a day  heparin  Injectable 5000 Unit(s) SubCutaneous every 8 hours  hydrALAZINE 75 milliGRAM(s) Oral every 8 hours  influenza   Vaccine 0.5 milliLiter(s) IntraMuscular once  isosorbide   dinitrate Tablet (ISORDIL) 10 milliGRAM(s) Oral three times a day  losartan 100 milliGRAM(s) Oral daily  predniSONE   Tablet 40 milliGRAM(s) Oral daily  spironolactone 25 milliGRAM(s) Oral daily      Vitals:  T(F): 97.5 (12-09), Max: 97.5 (12-09)  HR: 94 (12-09) (94 - 102)  BP: 132/94 (12-09) (127/88 - 138/96)  RR: 18 (12-09)  SpO2: 96% (12-09)  I&O's Summary    08 Dec 2019 07:01  -  09 Dec 2019 07:00  --------------------------------------------------------  IN: 1170 mL / OUT: 0 mL / NET: 1170 mL        Physical Exam:  Appearance: No acute distress; well appearing, aox3, sitting upright in chair.   Eyes: PERRL, EOMI, pink conjunctiva  HEENT: Normal oral mucosa  Cardiovascular: RRR, S1, S2, no murmurs, rubs, or gallops; cannot assess JVD 2/2 body habitus  Respiratory: diffuse wheeze, faint bibasilar crackles  Gastrointestinal: soft, non-tender, non-distended with normal bowel sounds  Musculoskeletal: No clubbing; no joint deformity   Extremities: 1+ pitting edema in LE b/l, warm, well perfused, dorsalis pedis pulses intact  Neurologic: Non-focal  Lymphatic: No lymphadenopathy  Psychiatry: AAOx3, mood & affect appropriate  Skin: No rashes, ecchymoses, or cyanosis                          16.0   13.20 )-----------( 302      ( 09 Dec 2019 06:50 )             50.6     12-09    140  |  99  |  21  ----------------------------<  108<H>  4.0   |  28  |  1.25    Ca    9.7      09 Dec 2019 06:44  Phos  4.9     12-09  Mg     2.3     12-09        CARDIAC MARKERS ( 04 Dec 2019 13:48 )  30 ng/L / x     / x     / x     / x     / x      CARDIAC MARKERS ( 04 Dec 2019 11:21 )  33 ng/L / x     / x     / x     / x     / x          Serum Pro-Brain Natriuretic Peptide: 1121 pg/mL (12-04 @ 11:21)          New ECG(s): Personally reviewed

## 2019-12-09 NOTE — PROGRESS NOTE ADULT - SUBJECTIVE AND OBJECTIVE BOX
PROGRESS NOTE:   Authoted by Jacob Soares MS4,  Pager [phone]    Patient is a 39y old  Male who presents with a chief complaint of CP, SOB (08 Dec 2019 10:33)      SUBJECTIVE / OVERNIGHT EVENTS:  Patient seen and examained at bedside.     ADDITIONAL REVIEW OF SYSTEMS:    MEDICATIONS  (STANDING):  buDESOnide    Inhalation Suspension 0.25 milliGRAM(s) Inhalation two times a day  carvedilol 12.5 milliGRAM(s) Oral every 12 hours  furosemide   Injectable 40 milliGRAM(s) IV Push two times a day  heparin  Injectable 5000 Unit(s) SubCutaneous every 8 hours  hydrALAZINE 75 milliGRAM(s) Oral every 8 hours  influenza   Vaccine 0.5 milliLiter(s) IntraMuscular once  isosorbide   dinitrate Tablet (ISORDIL) 10 milliGRAM(s) Oral three times a day  levalbuterol Inhalation 0.63 milliGRAM(s) Inhalation every 6 hours  losartan 100 milliGRAM(s) Oral daily  predniSONE   Tablet 40 milliGRAM(s) Oral daily  spironolactone 25 milliGRAM(s) Oral daily    MEDICATIONS  (PRN):      CAPILLARY BLOOD GLUCOSE        I&O's Summary    08 Dec 2019 07:01  -  09 Dec 2019 07:00  --------------------------------------------------------  IN: 1170 mL / OUT: 0 mL / NET: 1170 mL        PHYSICAL EXAM:  Vital Signs Last 24 Hrs  T(C): 36.4 (09 Dec 2019 04:51), Max: 36.4 (09 Dec 2019 04:51)  T(F): 97.5 (09 Dec 2019 04:51), Max: 97.5 (09 Dec 2019 04:51)  HR: 94 (09 Dec 2019 04:51) (94 - 102)  BP: 132/94 (09 Dec 2019 04:51) (127/88 - 138/96)  BP(mean): --  RR: 18 (09 Dec 2019 04:51) (18 - 18)  SpO2: 96% (09 Dec 2019 04:51) (94% - 96%)    CONSTITUTIONAL: NAD, well-developed  RESPIRATORY: Normal respiratory effort; lungs are clear to auscultation bilaterally  CARDIOVASCULAR: Regular rate and rhythm, normal S1 and S2, no murmur/rub/gallop; No lower extremity edema; Peripheral pulses are 2+ bilaterally  ABDOMEN: Nontender to palpation, normoactive bowel sounds, no rebound/guarding; No hepatosplenomegaly  MUSCLOSKELETAL: no clubbing or cyanosis of digits; no joint swelling or tenderness to palpation  PSYCH: A+O to person, place, and time; affect appropriate    LABS:                        16.0   13.20 )-----------( 302      ( 09 Dec 2019 06:50 )             50.6     12-08    137  |  101  |  22  ----------------------------<  213<H>  4.0   |  24  |  1.12    Ca    9.2      08 Dec 2019 07:09  Phos  4.4     12-08  Mg     2.2     12-08                  RADIOLOGY & ADDITIONAL TESTS:  Results Reviewed:   Imaging Personally Reviewed:  Electrocardiogram Personally Reviewed:    COORDINATION OF CARE:  Care Discussed with Consultants/Other Providers [Y/N]:  Prior or Outpatient Records Reviewed [Y/N]: PROGRESS NOTE:   Authoted by Jacob Soares MS4,  Pager [phone]    Patient is a 39y old  Male who presents with a chief complaint of CP, SOB (08 Dec 2019 10:33)      SUBJECTIVE / OVERNIGHT EVENTS:  Patient seen and examined at bedside. Pt states he is feeling better then yesterday and is urinating well. He appreciates some minor improvements in his SOB and leg swelling. He denies any CP, abdominal pain, dysuria, bowel changes, or calf tenderness.     ADDITIONAL REVIEW OF SYSTEMS:  CONSTITUTIONAL: No weakness, fevers or chills  EYES/ENT: No visual changes;  No throat pain   NECK: No pain or stiffness  RESPIRATORY: +mild cough/SOB, no hemoptysis;  CARDIOVASCULAR: +mild chest pain, no palpitations  GASTROINTESTINAL: No abdominal or epigastric pain. No nausea, vomiting, or hematemesis; No diarrhea or constipation. No melena or hematochezia.  GENITOURINARY: No dysuria, frequency or hematuria  NEUROLOGICAL: No numbness or weakness  SKIN: No itching, burning, rashes, or lesions   All other review of systems is negative unless indicated above.    MEDICATIONS  (STANDING):  buDESOnide    Inhalation Suspension 0.25 milliGRAM(s) Inhalation two times a day  carvedilol 12.5 milliGRAM(s) Oral every 12 hours  furosemide   Injectable 40 milliGRAM(s) IV Push two times a day  heparin  Injectable 5000 Unit(s) SubCutaneous every 8 hours  hydrALAZINE 75 milliGRAM(s) Oral every 8 hours  influenza   Vaccine 0.5 milliLiter(s) IntraMuscular once  isosorbide   dinitrate Tablet (ISORDIL) 10 milliGRAM(s) Oral three times a day  levalbuterol Inhalation 0.63 milliGRAM(s) Inhalation every 6 hours  losartan 100 milliGRAM(s) Oral daily  predniSONE   Tablet 40 milliGRAM(s) Oral daily  spironolactone 25 milliGRAM(s) Oral daily    MEDICATIONS  (PRN):      CAPILLARY BLOOD GLUCOSE        I&O's Summary    08 Dec 2019 07:01  -  09 Dec 2019 07:00  --------------------------------------------------------  IN: 1170 mL / OUT: 0 mL / NET: 1170 mL        PHYSICAL EXAM:  Vital Signs Last 24 Hrs  T(C): 36.4 (09 Dec 2019 04:51), Max: 36.4 (09 Dec 2019 04:51)  T(F): 97.5 (09 Dec 2019 04:51), Max: 97.5 (09 Dec 2019 04:51)  HR: 94 (09 Dec 2019 04:51) (94 - 102)  BP: 132/94 (09 Dec 2019 04:51) (127/88 - 138/96)  BP(mean): --  RR: 18 (09 Dec 2019 04:51) (18 - 18)  SpO2: 96% (09 Dec 2019 04:51) (94% - 96%)    CONSTITUTIONAL: NAD, well-developed  RESPIRATORY: Lungs clear to auscultation today, no wheezing appreciated. No crackles.   CARDIOVASCULAR: Regular rate and rhythm, normal S1 and S2, no murmur/rub/gallop; Peripheral pulses are 2+ bilaterally. Bilaterally 1+ pedal edema improved from yesterday    ABDOMEN: Nontender to palpation, normoactive bowel sounds, no rebound/guarding; No hepatosplenomegaly  MUSCLOSKELETAL: no clubbing or cyanosis of digits; no joint swelling or tenderness to palpation  PSYCH: A+O to person, place, and time; affect appropriate  Neuro: LAUREEN ROUSE grossly intact          LABS:                        16.0   13.20 )-----------( 302      ( 09 Dec 2019 06:50 )             50.6     12-08    137  |  101  |  22  ----------------------------<  213<H>  4.0   |  24  |  1.12    Ca    9.2      08 Dec 2019 07:09  Phos  4.4     12-08  Mg     2.2     12-08                  RADIOLOGY & ADDITIONAL TESTS:  Results Reviewed:   Imaging Personally Reviewed:  Electrocardiogram Personally Reviewed:    COORDINATION OF CARE:  Care Discussed with Consultants/Other Providers [Y/N]:  Prior or Outpatient Records Reviewed [Y/N]:

## 2019-12-09 NOTE — PROGRESS NOTE ADULT - PROBLEM SELECTOR PLAN 1
- Echo showed eccentric left ventricular hypertrophy (dilated left  ventricle with normal relative wall thickness). Moderate-severe global left ventricular systolic dysfunction. EF approximately 30% by visual estimation.  - on 40 IV lasix bid. gave additional 20 IV lasix in am on account of wheezing and crackles on lung auscultation  -will need right and left heart cath on admission per cardiology when pt is medically optimized and diuresed. Hoping for procedure on Monday.   - started on isordil - Echo showed eccentric left ventricular hypertrophy (dilated left  ventricle with normal relative wall thickness). Moderate-severe global left ventricular systolic dysfunction. EF approximately 30% by visual estimation.  - on 40 IV lasix bid. gave additional 20 IV lasix in am on account of wheezing and crackles on lung auscultation  -will need right and left heart cath on admission per cardiology when pt is medically optimized and diuresed. F/u cards rec for procedure.    - started on isordil - Echo showed eccentric left ventricular hypertrophy (dilated left  ventricle with normal relative wall thickness). Moderate-severe global left ventricular systolic dysfunction. EF approximately 30% by visual estimation.  - on 40 IV lasix bid. gave additional 20 IV lasix in am on account of wheezing and crackles on lung auscultation  -will need right and left heart cath on admission per cardiology when pt is medically optimized and   - Per cards, Pro-BNP to assess progress of pt's diuresis. If unchanged or increased from value at admission, can increase lasix dose to 60 IV BID.

## 2019-12-10 LAB
ANION GAP SERPL CALC-SCNC: 17 MMOL/L — SIGNIFICANT CHANGE UP (ref 5–17)
BUN SERPL-MCNC: 23 MG/DL — SIGNIFICANT CHANGE UP (ref 7–23)
CALCIUM SERPL-MCNC: 9.8 MG/DL — SIGNIFICANT CHANGE UP (ref 8.4–10.5)
CHLORIDE SERPL-SCNC: 97 MMOL/L — SIGNIFICANT CHANGE UP (ref 96–108)
CO2 SERPL-SCNC: 23 MMOL/L — SIGNIFICANT CHANGE UP (ref 22–31)
CREAT SERPL-MCNC: 1.16 MG/DL — SIGNIFICANT CHANGE UP (ref 0.5–1.3)
GLUCOSE SERPL-MCNC: 198 MG/DL — HIGH (ref 70–99)
HCT VFR BLD CALC: 48.4 % — SIGNIFICANT CHANGE UP (ref 39–50)
HGB BLD-MCNC: 15.4 G/DL — SIGNIFICANT CHANGE UP (ref 13–17)
MAGNESIUM SERPL-MCNC: 2.2 MG/DL — SIGNIFICANT CHANGE UP (ref 1.6–2.6)
MCHC RBC-ENTMCNC: 28.5 PG — SIGNIFICANT CHANGE UP (ref 27–34)
MCHC RBC-ENTMCNC: 31.8 GM/DL — LOW (ref 32–36)
MCV RBC AUTO: 89.6 FL — SIGNIFICANT CHANGE UP (ref 80–100)
NRBC # BLD: 0 /100 WBCS — SIGNIFICANT CHANGE UP (ref 0–0)
PHOSPHATE SERPL-MCNC: 4.1 MG/DL — SIGNIFICANT CHANGE UP (ref 2.5–4.5)
PLATELET # BLD AUTO: 294 K/UL — SIGNIFICANT CHANGE UP (ref 150–400)
POTASSIUM SERPL-MCNC: 4.5 MMOL/L — SIGNIFICANT CHANGE UP (ref 3.5–5.3)
POTASSIUM SERPL-SCNC: 4.5 MMOL/L — SIGNIFICANT CHANGE UP (ref 3.5–5.3)
RBC # BLD: 5.4 M/UL — SIGNIFICANT CHANGE UP (ref 4.2–5.8)
RBC # FLD: 15.2 % — HIGH (ref 10.3–14.5)
SODIUM SERPL-SCNC: 137 MMOL/L — SIGNIFICANT CHANGE UP (ref 135–145)
WBC # BLD: 16.22 K/UL — HIGH (ref 3.8–10.5)
WBC # FLD AUTO: 16.22 K/UL — HIGH (ref 3.8–10.5)

## 2019-12-10 PROCEDURE — 99233 SBSQ HOSP IP/OBS HIGH 50: CPT

## 2019-12-10 PROCEDURE — 99233 SBSQ HOSP IP/OBS HIGH 50: CPT | Mod: GC

## 2019-12-10 RX ORDER — ACETAMINOPHEN 500 MG
650 TABLET ORAL ONCE
Refills: 0 | Status: COMPLETED | OUTPATIENT
Start: 2019-12-10 | End: 2019-12-10

## 2019-12-10 RX ORDER — ACETAMINOPHEN 500 MG
650 TABLET ORAL EVERY 6 HOURS
Refills: 0 | Status: DISCONTINUED | OUTPATIENT
Start: 2019-12-10 | End: 2019-12-11

## 2019-12-10 RX ADMIN — Medication 650 MILLIGRAM(S): at 02:00

## 2019-12-10 RX ADMIN — ISOSORBIDE DINITRATE 10 MILLIGRAM(S): 5 TABLET ORAL at 06:47

## 2019-12-10 RX ADMIN — Medication 40 MILLIGRAM(S): at 05:39

## 2019-12-10 RX ADMIN — LOSARTAN POTASSIUM 100 MILLIGRAM(S): 100 TABLET, FILM COATED ORAL at 06:47

## 2019-12-10 RX ADMIN — Medication 75 MILLIGRAM(S): at 13:07

## 2019-12-10 RX ADMIN — CARVEDILOL PHOSPHATE 12.5 MILLIGRAM(S): 80 CAPSULE, EXTENDED RELEASE ORAL at 05:40

## 2019-12-10 RX ADMIN — Medication 75 MILLIGRAM(S): at 21:05

## 2019-12-10 RX ADMIN — Medication 75 MILLIGRAM(S): at 05:40

## 2019-12-10 RX ADMIN — Medication 40 MILLIGRAM(S): at 17:14

## 2019-12-10 RX ADMIN — Medication 650 MILLIGRAM(S): at 17:24

## 2019-12-10 RX ADMIN — ISOSORBIDE DINITRATE 10 MILLIGRAM(S): 5 TABLET ORAL at 13:07

## 2019-12-10 RX ADMIN — LEVALBUTEROL 0.63 MILLIGRAM(S): 1.25 SOLUTION, CONCENTRATE RESPIRATORY (INHALATION) at 01:21

## 2019-12-10 RX ADMIN — SPIRONOLACTONE 25 MILLIGRAM(S): 25 TABLET, FILM COATED ORAL at 05:38

## 2019-12-10 RX ADMIN — ISOSORBIDE DINITRATE 10 MILLIGRAM(S): 5 TABLET ORAL at 21:05

## 2019-12-10 RX ADMIN — Medication 0.25 MILLIGRAM(S): at 17:14

## 2019-12-10 RX ADMIN — Medication 40 MILLIGRAM(S): at 05:38

## 2019-12-10 RX ADMIN — CARVEDILOL PHOSPHATE 12.5 MILLIGRAM(S): 80 CAPSULE, EXTENDED RELEASE ORAL at 17:14

## 2019-12-10 RX ADMIN — Medication 650 MILLIGRAM(S): at 01:20

## 2019-12-10 RX ADMIN — Medication 650 MILLIGRAM(S): at 17:53

## 2019-12-10 NOTE — PROGRESS NOTE ADULT - PROBLEM SELECTOR PLAN 2
-possible underlying COPD given 17 year smoking history. S/p 125mg  IV solumedrol in ED.   - pt still wheezing today. started on budesonide inhaler  - c/w prednisone 40 mg  - Ct shows small area of scattered nodular opacity with a tree-in-bud configuration   of the left lower lobe, likely represents mucoid impacted distal airways and  Emphysema. possible underlying COPD given 17 year smoking history. S/p 125mg  IV solumedrol in ED.   - c/w xopenox  - finished 4 days of steroids

## 2019-12-10 NOTE — PROGRESS NOTE ADULT - SUBJECTIVE AND OBJECTIVE BOX
PROGRESS NOTE:   Authoted by Jacob Soares MS4,  Pager [phone]    Patient is a 39y old  Male who presents with a chief complaint of CP, SOB (10 Dec 2019 08:29)      SUBJECTIVE / OVERNIGHT EVENTS:  Patient seen and examined at bedside. Pt states he is feeling better but is tired this morning, couldn't sleep at night due to headache. He appreciates some minor improvements in his SOB and leg swelling. He denies any CP, abdominal pain, dysuria, bowel changes, or calf tenderness.     ADDITIONAL REVIEW OF SYSTEMS:  CONSTITUTIONAL: No weakness, fevers or chills  EYES/ENT: No visual changes;  No throat pain   NECK: No pain or stiffness  RESPIRATORY: +mild cough/SOB, no hemoptysis;  CARDIOVASCULAR: +mild chest pain, no palpitations  GASTROINTESTINAL: No abdominal or epigastric pain. No nausea, vomiting, or hematemesis; No diarrhea or constipation. No melena or hematochezia.  GENITOURINARY: No dysuria, frequency or hematuria  NEUROLOGICAL: No numbness or weakness  SKIN: No itching, burning, rashes, or lesions   All other review of systems is negative unless indicated above.    MEDICATIONS  (STANDING):  buDESOnide    Inhalation Suspension 0.25 milliGRAM(s) Inhalation two times a day  carvedilol 12.5 milliGRAM(s) Oral every 12 hours  furosemide   Injectable 40 milliGRAM(s) IV Push two times a day  heparin  Injectable 5000 Unit(s) SubCutaneous every 8 hours  hydrALAZINE 75 milliGRAM(s) Oral every 8 hours  influenza   Vaccine 0.5 milliLiter(s) IntraMuscular once  isosorbide   dinitrate Tablet (ISORDIL) 10 milliGRAM(s) Oral three times a day  losartan 100 milliGRAM(s) Oral daily  spironolactone 25 milliGRAM(s) Oral daily    MEDICATIONS  (PRN):  levalbuterol Inhalation 0.63 milliGRAM(s) Inhalation every 6 hours PRN Wheezing/Sob      CAPILLARY BLOOD GLUCOSE        I&O's Summary    09 Dec 2019 07:01  -  10 Dec 2019 07:00  --------------------------------------------------------  IN: 600 mL / OUT: 2550 mL / NET: -1950 mL        PHYSICAL EXAM:  Vital Signs Last 24 Hrs  T(C): 36.8 (10 Dec 2019 07:40), Max: 36.9 (10 Dec 2019 04:02)  T(F): 98.3 (10 Dec 2019 07:40), Max: 98.4 (10 Dec 2019 04:02)  HR: 96 (10 Dec 2019 07:40) (93 - 106)  BP: 128/82 (10 Dec 2019 07:40) (115/81 - 147/84)  BP(mean): --  RR: 18 (10 Dec 2019 07:40) (18 - 18)  SpO2: 96% (10 Dec 2019 07:40) (94% - 96%)    CONSTITUTIONAL: NAD, well-developed  RESPIRATORY: Lungs clear to auscultation today, no wheezing appreciated. No crackles.   CARDIOVASCULAR: Regular rate and rhythm, normal S1 and S2, no murmur/rub/gallop; Peripheral pulses are 2+ bilaterally. Bilaterally 1+ pedal edema improved from yesterday    ABDOMEN: Nontender to palpation, normoactive bowel sounds, no rebound/guarding; No hepatosplenomegaly  MUSCLOSKELETAL: no clubbing or cyanosis of digits; no joint swelling or tenderness to palpation  PSYCH: A+O to person, place, and time; affect appropriate  Neuro: LAUREEN ROUSE grossly intact    LABS:                        15.4   16.22 )-----------( 294      ( 10 Dec 2019 06:45 )             48.4     12-10    137  |  97  |  23  ----------------------------<  198<H>  4.5   |  23  |  1.16    Ca    9.8      10 Dec 2019 06:41  Phos  4.1     12-10  Mg     2.2     12-10                  RADIOLOGY & ADDITIONAL TESTS:  Results Reviewed:   Imaging Personally Reviewed:  Electrocardiogram Personally Reviewed:    COORDINATION OF CARE:  Care Discussed with Consultants/Other Providers [Y/N]:  Prior or Outpatient Records Reviewed [Y/N]:

## 2019-12-10 NOTE — PROGRESS NOTE ADULT - ASSESSMENT
39M former smoker w/ h/o htn, asthma, cxr in 2/2019 suggestive of sarcoid, and cardiomyopathy w/ EF 22% on cardiac MRI 4/2/19, though the cause of his cardiomyopathy is unclear.   He presented w/ Acute on Chronic Systolic HF, and has responded favorably to diuretics, though he still has evidence to suggest elevated filling pressures. Diuresing well on current regimen.    #Acute on Chronic Systolic HFrEF   - Possible etiologies include, but are not limited to, ischemic cardiac disease or hypertensive heart disease (long history)   - Less likely sarcoid given no LAD on CT chest, however, this does not rule it out.    - C/w isosorbide dinitrate 10 mg PO TID   - continue Lasix 40 IV BID  - continue carvedilol 12.5 BID   - losartan 100 mg PO Daily   - continue spironolactone 25 mg qd  - c/w hydralazine 75 TID  - Strict I/Os, daily standing weights   - suggest limited echo with contrast to further assess LV function as it may helpful to assess for recovery and for consideration of AICD implantation   - when optimized and diuresed, will need right and left heart cath to evaluate for ischemic cause of heart failure    Alhaji Taveras M.D.  Cardiology Resident 39M former smoker w/ h/o htn, asthma, cxr in 2/2019 suggestive of sarcoid, and cardiomyopathy w/ EF 22% on cardiac MRI 4/2/19, though the cause of his cardiomyopathy is unclear.   He presented w/ Acute on Chronic Systolic HF, and has responded favorably to diuretics, though he still has evidence to suggest elevated filling pressures. Diuresing well on current regimen.    #Acute on Chronic Systolic HFrEF   - Possible etiologies include, but are not limited to, ischemic cardiac disease or hypertensive heart disease (long history)   - Less likely sarcoid given no LAD on CT chest, however, this does not rule it out.    - C/w isosorbide dinitrate 10 mg PO TID   - continue Lasix 40 IV BID  - continue carvedilol 12.5 BID   - losartan 100 mg PO Daily   - continue spironolactone 25 mg qd  - c/w hydralazine 75 TID  - Strict I/Os, daily standing weights   - after one more day of diuresis, can most likely go for right and left heart cath to evaluate for ischemic cause of heart failure    Alhaji Taveras M.D.  Cardiology Resident

## 2019-12-10 NOTE — PROGRESS NOTE ADULT - PROBLEM SELECTOR PLAN 1
- Echo showed eccentric left ventricular hypertrophy (dilated left  ventricle with normal relative wall thickness). Moderate-severe global left ventricular systolic dysfunction. EF approximately 30% by visual estimation.  - on 40 IV lasix bid. gave additional 20 IV lasix in am on account of wheezing and crackles on lung auscultation  -will need right and left heart cath on admission per cardiology when pt is medically optimized and   - Per cards, Pro-BNP to assess progress of pt's diuresis. If unchanged or increased from value at admission, can increase lasix dose to 60 IV BID. - Echo showed eccentric left ventricular hypertrophy (dilated left  ventricle with normal relative wall thickness). Moderate-severe global left ventricular systolic dysfunction. EF approximately 30% by visual estimation.  -will need right and left heart cath on admission per cardiology when pt is medically optimized, hoping for tomorrow

## 2019-12-10 NOTE — PROGRESS NOTE ADULT - PROBLEM SELECTOR PLAN 4
CHF vs sarcoid vs infection vs pulmonary hypertension. Unclear of etiology giving history of incarceration + environmental exposure. Chest x-ray shows no acute processes but is suspicious for hilar lymphadenopathy.   - Ct shows small area of scattered nodular opacity with a tree-in-bud configuration   of the left lower lobe, likely represents mucoid impacted distal airways and  Emphysema.  - HIV neg.   - ACE WNL  - quant neg CHF vs sarcoid vs infection vs pulmonary hypertension. Unclear of etiology giving history of incarceration + environmental exposure. Chest x-ray shows no acute processes but is suspicious for hilar lymphadenopathy.   - ACE WNL  - quant neg

## 2019-12-10 NOTE — PROGRESS NOTE ADULT - SUBJECTIVE AND OBJECTIVE BOX
Overnight Events: no acute events         Review Of Systems: No chest pain, shortness of breath, or palpitations  CONSTITUTIONAL: no fevers or chills  EYES/ENT: No visual changes;  No vertigo or throat pain   NECK: No pain or stiffness  RESPIRATORY: No cough, wheezing. No shortness of breath  CARDIOVASCULAR: No chest pain or palpitations  GASTROINTESTINAL: No abdominal or epigastric pain. No nausea, vomiting. No diarrhea. No melena.  GENITOURINARY: No dysuria, frequency or hematuria  NEUROLOGICAL: No numbness or weakness  SKIN: No itching, burning, rashes, or lesions   All other review of systems is negative unless indicated above.     Current Meds:  buDESOnide    Inhalation Suspension 0.25 milliGRAM(s) Inhalation two times a day  carvedilol 12.5 milliGRAM(s) Oral every 12 hours  furosemide   Injectable 40 milliGRAM(s) IV Push two times a day  heparin  Injectable 5000 Unit(s) SubCutaneous every 8 hours  hydrALAZINE 75 milliGRAM(s) Oral every 8 hours  influenza   Vaccine 0.5 milliLiter(s) IntraMuscular once  isosorbide   dinitrate Tablet (ISORDIL) 10 milliGRAM(s) Oral three times a day  levalbuterol Inhalation 0.63 milliGRAM(s) Inhalation every 6 hours PRN  losartan 100 milliGRAM(s) Oral daily  spironolactone 25 milliGRAM(s) Oral daily      Vitals:  T(F): 98.3 (12-10), Max: 98.4 (12-10)  HR: 96 (12-10) (93 - 106)  BP: 128/82 (12-10) (115/81 - 147/84)  RR: 18 (12-10)  SpO2: 96% (12-10)  I&O's Summary    09 Dec 2019 07:01  -  10 Dec 2019 07:00  --------------------------------------------------------  IN: 600 mL / OUT: 2550 mL / NET: -1950 mL        Physical Exam:  Appearance: No acute distress; well appearing  Eyes: PERRL, EOMI, pink conjunctiva  HEENT: Normal oral mucosa  Cardiovascular: RRR, S1, S2, no murmurs, rubs, or gallops; no edema; no JVD  Respiratory: Clear to auscultation bilaterally  Gastrointestinal: soft, non-tender, non-distended with normal bowel sounds  Musculoskeletal: No clubbing; no joint deformity   Neurologic: Non-focal  Lymphatic: No lymphadenopathy  Psychiatry: AAOx3, mood & affect appropriate  Skin: No rashes, ecchymoses, or cyanosis                          15.4   16.22 )-----------( 294      ( 10 Dec 2019 06:45 )             48.4     12-10    137  |  97  |  23  ----------------------------<  198<H>  4.5   |  23  |  1.16    Ca    9.8      10 Dec 2019 06:41  Phos  4.1     12-10  Mg     2.2     12-10        CARDIAC MARKERS ( 04 Dec 2019 13:48 )  30 ng/L / x     / x     / x     / x     / x      CARDIAC MARKERS ( 04 Dec 2019 11:21 )  33 ng/L / x     / x     / x     / x     / x          Serum Pro-Brain Natriuretic Peptide: 344 pg/mL (12-09 @ 06:44)  Serum Pro-Brain Natriuretic Peptide: 1121 pg/mL (12-04 @ 11:21)          New ECG(s): Personally reviewed    Echo:    Stress Testing:     Cath:    Imaging:    Interpretation of Telemetry:      Assessment and Recommendations:        Patient seen and examined at bedside. Note is preliminary until signed by attending. Overnight Events: no acute events. Pt able to lie flat in bed, states that his breathing has improved from yesterday.          Review Of Systems:   CONSTITUTIONAL: no fevers or chills  EYES/ENT: No visual changes;  No vertigo or throat pain   NECK: No pain or stiffness  RESPIRATORY: +SOB  CARDIOVASCULAR: No chest pain or palpitations  GASTROINTESTINAL: No abdominal or epigastric pain. No nausea, vomiting. No diarrhea. No melena.  GENITOURINARY: No dysuria, frequency or hematuria  NEUROLOGICAL: No numbness or weakness  SKIN: No itching, burning, rashes, or lesions   All other review of systems is negative unless indicated above.     Current Meds:  buDESOnide    Inhalation Suspension 0.25 milliGRAM(s) Inhalation two times a day  carvedilol 12.5 milliGRAM(s) Oral every 12 hours  furosemide   Injectable 40 milliGRAM(s) IV Push two times a day  heparin  Injectable 5000 Unit(s) SubCutaneous every 8 hours  hydrALAZINE 75 milliGRAM(s) Oral every 8 hours  influenza   Vaccine 0.5 milliLiter(s) IntraMuscular once  isosorbide   dinitrate Tablet (ISORDIL) 10 milliGRAM(s) Oral three times a day  levalbuterol Inhalation 0.63 milliGRAM(s) Inhalation every 6 hours PRN  losartan 100 milliGRAM(s) Oral daily  spironolactone 25 milliGRAM(s) Oral daily      Vitals:  T(F): 98.3 (12-10), Max: 98.4 (12-10)  HR: 96 (12-10) (93 - 106)  BP: 128/82 (12-10) (115/81 - 147/84)  RR: 18 (12-10)  SpO2: 96% (12-10)  I&O's Summary    09 Dec 2019 07:01  -  10 Dec 2019 07:00  --------------------------------------------------------  IN: 600 mL / OUT: 2550 mL / NET: -1950 mL        Physical Exam:  Appearance: NAD, lying flat in bed, aox3  Eyes: PERRL, EOMI, pink conjunctiva  HEENT: Normal oral mucosa  Cardiovascular: RRR, S1, S2, no murmurs, rubs, or gallops; cannot appreciate JVD secondary to body habitus  Respiratory: faint bibasilar crackles  Gastrointestinal: soft, non-tender, non-distended with normal bowel sounds  Musculoskeletal: No clubbing; no joint deformity   Ext: trace pitting edema in LE b/l, warm and well perfused  Neurologic: Non-focal  Lymphatic: No lymphadenopathy  Psychiatry: AAOx3, mood & affect appropriate  Skin: No rashes, ecchymoses, or cyanosis                          15.4   16.22 )-----------( 294      ( 10 Dec 2019 06:45 )             48.4     12-10    137  |  97  |  23  ----------------------------<  198<H>  4.5   |  23  |  1.16    Ca    9.8      10 Dec 2019 06:41  Phos  4.1     12-10  Mg     2.2     12-10        CARDIAC MARKERS ( 04 Dec 2019 13:48 )  30 ng/L / x     / x     / x     / x     / x      CARDIAC MARKERS ( 04 Dec 2019 11:21 )  33 ng/L / x     / x     / x     / x     / x          Serum Pro-Brain Natriuretic Peptide: 344 pg/mL (12-09 @ 06:44)  Serum Pro-Brain Natriuretic Peptide: 1121 pg/mL (12-04 @ 11:21)          New ECG(s): Personally reviewed    Interpretation of Telemetry:  sinus tach , couplets and PVCs

## 2019-12-11 ENCOUNTER — TRANSCRIPTION ENCOUNTER (OUTPATIENT)
Age: 39
End: 2019-12-11

## 2019-12-11 LAB
ANION GAP SERPL CALC-SCNC: 15 MMOL/L — SIGNIFICANT CHANGE UP (ref 5–17)
BUN SERPL-MCNC: 25 MG/DL — HIGH (ref 7–23)
CALCIUM SERPL-MCNC: 9.6 MG/DL — SIGNIFICANT CHANGE UP (ref 8.4–10.5)
CHLORIDE SERPL-SCNC: 98 MMOL/L — SIGNIFICANT CHANGE UP (ref 96–108)
CO2 SERPL-SCNC: 25 MMOL/L — SIGNIFICANT CHANGE UP (ref 22–31)
CREAT SERPL-MCNC: 1.23 MG/DL — SIGNIFICANT CHANGE UP (ref 0.5–1.3)
GLUCOSE SERPL-MCNC: 167 MG/DL — HIGH (ref 70–99)
HCT VFR BLD CALC: 51.2 % — HIGH (ref 39–50)
HGB BLD-MCNC: 16.6 G/DL — SIGNIFICANT CHANGE UP (ref 13–17)
MAGNESIUM SERPL-MCNC: 2.3 MG/DL — SIGNIFICANT CHANGE UP (ref 1.6–2.6)
MCHC RBC-ENTMCNC: 28.5 PG — SIGNIFICANT CHANGE UP (ref 27–34)
MCHC RBC-ENTMCNC: 32.4 GM/DL — SIGNIFICANT CHANGE UP (ref 32–36)
MCV RBC AUTO: 87.8 FL — SIGNIFICANT CHANGE UP (ref 80–100)
NRBC # BLD: 0 /100 WBCS — SIGNIFICANT CHANGE UP (ref 0–0)
PHOSPHATE SERPL-MCNC: 4.4 MG/DL — SIGNIFICANT CHANGE UP (ref 2.5–4.5)
PLATELET # BLD AUTO: 285 K/UL — SIGNIFICANT CHANGE UP (ref 150–400)
POTASSIUM SERPL-MCNC: 4.2 MMOL/L — SIGNIFICANT CHANGE UP (ref 3.5–5.3)
POTASSIUM SERPL-SCNC: 4.2 MMOL/L — SIGNIFICANT CHANGE UP (ref 3.5–5.3)
RBC # BLD: 5.83 M/UL — HIGH (ref 4.2–5.8)
RBC # FLD: 15.2 % — HIGH (ref 10.3–14.5)
SODIUM SERPL-SCNC: 138 MMOL/L — SIGNIFICANT CHANGE UP (ref 135–145)
WBC # BLD: 14.12 K/UL — HIGH (ref 3.8–10.5)
WBC # FLD AUTO: 14.12 K/UL — HIGH (ref 3.8–10.5)

## 2019-12-11 PROCEDURE — 99232 SBSQ HOSP IP/OBS MODERATE 35: CPT

## 2019-12-11 PROCEDURE — 99152 MOD SED SAME PHYS/QHP 5/>YRS: CPT

## 2019-12-11 PROCEDURE — 99233 SBSQ HOSP IP/OBS HIGH 50: CPT | Mod: GC

## 2019-12-11 PROCEDURE — 93456 R HRT CORONARY ARTERY ANGIO: CPT | Mod: 26

## 2019-12-11 PROCEDURE — 93010 ELECTROCARDIOGRAM REPORT: CPT

## 2019-12-11 RX ORDER — ACETAMINOPHEN 500 MG
650 TABLET ORAL EVERY 6 HOURS
Refills: 0 | Status: DISCONTINUED | OUTPATIENT
Start: 2019-12-11 | End: 2019-12-12

## 2019-12-11 RX ADMIN — Medication 40 MILLIGRAM(S): at 05:53

## 2019-12-11 RX ADMIN — SPIRONOLACTONE 25 MILLIGRAM(S): 25 TABLET, FILM COATED ORAL at 05:53

## 2019-12-11 RX ADMIN — CARVEDILOL PHOSPHATE 12.5 MILLIGRAM(S): 80 CAPSULE, EXTENDED RELEASE ORAL at 19:58

## 2019-12-11 RX ADMIN — Medication 75 MILLIGRAM(S): at 13:43

## 2019-12-11 RX ADMIN — Medication 650 MILLIGRAM(S): at 06:10

## 2019-12-11 RX ADMIN — Medication 650 MILLIGRAM(S): at 15:15

## 2019-12-11 RX ADMIN — Medication 650 MILLIGRAM(S): at 00:45

## 2019-12-11 RX ADMIN — ISOSORBIDE DINITRATE 10 MILLIGRAM(S): 5 TABLET ORAL at 13:43

## 2019-12-11 RX ADMIN — LOSARTAN POTASSIUM 100 MILLIGRAM(S): 100 TABLET, FILM COATED ORAL at 05:53

## 2019-12-11 RX ADMIN — Medication 650 MILLIGRAM(S): at 06:58

## 2019-12-11 RX ADMIN — Medication 650 MILLIGRAM(S): at 15:45

## 2019-12-11 RX ADMIN — ISOSORBIDE DINITRATE 10 MILLIGRAM(S): 5 TABLET ORAL at 05:53

## 2019-12-11 RX ADMIN — CARVEDILOL PHOSPHATE 12.5 MILLIGRAM(S): 80 CAPSULE, EXTENDED RELEASE ORAL at 05:53

## 2019-12-11 RX ADMIN — Medication 40 MILLIGRAM(S): at 19:58

## 2019-12-11 RX ADMIN — Medication 650 MILLIGRAM(S): at 00:06

## 2019-12-11 RX ADMIN — Medication 75 MILLIGRAM(S): at 05:53

## 2019-12-11 NOTE — DISCHARGE NOTE PROVIDER - NSFOLLOWUPCLINICS_GEN_ALL_ED_FT
[facility]  Pulmonary Medicine  [address] [place] [address]  Phone: [phone]  Fax: [phone]  Follow Up Time: 2 weeks

## 2019-12-11 NOTE — CHART NOTE - NSCHARTNOTEFT_GEN_A_CORE
Nutrition Follow Up Note  Patient seen for: follow up     Reason for Admission	CP, SOB    39yoM w/ PMHx significant for CHF (LVEF = 22% per MRI performed in 2019, unclear etiology, cardiac MRI ordered for evaluation for sarcoid?, no AICD), HTN, asthma (vs. COPD, not diagnosed but pt w/ smoking history: 1.5 packs for roughly 17 years stating that he recently quit), who presents with 1 month of worsening (exertional) SOB and CP w/ diffuse wheezing on examination. Pt admitted for CHF exacerbation vs. asthma/COPD exacerbation.             Source: pt, chart, Team 3    Diet : NPO after midnight-Plan for cardiac cath today.     Patient reports: eating well when receiving meals prior to NPO     PO intake : >75% when receiving po diet     Source for PO intake: pt          Daily Weight in k (), Weight in k.2 (12-10), Weight in k.7 (), Weight in k.3 (), Weight in k (), Weight in k.8 (-), Weight in k.1 ()  % Weight Change: 4% loss since previous assess on     Pertinent Medications: MEDICATIONS  (STANDING):  buDESOnide    Inhalation Suspension 0.25 milliGRAM(s) Inhalation two times a day  carvedilol 12.5 milliGRAM(s) Oral every 12 hours  furosemide   Injectable 40 milliGRAM(s) IV Push two times a day  heparin  Injectable 5000 Unit(s) SubCutaneous every 8 hours  hydrALAZINE 75 milliGRAM(s) Oral every 8 hours  influenza   Vaccine 0.5 milliLiter(s) IntraMuscular once  isosorbide   dinitrate Tablet (ISORDIL) 10 milliGRAM(s) Oral three times a day  losartan 100 milliGRAM(s) Oral daily  spironolactone 25 milliGRAM(s) Oral daily    MEDICATIONS  (PRN):  acetaminophen   Tablet .. 650 milliGRAM(s) Oral every 6 hours PRN Temp greater or equal to 38C (100.4F), Mild Pain (1 - 3)  levalbuterol Inhalation 0.63 milliGRAM(s) Inhalation every 6 hours PRN Wheezing/Sob    Pertinent Labs:  @ 07:15: Na 138, BUN 25<H>, Cr 1.23, <H>, K+ 4.2, Phos 4.4, :HbA1c 6.4    spoke with Team 3 who reported alerting pt of pre-diabetes result of HgbA1c 6.4.          Skin per nursing documentation: no pressure injury  Edema: right foot, left foot, non pitting edema    Estimated Needs:   [x ] no change since previous assessment  [ ] recalculated:     Previous Nutrition Diagnosis: Overweight/Obesity.   Nutrition Diagnosis is: ongoing -addressed with education    New Nutrition Diagnosis  Abnormal Nutrition Related labs related to endocrine dysfunction as evidenced by Hgba1c 6.4%.  Intervention: RD provided Pre-Diabetes education         Recommend  when po diet reinitiated recommend consistent carbohydrate, DASH diet,   monitor need for diet ed reinforcement related to prediabetes    Monitoring and Evaluation:     Continue to monitor Nutritional intake, Tolerance to diet prescription, weights, labs, skin integrity    RD remains available upon request and will follow up per protocol  Tiffanie Ca MA, RD, CDN #[id number] Nutrition Follow Up Note  Patient seen for: follow up     Reason for Admission	CP, SOB    39yoM w/ PMHx significant for CHF (LVEF = 22% per MRI performed in 2019, unclear etiology, cardiac MRI ordered for evaluation for sarcoid?, no AICD), HTN, asthma (vs. COPD, not diagnosed but pt w/ smoking history: 1.5 packs for roughly 17 years stating that he recently quit), who presents with 1 month of worsening (exertional) SOB and CP w/ diffuse wheezing on examination. Pt admitted for CHF exacerbation vs. asthma/COPD exacerbation.             Source: pt, chart, Team 3    Diet : NPO after midnight-Plan for cardiac cath today.     Patient reports: eating well when receiving meals prior to NPO     PO intake : >75% when receiving po diet     Source for PO intake: pt          Daily Weight in k (), Weight in k.2 (12-10), Weight in k.7 (), Weight in k.3 (), Weight in k (), Weight in k.8 (-), Weight in k.1 ()  % Weight Change: 4% loss since previous assess on     Pertinent Medications: MEDICATIONS  (STANDING):  buDESOnide    Inhalation Suspension 0.25 milliGRAM(s) Inhalation two times a day  carvedilol 12.5 milliGRAM(s) Oral every 12 hours  furosemide   Injectable 40 milliGRAM(s) IV Push two times a day  heparin  Injectable 5000 Unit(s) SubCutaneous every 8 hours  hydrALAZINE 75 milliGRAM(s) Oral every 8 hours  influenza   Vaccine 0.5 milliLiter(s) IntraMuscular once  isosorbide   dinitrate Tablet (ISORDIL) 10 milliGRAM(s) Oral three times a day  losartan 100 milliGRAM(s) Oral daily  spironolactone 25 milliGRAM(s) Oral daily    MEDICATIONS  (PRN):  acetaminophen   Tablet .. 650 milliGRAM(s) Oral every 6 hours PRN Temp greater or equal to 38C (100.4F), Mild Pain (1 - 3)  levalbuterol Inhalation 0.63 milliGRAM(s) Inhalation every 6 hours PRN Wheezing/Sob    Pertinent Labs:  @ 07:15: Na 138, BUN 25<H>, Cr 1.23, <H>, K+ 4.2, Phos 4.4, :HbA1c 6.4    spoke with Team 3 who reported alerting pt of pre-diabetes result of HgbA1c 6.4.          Skin per nursing documentation: no pressure injury  Edema: right foot, left foot, non pitting edema    Estimated Needs:   [x ] no change since previous assessment  [ ] recalculated:     Previous Nutrition Diagnosis: Overweight/Obesity.   Nutrition Diagnosis is: ongoing -addressed with education    New Nutrition Diagnosis  Abnormal Nutrition Related labs related to endocrine dysfunction as evidenced by Hgba1c 6.4%.  Intervention: RD provided Pre-Diabetes education     New Nutrition Diagnosis  Inadequate oral intake  related to pt not receiving meals  as evidenced by NPO after midnight  Intervention: recommend reinitiate po diet within 24-48 hours when medically feasible to a goal of consistent carbohydrate, DASH.         Recommend  when po diet reinitiated recommend consistent carbohydrate, DASH diet,   monitor need for diet ed reinforcement related to prediabetes    Monitoring and Evaluation:     Continue to monitor Nutritional intake, Tolerance to diet prescription, weights, labs, skin integrity    RD remains available upon request and will follow up per protocol  Tiffanie Ca MA, BISMARK, CDN #[phone]

## 2019-12-11 NOTE — DISCHARGE NOTE PROVIDER - CARE PROVIDER_API CALL
Kamini Suárez (MD)  Internal Medicine  [address] [place] [address], [address] [place] [address]  Phone: [phone]  Fax: [phone]  Follow Up Time: 1 week Kamini Suárez)  Internal Medicine  [facility], [address] [place] [address]  Phone: [phone]  Fax: [phone]  Follow Up Time: 1 week    Irene Leong)  Internal Medicine  [address] [place] [address]  Phone: [phone]  Fax: [phone]  Follow Up Time: 1-3 days

## 2019-12-11 NOTE — DISCHARGE NOTE PROVIDER - NSDCMRMEDTOKEN_GEN_ALL_CORE_FT
Daily Caesar oral tablet: 1 tab(s) orally once a day  hydrALAZINE 50 mg oral tablet: 1 tab(s) orally 3 times a day    Note:Pharmacy also has coreg and losartan but doesn&#x27;t take as home meds. Patient states it gives them a headache. hydrALAZINE 50 mg oral tablet: 1 tab(s) orally 3 times a day    Note:Pharmacy also has coreg and losartan but doesn&#x27;t take as home meds. Patient states it gives them a headache. hydrALAZINE 50 mg oral tablet: 1 tab(s) orally 3 times a day    Note:Pharmacy also has coreg and losartan but doesn&#x27;t take as home meds. Patient states it gives them a headache.   losartan 100 mg oral tablet: 1 tab(s) orally once a day MDD:1 tab albuterol 90 mcg/inh inhalation aerosol: 2 puff(s) inhaled every 6 hours, As Needed -for shortness of breath and/or wheezing   carvedilol 12.5 mg oral tablet: 1 tab(s) orally every 12 hours MDD:2 tabs  furosemide 40 mg oral tablet: 1 tab(s) orally 2 times a day MDD:2 tabs  hydrALAZINE 50 mg oral tablet: 1.5 tab(s) orally 3 times a day MDD:4.5 tabs daily  isosorbide dinitrate 20 mg oral tablet: 1 tab(s) orally 3 times a day MDD:3 tabs  losartan 100 mg oral tablet: 1 tab(s) orally once a day MDD:1 tab  spironolactone 25 mg oral tablet: 1 tab(s) orally once a day MDD:1 tab  Symbicort 160 mcg-4.5 mcg/inh inhalation aerosol: 2 puff(s) inhaled once a day albuterol 90 mcg/inh inhalation aerosol: 2 puff(s) inhaled every 6 hours, As Needed -for shortness of breath and/or wheezing   Breo Ellipta 200 mcg-25 mcg/inh inhalation powder: 2 puff(s) inhaled once a day   carvedilol 12.5 mg oral tablet: 1 tab(s) orally every 12 hours MDD:2 tabs  furosemide 40 mg oral tablet: 1 tab(s) orally 2 times a day MDD:2 tabs  hydrALAZINE 50 mg oral tablet: 1.5 tab(s) orally 3 times a day MDD:4.5 tabs daily  isosorbide dinitrate 20 mg oral tablet: 1 tab(s) orally 3 times a day MDD:3 tabs  losartan 100 mg oral tablet: 1 tab(s) orally once a day MDD:1 tab  spironolactone 25 mg oral tablet: 1 tab(s) orally once a day MDD:1 tab

## 2019-12-11 NOTE — PROGRESS NOTE ADULT - PROBLEM SELECTOR PLAN 4
CHF vs sarcoid vs infection vs pulmonary hypertension. Unclear of etiology giving history of incarceration + environmental exposure. Chest x-ray shows no acute processes but is suspicious for hilar lymphadenopathy.   - ACE WNL  - quant neg

## 2019-12-11 NOTE — DISCHARGE NOTE PROVIDER - PROVIDER TOKENS
PROVIDER:[TOKEN:[[id number]],FOLLOWUP:[1 week]] PROVIDER:[TOKEN:[[id number]],FOLLOWUP:[1 week]],PROVIDER:[TOKEN:[[id number]],FOLLOWUP:[1-3 days]]

## 2019-12-11 NOTE — PROGRESS NOTE ADULT - PROBLEM SELECTOR PLAN 2
possible underlying COPD given 17 year smoking history. S/p 125mg  IV solumedrol in ED.   - c/w xopenox  - finished 4 days of steroids

## 2019-12-11 NOTE — PROGRESS NOTE ADULT - SUBJECTIVE AND OBJECTIVE BOX
PROGRESS NOTE:   Authoted by Jacob Soares MS4,  Pager [phone]    Patient is a 39y old  Male who presents with a chief complaint of CP, SOB (10 Dec 2019 09:21)      SUBJECTIVE / OVERNIGHT EVENTS:  Patient seen and examined at bedside. Pt states he is feeling better. He appreciates improvements in his SOB and leg swelling. He denies any CP, abdominal pain, dysuria, bowel changes, or calf tenderness.     ADDITIONAL REVIEW OF SYSTEMS:  CONSTITUTIONAL: No weakness, fevers or chills  EYES/ENT: No visual changes;  No throat pain   NECK: No pain or stiffness  RESPIRATORY: +mild cough/SOB, no hemoptysis;  CARDIOVASCULAR: +mild chest pain, no palpitations  GASTROINTESTINAL: No abdominal or epigastric pain. No nausea, vomiting, or hematemesis; No diarrhea or constipation. No melena or hematochezia.  GENITOURINARY: No dysuria, frequency or hematuria  NEUROLOGICAL: No numbness or weakness  SKIN: No itching, burning, rashes, or lesions   All other review of systems is negative unless indicated above.    MEDICATIONS  (PRN):  acetaminophen   Tablet .. 650 milliGRAM(s) Oral every 6 hours PRN Temp greater or equal to 38C (100.4F), Mild Pain (1 - 3)  levalbuterol Inhalation 0.63 milliGRAM(s) Inhalation every 6 hours PRN Wheezing/Sob      CAPILLARY BLOOD GLUCOSE        I&O's Summary    10 Dec 2019 07:01  -  11 Dec 2019 07:00  --------------------------------------------------------  IN: 1160 mL / OUT: 350 mL / NET: 810 mL        PHYSICAL EXAM:  Vital Signs Last 24 Hrs  T(C): 36.4 (11 Dec 2019 05:07), Max: 36.8 (10 Dec 2019 11:41)  T(F): 97.5 (11 Dec 2019 05:07), Max: 98.3 (10 Dec 2019 16:55)  HR: 90 (11 Dec 2019 05:07) (90 - 101)  BP: 143/98 (11 Dec 2019 05:07) (123/79 - 154/88)  BP(mean): --  RR: 18 (11 Dec 2019 05:07) (18 - 18)  SpO2: 96% (11 Dec 2019 05:07) (94% - 97%)    CONSTITUTIONAL: NAD, well-developed  RESPIRATORY: Lungs clear to auscultation today, no wheezing appreciated. No crackles.   CARDIOVASCULAR: Regular rate and rhythm, normal S1 and S2, no murmur/rub/gallop; Peripheral pulses are 2+ bilaterally. Bilaterally 1+ pedal edema improved from yesterday    ABDOMEN: Nontender to palpation, normoactive bowel sounds, no rebound/guarding; No hepatosplenomegaly  MUSCLOSKELETAL: no clubbing or cyanosis of digits; no joint swelling or tenderness to palpation  PSYCH: A+O to person, place, and time; affect appropriate  Neuro: LAUREEN ROUSE grossly intact    LABS:                        16.6   14.12 )-----------( 285      ( 11 Dec 2019 07:15 )             51.2     12-11    138  |  98  |  25<H>  ----------------------------<  167<H>  4.2   |  25  |  1.23    Ca    9.6      11 Dec 2019 07:15  Phos  4.4     12-11  Mg     2.3     12-11                  RADIOLOGY & ADDITIONAL TESTS:  Results Reviewed:   Imaging Personally Reviewed:  Electrocardiogram Personally Reviewed:    COORDINATION OF CARE:  Care Discussed with Consultants/Other Providers [Y/N]:  Prior or Outpatient Records Reviewed [Y/N]:

## 2019-12-11 NOTE — DISCHARGE NOTE PROVIDER - NSDCCPTREATMENT_GEN_ALL_CORE_FT
PRINCIPAL PROCEDURE  Procedure: Catheterization, coronary artery, with angiogram  Findings and Treatment: midLAD mild atherosclerosis no flow limitations, all other arteries normal      SECONDARY PROCEDURE  Procedure: Transthoracic echo  Findings and Treatment: mild MR, eccentric LV hypertrophy, moderte-severe global LV systoic dysfx EF 30%.

## 2019-12-11 NOTE — PROGRESS NOTE ADULT - PROBLEM SELECTOR PLAN 1
- Echo showed eccentric left ventricular hypertrophy (dilated left  ventricle with normal relative wall thickness). Moderate-severe global left ventricular systolic dysfunction. EF approximately 30% by visual estimation.  -will need right and left heart cath on admission per cardiology when pt is medically optimized, hoping for tomorrow

## 2019-12-11 NOTE — DISCHARGE NOTE PROVIDER - NSDCCPCAREPLAN_GEN_ALL_CORE_FT
PRINCIPAL DISCHARGE DIAGNOSIS  Diagnosis: Acute CHF  Assessment and Plan of Treatment: You came to the ED complaing of SOB, CP, and swelling of your feet. These complications are most likely due to congestive heart failure(CHF) due to your heart pumping between 22-30%. This could be due to multiple reasons and your cardiac cath test did not show significant plaques in your arteries. In order to further assess the cause of your CHF please be sure to follow with cardiology in 1-2 weeks. In order to prevent these symtpomes from occuring again, it is extremly important to take your medications. Please return to the ED if you begin to experience fevers with a temperature of 100.4 or greater, chest pain, shortness of breath, swelling of both legs, or pain and redness around the incision site on your right thigh. PRINCIPAL DISCHARGE DIAGNOSIS  Diagnosis: Acute CHF  Assessment and Plan of Treatment: You came to the ED complaing of SOB, CP, and swelling of your feet. These complications are most likely due to congestive heart failure(CHF) due to your heart pumping between 22-30%. This could be due to multiple reasons and your cardiac cath test did not show significant plaques in your arteries. In order to further assess the cause of your CHF please be sure to follow with cardiology in 1-2 weeks. In order to prevent these symtpomes from occuring again, it is extremly important to take your medications. Please return to the ED if you begin to experience fevers with a temperature of 100.4 or greater, chest pain, shortness of breath, swelling of both legs, or pain and redness around the incision site on your right thigh.      SECONDARY DISCHARGE DIAGNOSES  Diagnosis: Asthma with COPD  Assessment and Plan of Treatment: Please adhere to the prescribed inhalers. Follow up with your primary care or pulmonlogist withing 1-2 weeks of discharge. You may need a sleep study to evaluate for sleep apnea/ obesity hypoventilation syndrome.

## 2019-12-11 NOTE — DISCHARGE NOTE PROVIDER - HOSPITAL COURSE
HPI: 39yoM w/ PMHx significant for CHF (LVEF = 22% per MRI performed in 4/2019, unclear etiology, cardiac MRI ordered for evaluation for sarcoid?, no AICD), HTN, asthma (vs. COPD, not diagnosed but pt w/ smoking history: 1.5 packs for roughly 17 years stating that he recently quit), who presents with 1 month of worsening SOB and CP. Pt describes a sharp, nonradiaiting, exertion CP accompanied with SOB. He is only able to ambulate 1 block or up 1 flight of stairs before having to rest. He states he is unable to lay flat due to worsening dyspnea and has been sleeping upright. He also endorses bilaterally pedal edema over the last 3 days as well as a recent URI. He states that this is his first time experiencing all these symptoms together. He worked as a  and was exposed to dust and other hazards. He was incarcerated from [id number]. He states he has an inhaler but rarely if ever utilizes it. He denies any alcohol or illicit drug use. He denies any fever/chills, localizing signs/sx of infection, recent travel, sick contacts, or calf tenderness.             Hospital Course: In the ED, patient was afebrile, tachy, normaltensive, and with good oxygen saturation despite wheezing. He received 40mg IV lasix, 125 mg solumedrol and 2 duonebs. Pt SOB and CP improved, on an outpatient cardiac MRI, patient was noted to have an EF of 22%. He was evaluated by cardiology who stated the patient would need a left + right heart cath for further evaluation when he was medically optimized and diuresed. During his stay, he received a echocardiogram which demonstrated eccentric left ventricular hypertrophy with moderate-severe global left ventricular systolic dysfunction and an EF of approximately 30% by visual estimation. Pt continued to suffer from wheezing and SOB, CT chest demonstrated small area of scattered nodular opacity with a tree-in-bud configuration of the left lower lobe, likely represents mucoid impacted distal airways and    emphysema. Pt was deduced to be suffering from a COPD/asthma exacerbation and completed a 4 day course of 40mg prednisone with improvement to lung sounds and SOB. Pt was also continued on 40mg IV lasix until he was euvolemic and able to tolerate a cardic cath which demonstrated ....... HPI: 39yoM w/ PMHx significant for CHF (LVEF = 22% per MRI performed in 4/2019, unclear etiology, cardiac MRI ordered for evaluation for sarcoid?, no AICD), HTN, asthma (vs. COPD, not diagnosed but pt w/ smoking history: 1.5 packs for roughly 17 years stating that he recently quit), who presents with 1 month of worsening SOB and CP. Pt describes a sharp, nonradiaiting, exertion CP accompanied with SOB. He is only able to ambulate 1 block or up 1 flight of stairs before having to rest. He states he is unable to lay flat due to worsening dyspnea and has been sleeping upright. He also endorses bilaterally pedal edema over the last 3 days as well as a recent URI. He states that this is his first time experiencing all these symptoms together. He worked as a  and was exposed to dust and other hazards. He was incarcerated from [id number]. He states he has an inhaler but rarely if ever utilizes it. He denies any alcohol or illicit drug use. He denies any fever/chills, localizing signs/sx of infection, recent travel, sick contacts, or calf tenderness.             Hospital Course: In the ED, patient was afebrile, tachy, normaltensive, and with good oxygen saturation despite wheezing. He received 40mg IV lasix, 125 mg solumedrol and 2 duonebs. Pt SOB and CP improved, on an outpatient cardiac MRI, patient was noted to have an EF of 22%. He was evaluated by cardiology who stated the patient would need a left + right heart cath for further evaluation when he was medically optimized and diuresed. During his stay, he received a echocardiogram which demonstrated eccentric left ventricular hypertrophy with moderate-severe global left ventricular systolic dysfunction and an EF of approximately 30% by visual estimation. Pt continued to suffer from wheezing and SOB, CT chest demonstrated small area of scattered nodular opacity with a tree-in-bud configuration of the left lower lobe, likely represents mucoid impacted distal airways and    emphysema. Pt was deduced to be suffering from a COPD/asthma exacerbation and completed a 4 day course of 40mg prednisone with improvement to lung sounds and SOB. Pt was also continued on 40mg IV lasix until he was euvolemic and able to tolerate a cardiac cath which demonstrated mild atherosclerosis with no need for stents or acute intervention. Pt will follow with cardiology in 1-2 weeks as well as pulmonology for his COPD/asthma. Per cards will discharge with 40mg PO lasix BID and increase dose of isosorbide dinitrate to 20 mg PO TID. Pt feeling well, SOB and CP at admission has resolved, lungs clear to ausculation, legs without edema and patient able to ambulate without any complications. Pt safe for discharge with follow up.

## 2019-12-11 NOTE — PROGRESS NOTE ADULT - SUBJECTIVE AND OBJECTIVE BOX
Overnight Events: no acute events overnight. Pt states that he feels well.         Review Of Systems:   CONSTITUTIONAL: no fevers or chills  EYES/ENT: No visual changes;  No vertigo or throat pain   NECK: No pain or stiffness  RESPIRATORY: No cough, wheezing. No shortness of breath  CARDIOVASCULAR: No chest pain or palpitations  GASTROINTESTINAL: No abdominal or epigastric pain. No nausea, vomiting. No diarrhea. No melena.  GENITOURINARY: No dysuria, frequency or hematuria  NEUROLOGICAL: No numbness or weakness  SKIN: No itching, burning, rashes, or lesions   All other review of systems is negative unless indicated above.     Current Meds:  acetaminophen   Tablet .. 650 milliGRAM(s) Oral every 6 hours PRN  buDESOnide    Inhalation Suspension 0.25 milliGRAM(s) Inhalation two times a day  carvedilol 12.5 milliGRAM(s) Oral every 12 hours  furosemide   Injectable 40 milliGRAM(s) IV Push two times a day  heparin  Injectable 5000 Unit(s) SubCutaneous every 8 hours  hydrALAZINE 75 milliGRAM(s) Oral every 8 hours  influenza   Vaccine 0.5 milliLiter(s) IntraMuscular once  isosorbide   dinitrate Tablet (ISORDIL) 10 milliGRAM(s) Oral three times a day  levalbuterol Inhalation 0.63 milliGRAM(s) Inhalation every 6 hours PRN  losartan 100 milliGRAM(s) Oral daily  spironolactone 25 milliGRAM(s) Oral daily      Vitals:  T(F): 97.5 (12-11), Max: 98.3 (12-10)  HR: 90 (12-11) (90 - 101)  BP: 143/98 (12-11) (123/79 - 154/88)  RR: 18 (12-11)  SpO2: 96% (12-11)  I&O's Summary    10 Dec 2019 07:01  -  11 Dec 2019 07:00  --------------------------------------------------------  IN: 1160 mL / OUT: 350 mL / NET: 810 mL        Physical Exam:  Appearance: NAD, aox3, lying in bed flat  Eyes: PERRL, EOMI, pink conjunctiva  HEENT: Normal oral mucosa  Cardiovascular: RRR, S1, S2, no murmurs, rubs, or gallops; no edema; cannot assess for jvd b/c of body habitus  Respiratory: Clear to auscultation bilaterally, decreased breath sounds  Gastrointestinal: soft, non-tender, non-distended with normal bowel sounds  Musculoskeletal: No clubbing; no joint deformity   Ext: no clubbing, cyanosis, or edema  Neurologic: Non-focal  Lymphatic: No lymphadenopathy  Psychiatry: AAOx3, mood & affect appropriate  Skin: No rashes, ecchymoses, or cyanosis                          16.6   14.12 )-----------( 285      ( 11 Dec 2019 07:15 )             51.2     12-11    138  |  98  |  25<H>  ----------------------------<  167<H>  4.2   |  25  |  1.23    Ca    9.6      11 Dec 2019 07:15  Phos  4.4     12-11  Mg     2.3     12-11        CARDIAC MARKERS ( 04 Dec 2019 13:48 )  30 ng/L / x     / x     / x     / x     / x      CARDIAC MARKERS ( 04 Dec 2019 11:21 )  33 ng/L / x     / x     / x     / x     / x          Serum Pro-Brain Natriuretic Peptide: 344 pg/mL (12-09 @ 06:44)  Serum Pro-Brain Natriuretic Peptide: 1121 pg/mL (12-04 @ 11:21)          New ECG(s): Personally reviewed      Interpretation of Telemetry: NSR

## 2019-12-11 NOTE — DISCHARGE NOTE PROVIDER - CARE PROVIDERS DIRECT ADDRESSES
[email or web address]

## 2019-12-11 NOTE — PROGRESS NOTE ADULT - ASSESSMENT
39M former smoker w/ h/o htn, asthma, cxr in 2/2019 suggestive of sarcoid, and cardiomyopathy w/ EF 22% on cardiac MRI 4/2/19, though the cause of his cardiomyopathy is unclear.   He presented w/ Acute on Chronic Systolic HF, and has responded favorably to diuretics, though he still has evidence to suggest elevated filling pressures. Diuresing well on current regimen.    #Acute on Chronic Systolic HFrEF   - Possible etiologies include, but are not limited to, ischemic cardiac disease or hypertensive heart disease (long history)   - Less likely sarcoid given no LAD on CT chest, however, this does not rule it out.    - C/w isosorbide dinitrate 10 mg PO TID   - continue Lasix 40 IV BID  - continue carvedilol 12.5 BID   - losartan 100 mg PO Daily   - continue spironolactone 25 mg qd  - c/w hydralazine 75 TID  - Strict I/Os, daily standing weights   - scheduled for right and left heart cath to evaluate for ischemic cause of heart failure today    Alhaji Taveras M.D.  Cardiology Resident

## 2019-12-12 ENCOUNTER — TRANSCRIPTION ENCOUNTER (OUTPATIENT)
Age: 39
End: 2019-12-12

## 2019-12-12 VITALS
DIASTOLIC BLOOD PRESSURE: 97 MMHG | HEART RATE: 107 BPM | OXYGEN SATURATION: 97 % | SYSTOLIC BLOOD PRESSURE: 136 MMHG | RESPIRATION RATE: 16 BRPM | TEMPERATURE: 98 F

## 2019-12-12 LAB
ANION GAP SERPL CALC-SCNC: 14 MMOL/L — SIGNIFICANT CHANGE UP (ref 5–17)
BUN SERPL-MCNC: 29 MG/DL — HIGH (ref 7–23)
CALCIUM SERPL-MCNC: 9.1 MG/DL — SIGNIFICANT CHANGE UP (ref 8.4–10.5)
CHLORIDE SERPL-SCNC: 98 MMOL/L — SIGNIFICANT CHANGE UP (ref 96–108)
CO2 SERPL-SCNC: 26 MMOL/L — SIGNIFICANT CHANGE UP (ref 22–31)
CREAT SERPL-MCNC: 1.35 MG/DL — HIGH (ref 0.5–1.3)
GLUCOSE SERPL-MCNC: 201 MG/DL — HIGH (ref 70–99)
HCT VFR BLD CALC: 49.6 % — SIGNIFICANT CHANGE UP (ref 39–50)
HGB BLD-MCNC: 15.6 G/DL — SIGNIFICANT CHANGE UP (ref 13–17)
MAGNESIUM SERPL-MCNC: 2.2 MG/DL — SIGNIFICANT CHANGE UP (ref 1.6–2.6)
MCHC RBC-ENTMCNC: 27.9 PG — SIGNIFICANT CHANGE UP (ref 27–34)
MCHC RBC-ENTMCNC: 31.5 GM/DL — LOW (ref 32–36)
MCV RBC AUTO: 88.6 FL — SIGNIFICANT CHANGE UP (ref 80–100)
NRBC # BLD: 0 /100 WBCS — SIGNIFICANT CHANGE UP (ref 0–0)
PHOSPHATE SERPL-MCNC: 5.2 MG/DL — HIGH (ref 2.5–4.5)
PLATELET # BLD AUTO: 285 K/UL — SIGNIFICANT CHANGE UP (ref 150–400)
POTASSIUM SERPL-MCNC: 4.2 MMOL/L — SIGNIFICANT CHANGE UP (ref 3.5–5.3)
POTASSIUM SERPL-SCNC: 4.2 MMOL/L — SIGNIFICANT CHANGE UP (ref 3.5–5.3)
RBC # BLD: 5.6 M/UL — SIGNIFICANT CHANGE UP (ref 4.2–5.8)
RBC # FLD: 15.3 % — HIGH (ref 10.3–14.5)
SODIUM SERPL-SCNC: 138 MMOL/L — SIGNIFICANT CHANGE UP (ref 135–145)
WBC # BLD: 12.13 K/UL — HIGH (ref 3.8–10.5)
WBC # FLD AUTO: 12.13 K/UL — HIGH (ref 3.8–10.5)

## 2019-12-12 PROCEDURE — 86480 TB TEST CELL IMMUN MEASURE: CPT

## 2019-12-12 PROCEDURE — 84484 ASSAY OF TROPONIN QUANT: CPT

## 2019-12-12 PROCEDURE — C1894: CPT

## 2019-12-12 PROCEDURE — 85027 COMPLETE CBC AUTOMATED: CPT

## 2019-12-12 PROCEDURE — 94640 AIRWAY INHALATION TREATMENT: CPT

## 2019-12-12 PROCEDURE — 99153 MOD SED SAME PHYS/QHP EA: CPT

## 2019-12-12 PROCEDURE — 87389 HIV-1 AG W/HIV-1&-2 AB AG IA: CPT

## 2019-12-12 PROCEDURE — 93306 TTE W/DOPPLER COMPLETE: CPT

## 2019-12-12 PROCEDURE — 85379 FIBRIN DEGRADATION QUANT: CPT

## 2019-12-12 PROCEDURE — 83036 HEMOGLOBIN GLYCOSYLATED A1C: CPT

## 2019-12-12 PROCEDURE — 80053 COMPREHEN METABOLIC PANEL: CPT

## 2019-12-12 PROCEDURE — 84100 ASSAY OF PHOSPHORUS: CPT

## 2019-12-12 PROCEDURE — 93456 R HRT CORONARY ARTERY ANGIO: CPT

## 2019-12-12 PROCEDURE — 85610 PROTHROMBIN TIME: CPT

## 2019-12-12 PROCEDURE — 99232 SBSQ HOSP IP/OBS MODERATE 35: CPT

## 2019-12-12 PROCEDURE — C1769: CPT

## 2019-12-12 PROCEDURE — 80061 LIPID PANEL: CPT

## 2019-12-12 PROCEDURE — 85730 THROMBOPLASTIN TIME PARTIAL: CPT

## 2019-12-12 PROCEDURE — 99285 EMERGENCY DEPT VISIT HI MDM: CPT | Mod: 25

## 2019-12-12 PROCEDURE — 83880 ASSAY OF NATRIURETIC PEPTIDE: CPT

## 2019-12-12 PROCEDURE — 99239 HOSP IP/OBS DSCHRG MGMT >30: CPT | Mod: GC

## 2019-12-12 PROCEDURE — C1887: CPT

## 2019-12-12 PROCEDURE — 82164 ANGIOTENSIN I ENZYME TEST: CPT

## 2019-12-12 PROCEDURE — 96374 THER/PROPH/DIAG INJ IV PUSH: CPT

## 2019-12-12 PROCEDURE — 99152 MOD SED SAME PHYS/QHP 5/>YRS: CPT

## 2019-12-12 PROCEDURE — 83735 ASSAY OF MAGNESIUM: CPT

## 2019-12-12 PROCEDURE — 71250 CT THORAX DX C-: CPT

## 2019-12-12 PROCEDURE — 82962 GLUCOSE BLOOD TEST: CPT

## 2019-12-12 PROCEDURE — 87631 RESP VIRUS 3-5 TARGETS: CPT

## 2019-12-12 PROCEDURE — 71046 X-RAY EXAM CHEST 2 VIEWS: CPT

## 2019-12-12 PROCEDURE — 93005 ELECTROCARDIOGRAM TRACING: CPT

## 2019-12-12 PROCEDURE — 80048 BASIC METABOLIC PNL TOTAL CA: CPT

## 2019-12-12 PROCEDURE — 96375 TX/PRO/DX INJ NEW DRUG ADDON: CPT

## 2019-12-12 RX ORDER — ISOSORBIDE DINITRATE 5 MG/1
20 TABLET ORAL THREE TIMES A DAY
Refills: 0 | Status: DISCONTINUED | OUTPATIENT
Start: 2019-12-12 | End: 2019-12-12

## 2019-12-12 RX ORDER — SPIRONOLACTONE 25 MG/1
1 TABLET, FILM COATED ORAL
Qty: 30 | Refills: 0
Start: 2019-12-12 | End: 2020-01-10

## 2019-12-12 RX ORDER — FLUTICASONE FUROATE AND VILANTEROL TRIFENATATE 100; 25 UG/1; UG/1
2 POWDER RESPIRATORY (INHALATION)
Qty: 1 | Refills: 0
Start: 2019-12-12 | End: 2020-01-10

## 2019-12-12 RX ORDER — BUDESONIDE AND FORMOTEROL FUMARATE DIHYDRATE 160; 4.5 UG/1; UG/1
2 AEROSOL RESPIRATORY (INHALATION)
Qty: 1 | Refills: 0
Start: 2019-12-12 | End: 2020-01-10

## 2019-12-12 RX ORDER — HYDRALAZINE HCL 50 MG
1 TABLET ORAL
Qty: 0 | Refills: 0 | DISCHARGE

## 2019-12-12 RX ORDER — FUROSEMIDE 40 MG
40 TABLET ORAL
Refills: 0 | Status: DISCONTINUED | OUTPATIENT
Start: 2019-12-12 | End: 2019-12-12

## 2019-12-12 RX ORDER — FUROSEMIDE 40 MG
1 TABLET ORAL
Qty: 60 | Refills: 0
Start: 2019-12-12 | End: 2020-01-10

## 2019-12-12 RX ORDER — CARVEDILOL PHOSPHATE 80 MG/1
1 CAPSULE, EXTENDED RELEASE ORAL
Qty: 60 | Refills: 0
Start: 2019-12-12 | End: 2020-01-10

## 2019-12-12 RX ORDER — ISOSORBIDE DINITRATE 5 MG/1
1 TABLET ORAL
Qty: 90 | Refills: 0
Start: 2019-12-12 | End: 2020-01-10

## 2019-12-12 RX ORDER — HYDRALAZINE HCL 50 MG
1.5 TABLET ORAL
Qty: 135 | Refills: 0
Start: 2019-12-12 | End: 2020-01-10

## 2019-12-12 RX ORDER — LOSARTAN POTASSIUM 100 MG/1
1 TABLET, FILM COATED ORAL
Qty: 30 | Refills: 0
Start: 2019-12-12 | End: 2020-01-10

## 2019-12-12 RX ORDER — ALBUTEROL 90 UG/1
2 AEROSOL, METERED ORAL
Qty: 2 | Refills: 0
Start: 2019-12-12

## 2019-12-12 RX ADMIN — ISOSORBIDE DINITRATE 20 MILLIGRAM(S): 5 TABLET ORAL at 14:16

## 2019-12-12 RX ADMIN — Medication 40 MILLIGRAM(S): at 05:10

## 2019-12-12 RX ADMIN — Medication 650 MILLIGRAM(S): at 01:45

## 2019-12-12 RX ADMIN — Medication 650 MILLIGRAM(S): at 00:53

## 2019-12-12 RX ADMIN — CARVEDILOL PHOSPHATE 12.5 MILLIGRAM(S): 80 CAPSULE, EXTENDED RELEASE ORAL at 05:10

## 2019-12-12 RX ADMIN — Medication 75 MILLIGRAM(S): at 14:16

## 2019-12-12 RX ADMIN — SPIRONOLACTONE 25 MILLIGRAM(S): 25 TABLET, FILM COATED ORAL at 05:10

## 2019-12-12 RX ADMIN — Medication 75 MILLIGRAM(S): at 05:13

## 2019-12-12 RX ADMIN — LOSARTAN POTASSIUM 100 MILLIGRAM(S): 100 TABLET, FILM COATED ORAL at 05:10

## 2019-12-12 RX ADMIN — ISOSORBIDE DINITRATE 10 MILLIGRAM(S): 5 TABLET ORAL at 05:10

## 2019-12-12 RX ADMIN — LEVALBUTEROL 0.63 MILLIGRAM(S): 1.25 SOLUTION, CONCENTRATE RESPIRATORY (INHALATION) at 14:16

## 2019-12-12 NOTE — PROGRESS NOTE ADULT - REASON FOR ADMISSION
CP, SOB

## 2019-12-12 NOTE — PROGRESS NOTE ADULT - SUBJECTIVE AND OBJECTIVE BOX
PROGRESS NOTE:   Authoted by Jacob Soares MS4,  Pager [phone]    Patient is a 39y old  Male who presents with a chief complaint of CP, SOB (12 Dec 2019 09:22)      SUBJECTIVE / OVERNIGHT EVENTS:  Patient seen and examained at bedside.     ADDITIONAL REVIEW OF SYSTEMS:    MEDICATIONS  (STANDING):  buDESOnide    Inhalation Suspension 0.25 milliGRAM(s) Inhalation two times a day  carvedilol 12.5 milliGRAM(s) Oral every 12 hours  furosemide   Injectable 40 milliGRAM(s) IV Push two times a day  heparin  Injectable 5000 Unit(s) SubCutaneous every 8 hours  hydrALAZINE 75 milliGRAM(s) Oral every 8 hours  influenza   Vaccine 0.5 milliLiter(s) IntraMuscular once  isosorbide   dinitrate Tablet (ISORDIL) 10 milliGRAM(s) Oral three times a day  losartan 100 milliGRAM(s) Oral daily  spironolactone 25 milliGRAM(s) Oral daily    MEDICATIONS  (PRN):  acetaminophen   Tablet .. 650 milliGRAM(s) Oral every 6 hours PRN Temp greater or equal to 38C (100.4F), Mild Pain (1 - 3), Moderate Pain (4 - 6)  levalbuterol Inhalation 0.63 milliGRAM(s) Inhalation every 6 hours PRN Wheezing/Sob      CAPILLARY BLOOD GLUCOSE        I&O's Summary    11 Dec 2019 07:01  -  12 Dec 2019 07:00  --------------------------------------------------------  IN: 1100 mL / OUT: 0 mL / NET: 1100 mL        PHYSICAL EXAM:  Vital Signs Last 24 Hrs  T(C): 36.3 (12 Dec 2019 06:04), Max: 36.6 (11 Dec 2019 10:30)  T(F): 97.3 (12 Dec 2019 06:04), Max: 97.8 (11 Dec 2019 10:30)  HR: 93 (12 Dec 2019 06:04) (72 - 101)  BP: 144/93 (12 Dec 2019 06:04) (101/75 - 144/96)  BP(mean): 105 (11 Dec 2019 13:38) (105 - 105)  RR: 18 (12 Dec 2019 06:04) (18 - 18)  SpO2: 99% (12 Dec 2019 06:04) (91% - 99%)    CONSTITUTIONAL: NAD, well-developed  RESPIRATORY: Normal respiratory effort; lungs are clear to auscultation bilaterally  CARDIOVASCULAR: Regular rate and rhythm, normal S1 and S2, no murmur/rub/gallop; No lower extremity edema; Peripheral pulses are 2+ bilaterally  ABDOMEN: Nontender to palpation, normoactive bowel sounds, no rebound/guarding; No hepatosplenomegaly  MUSCLOSKELETAL: no clubbing or cyanosis of digits; no joint swelling or tenderness to palpation  PSYCH: A+O to person, place, and time; affect appropriate    LABS:                        15.6   12.13 )-----------( 285      ( 12 Dec 2019 07:09 )             49.6     12-12    138  |  98  |  29<H>  ----------------------------<  201<H>  4.2   |  26  |  1.35<H>    Ca    9.1      12 Dec 2019 07:03  Phos  5.2     12-12  Mg     2.2     12-12                  RADIOLOGY & ADDITIONAL TESTS:  Results Reviewed:   Imaging Personally Reviewed:  Electrocardiogram Personally Reviewed:    COORDINATION OF CARE:  Care Discussed with Consultants/Other Providers [Y/N]:  Prior or Outpatient Records Reviewed [Y/N]: PROGRESS NOTE:   Authoted by Jacob Soares MS4,  Pager [phone]    Patient is a 39y old  Male who presents with a chief complaint of CP, SOB (12 Dec 2019 09:22)      SUBJECTIVE / OVERNIGHT EVENTS:  Patient seen and examined at bedside. Pt states he is feeling better. He appreciates improvements in his SOB and leg swelling. He denies any CP, abdominal pain, dysuria, bowel changes, or calf tenderness.     ADDITIONAL REVIEW OF SYSTEMS:  CONSTITUTIONAL: No weakness, fevers or chills  EYES/ENT: No visual changes;  No throat pain   NECK: No pain or stiffness  RESPIRATORY: +mild cough/SOB, no hemoptysis;  CARDIOVASCULAR: +mild chest pain, no palpitations  GASTROINTESTINAL: No abdominal or epigastric pain. No nausea, vomiting, or hematemesis; No diarrhea or constipation. No melena or hematochezia.  GENITOURINARY: No dysuria, frequency or hematuria  NEUROLOGICAL: No numbness or weakness  SKIN: No itching, burning, rashes, or lesions   All other review of systems is negative unless indicated above      MEDICATIONS  (STANDING):  buDESOnide    Inhalation Suspension 0.25 milliGRAM(s) Inhalation two times a day  carvedilol 12.5 milliGRAM(s) Oral every 12 hours  furosemide   Injectable 40 milliGRAM(s) IV Push two times a day  heparin  Injectable 5000 Unit(s) SubCutaneous every 8 hours  hydrALAZINE 75 milliGRAM(s) Oral every 8 hours  influenza   Vaccine 0.5 milliLiter(s) IntraMuscular once  isosorbide   dinitrate Tablet (ISORDIL) 10 milliGRAM(s) Oral three times a day  losartan 100 milliGRAM(s) Oral daily  spironolactone 25 milliGRAM(s) Oral daily    MEDICATIONS  (PRN):  acetaminophen   Tablet .. 650 milliGRAM(s) Oral every 6 hours PRN Temp greater or equal to 38C (100.4F), Mild Pain (1 - 3), Moderate Pain (4 - 6)  levalbuterol Inhalation 0.63 milliGRAM(s) Inhalation every 6 hours PRN Wheezing/Sob      CAPILLARY BLOOD GLUCOSE        I&O's Summary    11 Dec 2019 07:01  -  12 Dec 2019 07:00  --------------------------------------------------------  IN: 1100 mL / OUT: 0 mL / NET: 1100 mL        PHYSICAL EXAM:  Vital Signs Last 24 Hrs  T(C): 36.3 (12 Dec 2019 06:04), Max: 36.6 (11 Dec 2019 10:30)  T(F): 97.3 (12 Dec 2019 06:04), Max: 97.8 (11 Dec 2019 10:30)  HR: 93 (12 Dec 2019 06:04) (72 - 101)  BP: 144/93 (12 Dec 2019 06:04) (101/75 - 144/96)  BP(mean): 105 (11 Dec 2019 13:38) (105 - 105)  RR: 18 (12 Dec 2019 06:04) (18 - 18)  SpO2: 99% (12 Dec 2019 06:04) (91% - 99%)    CONSTITUTIONAL: NAD, well-developed  RESPIRATORY: Lungs clear to auscultation today, no wheezing appreciated. No crackles.   CARDIOVASCULAR: Regular rate and rhythm, normal S1 and S2, no murmur/rub/gallop; Peripheral pulses are 2+ bilaterally. Bilaterally 1+ pedal edema improved from yesterday    ABDOMEN: Nontender to palpation, normoactive bowel sounds, no rebound/guarding; No hepatosplenomegaly  MUSCLOSKELETAL: no clubbing or cyanosis of digits; no joint swelling or tenderness to palpation  PSYCH: A+O to person, place, and time; affect appropriate  Neuro: PERRLA, CN grossly intact    LABS:                        15.6   12.13 )-----------( 285      ( 12 Dec 2019 07:09 )             49.6     12-12    138  |  98  |  29<H>  ----------------------------<  201<H>  4.2   |  26  |  1.35<H>    Ca    9.1      12 Dec 2019 07:03  Phos  5.2     12-12  Mg     2.2     12-12                  RADIOLOGY & ADDITIONAL TESTS:  Results Reviewed:   Imaging Personally Reviewed:  Electrocardiogram Personally Reviewed:    COORDINATION OF CARE:  Care Discussed with Consultants/Other Providers [Y/N]:  Prior or Outpatient Records Reviewed [Y/N]:

## 2019-12-12 NOTE — DISCHARGE NOTE NURSING/CASE MANAGEMENT/SOCIAL WORK - NSDCPEWEB_GEN_ALL_CORE
[facility] for Tobacco Control website --- [email or web address] website --- [email or web address]

## 2019-12-12 NOTE — DISCHARGE NOTE NURSING/CASE MANAGEMENT/SOCIAL WORK - PATIENT PORTAL LINK FT
You can access the FollowMyHealth Patient Portal offered by [facility] by registering at the following website: [email or web address]. By joining "[employer]"’s FollowMyHealth portal, you will also be able to view your health information using other applications (apps) compatible with our system.

## 2019-12-12 NOTE — PROGRESS NOTE ADULT - ATTENDING COMMENTS
Patient seen and examined, discussed with house staff.  Continue diuresis as per cardiology, eventual ischemic w/u.  Unclear pulmonary history, questionable sarcoid, states he has asthma but has never required treatment, significant smoking history and CT suggests emphysema.
Patient seen and examined.  Plan for cardiac cath today.
Agree with above note by MS4 Fany delacruz findings and plan, edited where appropriate  tx for COPD & acute systolic HF  IV diuresis, steroids, nebs  eventual cath when euvolemic  persistent dyspnea per pt, exam is mostly quiet breath sounds presently  Attending Physician Dr. Vicente Hidalgo [phone]
Patient seen and examined, discussed with resident. labs and vitals are reviewed. Agree with above unless noted below     Continue diuresis as per cards and give  eventual cardiac cath to w/u cause of LV dysfunction.  Patient with increased SOB   continue steroids,   inhaled steroid  Duonebs  monitor BMP
Patient seen and examined, eventual R and LHC.  Volume status appears improved.  Can stop steroids after today for COPD exacerbation.
Patient seen and examined.  Plan for cardiac cath tomorrow.
Patient seen and examined.  d/c planning with cards and pulm f/u.  Total d/c time 40 minutes.
Patient seen and examined.  Continue diuresis as per cards, eventual cardiac cath to w/u cause of LV dysfunction.  Patient with increased wheezing and rhonchi on exam, significant smoking history with emphysematous changes on CT.  Will restart steroids, 40mg of Prednisone to complete a 5 day course.

## 2019-12-12 NOTE — PROGRESS NOTE ADULT - PROBLEM SELECTOR PLAN 3
-Pt complaining of sharp, nonraditing, exertional CP. Most likely 2/2 CHF exacerbation. Trops neg, EKG normal, low risk for MI. D-dimer WNL, low risk for PE.   - Cards with further assess with right and left heart cath -Pt complaining of sharp, nonraditing, exertional CP. Most likely 2/2 CHF exacerbation. Trops neg, EKG normal, low risk for MI. D-dimer WNL, low risk for PE.   - follow up with cards 1-2 weeks

## 2019-12-12 NOTE — PROGRESS NOTE ADULT - PROBLEM SELECTOR PLAN 2
possible underlying COPD given 17 year smoking history. S/p 125mg  IV solumedrol in ED.   - c/w xopenox  - finished 4 days of steroids possible underlying COPD given 17 year smoking history.  - f/u outpatient pulmonologist

## 2019-12-12 NOTE — PROGRESS NOTE ADULT - PROBLEM SELECTOR PLAN 5
- home HTN meds

## 2019-12-12 NOTE — PROGRESS NOTE ADULT - PROBLEM SELECTOR PLAN 7
- heparin subq for DVT prophylaxis

## 2019-12-12 NOTE — PROGRESS NOTE ADULT - ASSESSMENT
39M former smoker w/ h/o htn, asthma, cxr in 2/2019 suggestive of sarcoid, and cardiomyopathy w/ EF 22% on cardiac MRI 4/2/19, though the cause of his cardiomyopathy is unclear.   He presented w/ Acute on Chronic Systolic HF, and has responded favorably to diuretics, though he still has evidence to suggest elevated filling pressures. Diuresing well on current regimen, s/p RHC which demonstrates elevated PCWP and no coronary occlusion.     #Acute on Chronic Systolic HFrEF   - Possible etiologies include, but are not limited to, ischemic cardiac disease or hypertensive heart disease (long history)   - Less likely sarcoid given no LAD on CT chest, however, this does not rule it out.    - increase dose of isosorbide dinitrate to 20 mg PO TID   - decrease lasix to 40 PO BID  - continue carvedilol 12.5 BID   - losartan 100 mg PO Daily   - continue spironolactone 25 mg qd  - c/w hydralazine 75 TID  - pt expressed strong desire to leave today, can be discharged with these medications at specified doses.   - please schedule outpt follow up appointment with general cardiology and heart failure clinic in 1-2 weeks    Discussed w/ Dr. Suárez.     Alhaji Taveras M.D.  Cardiology Resident

## 2019-12-12 NOTE — DISCHARGE NOTE NURSING/CASE MANAGEMENT/SOCIAL WORK - NSDCPEEMAIL_GEN_ALL_CORE
[facility] for Tobacco Control email [email or web address]

## 2019-12-12 NOTE — PROGRESS NOTE ADULT - PROBLEM SELECTOR PLAN 1
- Echo showed eccentric left ventricular hypertrophy (dilated left  ventricle with normal relative wall thickness). Moderate-severe global left ventricular systolic dysfunction. EF approximately 30% by visual estimation.  -will need right and left heart cath on admission per cardiology when pt is medically optimized, hoping for tomorrow - Echo showed eccentric left ventricular hypertrophy (dilated left  ventricle with normal relative wall thickness). Moderate-severe global left ventricular systolic dysfunction. EF approximately 30% by visual estimation.  -Per cards cath showed no need for immediate intervention. Will switch isosorbide dinitrate to 20 mg PO TID + decrease lasix to 40 PO BID.  - Follow up appointment with general cardiology and heart failure clinic in 1-2 weeks.

## 2019-12-12 NOTE — PROGRESS NOTE ADULT - SUBJECTIVE AND OBJECTIVE BOX
Overnight Events: No acute events overnight. Pt had RHC performed yesterday. This morning, pt states that he would like to leave the hospital, has no other complaints. Denies pain at R femoral cath site.          Review Of Systems:   CONSTITUTIONAL: no fevers or chills  EYES/ENT: No visual changes;  No vertigo or throat pain   NECK: No pain or stiffness  RESPIRATORY: No cough, wheezing. No shortness of breath  CARDIOVASCULAR: No chest pain or palpitations  GASTROINTESTINAL: No abdominal or epigastric pain. No nausea, vomiting. No diarrhea. No melena.  GENITOURINARY: No dysuria, frequency or hematuria  NEUROLOGICAL: No numbness or weakness  SKIN: No itching, burning, rashes, or lesions   All other review of systems is negative unless indicated above.     Current Meds:  acetaminophen   Tablet .. 650 milliGRAM(s) Oral every 6 hours PRN  buDESOnide    Inhalation Suspension 0.25 milliGRAM(s) Inhalation two times a day  carvedilol 12.5 milliGRAM(s) Oral every 12 hours  furosemide   Injectable 40 milliGRAM(s) IV Push two times a day  heparin  Injectable 5000 Unit(s) SubCutaneous every 8 hours  hydrALAZINE 75 milliGRAM(s) Oral every 8 hours  influenza   Vaccine 0.5 milliLiter(s) IntraMuscular once  isosorbide   dinitrate Tablet (ISORDIL) 10 milliGRAM(s) Oral three times a day  levalbuterol Inhalation 0.63 milliGRAM(s) Inhalation every 6 hours PRN  losartan 100 milliGRAM(s) Oral daily  spironolactone 25 milliGRAM(s) Oral daily      Vitals:  T(F): 97.3 (12-12), Max: 97.8 (12-11)  HR: 93 (12-12) (72 - 101)  BP: 144/93 (12-12) (101/75 - 144/96)  RR: 18 (12-12)  SpO2: 99% (12-12)  I&O's Summary    11 Dec 2019 07:01  -  12 Dec 2019 07:00  --------------------------------------------------------  IN: 1100 mL / OUT: 0 mL / NET: 1100 mL        Physical Exam:  Appearance: NAD, aox3, sitting in chair  Eyes: PERRL, EOMI, pink conjunctiva  HEENT: Normal oral mucosa  Cardiovascular: RRR, S1, S2, no murmurs, rubs, or gallops; could not assess jvd because of body habitus  Respiratory: Clear to auscultation bilaterally  Gastrointestinal: soft, non-tender, non-distended with normal bowel sounds  Musculoskeletal: No clubbing; no joint deformity   Ext: no LE edema b/l, warm well perfused, dorsalis pedis pulses palpable b/l. R femoral cath site c/d/i.   Neurologic: Non-focal  Lymphatic: No lymphadenopathy  Psychiatry: AAOx3, mood & affect appropriate  Skin: No rashes, ecchymoses, or cyanosis                          15.6   12.13 )-----------( 285      ( 12 Dec 2019 07:09 )             49.6     12-12    138  |  98  |  29<H>  ----------------------------<  201<H>  4.2   |  26  |  1.35<H>    Ca    9.1      12 Dec 2019 07:03  Phos  5.2     12-12  Mg     2.2     12-12            Serum Pro-Brain Natriuretic Peptide: 344 pg/mL (12-09 @ 06:44)      Cath: no coronary occlusion, PCWP elevated to 30s    Interpretation of Telemetry: Sinus rhythm , PVCs, couplets

## 2019-12-12 NOTE — PROGRESS NOTE ADULT - PROBLEM SELECTOR PROBLEM 6
Morbid obesity

## 2019-12-12 NOTE — PROGRESS NOTE ADULT - PROBLEM SELECTOR PROBLEM 1
Acute on chronic systolic (congestive) heart failure

## 2019-12-12 NOTE — PROGRESS NOTE ADULT - PROBLEM SELECTOR PROBLEM 4
Shortness of breath at rest

## 2020-08-31 PROBLEM — I10 ESSENTIAL (PRIMARY) HYPERTENSION: Chronic | Status: ACTIVE | Noted: 2019-12-04

## 2020-08-31 PROBLEM — J45.909 UNSPECIFIED ASTHMA, UNCOMPLICATED: Chronic | Status: ACTIVE | Noted: 2019-12-04

## 2020-10-02 ENCOUNTER — APPOINTMENT (OUTPATIENT)
Dept: DERMATOLOGY | Facility: CLINIC | Age: 40
End: 2020-10-02
Payer: MEDICAID

## 2020-10-02 VITALS — WEIGHT: 246 LBS | BODY MASS INDEX: 34.44 KG/M2 | HEIGHT: 71 IN

## 2020-10-02 VITALS — TEMPERATURE: 97.1 F

## 2020-10-02 DIAGNOSIS — L73.9 FOLLICULAR DISORDER, UNSPECIFIED: ICD-10-CM

## 2020-10-02 DIAGNOSIS — L91.8 OTHER HYPERTROPHIC DISORDERS OF THE SKIN: ICD-10-CM

## 2020-10-02 PROCEDURE — 99203 OFFICE O/P NEW LOW 30 MIN: CPT | Mod: GC

## 2020-11-13 ENCOUNTER — APPOINTMENT (OUTPATIENT)
Dept: DERMATOLOGY | Facility: CLINIC | Age: 40
End: 2020-11-13

## 2021-12-09 ENCOUNTER — INPATIENT (INPATIENT)
Facility: HOSPITAL | Age: 41
LOS: 0 days | Discharge: LEFT BEFORE DISCHARGE | DRG: 293 | End: 2021-12-09
Attending: INTERNAL MEDICINE | Admitting: INTERNAL MEDICINE
Payer: MEDICAID

## 2021-12-09 ENCOUNTER — TRANSCRIPTION ENCOUNTER (OUTPATIENT)
Age: 41
End: 2021-12-09

## 2021-12-09 VITALS
WEIGHT: 259.93 LBS | HEIGHT: 71 IN | SYSTOLIC BLOOD PRESSURE: 159 MMHG | RESPIRATION RATE: 25 BRPM | OXYGEN SATURATION: 97 % | DIASTOLIC BLOOD PRESSURE: 124 MMHG | HEART RATE: 111 BPM | TEMPERATURE: 98 F

## 2021-12-09 VITALS
HEART RATE: 103 BPM | RESPIRATION RATE: 18 BRPM | OXYGEN SATURATION: 97 % | SYSTOLIC BLOOD PRESSURE: 144 MMHG | DIASTOLIC BLOOD PRESSURE: 117 MMHG

## 2021-12-09 DIAGNOSIS — I50.9 HEART FAILURE, UNSPECIFIED: ICD-10-CM

## 2021-12-09 LAB
BASE EXCESS BLDV CALC-SCNC: 1.4 MMOL/L — SIGNIFICANT CHANGE UP (ref -2–2)
BASOPHILS # BLD AUTO: 0.03 K/UL — SIGNIFICANT CHANGE UP (ref 0–0.2)
BASOPHILS NFR BLD AUTO: 0.4 % — SIGNIFICANT CHANGE UP (ref 0–2)
CA-I SERPL-SCNC: 1.14 MMOL/L — LOW (ref 1.15–1.33)
CHLORIDE BLDV-SCNC: 101 MMOL/L — SIGNIFICANT CHANGE UP (ref 96–108)
CO2 BLDV-SCNC: 29 MMOL/L — HIGH (ref 22–26)
EOSINOPHIL # BLD AUTO: 0.1 K/UL — SIGNIFICANT CHANGE UP (ref 0–0.5)
EOSINOPHIL NFR BLD AUTO: 1.2 % — SIGNIFICANT CHANGE UP (ref 0–6)
GAS PNL BLDV: 133 MMOL/L — LOW (ref 136–145)
GAS PNL BLDV: SIGNIFICANT CHANGE UP
GAS PNL BLDV: SIGNIFICANT CHANGE UP
GLUCOSE BLDV-MCNC: 387 MG/DL — HIGH (ref 70–99)
HCO3 BLDV-SCNC: 27 MMOL/L — SIGNIFICANT CHANGE UP (ref 22–29)
HCT VFR BLD CALC: 44.8 % — SIGNIFICANT CHANGE UP (ref 39–50)
HCT VFR BLDA CALC: 45 % — SIGNIFICANT CHANGE UP (ref 39–51)
HGB BLD CALC-MCNC: 15.1 G/DL — SIGNIFICANT CHANGE UP (ref 12.6–17.4)
HGB BLD-MCNC: 14.6 G/DL — SIGNIFICANT CHANGE UP (ref 13–17)
IMM GRANULOCYTES NFR BLD AUTO: 0.6 % — SIGNIFICANT CHANGE UP (ref 0–1.5)
INR BLD: 1.06 RATIO — SIGNIFICANT CHANGE UP (ref 0.88–1.16)
LACTATE BLDV-MCNC: 2.6 MMOL/L — HIGH (ref 0.7–2)
LYMPHOCYTES # BLD AUTO: 1.84 K/UL — SIGNIFICANT CHANGE UP (ref 1–3.3)
LYMPHOCYTES # BLD AUTO: 22.1 % — SIGNIFICANT CHANGE UP (ref 13–44)
MCHC RBC-ENTMCNC: 29.6 PG — SIGNIFICANT CHANGE UP (ref 27–34)
MCHC RBC-ENTMCNC: 32.6 GM/DL — SIGNIFICANT CHANGE UP (ref 32–36)
MCV RBC AUTO: 90.9 FL — SIGNIFICANT CHANGE UP (ref 80–100)
MONOCYTES # BLD AUTO: 0.64 K/UL — SIGNIFICANT CHANGE UP (ref 0–0.9)
MONOCYTES NFR BLD AUTO: 7.7 % — SIGNIFICANT CHANGE UP (ref 2–14)
NEUTROPHILS # BLD AUTO: 5.67 K/UL — SIGNIFICANT CHANGE UP (ref 1.8–7.4)
NEUTROPHILS NFR BLD AUTO: 68 % — SIGNIFICANT CHANGE UP (ref 43–77)
NRBC # BLD: 0 /100 WBCS — SIGNIFICANT CHANGE UP (ref 0–0)
NT-PROBNP SERPL-SCNC: 1510 PG/ML — HIGH (ref 0–300)
PCO2 BLDV: 46 MMHG — SIGNIFICANT CHANGE UP (ref 42–55)
PH BLDV: 7.38 — SIGNIFICANT CHANGE UP (ref 7.32–7.43)
PLATELET # BLD AUTO: 179 K/UL — SIGNIFICANT CHANGE UP (ref 150–400)
PO2 BLDV: 35 MMHG — SIGNIFICANT CHANGE UP (ref 25–45)
POTASSIUM BLDV-SCNC: 5.4 MMOL/L — HIGH (ref 3.5–5.1)
PROTHROM AB SERPL-ACNC: 12.7 SEC — SIGNIFICANT CHANGE UP (ref 10.6–13.6)
RAPID RVP RESULT: SIGNIFICANT CHANGE UP
RBC # BLD: 4.93 M/UL — SIGNIFICANT CHANGE UP (ref 4.2–5.8)
RBC # FLD: 13.2 % — SIGNIFICANT CHANGE UP (ref 10.3–14.5)
SAO2 % BLDV: 60.1 % — LOW (ref 67–88)
SARS-COV-2 RNA SPEC QL NAA+PROBE: SIGNIFICANT CHANGE UP
TROPONIN T, HIGH SENSITIVITY RESULT: 30 NG/L — SIGNIFICANT CHANGE UP (ref 0–51)
TSH SERPL-MCNC: 1.17 UIU/ML — SIGNIFICANT CHANGE UP (ref 0.27–4.2)
WBC # BLD: 8.33 K/UL — SIGNIFICANT CHANGE UP (ref 3.8–10.5)
WBC # FLD AUTO: 8.33 K/UL — SIGNIFICANT CHANGE UP (ref 3.8–10.5)

## 2021-12-09 PROCEDURE — 85730 THROMBOPLASTIN TIME PARTIAL: CPT

## 2021-12-09 PROCEDURE — 85018 HEMOGLOBIN: CPT

## 2021-12-09 PROCEDURE — 83880 ASSAY OF NATRIURETIC PEPTIDE: CPT

## 2021-12-09 PROCEDURE — 84484 ASSAY OF TROPONIN QUANT: CPT

## 2021-12-09 PROCEDURE — 82803 BLOOD GASES ANY COMBINATION: CPT

## 2021-12-09 PROCEDURE — 82330 ASSAY OF CALCIUM: CPT

## 2021-12-09 PROCEDURE — 71045 X-RAY EXAM CHEST 1 VIEW: CPT | Mod: 26

## 2021-12-09 PROCEDURE — 71045 X-RAY EXAM CHEST 1 VIEW: CPT

## 2021-12-09 PROCEDURE — 99291 CRITICAL CARE FIRST HOUR: CPT

## 2021-12-09 PROCEDURE — 84443 ASSAY THYROID STIM HORMONE: CPT

## 2021-12-09 PROCEDURE — 85610 PROTHROMBIN TIME: CPT

## 2021-12-09 PROCEDURE — 83605 ASSAY OF LACTIC ACID: CPT

## 2021-12-09 PROCEDURE — 82435 ASSAY OF BLOOD CHLORIDE: CPT

## 2021-12-09 PROCEDURE — 84295 ASSAY OF SERUM SODIUM: CPT

## 2021-12-09 PROCEDURE — 82947 ASSAY GLUCOSE BLOOD QUANT: CPT

## 2021-12-09 PROCEDURE — 0225U NFCT DS DNA&RNA 21 SARSCOV2: CPT

## 2021-12-09 PROCEDURE — 85014 HEMATOCRIT: CPT

## 2021-12-09 PROCEDURE — 80053 COMPREHEN METABOLIC PANEL: CPT

## 2021-12-09 PROCEDURE — 36415 COLL VENOUS BLD VENIPUNCTURE: CPT

## 2021-12-09 PROCEDURE — 99285 EMERGENCY DEPT VISIT HI MDM: CPT | Mod: 25

## 2021-12-09 PROCEDURE — 85025 COMPLETE CBC W/AUTO DIFF WBC: CPT

## 2021-12-09 PROCEDURE — 84132 ASSAY OF SERUM POTASSIUM: CPT

## 2021-12-09 RX ORDER — FUROSEMIDE 40 MG
60 TABLET ORAL
Qty: 14 | Refills: 0
Start: 2021-12-09 | End: 2021-12-15

## 2021-12-09 RX ORDER — ASPIRIN/CALCIUM CARB/MAGNESIUM 324 MG
162 TABLET ORAL ONCE
Refills: 0 | Status: COMPLETED | OUTPATIENT
Start: 2021-12-09 | End: 2021-12-09

## 2021-12-09 RX ORDER — FUROSEMIDE 40 MG
60 TABLET ORAL
Refills: 0 | Status: DISCONTINUED | OUTPATIENT
Start: 2021-12-09 | End: 2021-12-09

## 2021-12-09 RX ORDER — FUROSEMIDE 40 MG
1 TABLET ORAL
Qty: 14 | Refills: 0
Start: 2021-12-09 | End: 2021-12-15

## 2021-12-09 RX ORDER — FUROSEMIDE 40 MG
3 TABLET ORAL
Qty: 42 | Refills: 0
Start: 2021-12-09 | End: 2021-12-15

## 2021-12-09 RX ORDER — FUROSEMIDE 40 MG
40 TABLET ORAL DAILY
Refills: 0 | Status: DISCONTINUED | OUTPATIENT
Start: 2021-12-09 | End: 2021-12-09

## 2021-12-09 RX ADMIN — Medication 162 MILLIGRAM(S): at 15:25

## 2021-12-09 NOTE — DISCHARGE NOTE PROVIDER - NS AS DC PROVIDER CONTACT Y/N MULTI
copd  emphysema  albuterol NEBS  spiriva  I aundrea  keep sat > 88 pct  oral and skin care  seasonal vaccination  pt quit tob 3 months ago  at risk for olvin op and post op pulm complications Yes

## 2021-12-09 NOTE — DISCHARGE NOTE PROVIDER - NSDCCPCAREPLAN_GEN_ALL_CORE_FT
PRINCIPAL DISCHARGE DIAGNOSIS  Diagnosis: CHF exacerbation  Assessment and Plan of Treatment: Take PO lasix 60mg two times a day.   Make appointment with cardiologist and primary care physician in 1-3 days  IF you have worsening shortness of breath or chest pain return to the ED.

## 2021-12-09 NOTE — ED ADULT NURSE NOTE - OBJECTIVE STATEMENT
pt presents with chest pain x 3 days and accompanying SOB. As per pt, he feels like he is suffocating when he lays down and was prompted by his PCP to come into the ED. Past medical hx" heart murmur, DM II, asthma and HTN. Pt A+Ox3, speaking in clear and complete sentences. NAD at this current moment. Safety measures in place and family member at bedside.

## 2021-12-09 NOTE — DISCHARGE NOTE PROVIDER - HOSPITAL COURSE
41 yoM w/ PMHx significant for CHF (LVEF = 22% per MRI performed in 4/2019, unclear etiology, cardiac MRI ordered for evaluation for sarcoid?, no AICD), HTN, asthma (vs. COPD, not diagnosed but pt w/ smoking history: 1.5 packs for roughly 17 years stating that he recently quit), who presents with 3 days of exertional chest pain and positional dyspnea. Pt now requesting to leave AMA. Discussed with Dr. Machado, Pt to go AMA, but will send home with PO Lasix 60mg BID. Pt instructed to follow up with PCP and cardiologist within 1-3 days.       Problem 1: Dyspnea on exertion 2/2 HF exac.  - IV lasix 40qd  - CXR pulmonary congestion  - proBNP - 1510

## 2021-12-09 NOTE — ED PROVIDER NOTE - OBJECTIVE STATEMENT
41 yoM w/ PMHx significant for CHF (LVEF = 22% per MRI performed in 4/2019, unclear etiology, cardiac MRI ordered for evaluation for sarcoid?, no AICD), HTN, asthma (vs. COPD, not diagnosed but pt w/ smoking history: 1.5 packs for roughly 17 years stating that he recently quit), who presents with 3 days of exertional chest pain and positional dyspnea. Reports left sided chest pressure with no diaphoresis or nausea, worse with ambulation. No hx of cardiac stents / bypassess. Also with positional dyspnea, unable to lay flat. Also with productive cough. Not covid vaccinated. 41 yoM w/ PMHx significant for CHF (LVEF = 22% per MRI performed in 2019, unclear etiology, cardiac MRI ordered for evaluation for sarcoid?, no AICD), HTN, asthma (vs. COPD, not diagnosed but pt w/ smoking history: 1.5 packs for roughly 17 years stating that he recently quit), who presents with 3 days of exertional chest pain and positional dyspnea. Reports left sided chest pressure with no diaphoresis or nausea, worse with ambulation. No hx of cardiac stents / bypassess. Also with positional dyspnea, unable to lay flat. Also with productive cough. Not covid vaccinated.    Attendinyo male presents with chest pressure and shortness of breath.  has dyspnea on exertion and shortness of breath when he lies flat.  no fever or chills.  no cough.  had this before when he had heart failure in 2019

## 2021-12-09 NOTE — ED PROVIDER NOTE - CLINICAL SUMMARY MEDICAL DECISION MAKING FREE TEXT BOX
41 yoM w/ PMHx significant for CHF (LVEF = 22% per MRI performed in 4/2019, unclear etiology, cardiac MRI ordered for evaluation for sarcoid?, no AICD), HTN, asthma (vs. COPD, not diagnosed but pt w/ smoking history: 1.5 packs for roughly 17 years stating that he recently quit), who presents with 3 days of exertional chest pain and positional dyspnea. tachycardic. Clear lungs. Ddx ACS vs pneumonia vs CHF exacerbation. Cardiac work up, RVP + covid, admit

## 2021-12-09 NOTE — DISCHARGE NOTE PROVIDER - CARE PROVIDER_API CALL
Irene Leong  [address] [place] [address]  Phone: [phone]  Fax: (   )    -  Follow Up Time: 1-3 days

## 2021-12-09 NOTE — DISCHARGE NOTE PROVIDER - NSDCMRMEDTOKEN_GEN_ALL_CORE_FT
albuterol 90 mcg/inh inhalation aerosol: 2 puff(s) inhaled every 6 hours, As Needed -for shortness of breath and/or wheezing   Breo Ellipta 200 mcg-25 mcg/inh inhalation powder: 2 puff(s) inhaled once a day   carvedilol 12.5 mg oral tablet: 1 tab(s) orally every 12 hours MDD:2 tabs  hydrALAZINE 50 mg oral tablet: 1.5 tab(s) orally 3 times a day MDD:4.5 tabs daily  isosorbide dinitrate 20 mg oral tablet: 1 tab(s) orally 3 times a day MDD:3 tabs  Jardiance 10 mg oral tablet: 1 tab(s) orally once a day (in the morning)  Lasix 20 mg oral tablet: 3 tab(s) orally 2 times a day  losartan 100 mg oral tablet: 1 tab(s) orally once a day MDD:1 tab  metFORMIN 1000 mg oral tablet: 1 tab(s) orally 2 times a day  spironolactone 25 mg oral tablet: 1 tab(s) orally once a day MDD:1 tab

## 2021-12-09 NOTE — CHART NOTE - NSCHARTNOTEFT_GEN_A_CORE
Was called by RN due to patient wanting to sign out AMA. Asked patient why they wanted to leave, reports has obligations outside of the hospital and unable to stay.    Patient is AAO x 3. I explained at length to the patient the risks of signing out AMA including but not limited to harm, injury or death. I explained the risks, benefits and alternatives to treatment as well as the attendant risks of refusing treatment at this time. I offered to answer any questions and fully answered any such questions. We believe that the patient fully understands what has been explained and answered. I informed hospitalist Dr. Machado of Pt leaving AMA. Per Dr. Machado, send Pt home with PO Lasix 60 BID. Prescription sent to [facility] pharmacy. Pt advised to follow up with otpt PCP and cardiologist within 1-3 days and to come back to the hospital if worsening ALAS, chest pain, etc. Patient signed form to sign out AMA and accepts responsibility for any and all results of this decision.      Erika Trujillo PA-C   Department of Medicine

## 2021-12-09 NOTE — DISCHARGE NOTE PROVIDER - PROVIDER TOKENS
FREE:[LAST:[Anjalimed],FIRST:[Irene],PHONE:[phone]],FAX:[phone]   [phone]    -],ADDRESS:[[address] [place] [address]],FOLLOWUP:[1-3 days]]

## 2021-12-09 NOTE — DISCHARGE NOTE NURSING/CASE MANAGEMENT/SOCIAL WORK - NSDCPEFALRISK_GEN_ALL_CORE
For information on Fall & Injury Prevention, visit: [email or web address] OR  [email or web address] OR  https://www.cdc.gov/steadi/patient.html

## 2021-12-09 NOTE — DISCHARGE NOTE NURSING/CASE MANAGEMENT/SOCIAL WORK - PATIENT PORTAL LINK FT
You can access the FollowMyHealth Patient Portal offered by [facility] by registering at the following website: [email or web address]. By joining [email or web address] FollowMyHealth portal, you will also be able to view your health information using other applications (apps) compatible with our system.

## 2021-12-09 NOTE — ED ADULT NURSE REASSESSMENT NOTE - NS ED NURSE REASSESS COMMENT FT1
Patient A&O, ambulating with steady gait noted, patient has mother at bedside. Patient stating he does not want to be admitted. Covering team contacted, waiting for call back.

## 2022-02-01 ENCOUNTER — INPATIENT (INPATIENT)
Facility: HOSPITAL | Age: 42
LOS: 7 days | Discharge: HOME HEALTH | End: 2022-02-09
Attending: HOSPITALIST | Admitting: HOSPITALIST
Payer: MEDICAID

## 2022-02-01 VITALS
HEART RATE: 100 BPM | SYSTOLIC BLOOD PRESSURE: 177 MMHG | RESPIRATION RATE: 18 BRPM | TEMPERATURE: 98 F | HEIGHT: 71 IN | OXYGEN SATURATION: 99 % | DIASTOLIC BLOOD PRESSURE: 122 MMHG

## 2022-02-01 DIAGNOSIS — S92.309A FRACTURE OF UNSPECIFIED METATARSAL BONE(S), UNSPECIFIED FOOT, INITIAL ENCOUNTER FOR CLOSED FRACTURE: ICD-10-CM

## 2022-02-01 DIAGNOSIS — I50.9 HEART FAILURE, UNSPECIFIED: ICD-10-CM

## 2022-02-01 DIAGNOSIS — R07.9 CHEST PAIN, UNSPECIFIED: ICD-10-CM

## 2022-02-01 DIAGNOSIS — S62.309A UNSPECIFIED FRACTURE OF UNSPECIFIED METACARPAL BONE, INITIAL ENCOUNTER FOR CLOSED FRACTURE: ICD-10-CM

## 2022-02-01 DIAGNOSIS — J45.909 UNSPECIFIED ASTHMA, UNCOMPLICATED: ICD-10-CM

## 2022-02-01 DIAGNOSIS — E78.5 HYPERLIPIDEMIA, UNSPECIFIED: ICD-10-CM

## 2022-02-01 DIAGNOSIS — R65.10 SYSTEMIC INFLAMMATORY RESPONSE SYNDROME (SIRS) OF NON-INFECTIOUS ORIGIN WITHOUT ACUTE ORGAN DYSFUNCTION: ICD-10-CM

## 2022-02-01 DIAGNOSIS — E11.9 TYPE 2 DIABETES MELLITUS WITHOUT COMPLICATIONS: ICD-10-CM

## 2022-02-01 DIAGNOSIS — Z29.9 ENCOUNTER FOR PROPHYLACTIC MEASURES, UNSPECIFIED: ICD-10-CM

## 2022-02-01 DIAGNOSIS — R07.89 OTHER CHEST PAIN: ICD-10-CM

## 2022-02-01 DIAGNOSIS — I10 ESSENTIAL (PRIMARY) HYPERTENSION: ICD-10-CM

## 2022-02-01 DIAGNOSIS — E11.65 TYPE 2 DIABETES MELLITUS WITH HYPERGLYCEMIA: ICD-10-CM

## 2022-02-01 DIAGNOSIS — Z79.899 OTHER LONG TERM (CURRENT) DRUG THERAPY: ICD-10-CM

## 2022-02-01 DIAGNOSIS — D72.829 ELEVATED WHITE BLOOD CELL COUNT, UNSPECIFIED: ICD-10-CM

## 2022-02-01 LAB
A1C WITH ESTIMATED AVERAGE GLUCOSE RESULT: 11.3 % — HIGH (ref 4–5.6)
ALBUMIN SERPL ELPH-MCNC: 4 G/DL — SIGNIFICANT CHANGE UP (ref 3.3–5)
ALP SERPL-CCNC: 79 U/L — SIGNIFICANT CHANGE UP (ref 40–120)
ALT FLD-CCNC: 15 U/L — SIGNIFICANT CHANGE UP (ref 4–41)
ANION GAP SERPL CALC-SCNC: 11 MMOL/L — SIGNIFICANT CHANGE UP (ref 7–14)
APTT BLD: 29.5 SEC — SIGNIFICANT CHANGE UP (ref 27–36.3)
AST SERPL-CCNC: 13 U/L — SIGNIFICANT CHANGE UP (ref 4–40)
B PERT DNA SPEC QL NAA+PROBE: SIGNIFICANT CHANGE UP
B PERT+PARAPERT DNA PNL SPEC NAA+PROBE: SIGNIFICANT CHANGE UP
BASE EXCESS BLDV CALC-SCNC: 0.9 MMOL/L — SIGNIFICANT CHANGE UP (ref -2–3)
BASOPHILS # BLD AUTO: 0.03 K/UL — SIGNIFICANT CHANGE UP (ref 0–0.2)
BASOPHILS NFR BLD AUTO: 0.3 % — SIGNIFICANT CHANGE UP (ref 0–2)
BILIRUB SERPL-MCNC: 0.4 MG/DL — SIGNIFICANT CHANGE UP (ref 0.2–1.2)
BLOOD GAS VENOUS COMPREHENSIVE RESULT: SIGNIFICANT CHANGE UP
BORDETELLA PARAPERTUSSIS (RAPRVP): SIGNIFICANT CHANGE UP
BUN SERPL-MCNC: 12 MG/DL — SIGNIFICANT CHANGE UP (ref 7–23)
C PNEUM DNA SPEC QL NAA+PROBE: SIGNIFICANT CHANGE UP
CALCIUM SERPL-MCNC: 9.2 MG/DL — SIGNIFICANT CHANGE UP (ref 8.4–10.5)
CHLORIDE BLDV-SCNC: 102 MMOL/L — SIGNIFICANT CHANGE UP (ref 96–108)
CHLORIDE SERPL-SCNC: 100 MMOL/L — SIGNIFICANT CHANGE UP (ref 98–107)
CO2 BLDV-SCNC: 28 MMOL/L — HIGH (ref 22–26)
CO2 SERPL-SCNC: 24 MMOL/L — SIGNIFICANT CHANGE UP (ref 22–31)
CREAT SERPL-MCNC: 0.78 MG/DL — SIGNIFICANT CHANGE UP (ref 0.5–1.3)
EOSINOPHIL # BLD AUTO: 0.13 K/UL — SIGNIFICANT CHANGE UP (ref 0–0.5)
EOSINOPHIL NFR BLD AUTO: 1.2 % — SIGNIFICANT CHANGE UP (ref 0–6)
ESTIMATED AVERAGE GLUCOSE: 278 — SIGNIFICANT CHANGE UP
FLUAV SUBTYP SPEC NAA+PROBE: SIGNIFICANT CHANGE UP
FLUBV RNA SPEC QL NAA+PROBE: SIGNIFICANT CHANGE UP
GAS PNL BLDV: 134 MMOL/L — LOW (ref 136–145)
GLUCOSE BLDC GLUCOMTR-MCNC: 218 MG/DL — HIGH (ref 70–99)
GLUCOSE BLDC GLUCOMTR-MCNC: 239 MG/DL — HIGH (ref 70–99)
GLUCOSE BLDV-MCNC: 287 MG/DL — HIGH (ref 70–99)
GLUCOSE SERPL-MCNC: 275 MG/DL — HIGH (ref 70–99)
HADV DNA SPEC QL NAA+PROBE: SIGNIFICANT CHANGE UP
HCO3 BLDV-SCNC: 27 MMOL/L — SIGNIFICANT CHANGE UP (ref 22–29)
HCOV 229E RNA SPEC QL NAA+PROBE: SIGNIFICANT CHANGE UP
HCOV HKU1 RNA SPEC QL NAA+PROBE: SIGNIFICANT CHANGE UP
HCOV NL63 RNA SPEC QL NAA+PROBE: SIGNIFICANT CHANGE UP
HCOV OC43 RNA SPEC QL NAA+PROBE: SIGNIFICANT CHANGE UP
HCT VFR BLD CALC: 50.5 % — HIGH (ref 39–50)
HCT VFR BLDA CALC: 51 % — SIGNIFICANT CHANGE UP (ref 39–51)
HGB BLD CALC-MCNC: 17 G/DL — SIGNIFICANT CHANGE UP (ref 13–17)
HGB BLD-MCNC: 16.6 G/DL — SIGNIFICANT CHANGE UP (ref 13–17)
HMPV RNA SPEC QL NAA+PROBE: SIGNIFICANT CHANGE UP
HPIV1 RNA SPEC QL NAA+PROBE: SIGNIFICANT CHANGE UP
HPIV2 RNA SPEC QL NAA+PROBE: SIGNIFICANT CHANGE UP
HPIV3 RNA SPEC QL NAA+PROBE: SIGNIFICANT CHANGE UP
HPIV4 RNA SPEC QL NAA+PROBE: SIGNIFICANT CHANGE UP
IANC: 7.31 K/UL — SIGNIFICANT CHANGE UP (ref 1.5–8.5)
IMM GRANULOCYTES NFR BLD AUTO: 0.6 % — SIGNIFICANT CHANGE UP (ref 0–1.5)
INR BLD: 1 RATIO — SIGNIFICANT CHANGE UP (ref 0.88–1.16)
LACTATE BLDV-MCNC: 1.9 MMOL/L — SIGNIFICANT CHANGE UP (ref 0.5–2)
LIDOCAIN IGE QN: 22 U/L — SIGNIFICANT CHANGE UP (ref 7–60)
LYMPHOCYTES # BLD AUTO: 2.26 K/UL — SIGNIFICANT CHANGE UP (ref 1–3.3)
LYMPHOCYTES # BLD AUTO: 21.4 % — SIGNIFICANT CHANGE UP (ref 13–44)
M PNEUMO DNA SPEC QL NAA+PROBE: SIGNIFICANT CHANGE UP
MAGNESIUM SERPL-MCNC: 1.8 MG/DL — SIGNIFICANT CHANGE UP (ref 1.6–2.6)
MCHC RBC-ENTMCNC: 29.6 PG — SIGNIFICANT CHANGE UP (ref 27–34)
MCHC RBC-ENTMCNC: 32.9 GM/DL — SIGNIFICANT CHANGE UP (ref 32–36)
MCV RBC AUTO: 90.2 FL — SIGNIFICANT CHANGE UP (ref 80–100)
MONOCYTES # BLD AUTO: 0.75 K/UL — SIGNIFICANT CHANGE UP (ref 0–0.9)
MONOCYTES NFR BLD AUTO: 7.1 % — SIGNIFICANT CHANGE UP (ref 2–14)
NEUTROPHILS # BLD AUTO: 7.31 K/UL — SIGNIFICANT CHANGE UP (ref 1.8–7.4)
NEUTROPHILS NFR BLD AUTO: 69.4 % — SIGNIFICANT CHANGE UP (ref 43–77)
NRBC # BLD: 0 /100 WBCS — SIGNIFICANT CHANGE UP
NRBC # FLD: 0 K/UL — SIGNIFICANT CHANGE UP
NT-PROBNP SERPL-SCNC: 519 PG/ML — HIGH
PCO2 BLDV: 45 MMHG — SIGNIFICANT CHANGE UP (ref 42–55)
PH BLDV: 7.38 — SIGNIFICANT CHANGE UP (ref 7.32–7.43)
PHOSPHATE SERPL-MCNC: 3.6 MG/DL — SIGNIFICANT CHANGE UP (ref 2.5–4.5)
PLATELET # BLD AUTO: 192 K/UL — SIGNIFICANT CHANGE UP (ref 150–400)
PO2 BLDV: 45 MMHG — SIGNIFICANT CHANGE UP
POTASSIUM BLDV-SCNC: 5.4 MMOL/L — HIGH (ref 3.5–5.1)
POTASSIUM SERPL-MCNC: 4.3 MMOL/L — SIGNIFICANT CHANGE UP (ref 3.5–5.3)
POTASSIUM SERPL-SCNC: 4.3 MMOL/L — SIGNIFICANT CHANGE UP (ref 3.5–5.3)
PROT SERPL-MCNC: 7.1 G/DL — SIGNIFICANT CHANGE UP (ref 6–8.3)
PROTHROM AB SERPL-ACNC: 11.4 SEC — SIGNIFICANT CHANGE UP (ref 10.6–13.6)
RAPID RVP RESULT: SIGNIFICANT CHANGE UP
RBC # BLD: 5.6 M/UL — SIGNIFICANT CHANGE UP (ref 4.2–5.8)
RBC # FLD: 14.5 % — SIGNIFICANT CHANGE UP (ref 10.3–14.5)
RSV RNA SPEC QL NAA+PROBE: SIGNIFICANT CHANGE UP
RV+EV RNA SPEC QL NAA+PROBE: SIGNIFICANT CHANGE UP
SAO2 % BLDV: 78.7 % — SIGNIFICANT CHANGE UP
SARS-COV-2 RNA SPEC QL NAA+PROBE: SIGNIFICANT CHANGE UP
SODIUM SERPL-SCNC: 135 MMOL/L — SIGNIFICANT CHANGE UP (ref 135–145)
TROPONIN T, HIGH SENSITIVITY RESULT: 25 NG/L — SIGNIFICANT CHANGE UP
TROPONIN T, HIGH SENSITIVITY RESULT: 26 NG/L — SIGNIFICANT CHANGE UP
WBC # BLD: 10.54 K/UL — HIGH (ref 3.8–10.5)
WBC # FLD AUTO: 10.54 K/UL — HIGH (ref 3.8–10.5)

## 2022-02-01 PROCEDURE — 71045 X-RAY EXAM CHEST 1 VIEW: CPT | Mod: 26

## 2022-02-01 PROCEDURE — 29125 APPL SHORT ARM SPLINT STATIC: CPT | Mod: RT

## 2022-02-01 PROCEDURE — 99223 1ST HOSP IP/OBS HIGH 75: CPT

## 2022-02-01 PROCEDURE — 73120 X-RAY EXAM OF HAND: CPT | Mod: 26,RT,76

## 2022-02-01 PROCEDURE — 73110 X-RAY EXAM OF WRIST: CPT | Mod: 26,RT

## 2022-02-01 PROCEDURE — 93010 ELECTROCARDIOGRAM REPORT: CPT | Mod: 59

## 2022-02-01 PROCEDURE — 99285 EMERGENCY DEPT VISIT HI MDM: CPT | Mod: 25

## 2022-02-01 RX ORDER — BUDESONIDE AND FORMOTEROL FUMARATE DIHYDRATE 160; 4.5 UG/1; UG/1
2 AEROSOL RESPIRATORY (INHALATION)
Refills: 0 | Status: DISCONTINUED | OUTPATIENT
Start: 2022-02-01 | End: 2022-02-09

## 2022-02-01 RX ORDER — GLUCAGON INJECTION, SOLUTION 0.5 MG/.1ML
1 INJECTION, SOLUTION SUBCUTANEOUS ONCE
Refills: 0 | Status: DISCONTINUED | OUTPATIENT
Start: 2022-02-01 | End: 2022-02-09

## 2022-02-01 RX ORDER — LOSARTAN POTASSIUM 100 MG/1
100 TABLET, FILM COATED ORAL DAILY
Refills: 0 | Status: DISCONTINUED | OUTPATIENT
Start: 2022-02-01 | End: 2022-02-09

## 2022-02-01 RX ORDER — SODIUM CHLORIDE 9 MG/ML
3 INJECTION INTRAMUSCULAR; INTRAVENOUS; SUBCUTANEOUS EVERY 8 HOURS
Refills: 0 | Status: DISCONTINUED | OUTPATIENT
Start: 2022-02-01 | End: 2022-02-09

## 2022-02-01 RX ORDER — DEXTROSE 50 % IN WATER 50 %
25 SYRINGE (ML) INTRAVENOUS ONCE
Refills: 0 | Status: DISCONTINUED | OUTPATIENT
Start: 2022-02-01 | End: 2022-02-09

## 2022-02-01 RX ORDER — ACETAMINOPHEN 500 MG
975 TABLET ORAL ONCE
Refills: 0 | Status: COMPLETED | OUTPATIENT
Start: 2022-02-01 | End: 2022-02-01

## 2022-02-01 RX ORDER — DEXTROSE 50 % IN WATER 50 %
12.5 SYRINGE (ML) INTRAVENOUS ONCE
Refills: 0 | Status: DISCONTINUED | OUTPATIENT
Start: 2022-02-01 | End: 2022-02-09

## 2022-02-01 RX ORDER — INSULIN LISPRO 100/ML
4 VIAL (ML) SUBCUTANEOUS
Refills: 0 | Status: DISCONTINUED | OUTPATIENT
Start: 2022-02-01 | End: 2022-02-02

## 2022-02-01 RX ORDER — ISOSORBIDE DINITRATE 5 MG/1
20 TABLET ORAL THREE TIMES A DAY
Refills: 0 | Status: DISCONTINUED | OUTPATIENT
Start: 2022-02-01 | End: 2022-02-04

## 2022-02-01 RX ORDER — ACETAMINOPHEN 500 MG
650 TABLET ORAL EVERY 6 HOURS
Refills: 0 | Status: DISCONTINUED | OUTPATIENT
Start: 2022-02-01 | End: 2022-02-09

## 2022-02-01 RX ORDER — SODIUM CHLORIDE 9 MG/ML
1000 INJECTION, SOLUTION INTRAVENOUS
Refills: 0 | Status: DISCONTINUED | OUTPATIENT
Start: 2022-02-01 | End: 2022-02-09

## 2022-02-01 RX ORDER — INSULIN LISPRO 100/ML
VIAL (ML) SUBCUTANEOUS AT BEDTIME
Refills: 0 | Status: DISCONTINUED | OUTPATIENT
Start: 2022-02-01 | End: 2022-02-09

## 2022-02-01 RX ORDER — HYDRALAZINE HCL 50 MG
75 TABLET ORAL THREE TIMES A DAY
Refills: 0 | Status: DISCONTINUED | OUTPATIENT
Start: 2022-02-01 | End: 2022-02-09

## 2022-02-01 RX ORDER — ALBUTEROL 90 UG/1
2 AEROSOL, METERED ORAL EVERY 6 HOURS
Refills: 0 | Status: DISCONTINUED | OUTPATIENT
Start: 2022-02-01 | End: 2022-02-09

## 2022-02-01 RX ORDER — ASPIRIN/CALCIUM CARB/MAGNESIUM 324 MG
324 TABLET ORAL ONCE
Refills: 0 | Status: COMPLETED | OUTPATIENT
Start: 2022-02-01 | End: 2022-02-01

## 2022-02-01 RX ORDER — INSULIN GLARGINE 100 [IU]/ML
15 INJECTION, SOLUTION SUBCUTANEOUS AT BEDTIME
Refills: 0 | Status: DISCONTINUED | OUTPATIENT
Start: 2022-02-02 | End: 2022-02-02

## 2022-02-01 RX ORDER — FUROSEMIDE 40 MG
20 TABLET ORAL
Refills: 0 | Status: DISCONTINUED | OUTPATIENT
Start: 2022-02-01 | End: 2022-02-02

## 2022-02-01 RX ORDER — OXYCODONE AND ACETAMINOPHEN 5; 325 MG/1; MG/1
1 TABLET ORAL ONCE
Refills: 0 | Status: DISCONTINUED | OUTPATIENT
Start: 2022-02-01 | End: 2022-02-01

## 2022-02-01 RX ORDER — INSULIN LISPRO 100/ML
VIAL (ML) SUBCUTANEOUS
Refills: 0 | Status: DISCONTINUED | OUTPATIENT
Start: 2022-02-01 | End: 2022-02-08

## 2022-02-01 RX ORDER — INSULIN GLARGINE 100 [IU]/ML
15 INJECTION, SOLUTION SUBCUTANEOUS ONCE
Refills: 0 | Status: COMPLETED | OUTPATIENT
Start: 2022-02-01 | End: 2022-02-01

## 2022-02-01 RX ORDER — DEXTROSE 50 % IN WATER 50 %
15 SYRINGE (ML) INTRAVENOUS ONCE
Refills: 0 | Status: DISCONTINUED | OUTPATIENT
Start: 2022-02-01 | End: 2022-02-09

## 2022-02-01 RX ADMIN — SODIUM CHLORIDE 3 MILLILITER(S): 9 INJECTION INTRAMUSCULAR; INTRAVENOUS; SUBCUTANEOUS at 22:00

## 2022-02-01 RX ADMIN — OXYCODONE AND ACETAMINOPHEN 1 TABLET(S): 5; 325 TABLET ORAL at 23:17

## 2022-02-01 RX ADMIN — Medication 324 MILLIGRAM(S): at 14:00

## 2022-02-01 RX ADMIN — Medication 975 MILLIGRAM(S): at 13:57

## 2022-02-01 RX ADMIN — INSULIN GLARGINE 15 UNIT(S): 100 INJECTION, SOLUTION SUBCUTANEOUS at 23:58

## 2022-02-01 NOTE — H&P ADULT - PROBLEM SELECTOR PLAN 5
Patient unsure of what blood pressure medications he is on.  Per surescripts patient was prescribed Carvedilol 12.5 and hydralazine 50 mg.  Will continue with hold parameters.  DASH/TLC diet. Per surscripts patient was prescribed metformin and Jardiance. Patient unable to recall name of medications and dosages.  Patient with Hemoglobin A1c of 11.3 and serum glucose of 278.  Patient started on Lantus 15 units. Will continue pending Endocrine consult. Patient also started on Premeal insulin 4 units TID and low dose insulin sliding scale.  Will email endocrine consult.  Consistent carbohydrate diet ordered.  Monitor fingersticks. Patient unsure of what blood pressure medications he is on.  Will continue hydralazine 75 mg TID, Losartan 100 mg daily from previous admission. Will Carvedilol in setting of heart failure.  DASH/TLC diet. Patient unsure of what blood pressure medications he is on.  Will continue hydralazine 75 mg TID, Losartan 100 mg daily from previous admission.  Defer restarting Carvedilol in setting of acute heart failure to Heart failure team   DASH/TLC diet. Uncontrolled, SBP in 170s mmHg range; Patient unsure of what blood pressure medications he is on.  Will restart hydralazine 75 mg TID, Losartan 100 mg daily from previous admission.  Defer restarting Carvedilol in setting of acute heart failure to Heart failure team   DASH/TLC diet.

## 2022-02-01 NOTE — H&P ADULT - NSHPLABSRESULTS_GEN_ALL_CORE
16.6   10.54 )-----------( 192      ( 01 Feb 2022 13:51 )             50.5   02-01    135  |  100  |  12  ----------------------------<  275<H>  4.3   |  24  |  0.78    Ca    9.2      01 Feb 2022 13:51  Phos  3.6     02-01  Mg     1.80     02-01    TPro  7.1  /  Alb  4.0  /  TBili  0.4  /  DBili  x   /  AST  13  /  ALT  15  /  AlkPhos  79  02-01    PT/INR - ( 01 Feb 2022 13:51 )   PT: 11.4 sec;   INR: 1.00 ratio         PTT - ( 01 Feb 2022 13:51 )  PTT:29.5 sec    D-dimer: <150  Hemoglobin A1c 11.3  Troponin: 26-->25  ProBNP: 519    13:51 - VBG - pH: 7.38  | pCO2: 45    | pO2: 45    | Lactate: 1.9      RVP and COVID 19 PCR: Negative.    Hand Right X-ray:  FINDINGS:    Acute transverse fracture of the fourth metatarsal diaphysis with   approximately one third shaft's width radial displacement, additionally   there is apex dorsal displacement. There is adjacent soft tissue   swelling. No additional fractures are seen. No dislocation. Joint spaces   are maintained.    IMPRESSION:    Acute transverse fracture of the fourth metatarsal diaphysis with   approximately one third shaft's width radial displacement, additionally   there is apex dorsal displacement.     Xray Right wrist:  IMPRESSION: The transverse, significantly displaced fracture through the   mid right fourth metacarpal bone. There is also a questionable   nondisplaced obliquely orientated fracture through the base of the right   third metacarpal bone, seen on the oblique view only. No other fracture   or dislocation is seen. There is soft tissue swelling.    CXR:    FINDINGS:  Clear lungs. No pleural effusion or pneumothorax. Elevation of the right   hemidiaphragm.  The heart cannot accurately be assessed in this projection.  No acute osseous abnormality.    IMPRESSION:  Clear lungs    EKG: Sinus tachycardia at 106 bpm with LAFB. QT/QTc 346/459. 16.6   10.54 )-----------( 192      ( 01 Feb 2022 13:51 )             50.5   02-01    135  |  100  |  12  ----------------------------<  275<H>  4.3   |  24  |  0.78    Ca    9.2      01 Feb 2022 13:51  Phos  3.6     02-01  Mg     1.80     02-01    TPro  7.1  /  Alb  4.0  /  TBili  0.4  /  DBili  x   /  AST  13  /  ALT  15  /  AlkPhos  79  02-01    PT/INR - ( 01 Feb 2022 13:51 )   PT: 11.4 sec;   INR: 1.00 ratio         PTT - ( 01 Feb 2022 13:51 )  PTT:29.5 sec    D-dimer: <150  Hemoglobin A1c 11.3  Troponin: 26-->25  ProBNP: 519    13:51 - VBG - pH: 7.38  | pCO2: 45    | pO2: 45    | Lactate: 1.9      RVP and COVID 19 PCR: Negative.    Hand Right X-ray:  FINDINGS:    Acute transverse fracture of the fourth metatarsal diaphysis with   approximately one third shaft's width radial displacement, additionally   there is apex dorsal displacement. There is adjacent soft tissue   swelling. No additional fractures are seen. No dislocation. Joint spaces   are maintained.    IMPRESSION:    Acute transverse fracture of the fourth metatarsal diaphysis with   approximately one third shaft's width radial displacement, additionally   there is apex dorsal displacement.     Xray Right wrist:  IMPRESSION: The transverse, significantly displaced fracture through the   mid right fourth metacarpal bone. There is also a questionable   nondisplaced obliquely orientated fracture through the base of the right   third metacarpal bone, seen on the oblique view only. No other fracture   or dislocation is seen. There is soft tissue swelling.    CXR:    FINDINGS:  Clear lungs. No pleural effusion or pneumothorax. Elevation of the right   hemidiaphragm.  The heart cannot accurately be assessed in this projection.  No acute osseous abnormality.    IMPRESSION:  Clear lungs    EKG: Sinus tachycardia at 106 bpm with LAFB. QT/QTc 346/459.    Cardiac Cath December 11, 2019:  CORONARY VESSELS: The coronary circulation is left dominant.  LM:   --  LM: Normal.  LAD:   --  Mid LAD: Angiography showed mild atherosclerosis with no flow  limiting lesions.  CX:   --  Circumflex: Normal.  RCA:   --  RCA: Normal.  COMPLICATIONS: There were no complications.  DIAGNOSTIC RECOMMENDATIONS: The patient's diuretic regimen should be  carefully increased.    TTE: December 5th 2019  Conclusions:  1. Tethered mitral valve leaflets with normal opening. Mild  mitral regurgitation.  2. Normal trileaflet aortic valve. No aortic valve  regurgitation seen.  3. Eccentric left ventricular hypertrophy (dilated left  ventricle with normal relative wall thickness).  4. Moderate-severe global left ventricular systolic  dysfunction. EF approximately 30% by visual estimation.  Recommend repeat imaging with intravenous echo contrast for  EF assessment if clinically indicated. There is a false  tendon in the LV cavity (normal variant).  5. The right ventricle is not well visualized; grossly  normal right ventricular size with decreased systolic  function.  *** No previous Echo exam. ---Personally interpreted EKG, Sinus tach 106bpm, qtc 459, biphasic Tw in I, aVL, no acute ST changes     ---Personally interpreted CXR, clear lungs, no pleural effusion     ---Personally reviewed the labs below:    16.6   10.54 )-----------( 192      ( 01 Feb 2022 13:51 )             50.5   02-01    135  |  100  |  12  ----------------------------<  275<H>  4.3   |  24  |  0.78    Ca    9.2      01 Feb 2022 13:51  Phos  3.6     02-01  Mg     1.80     02-01    TPro  7.1  /  Alb  4.0  /  TBili  0.4  /  DBili  x   /  AST  13  /  ALT  15  /  AlkPhos  79  02-01    PT/INR - ( 01 Feb 2022 13:51 )   PT: 11.4 sec;   INR: 1.00 ratio         PTT - ( 01 Feb 2022 13:51 )  PTT:29.5 sec    D-dimer: <150  Hemoglobin A1c 11.3  Troponin: 26-->25  ProBNP: 519    13:51 - VBG - pH: 7.38  | pCO2: 45    | pO2: 45    | Lactate: 1.9      RVP and COVID 19 PCR: Negative.    ---Personally reviewed the radiological findings below:    Hand Right X-ray:  FINDINGS:    Acute transverse fracture of the fourth metatarsal diaphysis with   approximately one third shaft's width radial displacement, additionally   there is apex dorsal displacement. There is adjacent soft tissue   swelling. No additional fractures are seen. No dislocation. Joint spaces   are maintained.    IMPRESSION:    Acute transverse fracture of the fourth metatarsal diaphysis with   approximately one third shaft's width radial displacement, additionally   there is apex dorsal displacement.     Xray Right wrist:  IMPRESSION: The transverse, significantly displaced fracture through the   mid right fourth metacarpal bone. There is also a questionable   nondisplaced obliquely orientated fracture through the base of the right   third metacarpal bone, seen on the oblique view only. No other fracture   or dislocation is seen. There is soft tissue swelling.      ---Personally reviewed:  Cardiac Cath, December 11, 2019    CORONARY VESSELS: The coronary circulation is left dominant.  LM:   --  LM: Normal.  LAD:   --  Mid LAD: Angiography showed mild atherosclerosis with no flow  limiting lesions.  CX:   --  Circumflex: Normal.  RCA:   --  RCA: Normal.  COMPLICATIONS: There were no complications.  DIAGNOSTIC RECOMMENDATIONS: The patient's diuretic regimen should be  carefully increased.    TTE,  December 5th 2019  Conclusions:  1. Tethered mitral valve leaflets with normal opening. Mild  mitral regurgitation.  2. Normal trileaflet aortic valve. No aortic valve  regurgitation seen.  3. Eccentric left ventricular hypertrophy (dilated left  ventricle with normal relative wall thickness).  4. Moderate-severe global left ventricular systolic  dysfunction. EF approximately 30% by visual estimation.  Recommend repeat imaging with intravenous echo contrast for  EF assessment if clinically indicated. There is a false  tendon in the LV cavity (normal variant).  5. The right ventricle is not well visualized; grossly  normal right ventricular size with decreased systolic  function. ---Personally interpreted EKG, Sinus tach 106bpm, qtc 459, biphasic Tw in I, aVL, no acute ST changes, unchanged compared to EKG dated 9/12/21    ---Personally interpreted CXR, clear lungs, no pleural effusion     ---Personally reviewed the labs below:    16.6   10.54 )-----------( 192      ( 01 Feb 2022 13:51 )             50.5   02-01    135  |  100  |  12  ----------------------------<  275<H>  4.3   |  24  |  0.78    Ca    9.2      01 Feb 2022 13:51  Phos  3.6     02-01  Mg     1.80     02-01    TPro  7.1  /  Alb  4.0  /  TBili  0.4  /  DBili  x   /  AST  13  /  ALT  15  /  AlkPhos  79  02-01    PT/INR - ( 01 Feb 2022 13:51 )   PT: 11.4 sec;   INR: 1.00 ratio         PTT - ( 01 Feb 2022 13:51 )  PTT:29.5 sec    D-dimer: <150  Hemoglobin A1c 11.3  Troponin: 26-->25  ProBNP: 519    13:51 - VBG - pH: 7.38  | pCO2: 45    | pO2: 45    | Lactate: 1.9      RVP and COVID 19 PCR: Negative.    ---Personally reviewed the radiological findings below:    Hand Right X-ray:  FINDINGS:    Acute transverse fracture of the fourth metatarsal diaphysis with   approximately one third shaft's width radial displacement, additionally   there is apex dorsal displacement. There is adjacent soft tissue   swelling. No additional fractures are seen. No dislocation. Joint spaces   are maintained.    IMPRESSION:    Acute transverse fracture of the fourth metatarsal diaphysis with   approximately one third shaft's width radial displacement, additionally   there is apex dorsal displacement.     Xray Right wrist:  IMPRESSION: The transverse, significantly displaced fracture through the   mid right fourth metacarpal bone. There is also a questionable   nondisplaced obliquely orientated fracture through the base of the right   third metacarpal bone, seen on the oblique view only. No other fracture   or dislocation is seen. There is soft tissue swelling.      ---Personally reviewed:  Cardiac Cath, December 11, 2019    CORONARY VESSELS: The coronary circulation is left dominant.  LM:   --  LM: Normal.  LAD:   --  Mid LAD: Angiography showed mild atherosclerosis with no flow  limiting lesions.  CX:   --  Circumflex: Normal.  RCA:   --  RCA: Normal.  COMPLICATIONS: There were no complications.  DIAGNOSTIC RECOMMENDATIONS: The patient's diuretic regimen should be  carefully increased.    TTE,  December 5th 2019  Conclusions:  1. Tethered mitral valve leaflets with normal opening. Mild  mitral regurgitation.  2. Normal trileaflet aortic valve. No aortic valve  regurgitation seen.  3. Eccentric left ventricular hypertrophy (dilated left  ventricle with normal relative wall thickness).  4. Moderate-severe global left ventricular systolic  dysfunction. EF approximately 30% by visual estimation.  Recommend repeat imaging with intravenous echo contrast for  EF assessment if clinically indicated. There is a false  tendon in the LV cavity (normal variant).  5. The right ventricle is not well visualized; grossly  normal right ventricular size with decreased systolic  function. ---Personally interpreted EKG, Sinus tach 106bpm, qtc 459, biphasic Tw in I, aVL, no acute ST changes, unchanged compared to EKG dated 9/12/21    ---Personally interpreted CXR, clear lungs, mild vasc congestion, no pleural effusion     ---Personally reviewed the labs below:    16.6   10.54 )-----------( 192      ( 01 Feb 2022 13:51 )             50.5   02-01    135  |  100  |  12  ----------------------------<  275<H>  4.3   |  24  |  0.78    Ca    9.2      01 Feb 2022 13:51  Phos  3.6     02-01  Mg     1.80     02-01    TPro  7.1  /  Alb  4.0  /  TBili  0.4  /  DBili  x   /  AST  13  /  ALT  15  /  AlkPhos  79  02-01    PT/INR - ( 01 Feb 2022 13:51 )   PT: 11.4 sec;   INR: 1.00 ratio         PTT - ( 01 Feb 2022 13:51 )  PTT:29.5 sec    D-dimer: <150  Hemoglobin A1c 11.3  Troponin: 26-->25  ProBNP: 519    13:51 - VBG - pH: 7.38  | pCO2: 45    | pO2: 45    | Lactate: 1.9      RVP and COVID 19 PCR: Negative.    ---Personally reviewed the radiological findings below:    Hand Right X-ray:  FINDINGS:    Acute transverse fracture of the fourth metatarsal diaphysis with   approximately one third shaft's width radial displacement, additionally   there is apex dorsal displacement. There is adjacent soft tissue   swelling. No additional fractures are seen. No dislocation. Joint spaces   are maintained.    IMPRESSION:    Acute transverse fracture of the fourth metatarsal diaphysis with   approximately one third shaft's width radial displacement, additionally   there is apex dorsal displacement.     Xray Right wrist:  IMPRESSION: The transverse, significantly displaced fracture through the   mid right fourth metacarpal bone. There is also a questionable   nondisplaced obliquely orientated fracture through the base of the right   third metacarpal bone, seen on the oblique view only. No other fracture   or dislocation is seen. There is soft tissue swelling.      ---Personally reviewed:  Cardiac Cath, December 11, 2019    CORONARY VESSELS: The coronary circulation is left dominant.  LM:   --  LM: Normal.  LAD:   --  Mid LAD: Angiography showed mild atherosclerosis with no flow  limiting lesions.  CX:   --  Circumflex: Normal.  RCA:   --  RCA: Normal.  COMPLICATIONS: There were no complications.  DIAGNOSTIC RECOMMENDATIONS: The patient's diuretic regimen should be  carefully increased.    TTE,  December 5th 2019  Conclusions:  1. Tethered mitral valve leaflets with normal opening. Mild  mitral regurgitation.  2. Normal trileaflet aortic valve. No aortic valve  regurgitation seen.  3. Eccentric left ventricular hypertrophy (dilated left  ventricle with normal relative wall thickness).  4. Moderate-severe global left ventricular systolic  dysfunction. EF approximately 30% by visual estimation.  Recommend repeat imaging with intravenous echo contrast for  EF assessment if clinically indicated. There is a false  tendon in the LV cavity (normal variant).  5. The right ventricle is not well visualized; grossly  normal right ventricular size with decreased systolic  function.

## 2022-02-01 NOTE — H&P ADULT - PROBLEM SELECTOR PLAN 1
Admit to tele, continue monitoring.  Patient with midsternal chest pain.  Troponin 26-->25.  EKG with TWI or ST elevations.  D-dimer <150.  Cardiac cath from 2019 showing mild atherosclerosis in Mid LAD.  Will order TTE.  If patient has chest pain obtain stat EKG. Admit to tele, continue monitoring.  Heart failure likely in setting of medication non-compliance.  CXR showing showing vascular congestion.  Patient with ProBNP of 519 and 1+ pitting lower extremity edema.  Patient endorses not taking Lasix for 2 days and is unsure of dosage he takes.   Will start patient on Lasix 20 mg IVP BID with hold parameters.  Will order TTE.  Fluid restriction to 1500 ml.  DASH/TLC diet.  Strict I&O's. Admit to tele, continue monitoring.  Heart failure likely in setting of medications nonadherence    CXR showing showing vascular congestion.  Patient with ProBNP of 519 and 1+ pitting lower extremity edema.  Patient endorses not taking Lasix for 2 days and is unsure of dosage he takes.   Will start patient on Lasix 20 mg IVP BID with hold parameters.  Will order TTE.  Fluid restriction to 1500 ml.  DASH/TLC diet.  Strict I&O's. Intermitted CP, trace leg edema, elevated ProBNP; ALAS; Self-reported medications nonadherence >3 weeks  Admit to telemetry  Heart failure likely in setting of medications nonadherence    CXR mild vascular congestion.  Patient with ProBNP of 519 and 1+ pitting lower extremity edema.  Patient endorses not taking Lasix for 2 weeks and is unsure of dosage he takes.   Will start patient on Lasix 20 mg IVP BID with hold parameters.  Will order TTE.  Fluid restriction to 1500 ml.  DASH/TLC diet.  Strict I&O's.  Heart failure team c/s in AM
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
## 2022-02-01 NOTE — H&P ADULT - ATTENDING COMMENTS
40 y/o Male with  MHx of CHF (LVEF 22% on MRI  4/2019, 30% in TTE from 6/2019, no AICD), HTN, Asthma, Smoker, Type 2 diabetes, hyperlipidemia a/w atypical chest pain and acute on chronic systolic CHF likely due medications nonadherence; Also found to have transverse fracture of the fourth metatarsal diaphysis. 42 y/o Male with  MHx of CHF (LVEF 22% on MRI  4/2019, 30% in TTE from 6/2019, no AICD), HTN, Asthma, Smoker, Type 2 diabetes, hyperlipidemia a/w atypical chest pain and acute on chronic systolic CHF likely due medications nonadherence; Also found to have transverse fracture of the fourth metatarsal diaphysis.    Assessment and plan supplemented and modified by attending where indicated; 42 y/o Male with  MHx of severe sys CHF (LVEF 22% on MRI  4/2019, 30% in TTE from 6/2019, no AICD), HTN, Asthma, Smoker, Type 2 diabetes, hyperlipidemia a/w atypical chest pain and acute on chronic severe systolic CHF likely due medications nonadherence; Also found to have transverse fracture of the fourth metatarsal diaphysis, and uncontrolled DM Type 2.     Assessment and plan supplemented and modified by attending where indicated;

## 2022-02-01 NOTE — H&P ADULT - NSHPSOCIALHISTORY_GEN_ALL_CORE
Patient is former smoker stating he smoked from his teens to mid 20s, however per ED note patient smoked for 1.5 pack a day for 17 years.  Patient denies use of alcohol. Patient not currently working.

## 2022-02-01 NOTE — ED PROVIDER NOTE - NSICDXPASTMEDICALHX_GEN_ALL_CORE_FT
PAST MEDICAL HISTORY:  Asthma     Congestive heart failure (CHF) EF 22%    HTN (hypertension)     Hyperlipidemia     Type 2 diabetes mellitus

## 2022-02-01 NOTE — H&P ADULT - PROBLEM SELECTOR PLAN 7
Continue albuterol PRN for shortness of breath and wheezing.  Per surescripts patient on Wixela 250-50.  Will continue. Lipid level in AM.  Start patient on atorvastatin if needed.  DASH/TLC diet.

## 2022-02-01 NOTE — H&P ADULT - NSHPPHYSICALEXAM_GEN_ALL_CORE
Vital Signs Last 24 Hrs  T(C): 36.8 (01 Feb 2022 15:49), Max: 36.8 (01 Feb 2022 15:49)  T(F): 98.3 (01 Feb 2022 15:49), Max: 98.3 (01 Feb 2022 15:49)  HR: 94 (01 Feb 2022 15:49) (94 - 100)  BP: 160/110 (01 Feb 2022 15:49) (160/110 - 177/122)  BP(mean): --  RR: 18 (01 Feb 2022 15:49) (18 - 18)  SpO2: 98% (01 Feb 2022 15:49) (98% - 99%)

## 2022-02-01 NOTE — H&P ADULT - PROBLEM SELECTOR PLAN 9
Lovenox 40 mg subcutaneous daily. Patient unsure of his medications.  Will email medication reconciliation pharmacist. Patient unsure of his medications.  Will continue medications from previous admission.

## 2022-02-01 NOTE — H&P ADULT - PROBLEM SELECTOR PLAN 4
Right hand X-ray showing acute transverse fracture of the fourth metatarsal diaphysis with approximately one third shaft's width radial displacement, additionally there is apex dorsal displacement.  Hand splinted in ED.   Hand surgery was initially called by ED but said patient could be seen outpatient.  Call for Hand surgery consult in AM.  Pain control with acetaminophen 650 mg PRN q6h. Per surscripts patient was prescribed metformin 1000 mg and Jardiance 10 mg. Patient unable to recall name of medications and dosages.  Patient with Hemoglobin A1c of 11.3 and serum glucose of 278.  Patient started on Lantus 15 units. Will continue pending Endocrine consult. Patient also started on Premeal insulin 4 units TID and low dose insulin sliding scale.  Will email endocrine consult.  Consistent carbohydrate diet ordered.  Monitor fingersticks. Per surscripts patient was prescribed metformin 1000 mg and Jardiance 10 mg. Patient unable to recall name of medications and dosages.  Hemoglobin A1c of 11.3 and serum glucose of 278.  Will repeat A1c to confirm   Patient started conservatively on Lantus 15 units. Will continue pending Endocrine consult. Patient also started on Premeal insulin 4 units TID and low dose insulin sliding scale.  Will email endocrine consult.  Consistent carbohydrate diet ordered.  Monitor fingersticks. Per surscripts patient was prescribed metformin 1000 mg and Jardiance 10 mg. Patient unable to recall name of medications and dosages.  Hemoglobin A1c of 11.3 and serum glucose of 278.  Will repeat A1c to confirm   Patient started conservatively on Lantus 15 units. Will continue pending Endocrine consult. Patient also started on Premeal insulin 4 units TID and low dose insulin sliding scale.  Will request Endocrine consult given a1c of 11  Consistent carbohydrate diet ordered.  Monitor fingersticks.

## 2022-02-01 NOTE — ED PROVIDER NOTE - CARE PLAN
Principal Discharge DX:	Cardiac chest pain   1 Principal Discharge DX:	Cardiac chest pain  Secondary Diagnosis:	Metatarsal fracture

## 2022-02-01 NOTE — H&P ADULT - PROBLEM SELECTOR PROBLEM 1
Chest pain Atypical chest pain Acute on chronic heart failure Acute on chronic systolic congestive heart failure
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
## 2022-02-01 NOTE — H&P ADULT - PROBLEM SELECTOR PROBLEM 3
SIRS (systemic inflammatory response syndrome) Metatarsal fracture Displaced fracture of metacarpal bone
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
## 2022-02-01 NOTE — H&P ADULT - PROBLEM SELECTOR PLAN 2
Patient with ProBNP of 519 and 1+ pitting lower extremity edema.  Patient endorses not taking Lasix for 2 days and is unsure of dosage he takes. Per surescripts patient was prescribed 20 mg.  Will continue lasix 20 mg daily with hold parameters.  Will order TTE.  Fluid restriction to 1500 ml.  DASH/TLC diet.  Strict I&O's. Likely in setting of medication non-compliance.  CXR showing showing vascular congestion.  Patient with ProBNP of 519 and 1+ pitting lower extremity edema.  Patient endorses not taking Lasix for 2 days and is unsure of dosage he takes.   Will start patient on Lasix 20 mg IVP BID with hold parameters.  Will order TTE.  Fluid restriction to 1500 ml.  DASH/TLC diet.  Strict I&O's. Patient with midsternal chest pain.  Troponin 26-->25.  EKG with TWI or ST elevations.  D-dimer <150.  Cardiac cath from 2019 showing mild atherosclerosis in Mid LAD.  Will order TTE.  If patient has chest pain obtain stat EKG. Patient with midsternal chest pain, reoccurring daily with ALAS; Likely due to acute on chronic sys CHF;   Telemetry r/o arrythmia;   Troponin 26-->25.  EKG unchanged compared to prior   D-dimer <150.  Cardiac cath from 2019 showing mild atherosclerosis in Mid LAD.  ordered TTE.  Repeat EKG PRN CP

## 2022-02-01 NOTE — ED PROVIDER NOTE - PROGRESS NOTE DETAILS
Bari Cleveland MD (PGY-2): Attempted manual reduction of fourth metatarsal, but unsuccessful. Spoke to on call hand surgeon Dr. Martin, who said to place pt in splint and that pt can follow-up with him as outpatient for surgery.

## 2022-02-01 NOTE — H&P ADULT - PROBLEM SELECTOR PLAN 8
Per surscripts patient was prescribed metformin and Jardiance. Patient unable to recall name of medications and dosages.  Patient placed on low dose insulin sliding scale.  Patient with Hemoglobin A1c of 11.3.  Will email endocrine consult.  Consistent carbohydrate diet ordered.  Monitor fingersticks. Continue albuterol PRN for shortness of breath and wheezing.  Per surescripts patient on Wixela 250-50.  Will continue. Patient unsure of his medications.  Will continue medications from previous admission in December.

## 2022-02-01 NOTE — ED PROVIDER NOTE - NS ED ROS FT
CONSTITUTIONAL: No fever,  EYES: No redness  ENT: no sore throat  CARDIOVASCULAR: +++ chest pain,  RESPIRATORY: No cough, no shortness of breath  GI: No abdominal pain, no nausea, no vomiting,  GENITOURINARY: No dysuria  MUSKULOSKELETAL: ++++ new pain in joints/muscles  SKIN: No rash  NEURO: No headache  ALL OTHER SYSTEMS NEGATIVE. GENERAL: No fever or chills  EYES: No change in vision  HEENT: No trouble swallowing or speaking  CARDIAC: +chest pain  PULMONARY: +cough and SOB  GI: No abdominal pain, no nausea or no vomiting, no diarrhea or constipation  : No changes in urination  SKIN: No rashes  NEURO: No headache, no numbness  MSK: +hand injury/pain/swelling  Otherwise as HPI or negative.

## 2022-02-01 NOTE — ED ADULT NURSE REASSESSMENT NOTE - NS ED NURSE REASSESS COMMENT FT1
Report received from Day shift: Pt is resting comfortably, pt is stating midsternal chest pain 6/10. No radiating pain to arm or neck. No NAD, respirations are even and equal, MD made aware. awaiting further MD orders, will continue to monitor.

## 2022-02-01 NOTE — ED PROVIDER NOTE - PHYSICAL EXAMINATION
Gen: In mild distress. A&Ox4. Non-toxic appearing.  HEENT: Normocephalic and atraumatic. PERRL, EOMI, no nasal discharge, mucous membranes moist, no scleral icterus.  CV: Regular rate and rhythm, +S1/S2, no M/R/G. No significant lower extremity edema. Radial and DP pulses present and symmetrical. Capillary refill less than 2 seconds.  Resp: Normal effort and rate. CTAB, no rales, rhonchi, or wheezes.  GI: Abdomen soft, non-distended, non tender to palpation. No masses appreciated. Bowel sounds present.  MSK: +R hand has swelling on dorsal surface that is TTP. Pain with active flexion of fingers of right hand.   Neuro: Following commands, speaking in full sentences, moving extremities spontaneously  Psych: Appropriate mood, cooperative

## 2022-02-01 NOTE — H&P ADULT - ASSESSMENT
40 y/o M with PMH of CHF (LVEF 22% on MRI  4/2019, 30% in TTE from 6/2019, no AICD), HTN, Asthma, Smoker, Type 2 diabetes, hyperlipidemia presents to the ED complaining of chest pain. Admitted for chest pain and right transverse fracture of the fourth metatarsal diaphysis with approximately one third shaft's width. 40 y/o Male with  MHx of CHF (LVEF 22% on MRI  4/2019, 30% in TTE from 6/2019, no AICD), HTN, Asthma, Smoker, Type 2 diabetes, hyperlipidemia a/w atypical chest pain and acute on chronic systolic CHF likely due medications nonadherence; Also found to have transverse fracture of the fourth metatarsal diaphysis. 40 y/o Male with  MHx of severe sys CHF (LVEF 22% on MRI  4/2019, 30% in TTE from 6/2019, no AICD), HTN, Asthma, Smoker, Type 2 diabetes, hyperlipidemia a/w atypical chest pain and acute on chronic severe systolic CHF likely due medications nonadherence; Also found to have transverse fracture of the fourth metatarsal diaphysis, and uncontrolled DM Type 2.

## 2022-02-01 NOTE — H&P ADULT - PROBLEM SELECTOR PLAN 6
Lipid level in AM.  Start patient on atorvastatin if needed.  DASH/TLC diet. Patient unsure of what blood pressure medications he is on.  Per surescripts patient was prescribed Carvedilol 12.5 and hydralazine 50 mg.  Will continue with hold parameters.  DASH/TLC diet.
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
## 2022-02-02 ENCOUNTER — TRANSCRIPTION ENCOUNTER (OUTPATIENT)
Age: 42
End: 2022-02-02

## 2022-02-02 DIAGNOSIS — I10 ESSENTIAL (PRIMARY) HYPERTENSION: ICD-10-CM

## 2022-02-02 DIAGNOSIS — E11.65 TYPE 2 DIABETES MELLITUS WITH HYPERGLYCEMIA: ICD-10-CM

## 2022-02-02 DIAGNOSIS — I50.23 ACUTE ON CHRONIC SYSTOLIC (CONGESTIVE) HEART FAILURE: ICD-10-CM

## 2022-02-02 DIAGNOSIS — E78.5 HYPERLIPIDEMIA, UNSPECIFIED: ICD-10-CM

## 2022-02-02 LAB
A1C WITH ESTIMATED AVERAGE GLUCOSE RESULT: 11.1 % — HIGH (ref 4–5.6)
ANION GAP SERPL CALC-SCNC: 14 MMOL/L — SIGNIFICANT CHANGE UP (ref 7–14)
BASOPHILS # BLD AUTO: 0.02 K/UL — SIGNIFICANT CHANGE UP (ref 0–0.2)
BASOPHILS NFR BLD AUTO: 0.2 % — SIGNIFICANT CHANGE UP (ref 0–2)
BUN SERPL-MCNC: 17 MG/DL — SIGNIFICANT CHANGE UP (ref 7–23)
CALCIUM SERPL-MCNC: 9 MG/DL — SIGNIFICANT CHANGE UP (ref 8.4–10.5)
CHLORIDE SERPL-SCNC: 101 MMOL/L — SIGNIFICANT CHANGE UP (ref 98–107)
CHOLEST SERPL-MCNC: 165 MG/DL — SIGNIFICANT CHANGE UP
CO2 SERPL-SCNC: 22 MMOL/L — SIGNIFICANT CHANGE UP (ref 22–31)
CREAT SERPL-MCNC: 0.96 MG/DL — SIGNIFICANT CHANGE UP (ref 0.5–1.3)
EOSINOPHIL # BLD AUTO: 0.13 K/UL — SIGNIFICANT CHANGE UP (ref 0–0.5)
EOSINOPHIL NFR BLD AUTO: 1.4 % — SIGNIFICANT CHANGE UP (ref 0–6)
ESTIMATED AVERAGE GLUCOSE: 272 — SIGNIFICANT CHANGE UP
GLUCOSE BLDC GLUCOMTR-MCNC: 161 MG/DL — HIGH (ref 70–99)
GLUCOSE BLDC GLUCOMTR-MCNC: 180 MG/DL — HIGH (ref 70–99)
GLUCOSE BLDC GLUCOMTR-MCNC: 181 MG/DL — HIGH (ref 70–99)
GLUCOSE BLDC GLUCOMTR-MCNC: 185 MG/DL — HIGH (ref 70–99)
GLUCOSE BLDC GLUCOMTR-MCNC: 201 MG/DL — HIGH (ref 70–99)
GLUCOSE SERPL-MCNC: 233 MG/DL — HIGH (ref 70–99)
HCT VFR BLD CALC: 50.4 % — HIGH (ref 39–50)
HDLC SERPL-MCNC: 40 MG/DL — LOW
HGB BLD-MCNC: 16.4 G/DL — SIGNIFICANT CHANGE UP (ref 13–17)
IANC: 5.18 K/UL — SIGNIFICANT CHANGE UP (ref 1.5–8.5)
IMM GRANULOCYTES NFR BLD AUTO: 0.7 % — SIGNIFICANT CHANGE UP (ref 0–1.5)
LIPID PNL WITH DIRECT LDL SERPL: 100 MG/DL — HIGH
LYMPHOCYTES # BLD AUTO: 3.13 K/UL — SIGNIFICANT CHANGE UP (ref 1–3.3)
LYMPHOCYTES # BLD AUTO: 34.3 % — SIGNIFICANT CHANGE UP (ref 13–44)
MAGNESIUM SERPL-MCNC: 2 MG/DL — SIGNIFICANT CHANGE UP (ref 1.6–2.6)
MCHC RBC-ENTMCNC: 29.8 PG — SIGNIFICANT CHANGE UP (ref 27–34)
MCHC RBC-ENTMCNC: 32.5 GM/DL — SIGNIFICANT CHANGE UP (ref 32–36)
MCV RBC AUTO: 91.5 FL — SIGNIFICANT CHANGE UP (ref 80–100)
MONOCYTES # BLD AUTO: 0.61 K/UL — SIGNIFICANT CHANGE UP (ref 0–0.9)
MONOCYTES NFR BLD AUTO: 6.7 % — SIGNIFICANT CHANGE UP (ref 2–14)
NEUTROPHILS # BLD AUTO: 5.18 K/UL — SIGNIFICANT CHANGE UP (ref 1.8–7.4)
NEUTROPHILS NFR BLD AUTO: 56.7 % — SIGNIFICANT CHANGE UP (ref 43–77)
NON HDL CHOLESTEROL: 125 MG/DL — SIGNIFICANT CHANGE UP
NRBC # BLD: 0 /100 WBCS — SIGNIFICANT CHANGE UP
NRBC # FLD: 0 K/UL — SIGNIFICANT CHANGE UP
PHOSPHATE SERPL-MCNC: 3.2 MG/DL — SIGNIFICANT CHANGE UP (ref 2.5–4.5)
PLATELET # BLD AUTO: 172 K/UL — SIGNIFICANT CHANGE UP (ref 150–400)
POTASSIUM SERPL-MCNC: 4.7 MMOL/L — SIGNIFICANT CHANGE UP (ref 3.5–5.3)
POTASSIUM SERPL-SCNC: 4.7 MMOL/L — SIGNIFICANT CHANGE UP (ref 3.5–5.3)
RBC # BLD: 5.51 M/UL — SIGNIFICANT CHANGE UP (ref 4.2–5.8)
RBC # FLD: 14.5 % — SIGNIFICANT CHANGE UP (ref 10.3–14.5)
SODIUM SERPL-SCNC: 137 MMOL/L — SIGNIFICANT CHANGE UP (ref 135–145)
TRIGL SERPL-MCNC: 125 MG/DL — SIGNIFICANT CHANGE UP
WBC # BLD: 9.13 K/UL — SIGNIFICANT CHANGE UP (ref 3.8–10.5)
WBC # FLD AUTO: 9.13 K/UL — SIGNIFICANT CHANGE UP (ref 3.8–10.5)

## 2022-02-02 PROCEDURE — 99223 1ST HOSP IP/OBS HIGH 75: CPT

## 2022-02-02 PROCEDURE — 99233 SBSQ HOSP IP/OBS HIGH 50: CPT

## 2022-02-02 RX ORDER — INSULIN GLARGINE 100 [IU]/ML
18 INJECTION, SOLUTION SUBCUTANEOUS AT BEDTIME
Refills: 0 | Status: DISCONTINUED | OUTPATIENT
Start: 2022-02-02 | End: 2022-02-03

## 2022-02-02 RX ORDER — INFLUENZA VIRUS VACCINE 15; 15; 15; 15 UG/.5ML; UG/.5ML; UG/.5ML; UG/.5ML
0.5 SUSPENSION INTRAMUSCULAR ONCE
Refills: 0 | Status: DISCONTINUED | OUTPATIENT
Start: 2022-02-02 | End: 2022-02-09

## 2022-02-02 RX ORDER — OXYCODONE HYDROCHLORIDE 5 MG/1
5 TABLET ORAL ONCE
Refills: 0 | Status: DISCONTINUED | OUTPATIENT
Start: 2022-02-02 | End: 2022-02-02

## 2022-02-02 RX ORDER — CARVEDILOL PHOSPHATE 80 MG/1
12.5 CAPSULE, EXTENDED RELEASE ORAL EVERY 12 HOURS
Refills: 0 | Status: DISCONTINUED | OUTPATIENT
Start: 2022-02-02 | End: 2022-02-07

## 2022-02-02 RX ORDER — OXYCODONE HYDROCHLORIDE 5 MG/1
5 TABLET ORAL EVERY 8 HOURS
Refills: 0 | Status: DISCONTINUED | OUTPATIENT
Start: 2022-02-02 | End: 2022-02-03

## 2022-02-02 RX ORDER — FUROSEMIDE 40 MG
40 TABLET ORAL
Refills: 0 | Status: DISCONTINUED | OUTPATIENT
Start: 2022-02-02 | End: 2022-02-02

## 2022-02-02 RX ORDER — INSULIN LISPRO 100/ML
5 VIAL (ML) SUBCUTANEOUS
Refills: 0 | Status: DISCONTINUED | OUTPATIENT
Start: 2022-02-02 | End: 2022-02-03

## 2022-02-02 RX ORDER — FUROSEMIDE 40 MG
20 TABLET ORAL
Refills: 0 | Status: DISCONTINUED | OUTPATIENT
Start: 2022-02-02 | End: 2022-02-04

## 2022-02-02 RX ADMIN — SODIUM CHLORIDE 3 MILLILITER(S): 9 INJECTION INTRAMUSCULAR; INTRAVENOUS; SUBCUTANEOUS at 05:50

## 2022-02-02 RX ADMIN — Medication 5 UNIT(S): at 17:06

## 2022-02-02 RX ADMIN — Medication 20 MILLIGRAM(S): at 17:07

## 2022-02-02 RX ADMIN — CARVEDILOL PHOSPHATE 12.5 MILLIGRAM(S): 80 CAPSULE, EXTENDED RELEASE ORAL at 17:07

## 2022-02-02 RX ADMIN — ISOSORBIDE DINITRATE 20 MILLIGRAM(S): 5 TABLET ORAL at 12:29

## 2022-02-02 RX ADMIN — Medication 2: at 17:05

## 2022-02-02 RX ADMIN — ISOSORBIDE DINITRATE 20 MILLIGRAM(S): 5 TABLET ORAL at 05:42

## 2022-02-02 RX ADMIN — BUDESONIDE AND FORMOTEROL FUMARATE DIHYDRATE 2 PUFF(S): 160; 4.5 AEROSOL RESPIRATORY (INHALATION) at 20:06

## 2022-02-02 RX ADMIN — OXYCODONE HYDROCHLORIDE 5 MILLIGRAM(S): 5 TABLET ORAL at 20:14

## 2022-02-02 RX ADMIN — ISOSORBIDE DINITRATE 20 MILLIGRAM(S): 5 TABLET ORAL at 20:05

## 2022-02-02 RX ADMIN — Medication 650 MILLIGRAM(S): at 06:59

## 2022-02-02 RX ADMIN — Medication 4 UNIT(S): at 09:07

## 2022-02-02 RX ADMIN — Medication 650 MILLIGRAM(S): at 06:29

## 2022-02-02 RX ADMIN — Medication 75 MILLIGRAM(S): at 22:53

## 2022-02-02 RX ADMIN — Medication 1: at 13:15

## 2022-02-02 RX ADMIN — Medication 20 MILLIGRAM(S): at 05:43

## 2022-02-02 RX ADMIN — INSULIN GLARGINE 18 UNIT(S): 100 INJECTION, SOLUTION SUBCUTANEOUS at 22:32

## 2022-02-02 RX ADMIN — SODIUM CHLORIDE 3 MILLILITER(S): 9 INJECTION INTRAMUSCULAR; INTRAVENOUS; SUBCUTANEOUS at 22:37

## 2022-02-02 RX ADMIN — Medication 75 MILLIGRAM(S): at 13:15

## 2022-02-02 RX ADMIN — OXYCODONE HYDROCHLORIDE 5 MILLIGRAM(S): 5 TABLET ORAL at 21:14

## 2022-02-02 RX ADMIN — LOSARTAN POTASSIUM 100 MILLIGRAM(S): 100 TABLET, FILM COATED ORAL at 05:43

## 2022-02-02 RX ADMIN — OXYCODONE HYDROCHLORIDE 5 MILLIGRAM(S): 5 TABLET ORAL at 10:01

## 2022-02-02 RX ADMIN — SODIUM CHLORIDE 3 MILLILITER(S): 9 INJECTION INTRAMUSCULAR; INTRAVENOUS; SUBCUTANEOUS at 14:33

## 2022-02-02 RX ADMIN — Medication 75 MILLIGRAM(S): at 05:43

## 2022-02-02 RX ADMIN — Medication 1: at 09:07
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
## 2022-02-02 NOTE — CONSULT NOTE ADULT - ATTENDING COMMENTS
40 y/o Male with  MHx of severe sys CHF (LVEF 22% on MRI  4/2019, 30% in TTE from 6/2019, no AICD), HTN, Asthma, Smoker, Type 2 diabetes, hyperlipidemia a/w atypical chest pain and acute on chronic severe systolic CHF likely due medications nonadherence; Also found to have transverse fracture of the fourth metatarsal diaphysis. Endocrine consulted for management of uncontrolled DM2.   start basal bolus insulin as outlined in fellow note  ideally needs basal insulin at dc If the pt is amenable. trend inpt. dc planning as above

## 2022-02-02 NOTE — PROGRESS NOTE ADULT - PROBLEM SELECTOR PLAN 1
-Intermitted CP, trace leg edema, elevated ProBNP; ALAS; Self-reported medications nonadherence >3 weeks  -now appearing close to euvolemic  - cxr with clear lungs, LE edema resolving    - Lasix 20 mg IVP BID with hold parameters. Switch to 40BID in 1-2 days  - TTE.  -Fluid restriction to 1500 ml.  -DASH/TLC diet.  -Strict I&O's.  -c.w losartan and start coreg  -c/w nitrates and hdyralazine  -Stressed importance of medication adherence and outpt follow up -Intermitted CP, trace leg edema, elevated ProBNP; ALAS; Self-reported medications nonadherence >3 weeks  -now appearing close to euvolemic  - cxr with clear lungs, LE edema resolving    - Lasix 20 mg IVP BID with hold parameters. Switch to 40BIDPO in 1-2 days  - TTE.  -Fluid restriction to 1500 ml.  -DASH/TLC diet.  -Strict I&O's.  -c.w losartan and start coreg  -c/w nitrates and hdyralazine  -Stressed importance of medication adherence and outpt follow up

## 2022-02-02 NOTE — DISCHARGE NOTE PROVIDER - PROVIDER TOKENS
PROVIDER:[TOKEN:[[id number]],FOLLOWUP:[1 week],ESTABLISHEDPATIENT:[T]],PROVIDER:[TOKEN:[[id number]],FOLLOWUP:[1-3 days]] PROVIDER:[TOKEN:[[id number]],FOLLOWUP:[1 week],ESTABLISHEDPATIENT:[T]],PROVIDER:[TOKEN:[[id number]],FOLLOWUP:[1-3 days]],PROVIDER:[TOKEN:[[id number]]]
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
## 2022-02-02 NOTE — DISCHARGE NOTE PROVIDER - NSDCMRMEDTOKEN_GEN_ALL_CORE_FT
albuterol 90 mcg/inh inhalation aerosol: 2 puff(s) inhaled every 6 hours, As Needed -for shortness of breath and/or wheezing   Breo Ellipta 200 mcg-25 mcg/inh inhalation powder: 2 puff(s) inhaled once a day   carvedilol 12.5 mg oral tablet: 1 tab(s) orally every 12 hours MDD:2 tabs  hydrALAZINE 50 mg oral tablet: 1.5 tab(s) orally 3 times a day MDD:4.5 tabs daily  isosorbide dinitrate 20 mg oral tablet: 1 tab(s) orally 3 times a day MDD:3 tabs  Jardiance 10 mg oral tablet: 1 tab(s) orally once a day (in the morning)  Lasix 20 mg oral tablet: 3 tab(s) orally 2 times a day  losartan 100 mg oral tablet: 1 tab(s) orally once a day MDD:1 tab  metFORMIN 1000 mg oral tablet: 1 tab(s) orally 2 times a day  spironolactone 25 mg oral tablet: 1 tab(s) orally once a day MDD:1 tab   albuterol 90 mcg/inh inhalation aerosol: 2 puff(s) inhaled every 6 hours, As needed, Shortness of Breath and/or Wheezing  Breo Ellipta 200 mcg-25 mcg/inh inhalation powder: 2 puff(s) inhaled once a day   budesonide-formoterol 160 mcg-4.5 mcg/inh inhalation aerosol: 2 puff(s) inhaled 2 times a day  carvedilol 12.5 mg oral tablet: 1 tab(s) orally every 12 hours  carvedilol 12.5 mg oral tablet: 1 tab(s) orally every 12 hours MDD:2 tabs  digoxin 250 mcg (0.25 mg) oral tablet: 1 tab(s) orally once a day  hydrALAZINE 100 mg oral tablet: 1 tab(s) orally 3 times a day  isosorbide dinitrate 20 mg oral tablet: 1 tab(s) orally 3 times a day MDD:3 tabs  Jardiance 10 mg oral tablet: 1 tab(s) orally once a day (in the morning)  Lasix 20 mg oral tablet: 3 tab(s) orally 2 times a day  losartan 100 mg oral tablet: 1 tab(s) orally once a day  metFORMIN 1000 mg oral tablet: 1 tab(s) orally 2 times a day  spironolactone 25 mg oral tablet: 1 tab(s) orally once a day  torsemide 20 mg oral tablet: 2 tab(s) orally 2 times a day   albuterol 90 mcg/inh inhalation aerosol: 2 puff(s) inhaled every 6 hours, As needed, Shortness of Breath and/or Wheezing  budesonide-formoterol 160 mcg-4.5 mcg/inh inhalation aerosol: 2 puff(s) inhaled 2 times a day  carvedilol 12.5 mg oral tablet: 1 tab(s) orally every 12 hours  digoxin 250 mcg (0.25 mg) oral tablet: 1 tab(s) orally once a day  hydrALAZINE 100 mg oral tablet: 1 tab(s) orally 3 times a day  Jardiance 25 mg oral tablet: 1 tab(s) orally once a day (in the morning)  losartan 100 mg oral tablet: 1 tab(s) orally once a day  metFORMIN 1000 mg oral tablet: 1 tab(s) orally 2 times a day  spironolactone 25 mg oral tablet: 1 tab(s) orally once a day  torsemide 20 mg oral tablet: 2 tab(s) orally 2 times a day

## 2022-02-02 NOTE — DISCHARGE NOTE PROVIDER - NSFOLLOWUPCLINICS_GEN_ALL_ED_FT
[facility]  Endocrinology  [address] [place] [address]  Phone: [phone]  Fax:   Follow Up Time: 1 month

## 2022-02-02 NOTE — DISCHARGE NOTE PROVIDER - NSDCFUADDAPPT_GEN_ALL_CORE_FT
Patient can follow up with his PCP after discharge or if he wishes to, with endocrine at the location provided below:   [facility]   [address] [place] [address] [phone]
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
## 2022-02-02 NOTE — DISCHARGE NOTE PROVIDER - CARE PROVIDER_API CALL
Irene Leong [facility]  Internal Medicine  [address] [place] [address]  Phone: [phone]  Fax: [phone]  Established Patient  Follow Up Time: 1 week    Ranjith Martin (DO)  Plastic Surgery  [address] [place] [address]  Phone: [phone]  Fax: [phone]  Follow Up Time: 1-3 days   Irene Leong  Internal Medicine  [address] [place] [address]  Phone: [phone]  Fax: [phone]  Established Patient  Follow Up Time: 1 week    Ranjith Martin (DO)  Plastic Surgery  [address] [place] [address]  Phone: [phone]  Fax: [phone]  Follow Up Time: 1-3 days    Devon Lynch)  Adv Heart Fail Trnsplnt Cardio  [address] [place] [address]  Phone: [phone]  Fax: [phone]  Follow Up Time:

## 2022-02-02 NOTE — PATIENT PROFILE ADULT - NSPROPTRIGHTBILLOFRIGHTS_GEN_A_NUR
If you are a smoker, it is important for your health to stop smoking. Please be aware that second hand smoke is also harmful.
patient
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
## 2022-02-02 NOTE — DISCHARGE NOTE PROVIDER - NSDCCPCAREPLAN_GEN_ALL_CORE_FT
PRINCIPAL DISCHARGE DIAGNOSIS  Diagnosis: Acute on chronic systolic congestive heart failure  Assessment and Plan of Treatment: Continue recommended medication regimen. Monitor for signs/symptoms of fluid overload and electrolyte abnormalities, such as, shortness of breath, cough, swelling, chest discomfort, changes in heart rate, dizziness, fainting, or changes in mental status. Follow-up with your PCP/cardiologist outpatient after you've been discharged from the hospital.      SECONDARY DISCHARGE DIAGNOSES  Diagnosis: Uncontrolled type 2 diabetes mellitus, without long-term current use of insulin  Assessment and Plan of Treatment: Your HgbA1c was 11.3.  You were recommended to start insulin, however, you refused at this time. Continue your medication regimen and a consistent carbohydrate diet (Meaning eating the same amount of carbohydrates at the same time each day). Monitor blood glucose levels throughout the day before meals and at bedtime. Record blood sugars and bring to outpatient providers appointment in order to be reviewed by your doctor for management modifications. If your sugars are more than 400 or less than 70 you should contact your PCP immediately. Monitor for signs/symptoms of low blood glucose, such as, dizziness, altered mental status, or cool/clammy skin. In addition, monitor for signs/symptoms of high blood glucose, such as, feeling hot, dry, fatigued, or with increased thirst/urination. Make regular podiatry appointments in order to have feet checked for wounds and uncontrolled toe nail growth to prevent infections, as well as, appointments with an ophthalmologist to monitor your vision.   ** Follow up with your endocrinologist and or primary care provider within 1 - 2 weeks for further diabetes management.    Diagnosis: Hypertension  Assessment and Plan of Treatment: Continue blood pressure medication regimen as directed. Monitor for any visual changes, headaches or dizziness.  Monitor blood pressure regularly.  Follow up with your primary care provider or cardiologist for further management for high blood pressure.    Diagnosis: Asthma  Assessment and Plan of Treatment: Continue your inhalers and annual pulmonary function tests with your outpatient provider. Monitor for asthma exacerbation, such as, shortness of breath, hyperventilation, or any other difficulties breathing and report to the emergency room in the event that your rescue inhaler has not resolved your symptoms.    Diagnosis: Metatarsal fracture  Assessment and Plan of Treatment: Your hand was splinted in the emergency room and you were recommended to have surgery to fix the fracture within 1 week. Follow up with Dr. Martin for scheduling within 1 week.     PRINCIPAL DISCHARGE DIAGNOSIS  Diagnosis: Acute on chronic systolic congestive heart failure  Assessment and Plan of Treatment: Continue recommended medication regimen. Monitor for signs/symptoms of fluid overload and electrolyte abnormalities, such as, shortness of breath, cough, swelling, chest discomfort, changes in heart rate, dizziness, fainting, or changes in mental status. Follow-up with your PCP/cardiologist outpatient after you've been discharged from the hospital.      SECONDARY DISCHARGE DIAGNOSES  Diagnosis: Metatarsal fracture  Assessment and Plan of Treatment: Your hand was splinted in the emergency room. Follow up with Dr. Martin  as you will likely need surgical repair    Diagnosis: Hypertension  Assessment and Plan of Treatment: Continue blood pressure medication regimen as directed. Monitor for any visual changes, headaches or dizziness.  Monitor blood pressure regularly.  Follow up with your primary care provider or cardiologist for further management for high blood pressure.    Diagnosis: Asthma  Assessment and Plan of Treatment: Continue your inhalers and annual pulmonary function tests with your outpatient provider. Monitor for asthma exacerbation, such as, shortness of breath, hyperventilation, or any other difficulties breathing and report to the emergency room in the event that your rescue inhaler has not resolved your symptoms.    Diagnosis: Uncontrolled type 2 diabetes mellitus, without long-term current use of insulin  Assessment and Plan of Treatment: Your HgbA1c was 11.3.  You were recommended to start insulin, however, you refused at this time. Continue your medication regimen and a consistent carbohydrate diet (Meaning eating the same amount of carbohydrates at the same time each day). Monitor blood glucose levels throughout the day before meals and at bedtime. Record blood sugars and bring to outpatient providers appointment in order to be reviewed by your doctor for management modifications. If your sugars are more than 400 or less than 70 you should contact your PCP immediately. Monitor for signs/symptoms of low blood glucose, such as, dizziness, altered mental status, or cool/clammy skin. In addition, monitor for signs/symptoms of high blood glucose, such as, feeling hot, dry, fatigued, or with increased thirst/urination. Make regular podiatry appointments in order to have feet checked for wounds and uncontrolled toe nail growth to prevent infections, as well as, appointments with an ophthalmologist to monitor your vision.   ** Follow up with your endocrinologist and or primary care provider within 1 - 2 weeks for further diabetes management.    Diagnosis: Atypical chest pain  Assessment and Plan of Treatment: You were Seen by Cardiology, They rcommend you undergo a cardiac cath, However you refused  Please Follow up with Your Cardiolgist in 1-2 weeks

## 2022-02-02 NOTE — ED ADULT NURSE NOTE - OBJECTIVE STATEMENT
Pt A&Ox4 ambulatory at baseline, PMH DM, CHF, HTN presenting to the ED (RM18) c/o midsternal chest pain radiating to left side since last night. Pt states chest pain with excretion. Endorses sob and orthopnea. Pt was arrested by [place] earlier today. Pt endorses right hand pain and swelling is noted. Pt endorses pain upon movement of hand. Pt states not complaint to home meds. Respirations are even and equal, X9=634% RA, Pt on CM=NSR. 20G IV left forearm, positive blood return. Safety precaution implemented as per protocol, awaiting further MD orders, will continue to monitor.

## 2022-02-02 NOTE — PATIENT PROFILE ADULT - FALL HARM RISK - HARM RISK INTERVENTIONS

## 2022-02-02 NOTE — PROGRESS NOTE ADULT - ASSESSMENT
42 y/o Male with  MHx of severe sys CHF (LVEF 22% on MRI  4/2019, 30% in TTE from 6/2019, no AICD), HTN, Asthma, Smoker, Type 2 diabetes, hyperlipidemia a/w atypical chest pain and acute on chronic severe systolic CHF likely due medications nonadherence; Also found to have transverse fracture of the right fourth metatarsal diaphysis, and uncontrolled DM Type 2.

## 2022-02-02 NOTE — DISCHARGE NOTE PROVIDER - CARE PROVIDERS DIRECT ADDRESSES
,DirectAddress_Unknown,DirectAddress_Unknown ,DirectAddress_Unknown,DirectAddress_Unknown,[email or web address]

## 2022-02-03 LAB
ANION GAP SERPL CALC-SCNC: 13 MMOL/L — SIGNIFICANT CHANGE UP (ref 7–14)
BUN SERPL-MCNC: 17 MG/DL — SIGNIFICANT CHANGE UP (ref 7–23)
CALCIUM SERPL-MCNC: 8.8 MG/DL — SIGNIFICANT CHANGE UP (ref 8.4–10.5)
CHLORIDE SERPL-SCNC: 101 MMOL/L — SIGNIFICANT CHANGE UP (ref 98–107)
CO2 SERPL-SCNC: 24 MMOL/L — SIGNIFICANT CHANGE UP (ref 22–31)
CREAT SERPL-MCNC: 0.94 MG/DL — SIGNIFICANT CHANGE UP (ref 0.5–1.3)
GLUCOSE BLDC GLUCOMTR-MCNC: 163 MG/DL — HIGH (ref 70–99)
GLUCOSE BLDC GLUCOMTR-MCNC: 167 MG/DL — HIGH (ref 70–99)
GLUCOSE BLDC GLUCOMTR-MCNC: 171 MG/DL — HIGH (ref 70–99)
GLUCOSE BLDC GLUCOMTR-MCNC: 216 MG/DL — HIGH (ref 70–99)
GLUCOSE SERPL-MCNC: 258 MG/DL — HIGH (ref 70–99)
HCT VFR BLD CALC: 47.7 % — SIGNIFICANT CHANGE UP (ref 39–50)
HGB BLD-MCNC: 15.6 G/DL — SIGNIFICANT CHANGE UP (ref 13–17)
MAGNESIUM SERPL-MCNC: 1.7 MG/DL — SIGNIFICANT CHANGE UP (ref 1.6–2.6)
MCHC RBC-ENTMCNC: 29.7 PG — SIGNIFICANT CHANGE UP (ref 27–34)
MCHC RBC-ENTMCNC: 32.7 GM/DL — SIGNIFICANT CHANGE UP (ref 32–36)
MCV RBC AUTO: 90.9 FL — SIGNIFICANT CHANGE UP (ref 80–100)
NRBC # BLD: 0 /100 WBCS — SIGNIFICANT CHANGE UP
NRBC # FLD: 0 K/UL — SIGNIFICANT CHANGE UP
PHOSPHATE SERPL-MCNC: 3.4 MG/DL — SIGNIFICANT CHANGE UP (ref 2.5–4.5)
PLATELET # BLD AUTO: 185 K/UL — SIGNIFICANT CHANGE UP (ref 150–400)
POTASSIUM SERPL-MCNC: 3.7 MMOL/L — SIGNIFICANT CHANGE UP (ref 3.5–5.3)
POTASSIUM SERPL-SCNC: 3.7 MMOL/L — SIGNIFICANT CHANGE UP (ref 3.5–5.3)
RBC # BLD: 5.25 M/UL — SIGNIFICANT CHANGE UP (ref 4.2–5.8)
RBC # FLD: 14.3 % — SIGNIFICANT CHANGE UP (ref 10.3–14.5)
SODIUM SERPL-SCNC: 138 MMOL/L — SIGNIFICANT CHANGE UP (ref 135–145)
WBC # BLD: 10.74 K/UL — HIGH (ref 3.8–10.5)
WBC # FLD AUTO: 10.74 K/UL — HIGH (ref 3.8–10.5)

## 2022-02-03 PROCEDURE — 99232 SBSQ HOSP IP/OBS MODERATE 35: CPT

## 2022-02-03 PROCEDURE — 99233 SBSQ HOSP IP/OBS HIGH 50: CPT

## 2022-02-03 PROCEDURE — 93306 TTE W/DOPPLER COMPLETE: CPT | Mod: 26

## 2022-02-03 RX ORDER — LANOLIN ALCOHOL/MO/W.PET/CERES
6 CREAM (GRAM) TOPICAL AT BEDTIME
Refills: 0 | Status: DISCONTINUED | OUTPATIENT
Start: 2022-02-03 | End: 2022-02-09

## 2022-02-03 RX ORDER — INSULIN LISPRO 100/ML
5 VIAL (ML) SUBCUTANEOUS
Refills: 0 | Status: DISCONTINUED | OUTPATIENT
Start: 2022-02-03 | End: 2022-02-04

## 2022-02-03 RX ORDER — OXYCODONE HYDROCHLORIDE 5 MG/1
5 TABLET ORAL EVERY 8 HOURS
Refills: 0 | Status: DISCONTINUED | OUTPATIENT
Start: 2022-02-03 | End: 2022-02-03

## 2022-02-03 RX ORDER — OXYCODONE HYDROCHLORIDE 5 MG/1
5 TABLET ORAL EVERY 4 HOURS
Refills: 0 | Status: DISCONTINUED | OUTPATIENT
Start: 2022-02-03 | End: 2022-02-06

## 2022-02-03 RX ORDER — INSULIN GLARGINE 100 [IU]/ML
20 INJECTION, SOLUTION SUBCUTANEOUS AT BEDTIME
Refills: 0 | Status: DISCONTINUED | OUTPATIENT
Start: 2022-02-03 | End: 2022-02-05

## 2022-02-03 RX ADMIN — OXYCODONE HYDROCHLORIDE 5 MILLIGRAM(S): 5 TABLET ORAL at 20:40

## 2022-02-03 RX ADMIN — Medication 5 UNIT(S): at 17:47

## 2022-02-03 RX ADMIN — Medication 650 MILLIGRAM(S): at 03:06

## 2022-02-03 RX ADMIN — OXYCODONE HYDROCHLORIDE 5 MILLIGRAM(S): 5 TABLET ORAL at 16:38

## 2022-02-03 RX ADMIN — ISOSORBIDE DINITRATE 20 MILLIGRAM(S): 5 TABLET ORAL at 06:13

## 2022-02-03 RX ADMIN — Medication 20 MILLIGRAM(S): at 16:40

## 2022-02-03 RX ADMIN — CARVEDILOL PHOSPHATE 12.5 MILLIGRAM(S): 80 CAPSULE, EXTENDED RELEASE ORAL at 16:39

## 2022-02-03 RX ADMIN — Medication 1: at 17:47

## 2022-02-03 RX ADMIN — Medication 1: at 12:16

## 2022-02-03 RX ADMIN — Medication 650 MILLIGRAM(S): at 02:06

## 2022-02-03 RX ADMIN — OXYCODONE HYDROCHLORIDE 5 MILLIGRAM(S): 5 TABLET ORAL at 17:38

## 2022-02-03 RX ADMIN — Medication 20 MILLIGRAM(S): at 06:14

## 2022-02-03 RX ADMIN — SODIUM CHLORIDE 3 MILLILITER(S): 9 INJECTION INTRAMUSCULAR; INTRAVENOUS; SUBCUTANEOUS at 13:24

## 2022-02-03 RX ADMIN — Medication 5 UNIT(S): at 08:46

## 2022-02-03 RX ADMIN — BUDESONIDE AND FORMOTEROL FUMARATE DIHYDRATE 2 PUFF(S): 160; 4.5 AEROSOL RESPIRATORY (INHALATION) at 22:22

## 2022-02-03 RX ADMIN — OXYCODONE HYDROCHLORIDE 5 MILLIGRAM(S): 5 TABLET ORAL at 19:40

## 2022-02-03 RX ADMIN — Medication 75 MILLIGRAM(S): at 22:23

## 2022-02-03 RX ADMIN — ISOSORBIDE DINITRATE 20 MILLIGRAM(S): 5 TABLET ORAL at 13:14

## 2022-02-03 RX ADMIN — CARVEDILOL PHOSPHATE 12.5 MILLIGRAM(S): 80 CAPSULE, EXTENDED RELEASE ORAL at 06:13

## 2022-02-03 RX ADMIN — LOSARTAN POTASSIUM 100 MILLIGRAM(S): 100 TABLET, FILM COATED ORAL at 06:13

## 2022-02-03 RX ADMIN — ISOSORBIDE DINITRATE 20 MILLIGRAM(S): 5 TABLET ORAL at 22:23

## 2022-02-03 RX ADMIN — Medication 75 MILLIGRAM(S): at 13:14

## 2022-02-03 RX ADMIN — INSULIN GLARGINE 20 UNIT(S): 100 INJECTION, SOLUTION SUBCUTANEOUS at 22:23

## 2022-02-03 RX ADMIN — SODIUM CHLORIDE 3 MILLILITER(S): 9 INJECTION INTRAMUSCULAR; INTRAVENOUS; SUBCUTANEOUS at 06:13

## 2022-02-03 RX ADMIN — Medication 2: at 08:45

## 2022-02-03 RX ADMIN — Medication 5 UNIT(S): at 12:17

## 2022-02-03 RX ADMIN — SODIUM CHLORIDE 3 MILLILITER(S): 9 INJECTION INTRAMUSCULAR; INTRAVENOUS; SUBCUTANEOUS at 22:14

## 2022-02-03 RX ADMIN — Medication 75 MILLIGRAM(S): at 06:13

## 2022-02-03 RX ADMIN — Medication 6 MILLIGRAM(S): at 22:23

## 2022-02-03 NOTE — PROGRESS NOTE ADULT - SUBJECTIVE AND OBJECTIVE BOX
Pt seen  Chart reviewed  Full consult dictation to follow    Right 4th MC transverse shaft fx  Attempted closed reduction and splinting - post films pending  ? candidacy for operative intervention in light of cardiac status

## 2022-02-03 NOTE — PROGRESS NOTE ADULT - PROBLEM SELECTOR PLAN 1
-Intermitted CP, trace leg edema, elevated ProBNP; ALAS; Self-reported medications nonadherence >3 weeks  -now appearing close to euvolemic  - cxr with clear lungs, LE edema resolving    - Lasix 20 mg IVP BID with hold parameters. Switch to 40BIDPO in 1-2 days  - TTE with EF 27%.   -Fluid restriction to 1500 ml.  -DASH/TLC diet.  -Strict I&O's.  -c.w losartan and  coreg  -c/w nitrates and hdyralazine  -Stressed importance of medication adherence and outpt follow up  -HF evaluation today as etiology of cardiomyopathy remains unclear and pt needs good outpt follow up. Also may need cards clearance if surgery is offered for hand

## 2022-02-03 NOTE — PROGRESS NOTE ADULT - ASSESSMENT
40 y/o Male with  MHx of severe sys CHF (LVEF 22% on MRI  4/2019, 30% in TTE from 6/2019, no AICD), HTN, Asthma, Smoker, Type 2 diabetes, hyperlipidemia a/w atypical chest pain and acute on chronic severe systolic CHF likely due medications nonadherence; Also found to have transverse fracture of the fourth metatarsal diaphysis. Endocrine consulted for management of uncontrolled DM2.     Uncontrolled DM2 w/ complications   Nonadherent w/ oral medications   A1c 11.1%   Recommendations:   - Goal -180  - Increase to Lantus 20 units SC QHS   - c/w to Admelog 5 units SC Premeal/TIDAC (d/w pt if he eats 50% or less of the dose than to take 3 units of insulin)  - Admelog Low Correction Scale Premeal & Low Correction Scale Bedtime   - Blood glucose monitoring Premeal/Bedtime   - Hypoglycemia protocol   - Carb Consistent Diet   - Nutrition consult   DC Planning: In addition to home metformin 1000mg bid and increased Jardiance 25mg daily, discussed starting basal insulin along w/ home oral medications but patient not amenable to this; also suggested GLP-1 as another option to add to his current home medications but patient unwilling to add on additional medications at this time. Patient can follow up with his PCP after discharge or if he wishes to, with endocrine at the location provided below:     [facility]   [address] [place] [address] [phone]    HTN  Recommendations:   - BP goal < 130/80   - medication list includes losartan, continue if no other contraindications   - defer management to primary team     HLD   - LDL goal < 70   - consider addition of statin if no other contraindications given uncontrolled DM as a CV risk factor             Armida Fang MD  Attending Physician   Department of Endocrinology, Diabetes and Metabolism     Pager  [phone] [please provide 10 digit call back number]  [facility] 9-5  [email or web address]  Nights and weekends: [phone]  Please note that this patient may be followed by a different provider tomorrow.   If no answer or after hours, please contact [phone].  For final dc reccomendations, please call [phone] or page the endocrine fellow on call.

## 2022-02-03 NOTE — PROGRESS NOTE ADULT - SUBJECTIVE AND OBJECTIVE BOX
Patient is a 41y old  Male who presents with a chief complaint of Chest pain, Rt hand pain (02 Feb 2022 10:28)    SUBJECTIVE / OVERNIGHT EVENTS: Patient seen and examined. Currently in police custody. Patient reports chest pain and SOB has improved. NO more LE edema. Headache resolved. Stressed importance for outpt cards follow up and medication compliance.     MEDICATIONS  (STANDING):  budesonide 160 MICROgram(s)/formoterol 4.5 MICROgram(s) Inhaler 2 Puff(s) Inhalation two times a day  carvedilol 12.5 milliGRAM(s) Oral every 12 hours  dextrose 40% Gel 15 Gram(s) Oral once  dextrose 5%. 1000 milliLiter(s) (50 mL/Hr) IV Continuous <Continuous>  dextrose 5%. 1000 milliLiter(s) (100 mL/Hr) IV Continuous <Continuous>  dextrose 50% Injectable 25 Gram(s) IV Push once  dextrose 50% Injectable 12.5 Gram(s) IV Push once  dextrose 50% Injectable 25 Gram(s) IV Push once  furosemide   Injectable 20 milliGRAM(s) IV Push two times a day  glucagon  Injectable 1 milliGRAM(s) IntraMuscular once  hydrALAZINE 75 milliGRAM(s) Oral three times a day  influenza   Vaccine 0.5 milliLiter(s) IntraMuscular once  insulin glargine Injectable (LANTUS) 18 Unit(s) SubCutaneous at bedtime  insulin lispro (ADMELOG) corrective regimen sliding scale   SubCutaneous three times a day before meals  insulin lispro (ADMELOG) corrective regimen sliding scale   SubCutaneous at bedtime  insulin lispro Injectable (ADMELOG) 5 Unit(s) SubCutaneous three times a day before meals  isosorbide   dinitrate Tablet (ISORDIL) 20 milliGRAM(s) Oral three times a day  losartan 100 milliGRAM(s) Oral daily  sodium chloride 0.9% lock flush 3 milliLiter(s) IV Push every 8 hours    MEDICATIONS  (PRN):  acetaminophen     Tablet .. 650 milliGRAM(s) Oral every 6 hours PRN Mild Pain (1 - 3), Moderate Pain (4 - 6)  ALBUTerol    90 MICROgram(s) HFA Inhaler 2 Puff(s) Inhalation every 6 hours PRN Shortness of Breath and/or Wheezing      CAPILLARY BLOOD GLUCOSE  POCT Blood Glucose.: 167 mg/dL (03 Feb 2022 12:13)  POCT Blood Glucose.: 216 mg/dL (03 Feb 2022 08:30)  POCT Blood Glucose.: 185 mg/dL (02 Feb 2022 22:12)  POCT Blood Glucose.: 201 mg/dL (02 Feb 2022 17:03)      I&O's Summary    02 Feb 2022 07:01  -  03 Feb 2022 07:00  --------------------------------------------------------  IN: 600 mL / OUT: 1750 mL / NET: -1150 mL        PHYSICAL EXAM:  GENERAL: NAD, well-developed  HEAD:  Atraumatic, Normocephalic  EYES: EOMI, PERRLA, conjunctiva and sclera clear  NECK: Supple, No JVD  CHEST/LUNG: Clear to auscultation bilaterally; No wheeze  HEART: Regular rate and rhythm; No murmurs, rubs, or gallops  ABDOMEN: Soft, Nontender, Nondistended; Bowel sounds present  EXTREMITIES:  right hand in cast, no Le edema  PSYCH: AAOx3  NEUROLOGY: non-focal  SKIN: No rashes or lesions    LABS:                                   15.6   10.74 )-----------( 185      ( 03 Feb 2022 07:44 )             47.7   02-03    138  |  101  |  17  ----------------------------<  258<H>  3.7   |  24  |  0.94    Ca    8.8      03 Feb 2022 07:44  Phos  3.4     02-03  Mg     1.70     02-03    TPro  7.1  /  Alb  4.0  /  TBili  0.4  /  DBili  x   /  AST  13  /  ALT  15  /  AlkPhos  79  02-01        RADIOLOGY & ADDITIONAL TESTS: < from: Xray Chest 1 View- PORTABLE-Urgent (Xray Chest 1 View- PORTABLE-Urgent .) (02.01.22 @ 14:29) >  IMPRESSION:  Clear lungs    < end of copied text >      Imaging Personally Reviewed:    Consultant(s) Notes Reviewed:  endo    Care Discussed with Consultants/Other Providers: Hand surgery     Assessment and Plan:

## 2022-02-03 NOTE — PROGRESS NOTE ADULT - SUBJECTIVE AND OBJECTIVE BOX
Chief Complaint/Follow-up on: DM    Subjective:    POC blood glucose and insulin use reviewed.  states he eats some of his meals, at times picks at his meals  no n/v      MEDICATIONS  (STANDING):  budesonide 160 MICROgram(s)/formoterol 4.5 MICROgram(s) Inhaler 2 Puff(s) Inhalation two times a day  carvedilol 12.5 milliGRAM(s) Oral every 12 hours  dextrose 40% Gel 15 Gram(s) Oral once  dextrose 5%. 1000 milliLiter(s) (50 mL/Hr) IV Continuous <Continuous>  dextrose 5%. 1000 milliLiter(s) (100 mL/Hr) IV Continuous <Continuous>  dextrose 50% Injectable 25 Gram(s) IV Push once  dextrose 50% Injectable 12.5 Gram(s) IV Push once  dextrose 50% Injectable 25 Gram(s) IV Push once  furosemide   Injectable 20 milliGRAM(s) IV Push two times a day  glucagon  Injectable 1 milliGRAM(s) IntraMuscular once  hydrALAZINE 75 milliGRAM(s) Oral three times a day  influenza   Vaccine 0.5 milliLiter(s) IntraMuscular once  insulin glargine Injectable (LANTUS) 18 Unit(s) SubCutaneous at bedtime  insulin lispro (ADMELOG) corrective regimen sliding scale   SubCutaneous three times a day before meals  insulin lispro (ADMELOG) corrective regimen sliding scale   SubCutaneous at bedtime  insulin lispro Injectable (ADMELOG) 5 Unit(s) SubCutaneous three times a day before meals  isosorbide   dinitrate Tablet (ISORDIL) 20 milliGRAM(s) Oral three times a day  losartan 100 milliGRAM(s) Oral daily  sodium chloride 0.9% lock flush 3 milliLiter(s) IV Push every 8 hours    MEDICATIONS  (PRN):  acetaminophen     Tablet .. 650 milliGRAM(s) Oral every 6 hours PRN Mild Pain (1 - 3), Moderate Pain (4 - 6)  ALBUTerol    90 MICROgram(s) HFA Inhaler 2 Puff(s) Inhalation every 6 hours PRN Shortness of Breath and/or Wheezing  oxyCODONE    IR 5 milliGRAM(s) Oral every 8 hours PRN Severe Pain (7 - 10)      PHYSICAL EXAM:  VITALS: T(C): 36.7 (02-03-22 @ 14:10)  T(F): 98 (02-03-22 @ 14:10), Max: 98.1 (02-03-22 @ 02:30)  HR: 91 (02-03-22 @ 14:10) (90 - 98)  BP: 130/84 (02-03-22 @ 14:10) (122/88 - 139/84)  RR:  (18 - 18)  SpO2:  (97% - 100%)  Wt(kg): --  GENERAL: NAD, well-groomed, well-developed  EYES: No proptosis, no injection  RESPIRATORY: no resp distress   CARDIOVASCULAR: Regular rate and rhythm; No murmurs; no peripheral edema  GI: Soft, nontender, non distended, normal bowel sounds      POCT Blood Glucose.: 167 mg/dL (02-03-22 @ 12:13)  POCT Blood Glucose.: 216 mg/dL (02-03-22 @ 08:30)  POCT Blood Glucose.: 185 mg/dL (02-02-22 @ 22:12)  POCT Blood Glucose.: 201 mg/dL (02-02-22 @ 17:03)  POCT Blood Glucose.: 161 mg/dL (02-02-22 @ 13:09)  POCT Blood Glucose.: 180 mg/dL (02-02-22 @ 12:19)  POCT Blood Glucose.: 181 mg/dL (02-02-22 @ 08:28)  POCT Blood Glucose.: 239 mg/dL (02-01-22 @ 23:56)  POCT Blood Glucose.: 218 mg/dL (02-01-22 @ 22:19)    02-03    138  |  101  |  17  ----------------------------<  258<H>  3.7   |  24  |  0.94    EGFR if : 115  EGFR if non : 100    Ca    8.8      02-03  Mg     1.70     02-03  Phos  3.4     02-03    TPro  7.1  /  Alb  4.0  /  TBili  0.4  /  DBili  x   /  AST  13  /  ALT  15  /  AlkPhos  79  02-01          Thyroid Function Tests:

## 2022-02-03 NOTE — PROGRESS NOTE ADULT - ASSESSMENT
40 y/o Male with  MHx of severe sys CHF (LVEF 22% on MRI  4/2019, 30% in TTE from 6/2019, no AICD), HTN, Asthma, Smoker, Type 2 diabetes, hyperlipidemia a/w atypical chest pain and acute on chronic severe systolic CHF likely due medications nonadherence; Also found to have transverse fracture of the right fourth metatarsal diaphysis, and uncontrolled DM Type 2.

## 2022-02-04 LAB
ANION GAP SERPL CALC-SCNC: 12 MMOL/L — SIGNIFICANT CHANGE UP (ref 7–14)
BASOPHILS # BLD AUTO: 0.02 K/UL — SIGNIFICANT CHANGE UP (ref 0–0.2)
BASOPHILS NFR BLD AUTO: 0.2 % — SIGNIFICANT CHANGE UP (ref 0–2)
BUN SERPL-MCNC: 20 MG/DL — SIGNIFICANT CHANGE UP (ref 7–23)
CALCIUM SERPL-MCNC: 9.2 MG/DL — SIGNIFICANT CHANGE UP (ref 8.4–10.5)
CHLORIDE SERPL-SCNC: 101 MMOL/L — SIGNIFICANT CHANGE UP (ref 98–107)
CO2 SERPL-SCNC: 26 MMOL/L — SIGNIFICANT CHANGE UP (ref 22–31)
CREAT SERPL-MCNC: 1.33 MG/DL — HIGH (ref 0.5–1.3)
EOSINOPHIL # BLD AUTO: 0.15 K/UL — SIGNIFICANT CHANGE UP (ref 0–0.5)
EOSINOPHIL NFR BLD AUTO: 1.5 % — SIGNIFICANT CHANGE UP (ref 0–6)
GLUCOSE BLDC GLUCOMTR-MCNC: 139 MG/DL — HIGH (ref 70–99)
GLUCOSE BLDC GLUCOMTR-MCNC: 168 MG/DL — HIGH (ref 70–99)
GLUCOSE BLDC GLUCOMTR-MCNC: 200 MG/DL — HIGH (ref 70–99)
GLUCOSE BLDC GLUCOMTR-MCNC: 232 MG/DL — HIGH (ref 70–99)
GLUCOSE SERPL-MCNC: 224 MG/DL — HIGH (ref 70–99)
HCT VFR BLD CALC: 49.9 % — SIGNIFICANT CHANGE UP (ref 39–50)
HGB BLD-MCNC: 16.3 G/DL — SIGNIFICANT CHANGE UP (ref 13–17)
IANC: 6.55 K/UL — SIGNIFICANT CHANGE UP (ref 1.5–8.5)
IMM GRANULOCYTES NFR BLD AUTO: 0.6 % — SIGNIFICANT CHANGE UP (ref 0–1.5)
LYMPHOCYTES # BLD AUTO: 2.06 K/UL — SIGNIFICANT CHANGE UP (ref 1–3.3)
LYMPHOCYTES # BLD AUTO: 21 % — SIGNIFICANT CHANGE UP (ref 13–44)
MAGNESIUM SERPL-MCNC: 1.8 MG/DL — SIGNIFICANT CHANGE UP (ref 1.6–2.6)
MCHC RBC-ENTMCNC: 29.9 PG — SIGNIFICANT CHANGE UP (ref 27–34)
MCHC RBC-ENTMCNC: 32.7 GM/DL — SIGNIFICANT CHANGE UP (ref 32–36)
MCV RBC AUTO: 91.6 FL — SIGNIFICANT CHANGE UP (ref 80–100)
MONOCYTES # BLD AUTO: 0.98 K/UL — HIGH (ref 0–0.9)
MONOCYTES NFR BLD AUTO: 10 % — SIGNIFICANT CHANGE UP (ref 2–14)
NEUTROPHILS # BLD AUTO: 6.55 K/UL — SIGNIFICANT CHANGE UP (ref 1.8–7.4)
NEUTROPHILS NFR BLD AUTO: 66.7 % — SIGNIFICANT CHANGE UP (ref 43–77)
NRBC # BLD: 0 /100 WBCS — SIGNIFICANT CHANGE UP
NRBC # FLD: 0 K/UL — SIGNIFICANT CHANGE UP
PHOSPHATE SERPL-MCNC: 3.7 MG/DL — SIGNIFICANT CHANGE UP (ref 2.5–4.5)
PLATELET # BLD AUTO: 193 K/UL — SIGNIFICANT CHANGE UP (ref 150–400)
POTASSIUM SERPL-MCNC: 4.3 MMOL/L — SIGNIFICANT CHANGE UP (ref 3.5–5.3)
POTASSIUM SERPL-SCNC: 4.3 MMOL/L — SIGNIFICANT CHANGE UP (ref 3.5–5.3)
RBC # BLD: 5.45 M/UL — SIGNIFICANT CHANGE UP (ref 4.2–5.8)
RBC # FLD: 14.3 % — SIGNIFICANT CHANGE UP (ref 10.3–14.5)
SODIUM SERPL-SCNC: 139 MMOL/L — SIGNIFICANT CHANGE UP (ref 135–145)
WBC # BLD: 9.82 K/UL — SIGNIFICANT CHANGE UP (ref 3.8–10.5)
WBC # FLD AUTO: 9.82 K/UL — SIGNIFICANT CHANGE UP (ref 3.8–10.5)

## 2022-02-04 PROCEDURE — 99233 SBSQ HOSP IP/OBS HIGH 50: CPT

## 2022-02-04 PROCEDURE — 99232 SBSQ HOSP IP/OBS MODERATE 35: CPT

## 2022-02-04 PROCEDURE — 73130 X-RAY EXAM OF HAND: CPT | Mod: 26,RT

## 2022-02-04 PROCEDURE — 99222 1ST HOSP IP/OBS MODERATE 55: CPT

## 2022-02-04 RX ORDER — FUROSEMIDE 40 MG
20 TABLET ORAL ONCE
Refills: 0 | Status: COMPLETED | OUTPATIENT
Start: 2022-02-04 | End: 2022-02-04

## 2022-02-04 RX ORDER — FUROSEMIDE 40 MG
40 TABLET ORAL
Refills: 0 | Status: DISCONTINUED | OUTPATIENT
Start: 2022-02-04 | End: 2022-02-09

## 2022-02-04 RX ORDER — INSULIN LISPRO 100/ML
6 VIAL (ML) SUBCUTANEOUS
Refills: 0 | Status: DISCONTINUED | OUTPATIENT
Start: 2022-02-04 | End: 2022-02-09

## 2022-02-04 RX ADMIN — Medication 5 UNIT(S): at 08:55

## 2022-02-04 RX ADMIN — OXYCODONE HYDROCHLORIDE 5 MILLIGRAM(S): 5 TABLET ORAL at 02:42

## 2022-02-04 RX ADMIN — Medication 75 MILLIGRAM(S): at 12:18

## 2022-02-04 RX ADMIN — SODIUM CHLORIDE 3 MILLILITER(S): 9 INJECTION INTRAMUSCULAR; INTRAVENOUS; SUBCUTANEOUS at 20:29

## 2022-02-04 RX ADMIN — OXYCODONE HYDROCHLORIDE 5 MILLIGRAM(S): 5 TABLET ORAL at 21:53

## 2022-02-04 RX ADMIN — Medication 75 MILLIGRAM(S): at 05:46

## 2022-02-04 RX ADMIN — SODIUM CHLORIDE 3 MILLILITER(S): 9 INJECTION INTRAMUSCULAR; INTRAVENOUS; SUBCUTANEOUS at 11:49

## 2022-02-04 RX ADMIN — Medication 6 UNIT(S): at 16:52

## 2022-02-04 RX ADMIN — CARVEDILOL PHOSPHATE 12.5 MILLIGRAM(S): 80 CAPSULE, EXTENDED RELEASE ORAL at 05:47

## 2022-02-04 RX ADMIN — LOSARTAN POTASSIUM 100 MILLIGRAM(S): 100 TABLET, FILM COATED ORAL at 05:47

## 2022-02-04 RX ADMIN — BUDESONIDE AND FORMOTEROL FUMARATE DIHYDRATE 2 PUFF(S): 160; 4.5 AEROSOL RESPIRATORY (INHALATION) at 21:24

## 2022-02-04 RX ADMIN — Medication 6 MILLIGRAM(S): at 21:23

## 2022-02-04 RX ADMIN — CARVEDILOL PHOSPHATE 12.5 MILLIGRAM(S): 80 CAPSULE, EXTENDED RELEASE ORAL at 16:46

## 2022-02-04 RX ADMIN — OXYCODONE HYDROCHLORIDE 5 MILLIGRAM(S): 5 TABLET ORAL at 03:42

## 2022-02-04 RX ADMIN — SODIUM CHLORIDE 3 MILLILITER(S): 9 INJECTION INTRAMUSCULAR; INTRAVENOUS; SUBCUTANEOUS at 05:53

## 2022-02-04 RX ADMIN — OXYCODONE HYDROCHLORIDE 5 MILLIGRAM(S): 5 TABLET ORAL at 21:23

## 2022-02-04 RX ADMIN — Medication 75 MILLIGRAM(S): at 21:23

## 2022-02-04 RX ADMIN — Medication 1: at 12:15

## 2022-02-04 RX ADMIN — Medication 20 MILLIGRAM(S): at 21:23

## 2022-02-04 RX ADMIN — Medication 6 UNIT(S): at 12:15

## 2022-02-04 RX ADMIN — ISOSORBIDE DINITRATE 20 MILLIGRAM(S): 5 TABLET ORAL at 05:47

## 2022-02-04 RX ADMIN — ISOSORBIDE DINITRATE 20 MILLIGRAM(S): 5 TABLET ORAL at 12:18

## 2022-02-04 RX ADMIN — Medication 1: at 08:55

## 2022-02-04 RX ADMIN — Medication 20 MILLIGRAM(S): at 05:47

## 2022-02-04 RX ADMIN — BUDESONIDE AND FORMOTEROL FUMARATE DIHYDRATE 2 PUFF(S): 160; 4.5 AEROSOL RESPIRATORY (INHALATION) at 08:34

## 2022-02-04 RX ADMIN — Medication 20 MILLIGRAM(S): at 16:46

## 2022-02-04 RX ADMIN — INSULIN GLARGINE 20 UNIT(S): 100 INJECTION, SOLUTION SUBCUTANEOUS at 21:24
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
## 2022-02-04 NOTE — CONSULT NOTE ADULT - ATTENDING COMMENTS
Lasix 40 mg iv bid.  Continue other HF regimen.  Order nuclear stress test. Lasix 40 mg iv bid. Endorses orthopnea and PND.  D/c Isordil. Having headache.  If needed for BP control, add amlodipine 5 mg po qd.  Continue other HF regimen.  Order nuclear stress test.

## 2022-02-04 NOTE — PROGRESS NOTE ADULT - ASSESSMENT
40 y/o Male with  MHx of severe sys CHF (LVEF 22% on MRI  4/2019, 30% in TTE from 6/2019, no AICD), HTN, Asthma, Smoker, Type 2 diabetes, hyperlipidemia a/w atypical chest pain and acute on chronic severe systolic CHF likely due medications nonadherence; Also found to have transverse fracture of the fourth metatarsal diaphysis. Endocrine consulted for management of uncontrolled DM2.     Uncontrolled DM2 w/ complications   Nonadherent w/ oral medications   A1c 11.1%   Recommendations:   - Goal -180  - continue Lantus 20 units SC QHS   - increase to Admelog 6 units SC Premeal/TIDAC (d/w pt if he eats 50% or less of the dose than to take 3 units of insulin)   - Admelog Low Correction Scale Premeal & Low Correction Scale Bedtime   - Blood glucose monitoring Premeal/Bedtime   - Hypoglycemia protocol   - Carb Consistent Diet   - Nutrition consult   DC Planning: In addition to home metformin 1000mg bid and increased Jardiance 25mg daily, discussed starting basal insulin along w/ home oral medications but patient not amenable to this; also suggested GLP-1 as another option to add to his current home medications but patient unwilling to add on additional medications at this time. Patient can follow up with his PCP after discharge or if he wishes to, with endocrine at the location provided below:     [facility]   [address] [place] [address] [phone]    HTN  Recommendations:   - BP goal < 130/80   - medication list includes losartan, continue if no other contraindications   - defer management to primary team     HLD   - LDL goal < 70   - consider addition of statin if no other contraindications given uncontrolled DM as a CV risk factor     Sindy Ye MD  Attending Physician   Department of Endocrinology, Diabetes and Metabolism     If before 9AM or after 5PM, or on weekends/holidays, please call the Endocrine answering service for assistance ([phone]).  For nonurgent matters, please email [email or web address] for assistance.      42 y/o Male with  MHx of severe sys CHF (LVEF 22% on MRI  4/2019, 30% in TTE from 6/2019, no AICD), HTN, Asthma, Smoker, Type 2 diabetes, hyperlipidemia a/w atypical chest pain and acute on chronic severe systolic CHF likely due medications nonadherence; Also found to have transverse fracture of the fourth metatarsal diaphysis. Endocrine consulted for management of uncontrolled DM2.     Uncontrolled DM2 w/ complications   Nonadherent w/ oral medications   A1c 11.1%   Recommendations:   - Goal -180  - continue Lantus 20 units SC QHS   - increase to Admelog 6 units SC Premeal/TIDAC (d/w pt if he eats 50% or less of the dose then to take 3 units of admelog)   - Admelog Low Correction Scale Premeal & Low Correction Scale Bedtime   - Blood glucose monitoring Premeal/Bedtime   - Hypoglycemia protocol   - Carb Consistent Diet   - Nutrition consult   DC Planning: In addition to home metformin 1000mg bid and increased Jardiance 25mg daily, discussed starting basal insulin along w/ home oral medications but patient not amenable to this; also suggested GLP-1 as another option to add to his current home medications but patient unwilling to add on additional medications at this time. Patient can follow up with his PCP after discharge or if he wishes to, with endocrine at the location provided below:     [facility]   [address] [place] [address] [phone]    HTN  Recommendations:   - BP goal < 130/80   - medication list includes losartan, continue if no other contraindications   - defer management to primary team     HLD   - LDL goal < 70   - consider addition of statin if no other contraindications given uncontrolled DM as a CV risk factor     Sindy Ye MD  Attending Physician   Department of Endocrinology, Diabetes and Metabolism     If before 9AM or after 5PM, or on weekends/holidays, please call the Endocrine answering service for assistance ([phone]).  For nonurgent matters, please email [email or web address] for assistance.

## 2022-02-04 NOTE — PROGRESS NOTE ADULT - SUBJECTIVE AND OBJECTIVE BOX
HPI: 42 y/o Male with  MHx of severe sys CHF (LVEF 22% on MRI  4/2019, 30% in TTE from 6/2019, no AICD), HTN, Asthma, Smoker, Type 2 diabetes, hyperlipidemia a/w atypical chest pain and acute on chronic severe systolic CHF likely due medications nonadherence; Also found to have transverse fracture of the fourth metatarsal diaphysis. Endocrine consulted for management of uncontrolled DM2.     Interval hx:  glucoses close to goal  ordered for diet      MEDICATIONS  (STANDING):  budesonide 160 MICROgram(s)/formoterol 4.5 MICROgram(s) Inhaler 2 Puff(s) Inhalation two times a day  carvedilol 12.5 milliGRAM(s) Oral every 12 hours  dextrose 40% Gel 15 Gram(s) Oral once  dextrose 5%. 1000 milliLiter(s) (50 mL/Hr) IV Continuous <Continuous>  dextrose 5%. 1000 milliLiter(s) (100 mL/Hr) IV Continuous <Continuous>  dextrose 50% Injectable 25 Gram(s) IV Push once  dextrose 50% Injectable 12.5 Gram(s) IV Push once  dextrose 50% Injectable 25 Gram(s) IV Push once  furosemide   Injectable 20 milliGRAM(s) IV Push two times a day  glucagon  Injectable 1 milliGRAM(s) IntraMuscular once  hydrALAZINE 75 milliGRAM(s) Oral three times a day  influenza   Vaccine 0.5 milliLiter(s) IntraMuscular once  insulin glargine Injectable (LANTUS) 20 Unit(s) SubCutaneous at bedtime  insulin lispro (ADMELOG) corrective regimen sliding scale   SubCutaneous three times a day before meals  insulin lispro (ADMELOG) corrective regimen sliding scale   SubCutaneous at bedtime  insulin lispro Injectable (ADMELOG) 5 Unit(s) SubCutaneous three times a day before meals  isosorbide   dinitrate Tablet (ISORDIL) 20 milliGRAM(s) Oral three times a day  losartan 100 milliGRAM(s) Oral daily  melatonin 6 milliGRAM(s) Oral at bedtime  sodium chloride 0.9% lock flush 3 milliLiter(s) IV Push every 8 hours    MEDICATIONS  (PRN):  acetaminophen     Tablet .. 650 milliGRAM(s) Oral every 6 hours PRN Mild Pain (1 - 3), Moderate Pain (4 - 6)  ALBUTerol    90 MICROgram(s) HFA Inhaler 2 Puff(s) Inhalation every 6 hours PRN Shortness of Breath and/or Wheezing  oxyCODONE    IR 5 milliGRAM(s) Oral every 4 hours PRN Severe Pain (7 - 10)      Allergies    No Known Allergies    Intolerances        Review of Systems:  Constitutional: No fever, good appetite/po intake  Eyes: No blurry vision, diplopia  Neuro: No tremors  HEENT: No pain  Cardiovascular: No chest pain, palpitations  Respiratory: No SOB, no cough  GI: No nausea, vomiting,   : No dysuria, hematuria  Skin: no rash  Psych: no depression  Endocrine: no polyuria, polydipsia  Hem/lymph: no swelling  Osteoporosis: no fractures    ALL OTHER SYSTEMS REVIEWED AND NEGATIVE    PHYSICAL EXAM:  VITALS: T(C): 36.7 (02-04-22 @ 08:00)  T(F): 98 (02-04-22 @ 08:00), Max: 98 (02-03-22 @ 14:10)  HR: 90 (02-04-22 @ 08:00) (90 - 103)  BP: 144/98 (02-04-22 @ 08:00) (125/74 - 144/98)  RR:  (16 - 18)  SpO2:  (97% - 100%)  Wt(kg): --  GENERAL: NAD, well-groomed, well-developed  EYES: No proptosis, no lid lag, anicteric  HEENT:  Atraumatic, normocephalic, moist mucous membranes  RESPIRATORY: nonlabored respirations, no wheezing  PSYCH: Alert and oriented x 3, normal affect, normal mood    POCT Blood Glucose.: 200 mg/dL (02-04-22 @ 08:51)  POCT Blood Glucose.: 171 mg/dL (02-03-22 @ 22:11)  POCT Blood Glucose.: 163 mg/dL (02-03-22 @ 17:27)  POCT Blood Glucose.: 167 mg/dL (02-03-22 @ 12:13)  POCT Blood Glucose.: 216 mg/dL (02-03-22 @ 08:30)  POCT Blood Glucose.: 185 mg/dL (02-02-22 @ 22:12)  POCT Blood Glucose.: 201 mg/dL (02-02-22 @ 17:03)  POCT Blood Glucose.: 161 mg/dL (02-02-22 @ 13:09)  POCT Blood Glucose.: 180 mg/dL (02-02-22 @ 12:19)  POCT Blood Glucose.: 181 mg/dL (02-02-22 @ 08:28)  POCT Blood Glucose.: 239 mg/dL (02-01-22 @ 23:56)  POCT Blood Glucose.: 218 mg/dL (02-01-22 @ 22:19)                            15.6   10.74 )-----------( 185      ( 03 Feb 2022 07:44 )             47.7       02-03    138  |  101  |  17  ----------------------------<  258<H>  3.7   |  24  |  0.94    EGFR if : 115  EGFR if non : 100    Ca    8.8      02-03  Mg     1.70     02-03  Phos  3.4     02-03    TPro  7.1  /  Alb  4.0  /  TBili  0.4  /  DBili  x   /  AST  13  /  ALT  15  /  AlkPhos  79  02-01      Thyroid Function Tests:      02-02 Chol 165 Direct LDL -- LDL calculated 100<H> HDL 40<L> Trig 125    Radiology:            HPI: 42 y/o Male with  MHx of severe sys CHF (LVEF 22% on MRI  4/2019, 30% in TTE from 6/2019, no AICD), HTN, Asthma, Smoker, Type 2 diabetes, hyperlipidemia a/w atypical chest pain and acute on chronic severe systolic CHF likely due medications nonadherence; Also found to have transverse fracture of the fourth metatarsal diaphysis. Endocrine consulted for management of uncontrolled DM2.     Interval hx:  glucoses close to goal  ordered for diet -> pt reports lower appetite, did not eat full bfast    MEDICATIONS  (STANDING):  budesonide 160 MICROgram(s)/formoterol 4.5 MICROgram(s) Inhaler 2 Puff(s) Inhalation two times a day  carvedilol 12.5 milliGRAM(s) Oral every 12 hours  dextrose 40% Gel 15 Gram(s) Oral once  dextrose 5%. 1000 milliLiter(s) (50 mL/Hr) IV Continuous <Continuous>  dextrose 5%. 1000 milliLiter(s) (100 mL/Hr) IV Continuous <Continuous>  dextrose 50% Injectable 25 Gram(s) IV Push once  dextrose 50% Injectable 12.5 Gram(s) IV Push once  dextrose 50% Injectable 25 Gram(s) IV Push once  furosemide   Injectable 20 milliGRAM(s) IV Push two times a day  glucagon  Injectable 1 milliGRAM(s) IntraMuscular once  hydrALAZINE 75 milliGRAM(s) Oral three times a day  influenza   Vaccine 0.5 milliLiter(s) IntraMuscular once  insulin glargine Injectable (LANTUS) 20 Unit(s) SubCutaneous at bedtime  insulin lispro (ADMELOG) corrective regimen sliding scale   SubCutaneous three times a day before meals  insulin lispro (ADMELOG) corrective regimen sliding scale   SubCutaneous at bedtime  insulin lispro Injectable (ADMELOG) 5 Unit(s) SubCutaneous three times a day before meals  isosorbide   dinitrate Tablet (ISORDIL) 20 milliGRAM(s) Oral three times a day  losartan 100 milliGRAM(s) Oral daily  melatonin 6 milliGRAM(s) Oral at bedtime  sodium chloride 0.9% lock flush 3 milliLiter(s) IV Push every 8 hours    MEDICATIONS  (PRN):  acetaminophen     Tablet .. 650 milliGRAM(s) Oral every 6 hours PRN Mild Pain (1 - 3), Moderate Pain (4 - 6)  ALBUTerol    90 MICROgram(s) HFA Inhaler 2 Puff(s) Inhalation every 6 hours PRN Shortness of Breath and/or Wheezing  oxyCODONE    IR 5 milliGRAM(s) Oral every 4 hours PRN Severe Pain (7 - 10)      Allergies    No Known Allergies    Intolerances        Review of Systems:  Constitutional: No fever, good appetite/po intake  Eyes: No blurry vision, diplopia  Neuro: No tremors  HEENT: No pain  Cardiovascular: No chest pain, palpitations  Respiratory: No SOB, no cough  GI: No nausea, vomiting,   : No dysuria, hematuria  Skin: no rash  Psych: no depression  Endocrine: no polyuria, polydipsia  Hem/lymph: no swelling  Osteoporosis: no fractures    ALL OTHER SYSTEMS REVIEWED AND NEGATIVE    PHYSICAL EXAM:  VITALS: T(C): 36.7 (02-04-22 @ 08:00)  T(F): 98 (02-04-22 @ 08:00), Max: 98 (02-03-22 @ 14:10)  HR: 90 (02-04-22 @ 08:00) (90 - 103)  BP: 144/98 (02-04-22 @ 08:00) (125/74 - 144/98)  RR:  (16 - 18)  SpO2:  (97% - 100%)  Wt(kg): --  GENERAL: NAD, well-groomed, well-developed  EYES: No proptosis, no lid lag, anicteric  HEENT:  Atraumatic, normocephalic, moist mucous membranes  RESPIRATORY: nonlabored respirations, no wheezing  PSYCH: Alert and oriented x 3, normal affect, normal mood    POCT Blood Glucose.: 200 mg/dL (02-04-22 @ 08:51)  POCT Blood Glucose.: 171 mg/dL (02-03-22 @ 22:11)  POCT Blood Glucose.: 163 mg/dL (02-03-22 @ 17:27)  POCT Blood Glucose.: 167 mg/dL (02-03-22 @ 12:13)  POCT Blood Glucose.: 216 mg/dL (02-03-22 @ 08:30)  POCT Blood Glucose.: 185 mg/dL (02-02-22 @ 22:12)  POCT Blood Glucose.: 201 mg/dL (02-02-22 @ 17:03)  POCT Blood Glucose.: 161 mg/dL (02-02-22 @ 13:09)  POCT Blood Glucose.: 180 mg/dL (02-02-22 @ 12:19)  POCT Blood Glucose.: 181 mg/dL (02-02-22 @ 08:28)  POCT Blood Glucose.: 239 mg/dL (02-01-22 @ 23:56)  POCT Blood Glucose.: 218 mg/dL (02-01-22 @ 22:19)                            15.6   10.74 )-----------( 185      ( 03 Feb 2022 07:44 )             47.7       02-03    138  |  101  |  17  ----------------------------<  258<H>  3.7   |  24  |  0.94    EGFR if : 115  EGFR if non : 100    Ca    8.8      02-03  Mg     1.70     02-03  Phos  3.4     02-03    TPro  7.1  /  Alb  4.0  /  TBili  0.4  /  DBili  x   /  AST  13  /  ALT  15  /  AlkPhos  79  02-01      Thyroid Function Tests:      02-02 Chol 165 Direct LDL -- LDL calculated 100<H> HDL 40<L> Trig 125    Radiology:
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
## 2022-02-04 NOTE — PROGRESS NOTE ADULT - SUBJECTIVE AND OBJECTIVE BOX
Patient is a 41y old  Male who presents with a chief complaint of Chest pain, Rt hand pain (02 Feb 2022 10:28)    SUBJECTIVE / OVERNIGHT EVENTS: Patient seen and examined. Reports improvement in SOB and chest pain. No more LE edema. Does not want insulin for uncontrolled DM. Wants oral regimen. Stressed importance of diet modification and close outpt follow up.     MEDICATIONS  (STANDING):  budesonide 160 MICROgram(s)/formoterol 4.5 MICROgram(s) Inhaler 2 Puff(s) Inhalation two times a day  carvedilol 12.5 milliGRAM(s) Oral every 12 hours  dextrose 40% Gel 15 Gram(s) Oral once  dextrose 5%. 1000 milliLiter(s) (50 mL/Hr) IV Continuous <Continuous>  dextrose 5%. 1000 milliLiter(s) (100 mL/Hr) IV Continuous <Continuous>  dextrose 50% Injectable 25 Gram(s) IV Push once  dextrose 50% Injectable 12.5 Gram(s) IV Push once  dextrose 50% Injectable 25 Gram(s) IV Push once  furosemide   Injectable 20 milliGRAM(s) IV Push two times a day  glucagon  Injectable 1 milliGRAM(s) IntraMuscular once  hydrALAZINE 75 milliGRAM(s) Oral three times a day  influenza   Vaccine 0.5 milliLiter(s) IntraMuscular once  insulin glargine Injectable (LANTUS) 18 Unit(s) SubCutaneous at bedtime  insulin lispro (ADMELOG) corrective regimen sliding scale   SubCutaneous three times a day before meals  insulin lispro (ADMELOG) corrective regimen sliding scale   SubCutaneous at bedtime  insulin lispro Injectable (ADMELOG) 5 Unit(s) SubCutaneous three times a day before meals  isosorbide   dinitrate Tablet (ISORDIL) 20 milliGRAM(s) Oral three times a day  losartan 100 milliGRAM(s) Oral daily  sodium chloride 0.9% lock flush 3 milliLiter(s) IV Push every 8 hours    MEDICATIONS  (PRN):  acetaminophen     Tablet .. 650 milliGRAM(s) Oral every 6 hours PRN Mild Pain (1 - 3), Moderate Pain (4 - 6)  ALBUTerol    90 MICROgram(s) HFA Inhaler 2 Puff(s) Inhalation every 6 hours PRN Shortness of Breath and/or Wheezing    CAPILLARY BLOOD GLUCOSE  POCT Blood Glucose.: 168 mg/dL (04 Feb 2022 12:01)  POCT Blood Glucose.: 200 mg/dL (04 Feb 2022 08:51)  POCT Blood Glucose.: 171 mg/dL (03 Feb 2022 22:11)  POCT Blood Glucose.: 163 mg/dL (03 Feb 2022 17:27)      I&O's Summary    03 Feb 2022 07:01  -  04 Feb 2022 07:00  --------------------------------------------------------  IN: 480 mL / OUT: 850 mL / NET: -370 mL    04 Feb 2022 07:01  -  04 Feb 2022 13:15  --------------------------------------------------------  IN: 120 mL / OUT: 0 mL / NET: 120 mL          PHYSICAL EXAM:  GENERAL: NAD, well-developed  HEAD:  Atraumatic, Normocephalic  EYES: EOMI, PERRLA, conjunctiva and sclera clear  NECK: Supple, No JVD  CHEST/LUNG: Clear to auscultation bilaterally; No wheeze  HEART: Regular rate and rhythm; No murmurs, rubs, or gallops  ABDOMEN: Soft, Nontender, Nondistended; Bowel sounds present  EXTREMITIES:  right hand in cast, no Le edema  PSYCH: AAOx3  NEUROLOGY: non-focal  SKIN: No rashes or lesions    LABS:                                   15.6   10.74 )-----------( 185      ( 03 Feb 2022 07:44 )             47.7   02-03    138  |  101  |  17  ----------------------------<  258<H>  3.7   |  24  |  0.94    Ca    8.8      03 Feb 2022 07:44  Phos  3.4     02-03  Mg     1.70     02-03                     RADIOLOGY & ADDITIONAL TESTS: < from: Xray Chest 1 View- PORTABLE-Urgent (Xray Chest 1 View- PORTABLE-Urgent .) (02.01.22 @ 14:29) >  IMPRESSION:  Clear lungs    < end of copied text >      Imaging Personally Reviewed:    Consultant(s) Notes Reviewed:  endo    Care Discussed with Consultants/Other Providers: Hand surgery     Assessment and Plan:

## 2022-02-05 LAB
ANION GAP SERPL CALC-SCNC: 11 MMOL/L — SIGNIFICANT CHANGE UP (ref 7–14)
BASOPHILS # BLD AUTO: 0.03 K/UL — SIGNIFICANT CHANGE UP (ref 0–0.2)
BASOPHILS NFR BLD AUTO: 0.3 % — SIGNIFICANT CHANGE UP (ref 0–2)
BUN SERPL-MCNC: 18 MG/DL — SIGNIFICANT CHANGE UP (ref 7–23)
CALCIUM SERPL-MCNC: 9.3 MG/DL — SIGNIFICANT CHANGE UP (ref 8.4–10.5)
CHLORIDE SERPL-SCNC: 102 MMOL/L — SIGNIFICANT CHANGE UP (ref 98–107)
CO2 SERPL-SCNC: 26 MMOL/L — SIGNIFICANT CHANGE UP (ref 22–31)
CREAT SERPL-MCNC: 0.88 MG/DL — SIGNIFICANT CHANGE UP (ref 0.5–1.3)
EOSINOPHIL # BLD AUTO: 0.15 K/UL — SIGNIFICANT CHANGE UP (ref 0–0.5)
EOSINOPHIL NFR BLD AUTO: 1.5 % — SIGNIFICANT CHANGE UP (ref 0–6)
GLUCOSE BLDC GLUCOMTR-MCNC: 159 MG/DL — HIGH (ref 70–99)
GLUCOSE BLDC GLUCOMTR-MCNC: 181 MG/DL — HIGH (ref 70–99)
GLUCOSE BLDC GLUCOMTR-MCNC: 187 MG/DL — HIGH (ref 70–99)
GLUCOSE BLDC GLUCOMTR-MCNC: 254 MG/DL — HIGH (ref 70–99)
GLUCOSE SERPL-MCNC: 174 MG/DL — HIGH (ref 70–99)
HCT VFR BLD CALC: 51.7 % — HIGH (ref 39–50)
HGB BLD-MCNC: 17 G/DL — SIGNIFICANT CHANGE UP (ref 13–17)
IANC: 5.68 K/UL — SIGNIFICANT CHANGE UP (ref 1.5–8.5)
IMM GRANULOCYTES NFR BLD AUTO: 0.5 % — SIGNIFICANT CHANGE UP (ref 0–1.5)
LYMPHOCYTES # BLD AUTO: 2.85 K/UL — SIGNIFICANT CHANGE UP (ref 1–3.3)
LYMPHOCYTES # BLD AUTO: 29.4 % — SIGNIFICANT CHANGE UP (ref 13–44)
MAGNESIUM SERPL-MCNC: 2 MG/DL — SIGNIFICANT CHANGE UP (ref 1.6–2.6)
MCHC RBC-ENTMCNC: 29.7 PG — SIGNIFICANT CHANGE UP (ref 27–34)
MCHC RBC-ENTMCNC: 32.9 GM/DL — SIGNIFICANT CHANGE UP (ref 32–36)
MCV RBC AUTO: 90.4 FL — SIGNIFICANT CHANGE UP (ref 80–100)
MONOCYTES # BLD AUTO: 0.93 K/UL — HIGH (ref 0–0.9)
MONOCYTES NFR BLD AUTO: 9.6 % — SIGNIFICANT CHANGE UP (ref 2–14)
NEUTROPHILS # BLD AUTO: 5.68 K/UL — SIGNIFICANT CHANGE UP (ref 1.8–7.4)
NEUTROPHILS NFR BLD AUTO: 58.7 % — SIGNIFICANT CHANGE UP (ref 43–77)
NRBC # BLD: 0 /100 WBCS — SIGNIFICANT CHANGE UP
NRBC # FLD: 0 K/UL — SIGNIFICANT CHANGE UP
PHOSPHATE SERPL-MCNC: 3.5 MG/DL — SIGNIFICANT CHANGE UP (ref 2.5–4.5)
PLATELET # BLD AUTO: 212 K/UL — SIGNIFICANT CHANGE UP (ref 150–400)
POTASSIUM SERPL-MCNC: 4.1 MMOL/L — SIGNIFICANT CHANGE UP (ref 3.5–5.3)
POTASSIUM SERPL-SCNC: 4.1 MMOL/L — SIGNIFICANT CHANGE UP (ref 3.5–5.3)
RBC # BLD: 5.72 M/UL — SIGNIFICANT CHANGE UP (ref 4.2–5.8)
RBC # FLD: 14.6 % — HIGH (ref 10.3–14.5)
SODIUM SERPL-SCNC: 139 MMOL/L — SIGNIFICANT CHANGE UP (ref 135–145)
WBC # BLD: 9.69 K/UL — SIGNIFICANT CHANGE UP (ref 3.8–10.5)
WBC # FLD AUTO: 9.69 K/UL — SIGNIFICANT CHANGE UP (ref 3.8–10.5)

## 2022-02-05 PROCEDURE — 99233 SBSQ HOSP IP/OBS HIGH 50: CPT

## 2022-02-05 PROCEDURE — 99232 SBSQ HOSP IP/OBS MODERATE 35: CPT

## 2022-02-05 RX ORDER — INSULIN GLARGINE 100 [IU]/ML
22 INJECTION, SOLUTION SUBCUTANEOUS AT BEDTIME
Refills: 0 | Status: DISCONTINUED | OUTPATIENT
Start: 2022-02-05 | End: 2022-02-09

## 2022-02-05 RX ADMIN — BUDESONIDE AND FORMOTEROL FUMARATE DIHYDRATE 2 PUFF(S): 160; 4.5 AEROSOL RESPIRATORY (INHALATION) at 22:03

## 2022-02-05 RX ADMIN — Medication 3: at 08:38

## 2022-02-05 RX ADMIN — Medication 40 MILLIGRAM(S): at 16:25

## 2022-02-05 RX ADMIN — Medication 75 MILLIGRAM(S): at 05:19

## 2022-02-05 RX ADMIN — CARVEDILOL PHOSPHATE 12.5 MILLIGRAM(S): 80 CAPSULE, EXTENDED RELEASE ORAL at 16:24

## 2022-02-05 RX ADMIN — Medication 75 MILLIGRAM(S): at 12:50

## 2022-02-05 RX ADMIN — Medication 75 MILLIGRAM(S): at 22:03

## 2022-02-05 RX ADMIN — Medication 1: at 16:23

## 2022-02-05 RX ADMIN — Medication 6 UNIT(S): at 12:49

## 2022-02-05 RX ADMIN — CARVEDILOL PHOSPHATE 12.5 MILLIGRAM(S): 80 CAPSULE, EXTENDED RELEASE ORAL at 05:21

## 2022-02-05 RX ADMIN — Medication 40 MILLIGRAM(S): at 05:21

## 2022-02-05 RX ADMIN — SODIUM CHLORIDE 3 MILLILITER(S): 9 INJECTION INTRAMUSCULAR; INTRAVENOUS; SUBCUTANEOUS at 05:51

## 2022-02-05 RX ADMIN — Medication 1: at 12:49

## 2022-02-05 RX ADMIN — BUDESONIDE AND FORMOTEROL FUMARATE DIHYDRATE 2 PUFF(S): 160; 4.5 AEROSOL RESPIRATORY (INHALATION) at 08:39

## 2022-02-05 RX ADMIN — LOSARTAN POTASSIUM 100 MILLIGRAM(S): 100 TABLET, FILM COATED ORAL at 05:20

## 2022-02-05 RX ADMIN — SODIUM CHLORIDE 3 MILLILITER(S): 9 INJECTION INTRAMUSCULAR; INTRAVENOUS; SUBCUTANEOUS at 22:09

## 2022-02-05 RX ADMIN — Medication 6 MILLIGRAM(S): at 22:03

## 2022-02-05 RX ADMIN — Medication 6 UNIT(S): at 08:38

## 2022-02-05 RX ADMIN — Medication 6 UNIT(S): at 16:24

## 2022-02-05 RX ADMIN — INSULIN GLARGINE 22 UNIT(S): 100 INJECTION, SOLUTION SUBCUTANEOUS at 22:04

## 2022-02-05 RX ADMIN — SODIUM CHLORIDE 3 MILLILITER(S): 9 INJECTION INTRAMUSCULAR; INTRAVENOUS; SUBCUTANEOUS at 11:59

## 2022-02-05 NOTE — PROGRESS NOTE ADULT - SUBJECTIVE AND OBJECTIVE BOX
Plastic surgical-hand f/u & addendum:    Series of x-rays reviewed with radiology.  In light of no swelling at site, immobility of fx site, visible callus at fx site on all films, presentation actually more c/w non acute fx rather than of acute etiology.    As fx is non-acute, as well as consideration of pt's cardiac status, no current indication for emergent operative intervention, and instead agree with d/c and f/u as outpatient for possible operative intervention to attempt improvement in alignment.

## 2022-02-05 NOTE — PROGRESS NOTE ADULT - PROBLEM SELECTOR PLAN 1
-Intermitted CP, trace leg edema, elevated ProBNP; ALAS; Self-reported medications nonadherence >3 weeks  -now appearing close to euvolemic  - cxr with clear lungs, LE edema resolving    - Lasix 40IV BID per HF  - TTE with EF 27%.   -Fluid restriction to 1500 ml.  -DASH/TLC diet.  -Strict I&O's.  -c.w losartan and  coreg  -Stressed importance of medication adherence and outpt follow up  -HF evaluation appreciated. Stress test ordered

## 2022-02-05 NOTE — PROGRESS NOTE ADULT - ASSESSMENT
40 y/o Male with  MHx of severe sys CHF (LVEF 22% on MRI  4/2019, 30% in TTE from 6/2019, no AICD), HTN, Asthma, Smoker, Type 2 diabetes, hyperlipidemia a/w atypical chest pain and acute on chronic severe systolic CHF likely due medications nonadherence; Also found to have transverse fracture of the fourth metatarsal diaphysis. Endocrine consulted for management of uncontrolled DM2.     1. Uncontrolled DM2 w/ complications   Nonadherent w/ oral medications   A1c 11.1%     While inpatient:   Goal -180 mg/dl  Increase Lantus to 22 units SQ qHS   Continue Admelog 6 units TID before meals (Hold if NPO/not eating meal)   Admelog Low Correction Scale Premeal & Low Correction Scale Bedtime   BG before meals and bedtime   Hypoglycemia protocol   Carb Consistent Diet   Nutrition consult     Discharge Plan:  Metformin 1g BID, increase Jardiance to 25 mg daily  Our team discussed starting basal insulin along w/ home oral medications but patient not amenable to this; also suggested GLP-1 as another option to add to his current home medications but patient unwilling to add on additional medications at this time.   Patient can follow up with his PCP after discharge or if he wishes to, with endocrine at the location provided below:   [facility]   [address] [place] [address] [phone]    2. HTN  BP goal < 130/80   Defer management to primary team     3. HLD   LDL goal < 70   Consider addition of statin if no other contraindications given uncontrolled DM as a CV risk factor     Lissette Nieves  Nurse Practitioner  Division of Endocrinology & Diabetes  In house pager #[phone]/long range pager #[phone]    If before 9AM or after 6PM, or on weekends/holidays, please call endocrine answering service for assistance ([phone]).  For nonurgent matters email [email or web address] for assistance.

## 2022-02-05 NOTE — PROGRESS NOTE ADULT - SUBJECTIVE AND OBJECTIVE BOX
Patient is a 41y old  Male who presents with a chief complaint of Chest pain, Rt hand pain (02 Feb 2022 10:28)    SUBJECTIVE / OVERNIGHT EVENTS: Patient seen and examined. Reports improvement in SOB and chest pain. No more LE edema. Still has some pain in his right hand. Pt insists that injury to his hand occured when he got arrested and not weeks ago.     MEDICATIONS  (STANDING):  budesonide 160 MICROgram(s)/formoterol 4.5 MICROgram(s) Inhaler 2 Puff(s) Inhalation two times a day  carvedilol 12.5 milliGRAM(s) Oral every 12 hours  dextrose 40% Gel 15 Gram(s) Oral once  dextrose 5%. 1000 milliLiter(s) (50 mL/Hr) IV Continuous <Continuous>  dextrose 5%. 1000 milliLiter(s) (100 mL/Hr) IV Continuous <Continuous>  dextrose 50% Injectable 25 Gram(s) IV Push once  dextrose 50% Injectable 12.5 Gram(s) IV Push once  dextrose 50% Injectable 25 Gram(s) IV Push once  furosemide   Injectable 20 milliGRAM(s) IV Push two times a day  glucagon  Injectable 1 milliGRAM(s) IntraMuscular once  hydrALAZINE 75 milliGRAM(s) Oral three times a day  influenza   Vaccine 0.5 milliLiter(s) IntraMuscular once  insulin glargine Injectable (LANTUS) 18 Unit(s) SubCutaneous at bedtime  insulin lispro (ADMELOG) corrective regimen sliding scale   SubCutaneous three times a day before meals  insulin lispro (ADMELOG) corrective regimen sliding scale   SubCutaneous at bedtime  insulin lispro Injectable (ADMELOG) 5 Unit(s) SubCutaneous three times a day before meals  isosorbide   dinitrate Tablet (ISORDIL) 20 milliGRAM(s) Oral three times a day  losartan 100 milliGRAM(s) Oral daily  sodium chloride 0.9% lock flush 3 milliLiter(s) IV Push every 8 hours    MEDICATIONS  (PRN):  acetaminophen     Tablet .. 650 milliGRAM(s) Oral every 6 hours PRN Mild Pain (1 - 3), Moderate Pain (4 - 6)  ALBUTerol    90 MICROgram(s) HFA Inhaler 2 Puff(s) Inhalation every 6 hours PRN Shortness of Breath and/or Wheezing    CAPILLARY BLOOD GLUCOSE  POCT Blood Glucose.: 254 mg/dL (05 Feb 2022 08:12)  POCT Blood Glucose.: 232 mg/dL (04 Feb 2022 21:10)  POCT Blood Glucose.: 139 mg/dL (04 Feb 2022 16:46)  POCT Blood Glucose.: 168 mg/dL (04 Feb 2022 12:01)      I&O's Summary    03 Feb 2022 07:01  -  04 Feb 2022 07:00  --------------------------------------------------------  IN: 480 mL / OUT: 850 mL / NET: -370 mL    04 Feb 2022 07:01  -  04 Feb 2022 13:15  --------------------------------------------------------  IN: 120 mL / OUT: 0 mL / NET: 120 mL          PHYSICAL EXAM:  GENERAL: NAD, well-developed  HEAD:  Atraumatic, Normocephalic  EYES: EOMI, PERRLA, conjunctiva and sclera clear  NECK: Supple, No JVD  CHEST/LUNG: Clear to auscultation bilaterally; No wheeze  HEART: Regular rate and rhythm; No murmurs, rubs, or gallops  ABDOMEN: Soft, Nontender, Nondistended; Bowel sounds present  EXTREMITIES:  right hand in cast, no Le edema  PSYCH: AAOx3  NEUROLOGY: non-focal  SKIN: No rashes or lesions    LABS:                                   16.3   9.82  )-----------( 193      ( 04 Feb 2022 15:05 )             49.9   02-04    139  |  101  |  20  ----------------------------<  224<H>  4.3   |  26  |  1.33<H>    Ca    9.2      04 Feb 2022 15:05  Phos  3.7     02-04  Mg     1.80     02-04          RADIOLOGY & ADDITIONAL TESTS: < from: Xray Chest 1 View- PORTABLE-Urgent (Xray Chest 1 View- PORTABLE-Urgent .) (02.01.22 @ 14:29) >  IMPRESSION:  Clear lungs    < end of copied text >      Imaging Personally Reviewed:    Consultant(s) Notes Reviewed:  endo    Care Discussed with Consultants/Other Providers: Hand surgery     Assessment and Plan:

## 2022-02-05 NOTE — PROGRESS NOTE ADULT - SUBJECTIVE AND OBJECTIVE BOX
Chief Complaint: DM 2    History: Patient seen at bedside. Reports he is eating meals, denies n/v, denies s/s of hypoglycemia  Patient endorses drinking juice prior to FS this morning 254 mg/dl  Per RN, patient intermittently having juice in spite of education on diet. Reinforced importance of consistent carbohydrate and avoidance of concentrated sweets    MEDICATIONS  (STANDING):  budesonide 160 MICROgram(s)/formoterol 4.5 MICROgram(s) Inhaler 2 Puff(s) Inhalation two times a day  carvedilol 12.5 milliGRAM(s) Oral every 12 hours  dextrose 40% Gel 15 Gram(s) Oral once  dextrose 5%. 1000 milliLiter(s) (50 mL/Hr) IV Continuous <Continuous>  dextrose 5%. 1000 milliLiter(s) (100 mL/Hr) IV Continuous <Continuous>  dextrose 50% Injectable 25 Gram(s) IV Push once  dextrose 50% Injectable 12.5 Gram(s) IV Push once  dextrose 50% Injectable 25 Gram(s) IV Push once  furosemide   Injectable 40 milliGRAM(s) IV Push two times a day  glucagon  Injectable 1 milliGRAM(s) IntraMuscular once  hydrALAZINE 75 milliGRAM(s) Oral three times a day  influenza   Vaccine 0.5 milliLiter(s) IntraMuscular once  insulin glargine Injectable (LANTUS) 20 Unit(s) SubCutaneous at bedtime  insulin lispro (ADMELOG) corrective regimen sliding scale   SubCutaneous three times a day before meals  insulin lispro (ADMELOG) corrective regimen sliding scale   SubCutaneous at bedtime  insulin lispro Injectable (ADMELOG) 6 Unit(s) SubCutaneous three times a day before meals  losartan 100 milliGRAM(s) Oral daily  melatonin 6 milliGRAM(s) Oral at bedtime  sodium chloride 0.9% lock flush 3 milliLiter(s) IV Push every 8 hours    MEDICATIONS  (PRN):  acetaminophen     Tablet .. 650 milliGRAM(s) Oral every 6 hours PRN Mild Pain (1 - 3), Moderate Pain (4 - 6)  ALBUTerol    90 MICROgram(s) HFA Inhaler 2 Puff(s) Inhalation every 6 hours PRN Shortness of Breath and/or Wheezing  oxyCODONE    IR 5 milliGRAM(s) Oral every 4 hours PRN Severe Pain (7 - 10)    No Known Allergies    Review of Systems:  HEENT: No pain  Cardiovascular: No chest pain  Respiratory: No SOB  GI: No nausea, vomiting    PHYSICAL EXAM:  VITALS: T(C): 36.8 (02-05-22 @ 12:08)  T(F): 98.2 (02-05-22 @ 12:08), Max: 98.2 (02-04-22 @ 21:00)  HR: 88 (02-05-22 @ 12:08) (88 - 100)  BP: 143/92 (02-05-22 @ 12:08) (132/90 - 149/106)  RR:  (16 - 18)  SpO2:  (100% - 100%)  Wt(kg): --  GENERAL: NAD  EYES: No proptosis, no lid lag, anicteric  HEENT:  Atraumatic, Normocephalic, moist mucous membranes  RESPIRATORY: unlabored respirations     CAPILLARY BLOOD GLUCOSE    POCT Blood Glucose.: 159 mg/dL (05 Feb 2022 12:15)  POCT Blood Glucose.: 254 mg/dL (05 Feb 2022 08:12)  POCT Blood Glucose.: 232 mg/dL (04 Feb 2022 21:10)  POCT Blood Glucose.: 139 mg/dL (04 Feb 2022 16:46)      02-05    139  |  102  |  18  ----------------------------<  174<H>  4.1   |  26  |  0.88    EGFR if : 123  EGFR if non : 106    Ca    9.3      02-05  Mg     2.00     02-05  Phos  3.5     02-05      A1C with Estimated Average Glucose Result: 11.1 % (02-02-22 @ 07:43)  A1C with Estimated Average Glucose Result: 11.3 % (02-01-22 @ 16:13)  A1C with Estimated Average Glucose Result: 11.2 % (02-01-22 @ 13:51)    Diet, Regular:   Consistent Carbohydrate Evening Snack (CSTCHOSN)  DASH/TLC Sodium & Cholesterol Restricted (DASH)  1200mL Fluid Restriction ([id number]) (02-02-22 @ 16:42)

## 2022-02-06 LAB
ANION GAP SERPL CALC-SCNC: 10 MMOL/L — SIGNIFICANT CHANGE UP (ref 7–14)
BASOPHILS # BLD AUTO: 0.03 K/UL — SIGNIFICANT CHANGE UP (ref 0–0.2)
BASOPHILS NFR BLD AUTO: 0.4 % — SIGNIFICANT CHANGE UP (ref 0–2)
BUN SERPL-MCNC: 17 MG/DL — SIGNIFICANT CHANGE UP (ref 7–23)
CALCIUM SERPL-MCNC: 9.3 MG/DL — SIGNIFICANT CHANGE UP (ref 8.4–10.5)
CHLORIDE SERPL-SCNC: 99 MMOL/L — SIGNIFICANT CHANGE UP (ref 98–107)
CO2 SERPL-SCNC: 28 MMOL/L — SIGNIFICANT CHANGE UP (ref 22–31)
CREAT SERPL-MCNC: 0.93 MG/DL — SIGNIFICANT CHANGE UP (ref 0.5–1.3)
EOSINOPHIL # BLD AUTO: 0.14 K/UL — SIGNIFICANT CHANGE UP (ref 0–0.5)
EOSINOPHIL NFR BLD AUTO: 1.6 % — SIGNIFICANT CHANGE UP (ref 0–6)
GLUCOSE BLDC GLUCOMTR-MCNC: 154 MG/DL — HIGH (ref 70–99)
GLUCOSE BLDC GLUCOMTR-MCNC: 171 MG/DL — HIGH (ref 70–99)
GLUCOSE BLDC GLUCOMTR-MCNC: 178 MG/DL — HIGH (ref 70–99)
GLUCOSE BLDC GLUCOMTR-MCNC: 181 MG/DL — HIGH (ref 70–99)
GLUCOSE SERPL-MCNC: 194 MG/DL — HIGH (ref 70–99)
HCT VFR BLD CALC: 54 % — HIGH (ref 39–50)
HGB BLD-MCNC: 18.3 G/DL — HIGH (ref 13–17)
IANC: 5.04 K/UL — SIGNIFICANT CHANGE UP (ref 1.5–8.5)
IMM GRANULOCYTES NFR BLD AUTO: 0.6 % — SIGNIFICANT CHANGE UP (ref 0–1.5)
LYMPHOCYTES # BLD AUTO: 2.59 K/UL — SIGNIFICANT CHANGE UP (ref 1–3.3)
LYMPHOCYTES # BLD AUTO: 30.3 % — SIGNIFICANT CHANGE UP (ref 13–44)
MAGNESIUM SERPL-MCNC: 1.7 MG/DL — SIGNIFICANT CHANGE UP (ref 1.6–2.6)
MCHC RBC-ENTMCNC: 30.1 PG — SIGNIFICANT CHANGE UP (ref 27–34)
MCHC RBC-ENTMCNC: 33.9 GM/DL — SIGNIFICANT CHANGE UP (ref 32–36)
MCV RBC AUTO: 88.8 FL — SIGNIFICANT CHANGE UP (ref 80–100)
MONOCYTES # BLD AUTO: 0.69 K/UL — SIGNIFICANT CHANGE UP (ref 0–0.9)
MONOCYTES NFR BLD AUTO: 8.1 % — SIGNIFICANT CHANGE UP (ref 2–14)
NEUTROPHILS # BLD AUTO: 5.04 K/UL — SIGNIFICANT CHANGE UP (ref 1.8–7.4)
NEUTROPHILS NFR BLD AUTO: 59 % — SIGNIFICANT CHANGE UP (ref 43–77)
NRBC # BLD: 0 /100 WBCS — SIGNIFICANT CHANGE UP
NRBC # FLD: 0 K/UL — SIGNIFICANT CHANGE UP
PHOSPHATE SERPL-MCNC: 4.3 MG/DL — SIGNIFICANT CHANGE UP (ref 2.5–4.5)
PLATELET # BLD AUTO: 210 K/UL — SIGNIFICANT CHANGE UP (ref 150–400)
POTASSIUM SERPL-MCNC: 3.6 MMOL/L — SIGNIFICANT CHANGE UP (ref 3.5–5.3)
POTASSIUM SERPL-SCNC: 3.6 MMOL/L — SIGNIFICANT CHANGE UP (ref 3.5–5.3)
RBC # BLD: 6.08 M/UL — HIGH (ref 4.2–5.8)
RBC # FLD: 14.1 % — SIGNIFICANT CHANGE UP (ref 10.3–14.5)
SODIUM SERPL-SCNC: 137 MMOL/L — SIGNIFICANT CHANGE UP (ref 135–145)
WBC # BLD: 8.54 K/UL — SIGNIFICANT CHANGE UP (ref 3.8–10.5)
WBC # FLD AUTO: 8.54 K/UL — SIGNIFICANT CHANGE UP (ref 3.8–10.5)

## 2022-02-06 PROCEDURE — 93018 CV STRESS TEST I&R ONLY: CPT | Mod: GC

## 2022-02-06 PROCEDURE — 99232 SBSQ HOSP IP/OBS MODERATE 35: CPT | Mod: GC

## 2022-02-06 PROCEDURE — 93016 CV STRESS TEST SUPVJ ONLY: CPT | Mod: GC

## 2022-02-06 PROCEDURE — 78452 HT MUSCLE IMAGE SPECT MULT: CPT | Mod: 26

## 2022-02-06 PROCEDURE — 99232 SBSQ HOSP IP/OBS MODERATE 35: CPT

## 2022-02-06 RX ORDER — POTASSIUM CHLORIDE 20 MEQ
40 PACKET (EA) ORAL ONCE
Refills: 0 | Status: COMPLETED | OUTPATIENT
Start: 2022-02-06 | End: 2022-02-06

## 2022-02-06 RX ADMIN — BUDESONIDE AND FORMOTEROL FUMARATE DIHYDRATE 2 PUFF(S): 160; 4.5 AEROSOL RESPIRATORY (INHALATION) at 22:12

## 2022-02-06 RX ADMIN — CARVEDILOL PHOSPHATE 12.5 MILLIGRAM(S): 80 CAPSULE, EXTENDED RELEASE ORAL at 17:07

## 2022-02-06 RX ADMIN — OXYCODONE HYDROCHLORIDE 5 MILLIGRAM(S): 5 TABLET ORAL at 18:05

## 2022-02-06 RX ADMIN — Medication 1: at 12:42

## 2022-02-06 RX ADMIN — Medication 75 MILLIGRAM(S): at 22:12

## 2022-02-06 RX ADMIN — Medication 75 MILLIGRAM(S): at 05:03

## 2022-02-06 RX ADMIN — SODIUM CHLORIDE 3 MILLILITER(S): 9 INJECTION INTRAMUSCULAR; INTRAVENOUS; SUBCUTANEOUS at 04:49

## 2022-02-06 RX ADMIN — OXYCODONE HYDROCHLORIDE 5 MILLIGRAM(S): 5 TABLET ORAL at 17:06

## 2022-02-06 RX ADMIN — Medication 40 MILLIGRAM(S): at 17:07

## 2022-02-06 RX ADMIN — INSULIN GLARGINE 22 UNIT(S): 100 INJECTION, SOLUTION SUBCUTANEOUS at 22:11

## 2022-02-06 RX ADMIN — SODIUM CHLORIDE 3 MILLILITER(S): 9 INJECTION INTRAMUSCULAR; INTRAVENOUS; SUBCUTANEOUS at 21:48

## 2022-02-06 RX ADMIN — Medication 75 MILLIGRAM(S): at 13:38

## 2022-02-06 RX ADMIN — CARVEDILOL PHOSPHATE 12.5 MILLIGRAM(S): 80 CAPSULE, EXTENDED RELEASE ORAL at 05:03

## 2022-02-06 RX ADMIN — Medication 6 MILLIGRAM(S): at 22:12

## 2022-02-06 RX ADMIN — Medication 6 UNIT(S): at 17:25

## 2022-02-06 RX ADMIN — Medication 6 UNIT(S): at 08:59

## 2022-02-06 RX ADMIN — LOSARTAN POTASSIUM 100 MILLIGRAM(S): 100 TABLET, FILM COATED ORAL at 05:03

## 2022-02-06 RX ADMIN — Medication 1: at 17:25

## 2022-02-06 RX ADMIN — Medication 6 UNIT(S): at 12:42

## 2022-02-06 RX ADMIN — Medication 1: at 08:58

## 2022-02-06 RX ADMIN — Medication 40 MILLIEQUIVALENT(S): at 09:18

## 2022-02-06 RX ADMIN — Medication 40 MILLIGRAM(S): at 05:04

## 2022-02-06 RX ADMIN — SODIUM CHLORIDE 3 MILLILITER(S): 9 INJECTION INTRAMUSCULAR; INTRAVENOUS; SUBCUTANEOUS at 13:35
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
## 2022-02-06 NOTE — PROGRESS NOTE ADULT - SUBJECTIVE AND OBJECTIVE BOX
Patient is a 41y old  Male who presents with a chief complaint of Chest pain, Rt hand pain (02 Feb 2022 10:28)    SUBJECTIVE / OVERNIGHT EVENTS: Patient seen and examined. Reports improvement in SOB and chest pain. No more LE edema. Still has some pain in his right hand. Went for NST this morning.     MEDICATIONS  (STANDING):  budesonide 160 MICROgram(s)/formoterol 4.5 MICROgram(s) Inhaler 2 Puff(s) Inhalation two times a day  carvedilol 12.5 milliGRAM(s) Oral every 12 hours  dextrose 40% Gel 15 Gram(s) Oral once  dextrose 5%. 1000 milliLiter(s) (50 mL/Hr) IV Continuous <Continuous>  dextrose 5%. 1000 milliLiter(s) (100 mL/Hr) IV Continuous <Continuous>  dextrose 50% Injectable 25 Gram(s) IV Push once  dextrose 50% Injectable 12.5 Gram(s) IV Push once  dextrose 50% Injectable 25 Gram(s) IV Push once  furosemide   Injectable 20 milliGRAM(s) IV Push two times a day  glucagon  Injectable 1 milliGRAM(s) IntraMuscular once  hydrALAZINE 75 milliGRAM(s) Oral three times a day  influenza   Vaccine 0.5 milliLiter(s) IntraMuscular once  insulin glargine Injectable (LANTUS) 18 Unit(s) SubCutaneous at bedtime  insulin lispro (ADMELOG) corrective regimen sliding scale   SubCutaneous three times a day before meals  insulin lispro (ADMELOG) corrective regimen sliding scale   SubCutaneous at bedtime  insulin lispro Injectable (ADMELOG) 5 Unit(s) SubCutaneous three times a day before meals  isosorbide   dinitrate Tablet (ISORDIL) 20 milliGRAM(s) Oral three times a day  losartan 100 milliGRAM(s) Oral daily  sodium chloride 0.9% lock flush 3 milliLiter(s) IV Push every 8 hours    MEDICATIONS  (PRN):  acetaminophen     Tablet .. 650 milliGRAM(s) Oral every 6 hours PRN Mild Pain (1 - 3), Moderate Pain (4 - 6)  ALBUTerol    90 MICROgram(s) HFA Inhaler 2 Puff(s) Inhalation every 6 hours PRN Shortness of Breath and/or Wheezing    CAPILLARY BLOOD GLUCOSE  POCT Blood Glucose.: 171 mg/dL (06 Feb 2022 12:17)  POCT Blood Glucose.: 181 mg/dL (06 Feb 2022 08:50)  POCT Blood Glucose.: 181 mg/dL (05 Feb 2022 21:45)  POCT Blood Glucose.: 187 mg/dL (05 Feb 2022 16:20)      I&O's Summary    05 Feb 2022 07:01  -  06 Feb 2022 07:00  --------------------------------------------------------  IN: 600 mL / OUT: 1100 mL / NET: -500 mL      PHYSICAL EXAM:  GENERAL: NAD, well-developed  HEAD:  Atraumatic, Normocephalic  EYES: EOMI, PERRLA, conjunctiva and sclera clear  NECK: Supple, No JVD  CHEST/LUNG: Clear to auscultation bilaterally; No wheeze  HEART: Regular rate and rhythm; No murmurs, rubs, or gallops  ABDOMEN: Soft, Nontender, Nondistended; Bowel sounds present  EXTREMITIES:  right hand deformity noted   PSYCH: AAOx3  NEUROLOGY: non-focal  SKIN: No rashes or lesions    LABS:                          18.3   8.54  )-----------( 210      ( 06 Feb 2022 07:30 )             54.0   02-06    137  |  99  |  17  ----------------------------<  194<H>  3.6   |  28  |  0.93    Ca    9.3      06 Feb 2022 07:30  Phos  4.3     02-06  Mg     1.70     02-06      RADIOLOGY & ADDITIONAL TESTS: < from: Xray Chest 1 View- PORTABLE-Urgent (Xray Chest 1 View- PORTABLE-Urgent .) (02.01.22 @ 14:29) >  IMPRESSION:  Clear lungs    < end of copied text >      Imaging Personally Reviewed:    Consultant(s) Notes Reviewed:  endo    Care Discussed with Consultants/Other Providers: Hand surgery     Assessment and Plan:

## 2022-02-06 NOTE — PROGRESS NOTE ADULT - PROBLEM SELECTOR PLAN 1
-Intermitted CP, trace leg edema, elevated ProBNP; ALAS; Self-reported medications nonadherence >3 weeks  -now appearing close to euvolemic  - cxr with clear lungs, LE edema resolving    - Lasix 40IV BID per HF  - TTE with EF 27%.   -Fluid restriction to 1500 ml.  -DASH/TLC diet.  -Strict I&O's.  -c.w losartan and  coreg  -Stressed importance of medication adherence and outpt follow up  -HF evaluation appreciated. Stress test pending same name as above

## 2022-02-06 NOTE — PROGRESS NOTE ADULT - SUBJECTIVE AND OBJECTIVE BOX
Harry Chand  [phone]  All Cardiology service information can be found 24/7 on [email or web address], password: [email or web address]    Overnight Events: No events overnight. Pt feels well today. Underwent NST earlier this morning.     Review Of Systems: No chest pain, shortness of breath, or palpitations            Current Meds:  acetaminophen     Tablet .. 650 milliGRAM(s) Oral every 6 hours PRN  ALBUTerol    90 MICROgram(s) HFA Inhaler 2 Puff(s) Inhalation every 6 hours PRN  budesonide 160 MICROgram(s)/formoterol 4.5 MICROgram(s) Inhaler 2 Puff(s) Inhalation two times a day  carvedilol 12.5 milliGRAM(s) Oral every 12 hours  dextrose 40% Gel 15 Gram(s) Oral once  dextrose 5%. 1000 milliLiter(s) IV Continuous <Continuous>  dextrose 5%. 1000 milliLiter(s) IV Continuous <Continuous>  dextrose 50% Injectable 25 Gram(s) IV Push once  dextrose 50% Injectable 12.5 Gram(s) IV Push once  dextrose 50% Injectable 25 Gram(s) IV Push once  furosemide   Injectable 40 milliGRAM(s) IV Push two times a day  glucagon  Injectable 1 milliGRAM(s) IntraMuscular once  hydrALAZINE 75 milliGRAM(s) Oral three times a day  influenza   Vaccine 0.5 milliLiter(s) IntraMuscular once  insulin glargine Injectable (LANTUS) 22 Unit(s) SubCutaneous at bedtime  insulin lispro (ADMELOG) corrective regimen sliding scale   SubCutaneous three times a day before meals  insulin lispro (ADMELOG) corrective regimen sliding scale   SubCutaneous at bedtime  insulin lispro Injectable (ADMELOG) 6 Unit(s) SubCutaneous three times a day before meals  losartan 100 milliGRAM(s) Oral daily  melatonin 6 milliGRAM(s) Oral at bedtime  oxyCODONE    IR 5 milliGRAM(s) Oral every 4 hours PRN  potassium chloride    Tablet ER 40 milliEquivalent(s) Oral once  sodium chloride 0.9% lock flush 3 milliLiter(s) IV Push every 8 hours      Vitals:  T(F): 97.8 (02-06), Max: 98.2 (02-05)  HR: 90 (02-06) (88 - 95)  BP: 131/80 (02-06) (131/80 - 143/100)  RR: 16 (02-06)  SpO2: 100% (02-06)  I&O's Summary    05 Feb 2022 07:01  -  06 Feb 2022 07:00  --------------------------------------------------------  IN: 600 mL / OUT: 1100 mL / NET: -500 mL        Physical Exam:  Appearance: No Acute Distress  Neck: supple, +JVD  Cardiovascular: Normal S1 S2  Respiratory: Clear to auscultation bilaterally  Gastrointestinal: Soft, Non-tender	  Skin: No cyanosis	  Neurologic: Non-focal  Extremities: Trace BLE edema  Psychiatry: Mood & affect appropriate                          18.3   8.54  )-----------( 210      ( 06 Feb 2022 07:30 )             54.0     02-06    137  |  99  |  17  ----------------------------<  194<H>  3.6   |  28  |  0.93    Ca    9.3      06 Feb 2022 07:30  Phos  4.3     02-06  Mg     1.70     02-06        CARDIAC MARKERS ( 01 Feb 2022 16:12 )  25 ng/L / x     / x     / x     / x     / x      CARDIAC MARKERS ( 01 Feb 2022 13:51 )  26 ng/L / x     / x     / x     / x     / x          Serum Pro-Brain Natriuretic Peptide: 519 pg/mL (02-01 @ 13:51)      Interpretation of Telemetry: sinus pvc

## 2022-02-07 DIAGNOSIS — I44.1 ATRIOVENTRICULAR BLOCK, SECOND DEGREE: ICD-10-CM

## 2022-02-07 DIAGNOSIS — I50.23 ACUTE ON CHRONIC SYSTOLIC (CONGESTIVE) HEART FAILURE: ICD-10-CM

## 2022-02-07 LAB
GLUCOSE BLDC GLUCOMTR-MCNC: 160 MG/DL — HIGH (ref 70–99)
GLUCOSE BLDC GLUCOMTR-MCNC: 163 MG/DL — HIGH (ref 70–99)
GLUCOSE BLDC GLUCOMTR-MCNC: 205 MG/DL — HIGH (ref 70–99)
GLUCOSE BLDC GLUCOMTR-MCNC: 224 MG/DL — HIGH (ref 70–99)

## 2022-02-07 PROCEDURE — 99233 SBSQ HOSP IP/OBS HIGH 50: CPT

## 2022-02-07 PROCEDURE — 99232 SBSQ HOSP IP/OBS MODERATE 35: CPT | Mod: 25

## 2022-02-07 RX ORDER — CARVEDILOL PHOSPHATE 80 MG/1
12.5 CAPSULE, EXTENDED RELEASE ORAL EVERY 12 HOURS
Refills: 0 | Status: DISCONTINUED | OUTPATIENT
Start: 2022-02-07 | End: 2022-02-09

## 2022-02-07 RX ADMIN — INSULIN GLARGINE 22 UNIT(S): 100 INJECTION, SOLUTION SUBCUTANEOUS at 21:48

## 2022-02-07 RX ADMIN — Medication 40 MILLIGRAM(S): at 17:39

## 2022-02-07 RX ADMIN — Medication 6 UNIT(S): at 17:42

## 2022-02-07 RX ADMIN — Medication 40 MILLIGRAM(S): at 05:05

## 2022-02-07 RX ADMIN — Medication 6 MILLIGRAM(S): at 21:49

## 2022-02-07 RX ADMIN — SODIUM CHLORIDE 3 MILLILITER(S): 9 INJECTION INTRAMUSCULAR; INTRAVENOUS; SUBCUTANEOUS at 14:36

## 2022-02-07 RX ADMIN — CARVEDILOL PHOSPHATE 12.5 MILLIGRAM(S): 80 CAPSULE, EXTENDED RELEASE ORAL at 18:38

## 2022-02-07 RX ADMIN — Medication 6 UNIT(S): at 08:41

## 2022-02-07 RX ADMIN — Medication 1: at 08:40

## 2022-02-07 RX ADMIN — BUDESONIDE AND FORMOTEROL FUMARATE DIHYDRATE 2 PUFF(S): 160; 4.5 AEROSOL RESPIRATORY (INHALATION) at 21:48

## 2022-02-07 RX ADMIN — CARVEDILOL PHOSPHATE 12.5 MILLIGRAM(S): 80 CAPSULE, EXTENDED RELEASE ORAL at 05:05

## 2022-02-07 RX ADMIN — Medication 75 MILLIGRAM(S): at 15:39

## 2022-02-07 RX ADMIN — LOSARTAN POTASSIUM 100 MILLIGRAM(S): 100 TABLET, FILM COATED ORAL at 05:05

## 2022-02-07 RX ADMIN — Medication 6 UNIT(S): at 12:45

## 2022-02-07 RX ADMIN — SODIUM CHLORIDE 3 MILLILITER(S): 9 INJECTION INTRAMUSCULAR; INTRAVENOUS; SUBCUTANEOUS at 05:05

## 2022-02-07 RX ADMIN — Medication 1: at 17:41

## 2022-02-07 RX ADMIN — Medication 2: at 12:44

## 2022-02-07 RX ADMIN — SODIUM CHLORIDE 3 MILLILITER(S): 9 INJECTION INTRAMUSCULAR; INTRAVENOUS; SUBCUTANEOUS at 22:02

## 2022-02-07 RX ADMIN — Medication 75 MILLIGRAM(S): at 21:49

## 2022-02-07 RX ADMIN — Medication 75 MILLIGRAM(S): at 05:05

## 2022-02-07 NOTE — PROGRESS NOTE ADULT - SUBJECTIVE AND OBJECTIVE BOX
Patient seen and examined. He states he still fees ALAS after washing up today.   No SOB at rest, CP, palpitations.   NST w/ abnormal findings noted.         Medications:  acetaminophen     Tablet .. 650 milliGRAM(s) Oral every 6 hours PRN  ALBUTerol    90 MICROgram(s) HFA Inhaler 2 Puff(s) Inhalation every 6 hours PRN  budesonide 160 MICROgram(s)/formoterol 4.5 MICROgram(s) Inhaler 2 Puff(s) Inhalation two times a day  dextrose 40% Gel 15 Gram(s) Oral once  dextrose 5%. 1000 milliLiter(s) IV Continuous <Continuous>  dextrose 5%. 1000 milliLiter(s) IV Continuous <Continuous>  dextrose 50% Injectable 25 Gram(s) IV Push once  dextrose 50% Injectable 12.5 Gram(s) IV Push once  dextrose 50% Injectable 25 Gram(s) IV Push once  furosemide   Injectable 40 milliGRAM(s) IV Push two times a day  glucagon  Injectable 1 milliGRAM(s) IntraMuscular once  hydrALAZINE 75 milliGRAM(s) Oral three times a day  influenza   Vaccine 0.5 milliLiter(s) IntraMuscular once  insulin glargine Injectable (LANTUS) 22 Unit(s) SubCutaneous at bedtime  insulin lispro (ADMELOG) corrective regimen sliding scale   SubCutaneous three times a day before meals  insulin lispro (ADMELOG) corrective regimen sliding scale   SubCutaneous at bedtime  insulin lispro Injectable (ADMELOG) 6 Unit(s) SubCutaneous three times a day before meals  losartan 100 milliGRAM(s) Oral daily  melatonin 6 milliGRAM(s) Oral at bedtime  sodium chloride 0.9% lock flush 3 milliLiter(s) IV Push every 8 hours      Vitals:  Vital Signs Last 24 Hrs  T(C): 36.6 (07 Feb 2022 05:00), Max: 36.7 (06 Feb 2022 21:00)  T(F): 97.8 (07 Feb 2022 05:00), Max: 98 (06 Feb 2022 21:00)  HR: 86 (07 Feb 2022 05:00) (86 - 90)  BP: 128/81 (07 Feb 2022 05:00) (128/81 - 140/90)  BP(mean): --  RR: 18 (07 Feb 2022 05:00) (18 - 18)  SpO2: 100% (07 Feb 2022 05:00) (100% - 100%)    Daily     Daily     I&O's Detail    06 Feb 2022 07:01  -  07 Feb 2022 07:00  --------------------------------------------------------  IN:    Oral Fluid: 500 mL  Total IN: 500 mL    OUT:    Voided (mL): 400 mL  Total OUT: 400 mL    Total NET: 100 mL          Physical Exam:     General: No distress. Comfortable.  HEENT: EOM intact.  Neck: Neck supple. JVP not elevated. No masses  Chest: Clear to auscultation bilaterally  CV: Normal S1 and S2. No murmurs, rub, or gallops. Radial pulses normal. No LE edema and is warm b/l.   Abdomen: Soft, non-distended, non-tender  Skin: No rashes or skin breakdown  Neurology: Alert and oriented times three. Sensation intact  Psych: Affect normal    Labs:                        18.3   8.54  )-----------( 210      ( 06 Feb 2022 07:30 )             54.0     02-06    137  |  99  |  17  ----------------------------<  194<H>  3.6   |  28  |  0.93    Ca    9.3      06 Feb 2022 07:30  Phos  4.3     02-06  Mg     1.70     02-06             Patient seen and examined. He states he still feels ALAS after washing up today.   No SOB at rest, CP, palpitations.   NST w/ abnormal findings noted.         Medications:  acetaminophen     Tablet .. 650 milliGRAM(s) Oral every 6 hours PRN  ALBUTerol    90 MICROgram(s) HFA Inhaler 2 Puff(s) Inhalation every 6 hours PRN  budesonide 160 MICROgram(s)/formoterol 4.5 MICROgram(s) Inhaler 2 Puff(s) Inhalation two times a day  dextrose 40% Gel 15 Gram(s) Oral once  dextrose 5%. 1000 milliLiter(s) IV Continuous <Continuous>  dextrose 5%. 1000 milliLiter(s) IV Continuous <Continuous>  dextrose 50% Injectable 25 Gram(s) IV Push once  dextrose 50% Injectable 12.5 Gram(s) IV Push once  dextrose 50% Injectable 25 Gram(s) IV Push once  furosemide   Injectable 40 milliGRAM(s) IV Push two times a day  glucagon  Injectable 1 milliGRAM(s) IntraMuscular once  hydrALAZINE 75 milliGRAM(s) Oral three times a day  influenza   Vaccine 0.5 milliLiter(s) IntraMuscular once  insulin glargine Injectable (LANTUS) 22 Unit(s) SubCutaneous at bedtime  insulin lispro (ADMELOG) corrective regimen sliding scale   SubCutaneous three times a day before meals  insulin lispro (ADMELOG) corrective regimen sliding scale   SubCutaneous at bedtime  insulin lispro Injectable (ADMELOG) 6 Unit(s) SubCutaneous three times a day before meals  losartan 100 milliGRAM(s) Oral daily  melatonin 6 milliGRAM(s) Oral at bedtime  sodium chloride 0.9% lock flush 3 milliLiter(s) IV Push every 8 hours      Vitals:  Vital Signs Last 24 Hrs  T(C): 36.6 (07 Feb 2022 05:00), Max: 36.7 (06 Feb 2022 21:00)  T(F): 97.8 (07 Feb 2022 05:00), Max: 98 (06 Feb 2022 21:00)  HR: 86 (07 Feb 2022 05:00) (86 - 90)  BP: 128/81 (07 Feb 2022 05:00) (128/81 - 140/90)  BP(mean): --  RR: 18 (07 Feb 2022 05:00) (18 - 18)  SpO2: 100% (07 Feb 2022 05:00) (100% - 100%)    Daily     Daily     I&O's Detail    06 Feb 2022 07:01  -  07 Feb 2022 07:00  --------------------------------------------------------  IN:    Oral Fluid: 500 mL  Total IN: 500 mL    OUT:    Voided (mL): 400 mL  Total OUT: 400 mL    Total NET: 100 mL          Physical Exam:     General: No distress. Comfortable.  HEENT: EOM intact.  Neck: Neck supple. JVP not elevated. No masses  Chest: Clear to auscultation bilaterally  CV: Normal S1 and S2. No murmurs, rub, or gallops. Radial pulses normal. No LE edema and is warm b/l.   Abdomen: Soft, non-distended, non-tender  Skin: No rashes or skin breakdown  Neurology: Alert and oriented times three. Sensation intact  Psych: Affect normal    Labs:                        18.3   8.54  )-----------( 210      ( 06 Feb 2022 07:30 )             54.0     02-06    137  |  99  |  17  ----------------------------<  194<H>  3.6   |  28  |  0.93    Ca    9.3      06 Feb 2022 07:30  Phos  4.3     02-06  Mg     1.70     02-06

## 2022-02-07 NOTE — CONSULT NOTE ADULT - ATTENDING COMMENTS
41 year old male with a PMH of chronic systolic heart failure since 2019, dilated cardiomyopathy, HTN, asthma, smoker, DMT2-uncontrolled, HLD presented with c/o chest pain.  Patient describes "pricking sensation".   He was found to have acute decompensated heart failure and a fracture of 4th metacarpal bone may require hand surgery. Unclear if was on OMT for at least three months. Will follow on tele and continue OMT- uptitrate if possible.

## 2022-02-07 NOTE — PROVIDER CONTACT NOTE (OTHER) - ASSESSMENT
Pt had an episode of of second degree type 2; Pt lying in bed asymptomatic and vitally stable; vitals in flowsheet; EKG strip in chart; ACP notified
pt had 3 beats of V-tach on the tele monitor. pt is asymptomatic and is currently resting in bed, will continue to monitor. BEATRICE Moreno made aware.

## 2022-02-07 NOTE — PROVIDER CONTACT NOTE (OTHER) - BACKGROUND
(Admit Diagnosis) Chest pain  (PMH) Hyperlipidemia  (PMH) Type 2 diabetes mellitus  (PMH) Congestive heart failure (CHF)  (PMH) Asthma
Pt admitted for chest pain

## 2022-02-07 NOTE — PROVIDER CONTACT NOTE (OTHER) - ACTION/TREATMENT ORDERED:
ACP made aware; Coreg discontinued; atropine and zoll at the bedside; tele monitoring continued; will continue to monitor the patient
BEATRICE Moreno made aware. Will continue to monitor

## 2022-02-07 NOTE — CONSULT NOTE ADULT - ASSESSMENT
41 year old male with a PMH of chronic systolic heart failure since 2019, dilated cardiomyopathy, HTN, asthma, smoker, DMT2-uncontrolled, HLD presented with c/o chest pain.  Patient describes "pricking sensation".   He was found to have acute decompensated heart failure and a fracture of 4th metacarpal bone may require hand surgery.  Telemetry demonstrates SR with NSVT (4 beats) and one 2 rd degree AV block (Mobitz II) during sleep. In light of his chronic severely reduced LVEF despite of guideline-directed management and therapy since 2019 (with short duration of interruption of meds refills), symptomatic NSVT with palpitations and lightheadedness (may be secondary to transient Mobitz II, patient would likely benefit from ICD for primary prevention for sudden cardiac death from ventricular tachyarrhythmia.      -Continue Coreg, continue to monitor for symptomatic tatum with AVB/ NSVT  -Keep K >4 and Mg >1.8  -Continue HF treatement per HF team, ?patient still on waiting list of heart transplant  -Recommend ICD for SCD primary prevention        41 year old male with a PMH of chronic systolic heart failure since 2019, dilated cardiomyopathy, HTN, asthma, smoker, DMT2-uncontrolled, HLD presented with c/o chest pain.  Patient describes "pricking sensation".   He was found to have acute decompensated heart failure and a fracture of 4th metacarpal bone may require hand surgery.  Telemetry demonstrates SR with NSVT (4 beats) and one 2 rd degree AV block (Mobitz II) during sleep. In light of his chronic severely reduced LVEF despite of guideline-directed management and therapy since 2019 (with short duration of interruption of meds refills), symptomatic NSVT with palpitations and lightheadedness (may be secondary to transient Mobitz II, patient would likely benefit from ICD for primary prevention for sudden cardiac death from ventricular tachyarrhythmia.      -Continue Coreg for now, continue to monitor for symptomatic tatum with AVB/ NSVT  -Keep K >4 and Mg >1.8  -Continue HF treatement per HF team, need to verify if patient is a candidate for heart transplant or is still on waiting list  -Recommend ICD for SCD primary prevention if patient is a not candidate for heart transplant

## 2022-02-07 NOTE — PROGRESS NOTE ADULT - PROBLEM SELECTOR PLAN 1
Providers


Date of admission: 


02/19/20 08:39





Attending physician: 


Abby Rodarte





Primary care physician: 


Christine DACOSTA [facility] Course: 





76-year-old female with known history of nonalcoholic cirrhosis given with 

complaints of increasing abdominal distention denied any abdominal pain but some

pressure in the chest because of his had abdominal distention clinically patient

didn't have significant ascites noted denied any significant abdominal pain no 

tenderness of and on exam my suspicion is low for spontaneous Bactrim bronchitis

clinically.  Although clinically there is no significant ascites upon doing an 

ultrasound patient appears to have significant ascites because of which I 

ordered an ultrasound-guided paracentesis meantime patient will be started on IV

Lasix and Aldactone, patient's serum creatinine is bit elevated to 1.24 baseline

is around 0.8 patient on 40 mg of oral Lasix at home along with Aldactone.,  

Patient was extensively evaluated for etiology of cirrhosis was told because of 

the medications unsure which medication she is started about.  There is a small 

left-sided pleural effusion on the chest x-ray.





02/20/2020





Patient underwent paracentesis with removal of around 5 L of fluid.  And patient

is feeling much better, patient's Lasix will be increased to 40 mg twice a day 

will cut down the dose of lisinopril because of hypotension patient is probably 

on lisinopril as nephro protective agent because of her diabetes mellitus.  

Metformin will be discontinued because of concerns of lactic acidosis because of

her cirrhosis have blood sugars are okay without metformin.  Patient will be 

given potassium supplementation along with Aldactone and repeat basic metabolic 

profile in 3 days.











PHYSICAL EXAMINATION: 





GENERAL: The patient is alert and oriented x3, not in any acute distress. Well 

developed, well nourished. 


HEENT: Pupils are round and equally reacting to light. EOMI. No scleral icterus.

No conjunctival pallor. Normocephalic, atraumatic. No pharyngeal erythema. No 

thyromegaly. 


CARDIOVASCULAR: S1 and S2 present. No murmurs, rubs, or gallops. 


PULMONARY: Chest is clear to auscultation, no wheezing or crackles. 


ABDOMEN: Soft, nontender, nondistended, significant improvement in ascites


MUSCULOSKELETAL: No joint swelling or deformity.


EXTREMITIES: No cyanosis, clubbing, or pedal edema. 


NEUROLOGICAL: Gross neurological examination did not reveal any focal deficits. 


SKIN: No rashes. 








Assessment and Plan


Plan: 


-Symptomatic ascites: Secondary to nonalcoholic cirrhosis, status post 

paracentesis no evidence of for spontaneous bacterial peritonitis patient will 

be discharged with the above-mentioned the


Thrombocytopenia: Secondary to cirrhosis and splenic sequestration and portal 

hypertension


-Type 2 diabetes mellitus: Foreign will be discontinued because of cirrhosis.  

linagliptan Will be continued


-Hypertension


-Depression


-Acute renal failure: Secondary to prerenal azotemia from cirrhosis 














Patient Condition at Discharge: Fair





Plan - Discharge Summary


Discharge Rx Participant: No


New Discharge Prescriptions: 


Continue


   sitaGLIPtin PHOSPHATE [Januvia] 100 mg PO DAILY


   Sertraline [Zoloft] 50 mg PO DAILY


   Pioglitazone [Actos] 30 mg PO DAILY


   Cholecalciferol (Vitamin D3) [Vitamin D3] 2,000 unit PO DAILY


   Cyanocobalamin [Vitamin B-12] 1,000 mcg PO DAILY #60 tab


   Spironolactone [Aldactone] 50 mg PO DAILY


   Vitamin B Complex 1 cap PO DAILY





Changed


   Furosemide [Lasix] 40 mg PO BID #30 tablet


   Lisinopril [Zestril] 5 mg PO DAILY #0





Discontinued


   metFORMIN HCL 1,000 mg PO BID


Discharge Medication List





Cholecalciferol (Vitamin D3) [Vitamin D3] 2,000 unit PO DAILY 11/09/19 [History]


Pioglitazone [Actos] 30 mg PO DAILY 11/09/19 [History]


Sertraline [Zoloft] 50 mg PO DAILY 11/09/19 [History]


sitaGLIPtin PHOSPHATE [Januvia] 100 mg PO DAILY 11/09/19 [History]


Cyanocobalamin [Vitamin B-12] 1,000 mcg PO DAILY #60 tab 11/12/19 [Rx]


Spironolactone [Aldactone] 50 mg PO DAILY 01/14/20 [History]


Vitamin B Complex 1 cap PO DAILY 02/07/20 [History]


Furosemide [Lasix] 40 mg PO BID #30 tablet 02/20/20 [Rx]


Lisinopril [Zestril] 5 mg PO DAILY #0 02/20/20 [Rx]








Follow up Appointment(s)/Referral(s): 


Susan King MD [Primary Care Provider] - 3 Days


Ambulatory/Diagnostic Orders: 


Basic Metabolic Panel [LAB.AMB] Time Frame: 3 Days, Location: None Selected


Discharge Disposition: HOME SELF-CARE -Intermitted CP, trace leg edema, elevated ProBNP; ALAS; Self-reported medications nonadherence >3 weeks  -now appearing close to euvolemic  - cxr with clear lungs, LE edema resolving    - Lasix 40IV BID per HF  - TTE with EF 27%.   -Fluid restriction to 1500 ml.  -DASH/TLC diet.  -Strict I&O's.  -c.w losartan and  coreg  -Stressed importance of medication adherence and outpt follow up  -HF evaluation appreciated. Stress test pending

## 2022-02-07 NOTE — CONSULT NOTE ADULT - SUBJECTIVE AND OBJECTIVE BOX
Patient is a 41y old  Male who presents with a chief complaint of Chest pain, Rt hand pain (06 Feb 2022 12:22)          HPI:    41 year old male with a PMH of chronic systolic heart failure since 2019, dilated cardiomyopathy, was evaluated for transplant at [facility] in the past,  HTN, asthma, smoker, DMT2-uncontrolled, HLD presented with c/o chest pain.  Patient describes "pricking sensation".   He was found to have acute decompensated heart failure and a fracture of 4th metacarpal bone may require hand surgery.  Telemetry demonstrates SR with NSVT (4 beats) and one 2 rd degree AV block (Mobitz II) during sleep. Patient endorses hx of palpitations and lightheadedness at times, but denies syncope or near syncope.  He was on guideline-directed medical therapy for his chronic HFrEF, he reports the interruption of his meds once of while as he run out of meds (up to 3 weeks).  He underwent left heart catheterization in Dec 2019 which showed non-obstructive CAD and cardiac magnetic resonance (CMR) on 2/2/2019 which ruled out infiltrative heart diseases and scar.  LVEF was 22% on CMR.   Currently maintained on Coreg.  Responded to IV diuretic well now denies orthopnea.          PAST MEDICAL & SURGICAL HISTORY:  HTN (hypertension)    Asthma    Congestive heart failure (CHF)  EF 22%    Type 2 diabetes mellitus    Hyperlipidemia    No significant past surgical history        MEDICATIONS  (STANDING):  budesonide 160 MICROgram(s)/formoterol 4.5 MICROgram(s) Inhaler 2 Puff(s) Inhalation two times a day  dextrose 40% Gel 15 Gram(s) Oral once  dextrose 5%. 1000 milliLiter(s) (50 mL/Hr) IV Continuous <Continuous>  dextrose 5%. 1000 milliLiter(s) (100 mL/Hr) IV Continuous <Continuous>  dextrose 50% Injectable 25 Gram(s) IV Push once  dextrose 50% Injectable 12.5 Gram(s) IV Push once  dextrose 50% Injectable 25 Gram(s) IV Push once  furosemide   Injectable 40 milliGRAM(s) IV Push two times a day  glucagon  Injectable 1 milliGRAM(s) IntraMuscular once  hydrALAZINE 75 milliGRAM(s) Oral three times a day  influenza   Vaccine 0.5 milliLiter(s) IntraMuscular once  insulin glargine Injectable (LANTUS) 22 Unit(s) SubCutaneous at bedtime  insulin lispro (ADMELOG) corrective regimen sliding scale   SubCutaneous three times a day before meals  insulin lispro (ADMELOG) corrective regimen sliding scale   SubCutaneous at bedtime  insulin lispro Injectable (ADMELOG) 6 Unit(s) SubCutaneous three times a day before meals  losartan 100 milliGRAM(s) Oral daily  melatonin 6 milliGRAM(s) Oral at bedtime  sodium chloride 0.9% lock flush 3 milliLiter(s) IV Push every 8 hours    MEDICATIONS  (PRN):  acetaminophen     Tablet .. 650 milliGRAM(s) Oral every 6 hours PRN Mild Pain (1 - 3), Moderate Pain (4 - 6)  ALBUTerol    90 MICROgram(s) HFA Inhaler 2 Puff(s) Inhalation every 6 hours PRN Shortness of Breath and/or Wheezing    Allergies    No Known Allergies    Intolerances      FAMILY HISTORY:  FH: HTN (hypertension)        SOCIAL HISTORY:  +smoking; no   Alcohol  or  Drug abuse       REVIEW OF SYSTEMS:    CONSTITUTIONAL: No fever, weight loss, chills, shakes, or fatigue  EYES: No eye pain, visual disturbances, or discharge  ENMT:  No difficulty hearing, tinnitus, vertigo; No sinus or throat pain  NECK: No pain or stiffness  RESPIRATORY: No cough, wheezing, hemoptysis, or shortness of breath  CARDIOVASCULAR: No dyspnea, syncope, paroxysmal nocturnal dyspnea, orthopnea, or arm or leg swelling +palpitations, + dizziness, + chest pain  GASTROINTESTINAL: No abdominal  or epigastric pain, nausea, vomiting, hematemesis, diarrhea, constipation, melena or bright red blood.  GENITOURINARY: No dysuria, nocturia, hematuria, or urinary incontinence  NEUROLOGICAL: No headaches, memory loss, slurred speech, limb weakness, loss of strength, numbness, or tremors  SKIN: No itching, burning, rashes, or lesions   LYMPH NODES: No enlarged glands  ENDOCRINE: No heat or cold intolerance, or hair loss  MUSCULOSKELETAL: No joint pain or swelling, muscle, back, or extremity pain  PSYCHIATRIC: No depression, anxiety, or difficulty sleeping  HEME/LYMPH: No easy bruising or bleeding gums  ALLERY AND IMMUNOLOGIC: No hives or rash.      Vital Signs Last 24 Hrs  T(C): 36.6 (07 Feb 2022 05:00), Max: 36.7 (06 Feb 2022 21:00)  T(F): 97.8 (07 Feb 2022 05:00), Max: 98 (06 Feb 2022 21:00)  HR: 86 (07 Feb 2022 05:00) (81 - 90)  BP: 128/81 (07 Feb 2022 05:00) (128/81 - 140/90)  BP(mean): --  RR: 18 (07 Feb 2022 05:00) (17 - 18)  SpO2: 100% (07 Feb 2022 05:00) (99% - 100%)    PHYSICAL EXAM:    GENERAL: In no apparent distress, well nourished, and hydrated.  HEAD:  Atraumatic, Normocephalic  NECK: Supple . No JVD or carotid bruit or thyroidmegaly.  Carotid pulse is 2+ bilaterally.  PULMONARY: Clear to auscultation and perfusion.  No rales, wheezing, or rhonchi bilaterally.  HEART: Regular rate and rhythm; No murmurs, rubs, or gallops.  ABDOMEN: Soft, Nontender, Nondistended; Bowel sounds present  EXTREMITIES: No clubbing, cyanosis, or edema  +R hand 4 th metacarpal diaphysis fracture  NEUROLOGICAL: Alert oriented to person, place and time.  Speech clear.  Skin: Dry intact, no rashes or lesions.          INTERPRETATION OF TELEMETRY:  Sinus rhythm 70's with occasional PVC's, 4 beats NSVT's and one episode of 2 rd degree AVB seen at 4:36am    ECG:    SR, LVH, QRS 100ms      LABS:                        18.3   8.54  )-----------( 210      ( 06 Feb 2022 07:30 )             54.0     02-06    137  |  99  |  17  ----------------------------<  194<H>  3.6   |  28  |  0.93    Ca    9.3      06 Feb 2022 07:30  Phos  4.3     02-06  Mg     1.70     02-06                BNP  RADIOLOGY & ADDITIONAL STUDIES:    PREVIOUS DIAGNOSTIC TESTING:      ECHO FINDINGS:  DIMENSIONS:  Dimensions:     Normal Values:  LA:     4.0 cm    2.0 - 4.0 cm  Ao:     3.9 cm    2.0 - 3.8 cm  SEPTUM: 1.2 cm    0.6 - 1.2cm  PWT:    1.2 cm    0.6 - 1.1 cm  LVIDd:  6.2 cm    3.0 - 5.6 cm  LVIDs:  5.4 cm    1.8 - 4.0 cm  Derived Variables:  LVMI: 141 g/m2  RWT: 0.38  Fractional short: 13 %  Ejection Fraction (Cote Rule): 27 %  ------------------------------------------------------------------------  OBSERVATIONS:  Mitral Valve: Normal mitral valve. Mild mitral  regurgitation.  Aortic Root: Normal aortic root.  Aortic Valve: Normal trileaflet aortic valve.  Left Atrium: Mildly dilated left atrium.  LA volume index =  39 cc/m2.  Left Ventricle: Severe global left ventricular systolic  dysfunction. Eccentric left ventricular hypertrophy  (dilated left ventricle with normal relative wall  thickness). Mild diastolic dysfunction (Stage I).  Right Heart: Normal right atrium. Normal right ventricular  size and function. Normal tricuspid valve.  Minimal  tricuspid regurgitation. Normal pulmonic valve. Minimal  pulmonic regurgitation.  Pericardium/PleuraNormal pericardium with no pericardial  effusion.  ------------------------------------------------------------------------  CONCLUSIONS:  1. Normal mitral valve. Mild mitral regurgitation.  2. Mildly dilated left atrium.  LA volume index = 39 cc/m2.  3. Eccentric left ventricular hypertrophy (dilated left  ventricle with normal relative wall thickness).  4. Severe global left ventricular systolic dysfunction.  5. Mild diastolic dysfunction (Stage I).  6. Normal right ventricular size and function.  *** No previous Echo exam.  ------------------------------------------------------------------------  Confirmed on  2/3/2022 - 12:00:20 by Brandon Ashraf M.D.,      STRESS FINDINGS:    CATHETERIZATION FINDINGS:CORONARY VESSELS: The coronary circulation is left dominant.  LM:   --  LM: Normal.  LAD:   --  Mid LAD: Angiography showed mild atherosclerosis with no flow  limiting lesions.  CX:   --  Circumflex: Normal.  RCA:   --  RCA: Normal.  COMPLICATIONS: There were no complications.  DIAGNOSTIC RECOMMENDATIONS: The patient's diuretic regimenshould be  carefully increased.  Prepared and signed by  Edward Cardona M.D.  Signed 12/12/2019 13:09:42
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
## 2022-02-07 NOTE — PROVIDER CONTACT NOTE (OTHER) - SITUATION
An event of second degree type 2
pt had 3 beats of V-tach on the tele monitor. pt is asymptomatic and is currently resting in bed, will continue to monitor

## 2022-02-07 NOTE — PROGRESS NOTE ADULT - SUBJECTIVE AND OBJECTIVE BOX
[facility] Division of Hospital Medicine  Naye Elise MD  Pager [phone]    Patient is a 41y old  Male who presents with a chief complaint of Chest pain, Rt hand pain      SUBJECTIVE / OVERNIGHT EVENTS: pt resting comfortably in bed; officer at bedside      MEDICATIONS  (STANDING):  budesonide 160 MICROgram(s)/formoterol 4.5 MICROgram(s) Inhaler 2 Puff(s) Inhalation two times a day  dextrose 40% Gel 15 Gram(s) Oral once  dextrose 5%. 1000 milliLiter(s) (50 mL/Hr) IV Continuous <Continuous>  dextrose 5%. 1000 milliLiter(s) (100 mL/Hr) IV Continuous <Continuous>  dextrose 50% Injectable 25 Gram(s) IV Push once  dextrose 50% Injectable 12.5 Gram(s) IV Push once  dextrose 50% Injectable 25 Gram(s) IV Push once  furosemide   Injectable 40 milliGRAM(s) IV Push two times a day  glucagon  Injectable 1 milliGRAM(s) IntraMuscular once  hydrALAZINE 75 milliGRAM(s) Oral three times a day  influenza   Vaccine 0.5 milliLiter(s) IntraMuscular once  insulin glargine Injectable (LANTUS) 22 Unit(s) SubCutaneous at bedtime  insulin lispro (ADMELOG) corrective regimen sliding scale   SubCutaneous three times a day before meals  insulin lispro (ADMELOG) corrective regimen sliding scale   SubCutaneous at bedtime  insulin lispro Injectable (ADMELOG) 6 Unit(s) SubCutaneous three times a day before meals  losartan 100 milliGRAM(s) Oral daily  melatonin 6 milliGRAM(s) Oral at bedtime  sodium chloride 0.9% lock flush 3 milliLiter(s) IV Push every 8 hours    MEDICATIONS  (PRN):  acetaminophen     Tablet .. 650 milliGRAM(s) Oral every 6 hours PRN Mild Pain (1 - 3), Moderate Pain (4 - 6)  ALBUTerol    90 MICROgram(s) HFA Inhaler 2 Puff(s) Inhalation every 6 hours PRN Shortness of Breath and/or Wheezing      CAPILLARY BLOOD GLUCOSE  POCT Blood Glucose.: 160 mg/dL (07 Feb 2022 08:25)  POCT Blood Glucose.: 178 mg/dL (06 Feb 2022 22:04)  POCT Blood Glucose.: 154 mg/dL (06 Feb 2022 17:16)  POCT Blood Glucose.: 171 mg/dL (06 Feb 2022 12:17)      PHYSICAL EXAM:  Vital Signs Last 24 Hrs  T(F): 97.8 (07 Feb 2022 05:00), Max: 98 (06 Feb 2022 21:00)  HR: 86 (07 Feb 2022 05:00) (81 - 90)  BP: 128/81 (07 Feb 2022 05:00) (128/81 - 140/90)  RR: 18 (07 Feb 2022 05:00) (17 - 18)  SpO2: 100% (07 Feb 2022 05:00) (99% - 100%)    CONSTITUTIONAL: NAD, appears comfortable  EYES: PERRLA; conjunctiva and sclera clear  ENMT: Moist oral mucosa; normal dentition  RESPIRATORY: Normal respiratory effort; lungs are clear to auscultation bilaterally  CARDIOVASCULAR: Regular rate and rhythm; No lower extremity edema;   ABDOMEN: Nontender to palpation, normoactive bowel sounds  MUSCULOSKELETAL:  no clubbing or cyanosis of digits; no joint swelling or tenderness to palpation  PSYCH: A+O to person, place, and time; affect appropriate  NEUROLOGY: CN 2-12 are intact and symmetric; no gross sensory deficits   SKIN: L wrist handcuff; b/l ankle shackles    LABS:

## 2022-02-07 NOTE — PROGRESS NOTE ADULT - ASSESSMENT
42 y/o Male with  MHx of severe systolic CHF (LVEF 22% on MRI  4/2019, 30% in TTE from 6/2019, no AICD), HTN, Asthma, Smoker, Type 2 diabetes, hyperlipidemia a/w atypical chest pain and acute on chronic severe systolic CHF likely due medications nonadherence; Also found to have transverse fracture of the right fourth metatarsal diaphysis which apears to have a chronic component on imaging, and uncontrolled DM Type 2.

## 2022-02-08 LAB
ANION GAP SERPL CALC-SCNC: 10 MMOL/L — SIGNIFICANT CHANGE UP (ref 7–14)
BASOPHILS # BLD AUTO: 0.03 K/UL — SIGNIFICANT CHANGE UP (ref 0–0.2)
BASOPHILS NFR BLD AUTO: 0.3 % — SIGNIFICANT CHANGE UP (ref 0–2)
BUN SERPL-MCNC: 18 MG/DL — SIGNIFICANT CHANGE UP (ref 7–23)
CALCIUM SERPL-MCNC: 9.2 MG/DL — SIGNIFICANT CHANGE UP (ref 8.4–10.5)
CHLORIDE SERPL-SCNC: 101 MMOL/L — SIGNIFICANT CHANGE UP (ref 98–107)
CO2 SERPL-SCNC: 27 MMOL/L — SIGNIFICANT CHANGE UP (ref 22–31)
CREAT SERPL-MCNC: 1.05 MG/DL — SIGNIFICANT CHANGE UP (ref 0.5–1.3)
EOSINOPHIL # BLD AUTO: 0.17 K/UL — SIGNIFICANT CHANGE UP (ref 0–0.5)
EOSINOPHIL NFR BLD AUTO: 1.9 % — SIGNIFICANT CHANGE UP (ref 0–6)
GLUCOSE BLDC GLUCOMTR-MCNC: 136 MG/DL — HIGH (ref 70–99)
GLUCOSE BLDC GLUCOMTR-MCNC: 149 MG/DL — HIGH (ref 70–99)
GLUCOSE BLDC GLUCOMTR-MCNC: 161 MG/DL — HIGH (ref 70–99)
GLUCOSE BLDC GLUCOMTR-MCNC: 209 MG/DL — HIGH (ref 70–99)
GLUCOSE SERPL-MCNC: 127 MG/DL — HIGH (ref 70–99)
HCT VFR BLD CALC: 52.5 % — HIGH (ref 39–50)
HGB BLD-MCNC: 17.2 G/DL — HIGH (ref 13–17)
IANC: 4.63 K/UL — SIGNIFICANT CHANGE UP (ref 1.5–8.5)
IMM GRANULOCYTES NFR BLD AUTO: 0.6 % — SIGNIFICANT CHANGE UP (ref 0–1.5)
LYMPHOCYTES # BLD AUTO: 3.26 K/UL — SIGNIFICANT CHANGE UP (ref 1–3.3)
LYMPHOCYTES # BLD AUTO: 36.9 % — SIGNIFICANT CHANGE UP (ref 13–44)
MAGNESIUM SERPL-MCNC: 2 MG/DL — SIGNIFICANT CHANGE UP (ref 1.6–2.6)
MCHC RBC-ENTMCNC: 29.5 PG — SIGNIFICANT CHANGE UP (ref 27–34)
MCHC RBC-ENTMCNC: 32.8 GM/DL — SIGNIFICANT CHANGE UP (ref 32–36)
MCV RBC AUTO: 90.1 FL — SIGNIFICANT CHANGE UP (ref 80–100)
MONOCYTES # BLD AUTO: 0.69 K/UL — SIGNIFICANT CHANGE UP (ref 0–0.9)
MONOCYTES NFR BLD AUTO: 7.8 % — SIGNIFICANT CHANGE UP (ref 2–14)
NEUTROPHILS # BLD AUTO: 4.63 K/UL — SIGNIFICANT CHANGE UP (ref 1.8–7.4)
NEUTROPHILS NFR BLD AUTO: 52.5 % — SIGNIFICANT CHANGE UP (ref 43–77)
NRBC # BLD: 0 /100 WBCS — SIGNIFICANT CHANGE UP
NRBC # FLD: 0 K/UL — SIGNIFICANT CHANGE UP
PHOSPHATE SERPL-MCNC: 4.4 MG/DL — SIGNIFICANT CHANGE UP (ref 2.5–4.5)
PLATELET # BLD AUTO: 228 K/UL — SIGNIFICANT CHANGE UP (ref 150–400)
POTASSIUM SERPL-MCNC: 4.2 MMOL/L — SIGNIFICANT CHANGE UP (ref 3.5–5.3)
POTASSIUM SERPL-SCNC: 4.2 MMOL/L — SIGNIFICANT CHANGE UP (ref 3.5–5.3)
RBC # BLD: 5.83 M/UL — HIGH (ref 4.2–5.8)
RBC # FLD: 14.1 % — SIGNIFICANT CHANGE UP (ref 10.3–14.5)
SARS-COV-2 RNA SPEC QL NAA+PROBE: SIGNIFICANT CHANGE UP
SODIUM SERPL-SCNC: 138 MMOL/L — SIGNIFICANT CHANGE UP (ref 135–145)
WBC # BLD: 8.83 K/UL — SIGNIFICANT CHANGE UP (ref 3.8–10.5)
WBC # FLD AUTO: 8.83 K/UL — SIGNIFICANT CHANGE UP (ref 3.8–10.5)

## 2022-02-08 PROCEDURE — 99232 SBSQ HOSP IP/OBS MODERATE 35: CPT

## 2022-02-08 RX ORDER — DIGOXIN 250 MCG
250 TABLET ORAL DAILY
Refills: 0 | Status: DISCONTINUED | OUTPATIENT
Start: 2022-02-08 | End: 2022-02-09

## 2022-02-08 RX ORDER — INSULIN LISPRO 100/ML
VIAL (ML) SUBCUTANEOUS
Refills: 0 | Status: DISCONTINUED | OUTPATIENT
Start: 2022-02-08 | End: 2022-02-09

## 2022-02-08 RX ORDER — INSULIN LISPRO 100/ML
VIAL (ML) SUBCUTANEOUS EVERY 6 HOURS
Refills: 0 | Status: DISCONTINUED | OUTPATIENT
Start: 2022-02-08 | End: 2022-02-08

## 2022-02-08 RX ADMIN — CARVEDILOL PHOSPHATE 12.5 MILLIGRAM(S): 80 CAPSULE, EXTENDED RELEASE ORAL at 17:21

## 2022-02-08 RX ADMIN — Medication 6 MILLIGRAM(S): at 21:41

## 2022-02-08 RX ADMIN — SODIUM CHLORIDE 3 MILLILITER(S): 9 INJECTION INTRAMUSCULAR; INTRAVENOUS; SUBCUTANEOUS at 14:00

## 2022-02-08 RX ADMIN — Medication 6 UNIT(S): at 17:22

## 2022-02-08 RX ADMIN — CARVEDILOL PHOSPHATE 12.5 MILLIGRAM(S): 80 CAPSULE, EXTENDED RELEASE ORAL at 05:33

## 2022-02-08 RX ADMIN — SODIUM CHLORIDE 3 MILLILITER(S): 9 INJECTION INTRAMUSCULAR; INTRAVENOUS; SUBCUTANEOUS at 21:49

## 2022-02-08 RX ADMIN — Medication 6 UNIT(S): at 12:41

## 2022-02-08 RX ADMIN — LOSARTAN POTASSIUM 100 MILLIGRAM(S): 100 TABLET, FILM COATED ORAL at 05:32

## 2022-02-08 RX ADMIN — Medication 75 MILLIGRAM(S): at 13:30

## 2022-02-08 RX ADMIN — Medication 75 MILLIGRAM(S): at 21:40

## 2022-02-08 RX ADMIN — SODIUM CHLORIDE 3 MILLILITER(S): 9 INJECTION INTRAMUSCULAR; INTRAVENOUS; SUBCUTANEOUS at 05:44

## 2022-02-08 RX ADMIN — INSULIN GLARGINE 22 UNIT(S): 100 INJECTION, SOLUTION SUBCUTANEOUS at 21:39

## 2022-02-08 RX ADMIN — Medication 2: at 12:41

## 2022-02-08 RX ADMIN — Medication 250 MICROGRAM(S): at 17:21

## 2022-02-08 RX ADMIN — Medication 40 MILLIGRAM(S): at 17:22

## 2022-02-08 RX ADMIN — Medication 40 MILLIGRAM(S): at 05:33

## 2022-02-08 NOTE — PROGRESS NOTE ADULT - PROBLEM SELECTOR PLAN 1
-Intermittent CP, trace leg edema, elevated ProBNP; ALAS; Self-reported medications nonadherence >3 weeks  - Lasix 40IV BID per HF  - TTE with EF 27%.   -Fluid restriction to 1500 ml.  -DASH/TLC diet.  -Strict I&O's.  -c.w losartan and  coreg  -Stressed importance of medication adherence and outpt follow up  stress test with reversible ischemia, was planned for cath but pt now refusing despite being informed of risks

## 2022-02-08 NOTE — PROGRESS NOTE ADULT - PROBLEM SELECTOR PLAN 1
Patient admits to ALAS, orthopnea and PND. JVP appears normal. Borderline hypertensive.   Not good I/O recordings. D/w nurse to check strict I/O and standing weights.   Abnormal NST- patient is refusing LHC and RHC today, wants till tomorrow to decide.   Continue Coreg 12.5 mg po BID.  Continue Losartan 100mg daily. Will consider switch to Entresto.   Continue hydralazine 75 mg po TID.   Follow up EP regarding ICD  Strict I/O  Daily standing weights  Keep K 4.0-5.0 and mag 2.0

## 2022-02-08 NOTE — PROGRESS NOTE ADULT - SUBJECTIVE AND OBJECTIVE BOX
[facility] Division of Hospital Medicine  Naye Elise MD  Pager [phone]    Patient is a 41y old  Male who presents with a chief complaint of Chest pain, Rt hand pain      SUBJECTIVE / OVERNIGHT EVENTS: reports feeling stiff this AM; otherwise still reports intermittent CP and ALAS; pt this AM is refusing cath; after further d/w pt regarding stress test findings and need for poss stent, he said he understands but he does not want that      MEDICATIONS  (STANDING):  budesonide 160 MICROgram(s)/formoterol 4.5 MICROgram(s) Inhaler 2 Puff(s) Inhalation two times a day  carvedilol 12.5 milliGRAM(s) Oral every 12 hours  dextrose 40% Gel 15 Gram(s) Oral once  dextrose 5%. 1000 milliLiter(s) (50 mL/Hr) IV Continuous <Continuous>  dextrose 5%. 1000 milliLiter(s) (100 mL/Hr) IV Continuous <Continuous>  dextrose 50% Injectable 25 Gram(s) IV Push once  dextrose 50% Injectable 12.5 Gram(s) IV Push once  dextrose 50% Injectable 25 Gram(s) IV Push once  furosemide   Injectable 40 milliGRAM(s) IV Push two times a day  glucagon  Injectable 1 milliGRAM(s) IntraMuscular once  hydrALAZINE 75 milliGRAM(s) Oral three times a day  influenza   Vaccine 0.5 milliLiter(s) IntraMuscular once  insulin glargine Injectable (LANTUS) 22 Unit(s) SubCutaneous at bedtime  insulin lispro (ADMELOG) corrective regimen sliding scale   SubCutaneous at bedtime  insulin lispro (ADMELOG) corrective regimen sliding scale   SubCutaneous every 6 hours  insulin lispro Injectable (ADMELOG) 6 Unit(s) SubCutaneous three times a day before meals  losartan 100 milliGRAM(s) Oral daily  melatonin 6 milliGRAM(s) Oral at bedtime  sodium chloride 0.9% lock flush 3 milliLiter(s) IV Push every 8 hours    MEDICATIONS  (PRN):  acetaminophen     Tablet .. 650 milliGRAM(s) Oral every 6 hours PRN Mild Pain (1 - 3), Moderate Pain (4 - 6)  ALBUTerol    90 MICROgram(s) HFA Inhaler 2 Puff(s) Inhalation every 6 hours PRN Shortness of Breath and/or Wheezing      CAPILLARY BLOOD GLUCOSE  POCT Blood Glucose.: 136 mg/dL (08 Feb 2022 06:54)  POCT Blood Glucose.: 224 mg/dL (07 Feb 2022 21:21)  POCT Blood Glucose.: 163 mg/dL (07 Feb 2022 17:33)  POCT Blood Glucose.: 205 mg/dL (07 Feb 2022 12:35)        PHYSICAL EXAM:  Vital Signs Last 24 Hrs  T(F): 98.3 (08 Feb 2022 05:30), Max: 98.3 (08 Feb 2022 05:30)  HR: 89 (08 Feb 2022 11:01) (85 - 97)  BP: 118/75 (08 Feb 2022 11:01) (105/70 - 131/83)  RR: 18 (08 Feb 2022 11:01) (18 - 18)  SpO2: 99% (08 Feb 2022 11:01) (98% - 100%)    CONSTITUTIONAL: NAD, appears comfortable  EYES: PERRLA; conjunctiva and sclera clear  ENMT: Moist oral mucosa; normal dentition  RESPIRATORY: Normal respiratory effort; lungs are clear to auscultation bilaterally ant  CARDIOVASCULAR: Regular rate and rhythm; No lower extremity edema;  ABDOMEN: Nontender to palpation, normoactive bowel sounds  MUSCULOSKELETAL:   no clubbing or cyanosis of digits; no joint swelling or tenderness to palpation  PSYCH: A+O to person, place, and time; affect appropriate  NEUROLOGY: CN 2-12 are intact and symmetric; no gross sensory deficits   SKIN: L wrist handcuffed to bed; ankles with shackles    LABS:                        17.2   8.83  )-----------( 228      ( 08 Feb 2022 06:26 )             52.5     02-08    138  |  101  |  18  ----------------------------<  127<H>  4.2   |  27  |  1.05    Ca    9.2      08 Feb 2022 06:26  Phos  4.4     02-08  Mg     2.00     02-08

## 2022-02-08 NOTE — PROGRESS NOTE ADULT - ASSESSMENT
41 year old male with a PMH of chronic systolic heart failure since 2019, dilated cardiomyopathy, HTN, asthma, smoker, DMT2-uncontrolled, HLD presented with c/o chest pain.  He was found to have acute decompensated heart failure and a fracture of 4th metacarpal bone may require hand surgery.  Telemetry demonstrates SR with NSVT (4 beats) and one 2 rd degree AV block (Mobitz II) during sleep.  He underwent nuclear stress test 2/6.  Recommended LHC and RHC per HF.  However patient refused today.  He self reported hx of non-adherent to meds in the past up to 3 weeks (due to run out of meds).    -Patient is not on waiting list for a heart transplant  per HF Dr. Christianson  -Discussed with patient the importance of RHC (?LHC) for better optimization of HF therapy  -Will follow up for ICD candidacy if he demonstrates compliance in the near future,  -Continue Coreg for now, continue to monitor for symptomatic tatum with AVB/ NSVT  -Keep K >4 and Mg >1.8  -Continue HF treatement per HF team  -Discussed with Dr. Alba

## 2022-02-08 NOTE — PROGRESS NOTE ADULT - SUBJECTIVE AND OBJECTIVE BOX
Medications:  acetaminophen     Tablet .. 650 milliGRAM(s) Oral every 6 hours PRN  ALBUTerol    90 MICROgram(s) HFA Inhaler 2 Puff(s) Inhalation every 6 hours PRN  budesonide 160 MICROgram(s)/formoterol 4.5 MICROgram(s) Inhaler 2 Puff(s) Inhalation two times a day  carvedilol 12.5 milliGRAM(s) Oral every 12 hours  dextrose 40% Gel 15 Gram(s) Oral once  dextrose 5%. 1000 milliLiter(s) IV Continuous <Continuous>  dextrose 5%. 1000 milliLiter(s) IV Continuous <Continuous>  dextrose 50% Injectable 25 Gram(s) IV Push once  dextrose 50% Injectable 12.5 Gram(s) IV Push once  dextrose 50% Injectable 25 Gram(s) IV Push once  furosemide   Injectable 40 milliGRAM(s) IV Push two times a day  glucagon  Injectable 1 milliGRAM(s) IntraMuscular once  hydrALAZINE 75 milliGRAM(s) Oral three times a day  influenza   Vaccine 0.5 milliLiter(s) IntraMuscular once  insulin glargine Injectable (LANTUS) 22 Unit(s) SubCutaneous at bedtime  insulin lispro (ADMELOG) corrective regimen sliding scale   SubCutaneous at bedtime  insulin lispro (ADMELOG) corrective regimen sliding scale   SubCutaneous every 6 hours  insulin lispro Injectable (ADMELOG) 6 Unit(s) SubCutaneous three times a day before meals  losartan 100 milliGRAM(s) Oral daily  melatonin 6 milliGRAM(s) Oral at bedtime  sodium chloride 0.9% lock flush 3 milliLiter(s) IV Push every 8 hours      Vitals:  Vital Signs Last 24 Hrs  T(C): 36.8 (2022 05:30), Max: 36.8 (2022 15:35)  T(F): 98.3 (2022 05:30), Max: 98.3 (2022 05:30)  HR: 89 (2022 11:01) (85 - 97)  BP: 118/75 (2022 11:01) (105/70 - 131/83)  BP(mean): --  RR: 18 (2022 11:01) (18 - 18)  SpO2: 99% (2022 11:01) (98% - 100%)    Daily     Daily Weight in k.4 (2022 11:43)    I&O's Detail    2022 07:01  -  2022 13:04  --------------------------------------------------------  IN:    Oral Fluid: 200 mL  Total IN: 200 mL    OUT:  Total OUT: 0 mL    Total NET: 200 mL          Physical Exam:     General: No distress. Comfortable.  HEENT: EOM intact.  Neck: Neck supple. JVP not elevated. No masses  Chest: Clear to auscultation bilaterally  CV: Normal S1 and S2. No murmurs, rub, or gallops. Radial pulses normal.  Abdomen: Soft, non-distended, non-tender  Skin: No rashes or skin breakdown  Neurology: Alert and oriented times three. Sensation intact  Psych: Affect normal    Labs:                        17.2   8.83  )-----------( 228      ( 2022 06:26 )             52.5     02-08    138  |  101  |  18  ----------------------------<  127<H>  4.2   |  27  |  1.05    Ca    9.2      2022 06:26  Phos  4.4     02-08  Mg     2.00     02-08             Patient seen and examined. He still c/o orthopnea and PND.   No CP, palpations, dizziness.       Medications:  acetaminophen     Tablet .. 650 milliGRAM(s) Oral every 6 hours PRN  ALBUTerol    90 MICROgram(s) HFA Inhaler 2 Puff(s) Inhalation every 6 hours PRN  budesonide 160 MICROgram(s)/formoterol 4.5 MICROgram(s) Inhaler 2 Puff(s) Inhalation two times a day  carvedilol 12.5 milliGRAM(s) Oral every 12 hours  dextrose 40% Gel 15 Gram(s) Oral once  dextrose 5%. 1000 milliLiter(s) IV Continuous <Continuous>  dextrose 5%. 1000 milliLiter(s) IV Continuous <Continuous>  dextrose 50% Injectable 25 Gram(s) IV Push once  dextrose 50% Injectable 12.5 Gram(s) IV Push once  dextrose 50% Injectable 25 Gram(s) IV Push once  furosemide   Injectable 40 milliGRAM(s) IV Push two times a day  glucagon  Injectable 1 milliGRAM(s) IntraMuscular once  hydrALAZINE 75 milliGRAM(s) Oral three times a day  influenza   Vaccine 0.5 milliLiter(s) IntraMuscular once  insulin glargine Injectable (LANTUS) 22 Unit(s) SubCutaneous at bedtime  insulin lispro (ADMELOG) corrective regimen sliding scale   SubCutaneous at bedtime  insulin lispro (ADMELOG) corrective regimen sliding scale   SubCutaneous every 6 hours  insulin lispro Injectable (ADMELOG) 6 Unit(s) SubCutaneous three times a day before meals  losartan 100 milliGRAM(s) Oral daily  melatonin 6 milliGRAM(s) Oral at bedtime  sodium chloride 0.9% lock flush 3 milliLiter(s) IV Push every 8 hours      Vitals:  Vital Signs Last 24 Hrs  T(C): 36.8 (2022 05:30), Max: 36.8 (2022 15:35)  T(F): 98.3 (2022 05:30), Max: 98.3 (2022 05:30)  HR: 89 (2022 11:01) (85 - 97)  BP: 118/75 (2022 11:01) (105/70 - 131/83)  BP(mean): --  RR: 18 (2022 11:01) (18 - 18)  SpO2: 99% (2022 11:01) (98% - 100%)    Daily     Daily Weight in k.4 (2022 11:43)    I&O's Detail    2022 07:01  -  2022 13:04  --------------------------------------------------------  IN:    Oral Fluid: 200 mL  Total IN: 200 mL    OUT:  Total OUT: 0 mL    Total NET: 200 mL        Physical Exam:     General: No distress. Comfortable.  HEENT: EOM intact.  Neck: Neck supple. JVP not elevated. No masses  Chest: Clear to auscultation bilaterally  CV: Normal S1 and S2. No murmurs, rub, or gallops. Radial pulses normal. No LE edema b/l. Warm b/l.   Abdomen: Soft, non-distended, non-tender  Skin: No rashes or skin breakdown  Neurology: Alert and oriented times three. Sensation intact  Psych: Affect normal    Labs:                        17.2   8.83  )-----------( 228      ( 2022 06:26 )             52.5     02-08    138  |  101  |  18  ----------------------------<  127<H>  4.2   |  27  |  1.05    Ca    9.2      2022 06:26  Phos  4.4     02-08  Mg     2.00     02-08
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
## 2022-02-08 NOTE — PROGRESS NOTE ADULT - SUBJECTIVE AND OBJECTIVE BOX
Patient is a 41y old  Male who presents with a chief complaint of Chest pain, Rt hand pain (08 Feb 2022 13:04)  Denies CP or palpitations.  No SOB at rest.  Reports very sedentary life style due to dyspnea on exertion.    PAST MEDICAL & SURGICAL HISTORY:  No pertinent past medical history    HTN (hypertension)    Asthma    Congestive heart failure (CHF)  EF 22%    Type 2 diabetes mellitus    Hyperlipidemia    No significant past surgical history    No significant past surgical history        MEDICATIONS  (STANDING):  budesonide 160 MICROgram(s)/formoterol 4.5 MICROgram(s) Inhaler 2 Puff(s) Inhalation two times a day  carvedilol 12.5 milliGRAM(s) Oral every 12 hours  dextrose 40% Gel 15 Gram(s) Oral once  dextrose 5%. 1000 milliLiter(s) (50 mL/Hr) IV Continuous <Continuous>  dextrose 5%. 1000 milliLiter(s) (100 mL/Hr) IV Continuous <Continuous>  dextrose 50% Injectable 25 Gram(s) IV Push once  dextrose 50% Injectable 12.5 Gram(s) IV Push once  dextrose 50% Injectable 25 Gram(s) IV Push once  digoxin     Tablet 250 MICROGram(s) Oral daily  furosemide   Injectable 40 milliGRAM(s) IV Push two times a day  glucagon  Injectable 1 milliGRAM(s) IntraMuscular once  hydrALAZINE 75 milliGRAM(s) Oral three times a day  influenza   Vaccine 0.5 milliLiter(s) IntraMuscular once  insulin glargine Injectable (LANTUS) 22 Unit(s) SubCutaneous at bedtime  insulin lispro (ADMELOG) corrective regimen sliding scale   SubCutaneous at bedtime  insulin lispro (ADMELOG) corrective regimen sliding scale   SubCutaneous three times a day before meals  insulin lispro Injectable (ADMELOG) 6 Unit(s) SubCutaneous three times a day before meals  losartan 100 milliGRAM(s) Oral daily  melatonin 6 milliGRAM(s) Oral at bedtime  sodium chloride 0.9% lock flush 3 milliLiter(s) IV Push every 8 hours    MEDICATIONS  (PRN):  acetaminophen     Tablet .. 650 milliGRAM(s) Oral every 6 hours PRN Mild Pain (1 - 3), Moderate Pain (4 - 6)  ALBUTerol    90 MICROgram(s) HFA Inhaler 2 Puff(s) Inhalation every 6 hours PRN Shortness of Breath and/or Wheezing            Vital Signs Last 24 Hrs  T(C): 36.7 (08 Feb 2022 13:28), Max: 36.8 (08 Feb 2022 05:30)  T(F): 98 (08 Feb 2022 13:28), Max: 98.3 (08 Feb 2022 05:30)  HR: 85 (08 Feb 2022 17:20) (85 - 89)  BP: 125/90 (08 Feb 2022 17:20) (105/70 - 125/90)  BP(mean): --  RR: 18 (08 Feb 2022 13:28) (18 - 18)  SpO2: 99% (08 Feb 2022 13:28) (99% - 100%)            INTERPRETATION OF TELEMETRY:  SR, no NSVT or VT seen, no recurrent AVB seen    ECG:        LABS:                        17.2   8.83  )-----------( 228      ( 08 Feb 2022 06:26 )             52.5     02-08    138  |  101  |  18  ----------------------------<  127<H>  4.2   |  27  |  1.05    Ca    9.2      08 Feb 2022 06:26  Phos  4.4     02-08  Mg     2.00     02-08                BNP  RADIOLOGY & ADDITIONAL STUDIES:    Agent: Regadenoson 0.4 mg/5 ml NS. injected over 10 sec.  HR: Baseline HR: 92 bpm   Peak HR: 107 bpm (60% of MPHR)  MPHR: 178 bpm   85% of MPHR: 151 bpm  BP: Baseline BP: 135/95 mmHg   Peak BP: 154/113 mmHg  Peak RPP: 05806 (Rate Pressure Product)  Last Caffeineintake: 12 hrs  Terminated: Completion of protocol  ------------------------------------------------------------------------  SYMPTOMS/FINDINGS:  Symptoms: Shortness of breath  Chest pain: No chest pain with administration of  Regadenoson  Treatment:None  ------------------------------------------------------------------------  ECG ABNORMALITIES DURING/AFTER STRESS:   Abnormalities: None  ------------------------------------------------------------------------  NEW ARRHYTHMIAS DEVELOPED DURING/AFTER STRESS:  None  ------------------------------------------------------------------------  NUCLEAR FINDINGS:  Review of raw data shows: The study is of good technical  quality.  The left ventricle was enlarged. There is a small,  moderate defect in the distal inferior wall that is  predominantly reversible, suggestive of ischemia.  The images with attenuation correction show no significant  changes.  ------------------------------------------------------------------------  GATED ANALYSIS:  Post-stress gated wall motion analysis was performed (LVEF  = 22 %;LVEDV = 281 ml.), revealing diffuse hypokinesis.  The RV function appears reduced.  ------------------------------------------------------------------------  IMPRESSIONS:Abnormal Study  * Myocardial Perfusion SPECT results are abnormal.  * Review of raw data shows: The study is of good technical  quality.  * The left ventricle was enlarged. There is a small,  moderate defect in the distal inferior wall that is  predominantly reversible, suggestive of ischemia.  * The images with attenuation correctionshow no  significant changes.  * Post-stress gated wall motion analysis was performed  (LVEF = 22 %;LVEDV = 281 ml.), revealing diffuse  hypokinesis. The RV function appears reduced.  * NOTE, the degree of LV dysfunction is out of proportion  to extent of ischemia, suggesting NICM  ------------------------------------------------------------------------      PHYSICAL EXAM:    GENERAL: In no apparent distress, well nourished, and hydrated.  NECK: Supple and normal thyroid.  No JVD or carotid bruit.  Carotid pulse is 2+ bilaterally.  HEART: Regular rate and rhythm; No murmurs, rubs, or gallops.  PULMONARY: Clear to auscultation and perfusion.  No rales, wheezing, or rhonchi bilaterally.  ABDOMEN: Soft, Nontender, Nondistended; Bowel sounds present  EXTREMITIES:  2+ Peripheral Pulses, No clubbing, cyanosis, or edema; +R hand with transverse fracture of 4th metacarpal bone  NEUROLOGICAL: Grossly nonfocal

## 2022-02-09 ENCOUNTER — TRANSCRIPTION ENCOUNTER (OUTPATIENT)
Age: 42
End: 2022-02-09

## 2022-02-09 VITALS
HEART RATE: 95 BPM | TEMPERATURE: 98 F | RESPIRATION RATE: 17 BRPM | DIASTOLIC BLOOD PRESSURE: 82 MMHG | SYSTOLIC BLOOD PRESSURE: 134 MMHG | OXYGEN SATURATION: 100 %

## 2022-02-09 DIAGNOSIS — D58.2 OTHER HEMOGLOBINOPATHIES: ICD-10-CM

## 2022-02-09 LAB
ANION GAP SERPL CALC-SCNC: 9 MMOL/L — SIGNIFICANT CHANGE UP (ref 7–14)
BUN SERPL-MCNC: 21 MG/DL — SIGNIFICANT CHANGE UP (ref 7–23)
CALCIUM SERPL-MCNC: 9.5 MG/DL — SIGNIFICANT CHANGE UP (ref 8.4–10.5)
CHLORIDE SERPL-SCNC: 97 MMOL/L — LOW (ref 98–107)
CO2 SERPL-SCNC: 29 MMOL/L — SIGNIFICANT CHANGE UP (ref 22–31)
CREAT SERPL-MCNC: 1.02 MG/DL — SIGNIFICANT CHANGE UP (ref 0.5–1.3)
GLUCOSE BLDC GLUCOMTR-MCNC: 135 MG/DL — HIGH (ref 70–99)
GLUCOSE BLDC GLUCOMTR-MCNC: 137 MG/DL — HIGH (ref 70–99)
GLUCOSE SERPL-MCNC: 144 MG/DL — HIGH (ref 70–99)
HCT VFR BLD CALC: 56 % — HIGH (ref 39–50)
HGB BLD-MCNC: 18.6 G/DL — HIGH (ref 13–17)
MAGNESIUM SERPL-MCNC: 2 MG/DL — SIGNIFICANT CHANGE UP (ref 1.6–2.6)
MCHC RBC-ENTMCNC: 29.9 PG — SIGNIFICANT CHANGE UP (ref 27–34)
MCHC RBC-ENTMCNC: 33.2 GM/DL — SIGNIFICANT CHANGE UP (ref 32–36)
MCV RBC AUTO: 89.9 FL — SIGNIFICANT CHANGE UP (ref 80–100)
NRBC # BLD: 0 /100 WBCS — SIGNIFICANT CHANGE UP
NRBC # FLD: 0 K/UL — SIGNIFICANT CHANGE UP
PHOSPHATE SERPL-MCNC: 4.5 MG/DL — SIGNIFICANT CHANGE UP (ref 2.5–4.5)
PLATELET # BLD AUTO: 230 K/UL — SIGNIFICANT CHANGE UP (ref 150–400)
POTASSIUM SERPL-MCNC: 4 MMOL/L — SIGNIFICANT CHANGE UP (ref 3.5–5.3)
POTASSIUM SERPL-SCNC: 4 MMOL/L — SIGNIFICANT CHANGE UP (ref 3.5–5.3)
RBC # BLD: 6.23 M/UL — HIGH (ref 4.2–5.8)
RBC # FLD: 14.2 % — SIGNIFICANT CHANGE UP (ref 10.3–14.5)
SODIUM SERPL-SCNC: 135 MMOL/L — SIGNIFICANT CHANGE UP (ref 135–145)
WBC # BLD: 8.6 K/UL — SIGNIFICANT CHANGE UP (ref 3.8–10.5)
WBC # FLD AUTO: 8.6 K/UL — SIGNIFICANT CHANGE UP (ref 3.8–10.5)

## 2022-02-09 PROCEDURE — 99232 SBSQ HOSP IP/OBS MODERATE 35: CPT

## 2022-02-09 PROCEDURE — 99239 HOSP IP/OBS DSCHRG MGMT >30: CPT

## 2022-02-09 RX ORDER — DIGOXIN 250 MCG
1 TABLET ORAL
Qty: 0 | Refills: 0 | DISCHARGE
Start: 2022-02-09

## 2022-02-09 RX ORDER — HYDRALAZINE HCL 50 MG
100 TABLET ORAL THREE TIMES A DAY
Refills: 0 | Status: DISCONTINUED | OUTPATIENT
Start: 2022-02-09 | End: 2022-02-09

## 2022-02-09 RX ORDER — CARVEDILOL PHOSPHATE 80 MG/1
1 CAPSULE, EXTENDED RELEASE ORAL
Qty: 0 | Refills: 0 | DISCHARGE
Start: 2022-02-09

## 2022-02-09 RX ORDER — EMPAGLIFLOZIN 10 MG/1
1 TABLET, FILM COATED ORAL
Qty: 0 | Refills: 0 | DISCHARGE

## 2022-02-09 RX ORDER — ALBUTEROL 90 UG/1
2 AEROSOL, METERED ORAL
Qty: 0 | Refills: 0 | DISCHARGE
Start: 2022-02-09

## 2022-02-09 RX ORDER — SPIRONOLACTONE 25 MG/1
1 TABLET, FILM COATED ORAL
Qty: 0 | Refills: 0 | DISCHARGE
Start: 2022-02-09

## 2022-02-09 RX ORDER — LOSARTAN POTASSIUM 100 MG/1
1 TABLET, FILM COATED ORAL
Qty: 0 | Refills: 0 | DISCHARGE
Start: 2022-02-09

## 2022-02-09 RX ORDER — ASPIRIN/CALCIUM CARB/MAGNESIUM 324 MG
81 TABLET ORAL DAILY
Refills: 0 | Status: DISCONTINUED | OUTPATIENT
Start: 2022-02-09 | End: 2022-02-09

## 2022-02-09 RX ORDER — SPIRONOLACTONE 25 MG/1
25 TABLET, FILM COATED ORAL DAILY
Refills: 0 | Status: DISCONTINUED | OUTPATIENT
Start: 2022-02-09 | End: 2022-02-09

## 2022-02-09 RX ORDER — BUDESONIDE AND FORMOTEROL FUMARATE DIHYDRATE 160; 4.5 UG/1; UG/1
2 AEROSOL RESPIRATORY (INHALATION)
Qty: 0 | Refills: 0 | DISCHARGE
Start: 2022-02-09

## 2022-02-09 RX ORDER — HYDRALAZINE HCL 50 MG
1 TABLET ORAL
Qty: 0 | Refills: 0 | DISCHARGE
Start: 2022-02-09

## 2022-02-09 RX ADMIN — SODIUM CHLORIDE 3 MILLILITER(S): 9 INJECTION INTRAMUSCULAR; INTRAVENOUS; SUBCUTANEOUS at 04:14

## 2022-02-09 RX ADMIN — CARVEDILOL PHOSPHATE 12.5 MILLIGRAM(S): 80 CAPSULE, EXTENDED RELEASE ORAL at 04:07

## 2022-02-09 RX ADMIN — Medication 40 MILLIGRAM(S): at 04:08

## 2022-02-09 RX ADMIN — LOSARTAN POTASSIUM 100 MILLIGRAM(S): 100 TABLET, FILM COATED ORAL at 04:07

## 2022-02-09 RX ADMIN — Medication 6 UNIT(S): at 12:42

## 2022-02-09 RX ADMIN — Medication 6 UNIT(S): at 08:52

## 2022-02-09 RX ADMIN — Medication 100 MILLIGRAM(S): at 14:28

## 2022-02-09 RX ADMIN — Medication 250 MICROGRAM(S): at 04:05

## 2022-02-09 RX ADMIN — Medication 75 MILLIGRAM(S): at 04:05

## 2022-02-09 NOTE — DIETITIAN INITIAL EVALUATION ADULT. - ADD RECOMMEND
1. Encourage PO intake and honor food preferences as able. 2. F/u outpatient RD for continued DM management.

## 2022-02-09 NOTE — PROGRESS NOTE ADULT - PROBLEM SELECTOR PLAN 9
noted while pt asleep  EP to see  poss ICD for primary prevention given low EF, in addition to poss mobitz II
poss ICD for primary prevention given low EF, in addition to poss mobitz II  await EP follow up  unclear pt would agree to this  also unclear pt on transplant list, does not seem at this point pt is an ideal candidate
poss ICD for primary prevention given low EF, in addition to poss mobitz II  await EP follow up  unclear pt would agree to this  also unclear pt on transplant list, does not seem at this point pt is an ideal candidate
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
## 2022-02-09 NOTE — DIETITIAN INITIAL EVALUATION ADULT. - PROBLEM SELECTOR PLAN 3
Right hand X-ray showing acute transverse fracture of the fourth metatarsal diaphysis with approximately one third shaft's width radial displacement, additionally there is apex dorsal displacement.  Hand splinted in ED.   Hand surgery was initially called by ED but said patient could be seen outpatient.  Call for Hand surgery consult in AM as in [facility] custody   Pain control with acetaminophen 650 mg PRN q6h.

## 2022-02-09 NOTE — CHART NOTE - NSCHARTNOTEFT_GEN_A_CORE
PA Note     Called by tele tech for possible hearty block on Tele   Pt is asymptomatic  Vital Signs Last 24 Hrs  T(C): 36.6 (02-07-22 @ 05:00), Max: 36.7 (02-06-22 @ 21:00)  T(F): 97.8 (02-07-22 @ 05:00), Max: 98 (02-06-22 @ 21:00)  HR: 86 (02-07-22 @ 05:00) (81 - 90)  BP: 128/81 (02-07-22 @ 05:00) (128/81 - 140/90)  RR: 18 (02-07-22 @ 05:00) (17 - 18)  SpO2: 100% (02-07-22 @ 05:00) (99% - 100%)    Tele reviewed: Sinus Rhythm w/ Second Degree AV Block Type II, PAC w/ aberrancy, Block PAC    This is 42 y/o Male with  PMHx of CHF, LVEF 27%, HTN, Asthma, Smoker, Type 2 diabetes, hyperlipidemia a/w atypical chest pain and acute on chronic systolic CHF now found to have episode of Second Degree AV Block Type II, PAC w/ aberrancy, Block PAC.  Coreg 12.5 mg PO was just administered  Will hold Coreg for now, Atropine & Pacer Pads at the bedside.    Will Call EP Evaluation  Will Continue to monitor
Serina, from EP team, is aware to see pt regarding second degree type 2 AV block seen on tele at 5:30AM today. Coreg remains on hold at this time. Pt VSS and asymptomatic. Will continue to monitor and f/u EP recs.
42 y/o Male with  MHx of severe sys CHF (LVEF 22% on MRI  4/2019, 30% in TTE from 6/2019, no AICD), HTN, Asthma, Smoker, Type 2 diabetes, hyperlipidemia a/w atypical chest pain and acute on chronic severe systolic CHF likely due medications nonadherence; Also found to have transverse fracture of the fourth metatarsal diaphysis. Endocrine consulted for management of uncontrolled DM2.     Contacted by primary team for discharge recommendations for today  Recommend resume Metformin 1g PO BID, increase Jardiance to 25 mg PO daily  Patient with A1c 11.1%, our team discussed starting basal insulin along with home oral medications but patient not amenable to this; also suggested GLP-1 as another option to add to his current home medications but patient unwilling to add on additional medications at this time  Patient should followup with his PCP, can also followup with [facility] End[facility]ology:  [facility], [id number] [place] [address], [phone]    CAPILLARY BLOOD GLUCOSE    POCT Blood Glucose.: 137 mg/dL (09 Feb 2022 12:04)  POCT Blood Glucose.: 135 mg/dL (09 Feb 2022 08:23)  POCT Blood Glucose.: 161 mg/dL (08 Feb 2022 21:36)  POCT Blood Glucose.: 149 mg/dL (08 Feb 2022 17:15)      02-09    135  |  97<L>  |  21  ----------------------------<  144<H>  4.0   |  29  |  1.02    Ca    9.5      09 Feb 2022 04:13  Phos  4.5     02-09  Mg     2.00     02-09        MEDICATIONS  (STANDING):  budesonide 160 MICROgram(s)/formoterol 4.5 MICROgram(s) Inhaler 2 Puff(s) Inhalation two times a day  carvedilol 12.5 milliGRAM(s) Oral every 12 hours  dextrose 40% Gel 15 Gram(s) Oral once  dextrose 5%. 1000 milliLiter(s) (50 mL/Hr) IV Continuous <Continuous>  dextrose 5%. 1000 milliLiter(s) (100 mL/Hr) IV Continuous <Continuous>  dextrose 50% Injectable 25 Gram(s) IV Push once  dextrose 50% Injectable 12.5 Gram(s) IV Push once  dextrose 50% Injectable 25 Gram(s) IV Push once  digoxin     Tablet 250 MICROGram(s) Oral daily  glucagon  Injectable 1 milliGRAM(s) IntraMuscular once  hydrALAZINE 100 milliGRAM(s) Oral three times a day  influenza   Vaccine 0.5 milliLiter(s) IntraMuscular once  insulin glargine Injectable (LANTUS) 22 Unit(s) SubCutaneous at bedtime  insulin lispro (ADMELOG) corrective regimen sliding scale   SubCutaneous at bedtime  insulin lispro (ADMELOG) corrective regimen sliding scale   SubCutaneous three times a day before meals  insulin lispro Injectable (ADMELOG) 6 Unit(s) SubCutaneous three times a day before meals  losartan 100 milliGRAM(s) Oral daily  melatonin 6 milliGRAM(s) Oral at bedtime  sodium chloride 0.9% lock flush 3 milliLiter(s) IV Push every 8 hours  spironolactone 25 milliGRAM(s) Oral daily  torsemide 40 milliGRAM(s) Oral two times a day      Diet, Regular:   Consistent Carbohydrate {Evening Snack} (CSTCHOSN)  DASH/TLC {Sodium & Cholesterol Restricted} (DASH)  1200mL Fluid Restriction ([id number]) (02-02-22 @ 16:42)      A1C with Estimated Average Glucose Result: 11.1 % (02-02-22 @ 07:43)  A1C with Estimated Average Glucose Result: 11.3 % (02-01-22 @ 16:13)  A1C with Estimated Average Glucose Result: 11.2 % (02-01-22 @ 13:51)      Lissette Nieves  Nurse Practitioner  Division of Endocrinology & Diabetes  In house pager #[phone]/long range pager #[phone]    If before 9AM or after 6PM, or on weekends/holidays, please call endocrine answering service for assistance ([phone]).  For nonurgent matters email [email or web address] for assistance.
As per discussion w/ MD Parth (from EP) the Coreg is to be resumed despite tele noting Second Degree, type II AV block in AM today.  Will continue to monitor.
Message left for hand surgery (covered by Dr. Flores) on 2/2 @ 10:50AM for review of hand fracture. Awaiting call back.    Anthony Singh PA-C  Medicine ACP, pgr [phone]  Available on [employer] Teams

## 2022-02-09 NOTE — PROGRESS NOTE ADULT - PROBLEM SELECTOR PLAN 6
-DASH/TLC diet.

## 2022-02-09 NOTE — DIETITIAN INITIAL EVALUATION ADULT. - PERTINENT MEDS FT
MEDICATIONS  (STANDING):  budesonide 160 MICROgram(s)/formoterol 4.5 MICROgram(s) Inhaler 2 Puff(s) Inhalation two times a day  carvedilol 12.5 milliGRAM(s) Oral every 12 hours  dextrose 40% Gel 15 Gram(s) Oral once  dextrose 5%. 1000 milliLiter(s) (50 mL/Hr) IV Continuous <Continuous>  dextrose 5%. 1000 milliLiter(s) (100 mL/Hr) IV Continuous <Continuous>  dextrose 50% Injectable 25 Gram(s) IV Push once  dextrose 50% Injectable 12.5 Gram(s) IV Push once  dextrose 50% Injectable 25 Gram(s) IV Push once  digoxin     Tablet 250 MICROGram(s) Oral daily  glucagon  Injectable 1 milliGRAM(s) IntraMuscular once  hydrALAZINE 100 milliGRAM(s) Oral three times a day  influenza   Vaccine 0.5 milliLiter(s) IntraMuscular once  insulin glargine Injectable (LANTUS) 22 Unit(s) SubCutaneous at bedtime  insulin lispro (ADMELOG) corrective regimen sliding scale   SubCutaneous at bedtime  insulin lispro (ADMELOG) corrective regimen sliding scale   SubCutaneous three times a day before meals  insulin lispro Injectable (ADMELOG) 6 Unit(s) SubCutaneous three times a day before meals  losartan 100 milliGRAM(s) Oral daily  melatonin 6 milliGRAM(s) Oral at bedtime  sodium chloride 0.9% lock flush 3 milliLiter(s) IV Push every 8 hours  spironolactone 25 milliGRAM(s) Oral daily  torsemide 40 milliGRAM(s) Oral two times a day
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
## 2022-02-09 NOTE — PROGRESS NOTE ADULT - PROBLEM SELECTOR PLAN 5
-Will restart hydralazine 75 mg TID, Losartan 100 mg daily from previous admission.  -c/w coreg 12.5BID, on hold due to 2nd degree HB
-Will restart hydralazine 75 mg TID, Losartan 100 mg daily from previous admission.  -c/w coreg 12.5BID
-Will restart hydralazine 75 mg TID, Losartan 100 mg daily from previous admission.  -c/w coreg 12.5BID, on hold due to 2nd degree HB
-Will restart hydralazine 75 mg TID, Losartan 100 mg daily from previous admission.  -start coreg 12.5BID

## 2022-02-09 NOTE — PROGRESS NOTE ADULT - SUBJECTIVE AND OBJECTIVE BOX
[facility] Division of Hospital Medicine  Naye Elise MD  Pager [phone]    Patient is a 41y old  Male who presents with a chief complaint of Chest pain, Rt hand pain      SUBJECTIVE / OVERNIGHT EVENTS: pt refused cath yesterday despite my d/w him regarding risks and benefits; is waiting to speak with HF team again today about poss reconsidering; pt states he still has not made a decision.       MEDICATIONS  (STANDING):  budesonide 160 MICROgram(s)/formoterol 4.5 MICROgram(s) Inhaler 2 Puff(s) Inhalation two times a day  carvedilol 12.5 milliGRAM(s) Oral every 12 hours  dextrose 40% Gel 15 Gram(s) Oral once  dextrose 5%. 1000 milliLiter(s) (50 mL/Hr) IV Continuous <Continuous>  dextrose 5%. 1000 milliLiter(s) (100 mL/Hr) IV Continuous <Continuous>  dextrose 50% Injectable 25 Gram(s) IV Push once  dextrose 50% Injectable 12.5 Gram(s) IV Push once  dextrose 50% Injectable 25 Gram(s) IV Push once  digoxin     Tablet 250 MICROGram(s) Oral daily  furosemide   Injectable 40 milliGRAM(s) IV Push two times a day  glucagon  Injectable 1 milliGRAM(s) IntraMuscular once  hydrALAZINE 75 milliGRAM(s) Oral three times a day  influenza   Vaccine 0.5 milliLiter(s) IntraMuscular once  insulin glargine Injectable (LANTUS) 22 Unit(s) SubCutaneous at bedtime  insulin lispro (ADMELOG) corrective regimen sliding scale   SubCutaneous at bedtime  insulin lispro (ADMELOG) corrective regimen sliding scale   SubCutaneous three times a day before meals  insulin lispro Injectable (ADMELOG) 6 Unit(s) SubCutaneous three times a day before meals  losartan 100 milliGRAM(s) Oral daily  melatonin 6 milliGRAM(s) Oral at bedtime  sodium chloride 0.9% lock flush 3 milliLiter(s) IV Push every 8 hours    MEDICATIONS  (PRN):  acetaminophen     Tablet .. 650 milliGRAM(s) Oral every 6 hours PRN Mild Pain (1 - 3), Moderate Pain (4 - 6)  ALBUTerol    90 MICROgram(s) HFA Inhaler 2 Puff(s) Inhalation every 6 hours PRN Shortness of Breath and/or Wheezing      CAPILLARY BLOOD GLUCOSE  POCT Blood Glucose.: 135 mg/dL (09 Feb 2022 08:23)  POCT Blood Glucose.: 161 mg/dL (08 Feb 2022 21:36)  POCT Blood Glucose.: 149 mg/dL (08 Feb 2022 17:15)  POCT Blood Glucose.: 209 mg/dL (08 Feb 2022 12:09)      PHYSICAL EXAM:  Vital Signs Last 24 Hrs  T(F): 98.3 (09 Feb 2022 04:00), Max: 98.3 (09 Feb 2022 04:00)  HR: 88 (09 Feb 2022 04:00) (85 - 93)  BP: 128/79 (09 Feb 2022 04:00) (114/77 - 128/79)  RR: 18 (09 Feb 2022 04:00) (18 - 18)  SpO2: 98% (09 Feb 2022 04:00) (98% - 99%)    CONSTITUTIONAL: NAD, appears comfortable  EYES: PERRLA; conjunctiva and sclera clear  ENMT: Moist oral mucosa; normal dentition  NECK: Supple, no palpable masses; no thyromegaly  RESPIRATORY: Normal respiratory effort; lungs are clear to auscultation bilaterally  CARDIOVASCULAR: Regular rate and rhythm; No lower extremity edema;  ABDOMEN: Nontender to palpation, normoactive bowel sounds  MUSCULOSKELETAL:  no clubbing or cyanosis of digits; no joint swelling or tenderness to palpation  PSYCH: A+O to person, place, and time; affect appropriate  NEUROLOGY: CN 2-12 are intact and symmetric; no gross sensory deficits   SKIN: handcuff and shackles remain in place    LABS:                        18.6   8.60  )-----------( 230      ( 09 Feb 2022 04:13 )             56.0     02-09    135  |  97<L>  |  21  ----------------------------<  144<H>  4.0   |  29  |  1.02    Ca    9.5      09 Feb 2022 04:13  Phos  4.5     02-09  Mg     2.00     02-09

## 2022-02-09 NOTE — PROGRESS NOTE ADULT - PROVIDER SPECIALTY LIST ADULT
Endocrinology
Plastic Surgery
Plastic Surgery
Endocrinology
Heart Failure
Heart Failure
Electrophysiology
Electrophysiology
Heart Failure
Heart Failure
Hospitalist
Endocrinology
Hospitalist

## 2022-02-09 NOTE — PROGRESS NOTE ADULT - PROBLEM SELECTOR PROBLEM 3
Hyperlipidemia
Displaced fracture of metacarpal bone

## 2022-02-09 NOTE — DIETITIAN INITIAL EVALUATION ADULT. - PROBLEM SELECTOR PLAN 2
Patient with midsternal chest pain, reoccurring daily with ALAS; Likely due to acute on chronic sys CHF;   Telemetry r/o arrythmia;   Troponin 26-->25.  EKG unchanged compared to prior   D-dimer <150.  Cardiac cath from 2019 showing mild atherosclerosis in Mid LAD.  ordered TTE.  Repeat EKG PRN CP

## 2022-02-09 NOTE — PROGRESS NOTE ADULT - PROBLEM SELECTOR PLAN 2
-Patient with midsternal chest pain, reoccurring daily with ALAS; Likely due to acute on chronic sys CHF;   -Telemetry r/o arrythmia;   -Troponin 26-->25.  -EKG unchanged compared to prior   -D-dimer <150.  -Cardiac cath from 2019 showing mild atherosclerosis in Mid LAD.  -ordered TTE.  -Repeat EKG PRN CP
-Patient with midsternal chest pain, reoccurring daily with ALAS; Likely due to acute on chronic sys CHF;   -Telemetry r/o arrythmia;   -Troponin 26-->25.  -EKG unchanged compared to prior   -D-dimer <150.  -Cardiac cath from 2019 showing mild atherosclerosis in Mid LAD.  -TTE with known LV dysfunction. EF 27%  -Repeat EKG PRN CP
-Patient with midsternal chest pain, reoccurring daily with ALAS; Likely due to acute on chronic sys CHF;   -Telemetry r/o arrythmia;   -Troponin 26-->25.  -EKG unchanged compared to prior   -D-dimer <150.  -Cardiac cath from 2019 showing mild atherosclerosis in Mid LAD.  -TTE with known LV dysfunction. EF 27%  -Repeat EKG PRN CP  -stress test pending
-Patient with midsternal chest pain, reoccurring daily with ALAS; Likely due to acute on chronic sys CHF;   -Telemetry r/o arrythmia;   -Troponin 26-->25.  -EKG unchanged compared to prior   -D-dimer <150.  -Cardiac cath from 2019 showing mild atherosclerosis in Mid LAD.  -TTE with known LV dysfunction. EF 27%  -Repeat EKG PRN CP

## 2022-02-09 NOTE — PROGRESS NOTE ADULT - PROBLEM SELECTOR PROBLEM 2
Hypertension
Atypical chest pain

## 2022-02-09 NOTE — PROGRESS NOTE ADULT - SUBJECTIVE AND OBJECTIVE BOX
Medications:  acetaminophen     Tablet .. 650 milliGRAM(s) Oral every 6 hours PRN  ALBUTerol    90 MICROgram(s) HFA Inhaler 2 Puff(s) Inhalation every 6 hours PRN  budesonide 160 MICROgram(s)/formoterol 4.5 MICROgram(s) Inhaler 2 Puff(s) Inhalation two times a day  carvedilol 12.5 milliGRAM(s) Oral every 12 hours  dextrose 40% Gel 15 Gram(s) Oral once  dextrose 5%. 1000 milliLiter(s) IV Continuous <Continuous>  dextrose 5%. 1000 milliLiter(s) IV Continuous <Continuous>  dextrose 50% Injectable 25 Gram(s) IV Push once  dextrose 50% Injectable 12.5 Gram(s) IV Push once  dextrose 50% Injectable 25 Gram(s) IV Push once  digoxin     Tablet 250 MICROGram(s) Oral daily  furosemide   Injectable 40 milliGRAM(s) IV Push two times a day  glucagon  Injectable 1 milliGRAM(s) IntraMuscular once  hydrALAZINE 75 milliGRAM(s) Oral three times a day  influenza   Vaccine 0.5 milliLiter(s) IntraMuscular once  insulin glargine Injectable (LANTUS) 22 Unit(s) SubCutaneous at bedtime  insulin lispro (ADMELOG) corrective regimen sliding scale   SubCutaneous at bedtime  insulin lispro (ADMELOG) corrective regimen sliding scale   SubCutaneous three times a day before meals  insulin lispro Injectable (ADMELOG) 6 Unit(s) SubCutaneous three times a day before meals  losartan 100 milliGRAM(s) Oral daily  melatonin 6 milliGRAM(s) Oral at bedtime  sodium chloride 0.9% lock flush 3 milliLiter(s) IV Push every 8 hours      Vitals:  Vital Signs Last 24 Hrs  T(C): 36.8 (2022 04:00), Max: 36.8 (2022 21:35)  T(F): 98.3 (2022 04:00), Max: 98.3 (2022 04:00)  HR: 88 (2022 04:00) (85 - 93)  BP: 128/79 (2022 04:00) (114/77 - 128/79)  BP(mean): --  RR: 18 (2022 04:00) (18 - 18)  SpO2: 98% (2022 04:00) (98% - 99%)    Daily     Daily Weight in k.4 (2022 11:43)    I&O's Detail    2022 07:01  -  2022 07:00  --------------------------------------------------------  IN:    Oral Fluid: 600 mL  Total IN: 600 mL    OUT:  Total OUT: 0 mL    Total NET: 600 mL          Physical Exam:     General: No distress. Comfortable.  HEENT: EOM intact.  Neck: Neck supple. JVP not elevated. No masses  Chest: Clear to auscultation bilaterally  CV: Normal S1 and S2. No murmurs, rub, or gallops. Radial pulses normal.  Abdomen: Soft, non-distended, non-tender  Skin: No rashes or skin breakdown  Neurology: Alert and oriented times three. Sensation intact  Psych: Affect normal    Labs:                        18.6   8.60  )-----------( 230      ( 2022 04:13 )             56.0     02-09    135  |  97<L>  |  21  ----------------------------<  144<H>  4.0   |  29  |  1.02    Ca    9.5      2022 04:13  Phos  4.5     02-09  Mg     2.00     -             Patient seen and examined. He states he still has ALAS. No CP, palpitations.   Spent >20 mins explaining need for LHC/RHC-- but patient is refusing.     Medications:  acetaminophen     Tablet .. 650 milliGRAM(s) Oral every 6 hours PRN  ALBUTerol    90 MICROgram(s) HFA Inhaler 2 Puff(s) Inhalation every 6 hours PRN  budesonide 160 MICROgram(s)/formoterol 4.5 MICROgram(s) Inhaler 2 Puff(s) Inhalation two times a day  carvedilol 12.5 milliGRAM(s) Oral every 12 hours  dextrose 40% Gel 15 Gram(s) Oral once  dextrose 5%. 1000 milliLiter(s) IV Continuous <Continuous>  dextrose 5%. 1000 milliLiter(s) IV Continuous <Continuous>  dextrose 50% Injectable 25 Gram(s) IV Push once  dextrose 50% Injectable 12.5 Gram(s) IV Push once  dextrose 50% Injectable 25 Gram(s) IV Push once  digoxin     Tablet 250 MICROGram(s) Oral daily  furosemide   Injectable 40 milliGRAM(s) IV Push two times a day  glucagon  Injectable 1 milliGRAM(s) IntraMuscular once  hydrALAZINE 75 milliGRAM(s) Oral three times a day  influenza   Vaccine 0.5 milliLiter(s) IntraMuscular once  insulin glargine Injectable (LANTUS) 22 Unit(s) SubCutaneous at bedtime  insulin lispro (ADMELOG) corrective regimen sliding scale   SubCutaneous at bedtime  insulin lispro (ADMELOG) corrective regimen sliding scale   SubCutaneous three times a day before meals  insulin lispro Injectable (ADMELOG) 6 Unit(s) SubCutaneous three times a day before meals  losartan 100 milliGRAM(s) Oral daily  melatonin 6 milliGRAM(s) Oral at bedtime  sodium chloride 0.9% lock flush 3 milliLiter(s) IV Push every 8 hours      Vitals:  Vital Signs Last 24 Hrs  T(C): 36.8 (2022 04:00), Max: 36.8 (2022 21:35)  T(F): 98.3 (2022 04:00), Max: 98.3 (2022 04:00)  HR: 88 (2022 04:00) (85 - 93)  BP: 128/79 (2022 04:00) (114/77 - 128/79)  BP(mean): --  RR: 18 (2022 04:00) (18 - 18)  SpO2: 98% (2022 04:00) (98% - 99%)    Daily     Daily Weight in k.4 (2022 11:43)    I&O's Detail    2022 07:01  -  2022 07:00  --------------------------------------------------------  IN:    Oral Fluid: 600 mL  Total IN: 600 mL    OUT:  Total OUT: 0 mL    Total NET: 600 mL          Physical Exam:     General: No distress. Comfortable.  HEENT: EOM intact.  Neck: Neck supple. JVP elevated. No masses  Chest: Clear to auscultation bilaterally  CV: Normal S1 and S2. No murmurs, rub, or gallops. Radial pulses normal. No LE edema b/l, warm b/.   Abdomen: Soft, non-distended, non-tender  Skin: No rashes or skin breakdown  Neurology: Alert and oriented times three. Sensation intact  Psych: Affect normal    Labs:                        18.6   8.60  )-----------( 230      ( 2022 04:13 )             56.0     02-09    135  |  97<L>  |  21  ----------------------------<  144<H>  4.0   |  29  |  1.02    Ca    9.5      2022 04:13  Phos  4.5     02-09  Mg     2.00     02-09

## 2022-02-09 NOTE — PROGRESS NOTE ADULT - PROBLEM SELECTOR PLAN 1
Patient admits to ALAS. JVP appears normal.  Not good I/O recordings. D/w nurse to check strict I/O and standing weights.   Abnormal NST- patient is refusing LHC and RHC. Spent >20 mins explaining need for LHC/RHC-- but patient is refusing, states he understands risk.   Continue Coreg 12.5 mg po BID.  Continue Losartan 100mg daily.  Increase hydralazine to 100 mg po TID.   Pt is refusing ICD.   Strict I/O  Daily standing weights  Keep K 4.0-5.0 and mag 2.0  He wants to follow up with his cardiologist who he believes is a HF specialist (Dr. Leong). He was given contact info to our HF clinic if he wants to follow up here.

## 2022-02-09 NOTE — PROGRESS NOTE ADULT - PROBLEM SELECTOR PROBLEM 4
Uncontrolled type 2 diabetes mellitus, without long-term current use of insulin

## 2022-02-09 NOTE — DIETITIAN INITIAL EVALUATION ADULT. - OTHER INFO
42 y/o M with CHF, HTN, smoker, T2DM, HLD here with heart failure exacerbation.     Pt reports good appetite. Denies any GI issues (nausea/vomiting/diarrhea/constipation.) Denies any chewing or swallowing difficulties at this time. NKFA. Unable to state his UBW. 2019 weight 324.2 pounds per previous RD note.     Provided pt with handout Carbohydrate Counting for People with Diabetes. Discussed carbohydrate sources, carbohydrate portions, protein sources, mixed meals, avoiding sugary beverages, and nutrition label reading. Stressed importance of balanced meals with adequate protein and fiber. Pt was informed of current A1c, goal A1c, and goal fingerstick range.

## 2022-02-09 NOTE — PROGRESS NOTE ADULT - PROBLEM SELECTOR PLAN 3
-Right hand X-ray showing acute transverse fracture of the fourth metatarsal diaphysis with approximately one third shaft's width radial displacement, additionally there is apex dorsal displacement.  -Hand splinted in ED.   -seen by hand surgeon yesterday. s/p bedside reduction and splinting on 2/3. Repeat xray suggestive of non acute fracture. Per hand surgery no inpt surgical intervention. Outpt follow up
after further eval and repeat imaging, appears fx is subacute  s/p attempted reduction  will need outpt f/u with hand surg for poss surgical intervention to improve alignment
after further eval and repeat imaging, appears fx is subacute  s/p attempted reduction  will need outpt f/u with hand surg for poss surgical intervention to improve alignment
-Right hand X-ray showing acute transverse fracture of the fourth metatarsal diaphysis with approximately one third shaft's width radial displacement, additionally there is apex dorsal displacement.  -Hand splinted in ED.   -Hand surgery input appreciated. Once medically optimized, will clarify if surgical intervention can be performed inpatient. At this time outpt follow up is recommended however patient is in custody which complicates situation.   -Pain control with acetaminophen 650 mg PRN q6h.
-Right hand X-ray showing acute transverse fracture of the fourth metatarsal diaphysis with approximately one third shaft's width radial displacement, additionally there is apex dorsal displacement.  -Hand splinted in ED.   -seen by hand surgeon yesterday. Awaiting repeat xray of hand after bedside reduction and splinting on 2/3  -further recs per surgery
-Right hand X-ray showing acute transverse fracture of the fourth metatarsal diaphysis with approximately one third shaft's width radial displacement, additionally there is apex dorsal displacement.  -Hand splinted in ED.   -Hand surgery input appreciated. Once medically optimized, will clarify if surgical intervention can be performed inpatient. At this time outpt follow up is recommended however patient is in custody which complicates situation.   -Pain control with acetaminophen 650 mg PRN q6h.  -awaiting official hand surgery evaluation
-Right hand X-ray showing acute transverse fracture of the fourth metatarsal diaphysis with approximately one third shaft's width radial displacement, additionally there is apex dorsal displacement.  -Hand splinted in ED.   -seen by hand surgeon yesterday. s/p bedside reduction and splinting on 2/3. Repeat xray suggestive of non acute fracture. Per hand surgery no inpt surgical intervention. Outpt follow up
after further eval and repeat imaging, appears fx is subacute  s/p attempted reduction  will need outpt f/u with hand surg for poss surgical intervention to improve alignment

## 2022-02-09 NOTE — DISCHARGE NOTE NURSING/CASE MANAGEMENT/SOCIAL WORK - PATIENT PORTAL LINK FT
You can access the FollowMyHealth Patient Portal offered by [facility] by registering at the following website: [email or web address]. By joining [facility]’s FollowMyHealth portal, you will also be able to view your health information using other applications (apps) compatible with our system.

## 2022-02-09 NOTE — DIETITIAN INITIAL EVALUATION ADULT. - PROBLEM SELECTOR PLAN 4
Per surscripts patient was prescribed metformin 1000 mg and Jardiance 10 mg. Patient unable to recall name of medications and dosages.  Hemoglobin A1c of 11.3 and serum glucose of 278.  Will repeat A1c to confirm   Patient started conservatively on Lantus 15 units. Will continue pending Endocrine consult. Patient also started on Premeal insulin 4 units TID and low dose insulin sliding scale.  Will request Endocrine consult given a1c of 11  Consistent carbohydrate diet ordered.  Monitor fingersticks.

## 2022-02-09 NOTE — DISCHARGE NOTE NURSING/CASE MANAGEMENT/SOCIAL WORK - NSDCPEFALRISK_GEN_ALL_CORE
For information on Fall & Injury Prevention, visit: [email or web address] OR  [email or web address] OR  https://www.cdc.gov/steadi/patient.html

## 2022-02-09 NOTE — PROGRESS NOTE ADULT - PROBLEM SELECTOR PLAN 8
Lovenox 40 mg subcutaneous daily.

## 2022-02-09 NOTE — DIETITIAN INITIAL EVALUATION ADULT. - PROBLEM SELECTOR PLAN 7
Continue albuterol PRN for shortness of breath and wheezing.  Per surescripts patient on Wixela 250-50.  Will continue.

## 2022-02-09 NOTE — PROGRESS NOTE ADULT - ATTENDING COMMENTS
AFTER YOU LEAVE THIS OFFICE  1) The sleep lab will contact you to arrange your sleep study after they check with your insurance for coverage.  Expect this process to take 1--2 weeks.    2) You will be called within  5--7  days after completing your sleep study with the results.    3) If your sleep study is negative for sleep apnea, we will discuss alternative therapy at your follow up appointment.  If your sleep study is positive for sleep apnea we will arrange for you to start CPAP/Bipap therapy prior to your follow up appointment.    INSURANCE COMPLIANCE REQUIREMENTS  AFTER STARTING CPAP/BIPAP THERAPY    Most insurance companies especially Medicare will stop paying for your medical equipment if you are not compliant.    WHAT YOU NEED TO DO TO BE COMPLIANT    The device must be worn for at least 4 hrs each night on 70% of the nights for 30 consecutive days during the initial 3 months from set up.  If you are having mask issues please contact the medical equipment company ASAP.  You only have 30 days to exchange for a different mask to be covered by insurance.  Do not wait until your follow up to discuss mask issues because it may be too late to exchange for a new mask to be covered by your insurance.    You will need to have a face to face visit with the medical provider who ordered the equipment between the 31st and 91st day from set up.  If you have do not have an appointment please call our office.    Before your face to face visit after starting therapy you will need to do a download of your CPAP or BIPAP data.  This download will provide your physician with the necessary data to assess your compliance with therapy.  Please return the disk/chip back to the medical equipment company from which your received the equipment. If the download will not be completed before your appointment please call the office to reschedule.                                  [facility] LOC[facility]    [facility] ( 
[place])  [address]  [address]  [place] [address]  Phone [phone]      [facility] ( [place])  [address] [place] [address]  Phone [phone]      [facility] ( [place])  [address]  [place] [address]  Phone [phone]      Home  Back  SP    Monitoring Your Sleep: Home Sleep Study  A home sleep study tracks and records body functions while you’re asleep in your own bed. The results of the study will help with diagnosing your sleep problem and planning your treatment.    How a Home Sleep Study Works  During a sleep study, sensors attached to your body measure your breathing, oxygen level, and other body functions. You will be shown how to attach the sensors to your body. You may also have help from a technician. At bedtime you plug the sensors into a small computer and turn it on. In the morning, you will remove the sensors and return the computer so the results can be studied.  Tips  You’ll be given instructions for how to set up the sensors and the computer. Doing so will be simple. For best results:  · Go through the instructions during the day so you’ll be ready to use the equipment at bedtime.  · Stick to your normal routine. Ask your healthcare provider if you should do anything differently the night of the study.  · If you get up during the night, reconnect the sensors to the computer or to yourself correctly.  · Get as many hours of sleep as you can.  Getting the Results  The results of your sleep study need to be scored and interpreted. Once this is done, your doctor will discuss the findings with you. The sleep study results will show whether you have apnea. It can also tell how severe the apnea is. The findings help your doctor know which treatment or treatments may be the right ones for you.  © 9373-2799 [employer], [address], [place] [address]. All rights reserved. This information is not intended as a substitute for 
professional medical care. Always follow your healthcare professional's instructions.        
Pt refusing RHC.  Change Lasix to torsemide 40 mg po bid.  Increase hydralazine to 100 mg po tid.  Start spironolactone 25 mg po qd.  EP to decide on ICD.  No further w/u planned. He can f/u with me in the office.  Will sign off.
41 year old male with a PMH of chronic systolic heart failure since 2019, dilated cardiomyopathy, HTN, asthma, smoker, DMT2-uncontrolled, HLD presented with c/o chest pain.  He was found to have acute decompensated heart failure and a fracture of 4th metacarpal bone may require hand surgery.  Telemetry demonstrates SR with NSVT (4 beats) and one 2 rd degree AV block (Mobitz II) during sleep.  He underwent nuclear stress test 2/6.  Recommended LHC and RHC per HF.  However patient refused today.  He self reported hx of non-adherent to meds in the past up to 3 weeks (due to run out of meds). Awaiting R/LHC. No plan for ICD as of now.
F/u NST.
Start Digoxin 0.25 mg po qd.  Possible RHC tomorrow.
41 year old male with a PMH of chronic systolic heart failure since 2019, dilated cardiomyopathy, HTN, asthma, smoker, DMT2-uncontrolled, HLD presented with c/o chest pain.  He was found to have acute decompensated heart failure and a fracture of 4th metacarpal bone may require hand surgery.  Telemetry demonstrates SR with NSVT (4 beats) and one 2 rd degree AV block (Mobitz II) during sleep.  He underwent nuclear stress test 2/6.  Recommended LHC and RHC per HF.  However patient refused today.  He self reported hx of non-adherent to meds in the past up to 3 weeks (due to run out of meds). Refusing R/LHC. COnt OMT for at least 3 months. No plan for ICD as of now.
Remains dyspneic.  L/RHC tomorrow.  Resume Coreg at 12.5 mg po bid.

## 2022-02-09 NOTE — DIETITIAN INITIAL EVALUATION ADULT. - PROBLEM SELECTOR PLAN 1
Intermitted CP, trace leg edema, elevated ProBNP; ALAS; Self-reported medications nonadherence >3 weeks  Admit to telemetry  Heart failure likely in setting of medications nonadherence    CXR mild vascular congestion.  Patient with ProBNP of 519 and 1+ pitting lower extremity edema.  Patient endorses not taking Lasix for 2 weeks and is unsure of dosage he takes.   Will start patient on Lasix 20 mg IVP BID with hold parameters.  Will order TTE.  Fluid restriction to 1500 ml.  DASH/TLC diet.  Strict I&O's.  Heart failure team c/s in AM

## 2022-02-09 NOTE — PROGRESS NOTE ADULT - PROBLEM SELECTOR PROBLEM 1
DM (diabetes mellitus), type 2, uncontrolled with complications
DM (diabetes mellitus), type 2, uncontrolled with complications
Acute on chronic systolic congestive heart failure
Acute on chronic systolic heart failure
Acute on chronic systolic congestive heart failure
Acute on chronic systolic congestive heart failure
Acute on chronic systolic heart failure
DM (diabetes mellitus), type 2, uncontrolled with complications
Acute on chronic systolic congestive heart failure
Acute on chronic systolic heart failure
Acute on chronic systolic congestive heart failure

## 2022-02-09 NOTE — DIETITIAN INITIAL EVALUATION ADULT. - PERTINENT LABORATORY DATA
02-09 Na 135 mmol/L Glu 144 mg/dL<H> K+ 4.0 mmol/L Cr 1.02 mg/dL BUN 21 mg/dL Phos 4.5 mg/dL A1C with Estimated Average Glucose Result: 11.1 % (02-02-22 @ 07:43)  A1C with Estimated Average Glucose Result: 11.3 % (02-01-22 @ 16:13)  A1C with Estimated Average Glucose Result: 11.2 % (02-01-22 @ 13:51)   02-09 @ 08:23 POCT 135 mg/dL  02-08 @ 21:36 POCT 161 mg/dL  02-08 @ 17:15 POCT 149 mg/dL  02-08 @ 12:09 POCT 209 mg/dL

## 2022-02-09 NOTE — DIETITIAN INITIAL EVALUATION ADULT. - PROBLEM SELECTOR PLAN 5
Uncontrolled, SBP in 170s mmHg range; Patient unsure of what blood pressure medications he is on.  Will restart hydralazine 75 mg TID, Losartan 100 mg daily from previous admission.  Defer restarting Carvedilol in setting of acute heart failure to Heart failure team   DASH/TLC diet.

## 2022-05-13 NOTE — ED ADULT TRIAGE NOTE - IDEAL BODY WEIGHT(KG)
Spoke with primary RN Devan regarding 3 hour Sepsis bundle.       Hong Pittman MSN, [address] [facility]  [phone]

## 2022-09-03 ENCOUNTER — INPATIENT (INPATIENT)
Facility: HOSPITAL | Age: 42
LOS: 4 days | Discharge: HOME | DRG: 291 | End: 2022-09-08
Attending: INTERNAL MEDICINE | Admitting: INTERNAL MEDICINE
Payer: MEDICAID

## 2022-09-03 VITALS
DIASTOLIC BLOOD PRESSURE: 114 MMHG | HEIGHT: 71 IN | HEART RATE: 113 BPM | OXYGEN SATURATION: 93 % | RESPIRATION RATE: 18 BRPM | TEMPERATURE: 98 F | WEIGHT: 250 LBS | SYSTOLIC BLOOD PRESSURE: 161 MMHG

## 2022-09-03 DIAGNOSIS — I50.9 HEART FAILURE, UNSPECIFIED: ICD-10-CM

## 2022-09-03 LAB
A1C WITH ESTIMATED AVERAGE GLUCOSE RESULT: 9.6 % — HIGH (ref 4–5.6)
ALBUMIN SERPL ELPH-MCNC: 4 G/DL — SIGNIFICANT CHANGE UP (ref 3.3–5)
ALP SERPL-CCNC: 78 U/L — SIGNIFICANT CHANGE UP (ref 40–120)
ALT FLD-CCNC: 80 U/L — HIGH (ref 10–45)
ANION GAP SERPL CALC-SCNC: 9 MMOL/L — SIGNIFICANT CHANGE UP (ref 5–17)
AST SERPL-CCNC: 39 U/L — SIGNIFICANT CHANGE UP (ref 10–40)
BASE EXCESS BLDV CALC-SCNC: 2.7 MMOL/L — SIGNIFICANT CHANGE UP (ref -2–3)
BASOPHILS # BLD AUTO: 0.03 K/UL — SIGNIFICANT CHANGE UP (ref 0–0.2)
BASOPHILS NFR BLD AUTO: 0.3 % — SIGNIFICANT CHANGE UP (ref 0–2)
BILIRUB SERPL-MCNC: 0.8 MG/DL — SIGNIFICANT CHANGE UP (ref 0.2–1.2)
BUN SERPL-MCNC: 14 MG/DL — SIGNIFICANT CHANGE UP (ref 7–23)
CA-I SERPL-SCNC: 1.15 MMOL/L — SIGNIFICANT CHANGE UP (ref 1.15–1.33)
CALCIUM SERPL-MCNC: 8.6 MG/DL — SIGNIFICANT CHANGE UP (ref 8.4–10.5)
CHLORIDE BLDV-SCNC: 100 MMOL/L — SIGNIFICANT CHANGE UP (ref 96–108)
CHLORIDE SERPL-SCNC: 101 MMOL/L — SIGNIFICANT CHANGE UP (ref 96–108)
CO2 BLDV-SCNC: 29 MMOL/L — HIGH (ref 22–26)
CO2 SERPL-SCNC: 26 MMOL/L — SIGNIFICANT CHANGE UP (ref 22–31)
CREAT SERPL-MCNC: 0.98 MG/DL — SIGNIFICANT CHANGE UP (ref 0.5–1.3)
D DIMER BLD IA.RAPID-MCNC: <150 NG/ML DDU — SIGNIFICANT CHANGE UP
EGFR: 98 ML/MIN/1.73M2 — SIGNIFICANT CHANGE UP
EOSINOPHIL # BLD AUTO: 0.13 K/UL — SIGNIFICANT CHANGE UP (ref 0–0.5)
EOSINOPHIL NFR BLD AUTO: 1.3 % — SIGNIFICANT CHANGE UP (ref 0–6)
ESTIMATED AVERAGE GLUCOSE: 229 MG/DL — HIGH (ref 68–114)
GAS PNL BLDV: 131 MMOL/L — LOW (ref 136–145)
GAS PNL BLDV: SIGNIFICANT CHANGE UP
GLUCOSE BLDC GLUCOMTR-MCNC: 230 MG/DL — HIGH (ref 70–99)
GLUCOSE BLDC GLUCOMTR-MCNC: 308 MG/DL — HIGH (ref 70–99)
GLUCOSE BLDV-MCNC: 437 MG/DL — HIGH (ref 70–99)
GLUCOSE SERPL-MCNC: 414 MG/DL — HIGH (ref 70–99)
HCO3 BLDV-SCNC: 28 MMOL/L — SIGNIFICANT CHANGE UP (ref 22–29)
HCT VFR BLD CALC: 44.4 % — SIGNIFICANT CHANGE UP (ref 39–50)
HCT VFR BLDA CALC: 46 % — SIGNIFICANT CHANGE UP (ref 39–51)
HGB BLD CALC-MCNC: 15.4 G/DL — SIGNIFICANT CHANGE UP (ref 12.6–17.4)
HGB BLD-MCNC: 14.6 G/DL — SIGNIFICANT CHANGE UP (ref 13–17)
IMM GRANULOCYTES NFR BLD AUTO: 0.8 % — SIGNIFICANT CHANGE UP (ref 0–1.5)
LACTATE BLDV-MCNC: 1.8 MMOL/L — SIGNIFICANT CHANGE UP (ref 0.5–2)
LYMPHOCYTES # BLD AUTO: 1.57 K/UL — SIGNIFICANT CHANGE UP (ref 1–3.3)
LYMPHOCYTES # BLD AUTO: 16.3 % — SIGNIFICANT CHANGE UP (ref 13–44)
MCHC RBC-ENTMCNC: 29.7 PG — SIGNIFICANT CHANGE UP (ref 27–34)
MCHC RBC-ENTMCNC: 32.9 GM/DL — SIGNIFICANT CHANGE UP (ref 32–36)
MCV RBC AUTO: 90.4 FL — SIGNIFICANT CHANGE UP (ref 80–100)
MONOCYTES # BLD AUTO: 0.62 K/UL — SIGNIFICANT CHANGE UP (ref 0–0.9)
MONOCYTES NFR BLD AUTO: 6.4 % — SIGNIFICANT CHANGE UP (ref 2–14)
NEUTROPHILS # BLD AUTO: 7.23 K/UL — SIGNIFICANT CHANGE UP (ref 1.8–7.4)
NEUTROPHILS NFR BLD AUTO: 74.9 % — SIGNIFICANT CHANGE UP (ref 43–77)
NRBC # BLD: 0 /100 WBCS — SIGNIFICANT CHANGE UP (ref 0–0)
PCO2 BLDV: 44 MMHG — SIGNIFICANT CHANGE UP (ref 42–55)
PH BLDV: 7.41 — SIGNIFICANT CHANGE UP (ref 7.32–7.43)
PLATELET # BLD AUTO: 198 K/UL — SIGNIFICANT CHANGE UP (ref 150–400)
PO2 BLDV: 50 MMHG — HIGH (ref 25–45)
POTASSIUM BLDV-SCNC: 5.1 MMOL/L — SIGNIFICANT CHANGE UP (ref 3.5–5.1)
POTASSIUM SERPL-MCNC: 4.4 MMOL/L — SIGNIFICANT CHANGE UP (ref 3.5–5.3)
POTASSIUM SERPL-SCNC: 4.4 MMOL/L — SIGNIFICANT CHANGE UP (ref 3.5–5.3)
PROT SERPL-MCNC: 6.5 G/DL — SIGNIFICANT CHANGE UP (ref 6–8.3)
RAPID RVP RESULT: SIGNIFICANT CHANGE UP
RBC # BLD: 4.91 M/UL — SIGNIFICANT CHANGE UP (ref 4.2–5.8)
RBC # FLD: 13.8 % — SIGNIFICANT CHANGE UP (ref 10.3–14.5)
SAO2 % BLDV: 82.2 % — SIGNIFICANT CHANGE UP (ref 67–88)
SARS-COV-2 RNA SPEC QL NAA+PROBE: SIGNIFICANT CHANGE UP
SODIUM SERPL-SCNC: 136 MMOL/L — SIGNIFICANT CHANGE UP (ref 135–145)
TROPONIN T, HIGH SENSITIVITY RESULT: 30 NG/L — SIGNIFICANT CHANGE UP (ref 0–51)
TROPONIN T, HIGH SENSITIVITY RESULT: 31 NG/L — SIGNIFICANT CHANGE UP (ref 0–51)
WBC # BLD: 9.66 K/UL — SIGNIFICANT CHANGE UP (ref 3.8–10.5)
WBC # FLD AUTO: 9.66 K/UL — SIGNIFICANT CHANGE UP (ref 3.8–10.5)

## 2022-09-03 PROCEDURE — 71046 X-RAY EXAM CHEST 2 VIEWS: CPT | Mod: 26

## 2022-09-03 PROCEDURE — 93010 ELECTROCARDIOGRAM REPORT: CPT

## 2022-09-03 PROCEDURE — 99285 EMERGENCY DEPT VISIT HI MDM: CPT

## 2022-09-03 RX ORDER — SODIUM CHLORIDE 0.65 %
1 AEROSOL, SPRAY (ML) NASAL ONCE
Refills: 0 | Status: COMPLETED | OUTPATIENT
Start: 2022-09-03 | End: 2022-09-03

## 2022-09-03 RX ORDER — DEXTROSE 50 % IN WATER 50 %
12.5 SYRINGE (ML) INTRAVENOUS ONCE
Refills: 0 | Status: DISCONTINUED | OUTPATIENT
Start: 2022-09-03 | End: 2022-09-08

## 2022-09-03 RX ORDER — ENOXAPARIN SODIUM 100 MG/ML
40 INJECTION SUBCUTANEOUS EVERY 24 HOURS
Refills: 0 | Status: DISCONTINUED | OUTPATIENT
Start: 2022-09-03 | End: 2022-09-08

## 2022-09-03 RX ORDER — INSULIN LISPRO 100/ML
10 VIAL (ML) SUBCUTANEOUS ONCE
Refills: 0 | Status: DISCONTINUED | OUTPATIENT
Start: 2022-09-03 | End: 2022-09-03

## 2022-09-03 RX ORDER — INSULIN LISPRO 100/ML
VIAL (ML) SUBCUTANEOUS
Refills: 0 | Status: DISCONTINUED | OUTPATIENT
Start: 2022-09-03 | End: 2022-09-04

## 2022-09-03 RX ORDER — LOSARTAN POTASSIUM 100 MG/1
100 TABLET, FILM COATED ORAL DAILY
Refills: 0 | Status: DISCONTINUED | OUTPATIENT
Start: 2022-09-03 | End: 2022-09-05

## 2022-09-03 RX ORDER — CARVEDILOL PHOSPHATE 80 MG/1
12.5 CAPSULE, EXTENDED RELEASE ORAL EVERY 12 HOURS
Refills: 0 | Status: DISCONTINUED | OUTPATIENT
Start: 2022-09-03 | End: 2022-09-04

## 2022-09-03 RX ORDER — SODIUM CHLORIDE 9 MG/ML
1000 INJECTION, SOLUTION INTRAVENOUS
Refills: 0 | Status: DISCONTINUED | OUTPATIENT
Start: 2022-09-03 | End: 2022-09-08

## 2022-09-03 RX ORDER — ACETAMINOPHEN 500 MG
650 TABLET ORAL ONCE
Refills: 0 | Status: COMPLETED | OUTPATIENT
Start: 2022-09-03 | End: 2022-09-03

## 2022-09-03 RX ORDER — LIDOCAINE 4 G/100G
10 CREAM TOPICAL EVERY 4 HOURS
Refills: 0 | Status: DISCONTINUED | OUTPATIENT
Start: 2022-09-03 | End: 2022-09-08

## 2022-09-03 RX ORDER — GLUCAGON INJECTION, SOLUTION 0.5 MG/.1ML
1 INJECTION, SOLUTION SUBCUTANEOUS ONCE
Refills: 0 | Status: DISCONTINUED | OUTPATIENT
Start: 2022-09-03 | End: 2022-09-08

## 2022-09-03 RX ORDER — ACETAMINOPHEN 500 MG
1000 TABLET ORAL ONCE
Refills: 0 | Status: COMPLETED | OUTPATIENT
Start: 2022-09-03 | End: 2022-09-03

## 2022-09-03 RX ORDER — FUROSEMIDE 40 MG
40 TABLET ORAL ONCE
Refills: 0 | Status: COMPLETED | OUTPATIENT
Start: 2022-09-03 | End: 2022-09-03

## 2022-09-03 RX ORDER — INSULIN GLARGINE 100 [IU]/ML
14 INJECTION, SOLUTION SUBCUTANEOUS AT BEDTIME
Refills: 0 | Status: DISCONTINUED | OUTPATIENT
Start: 2022-09-03 | End: 2022-09-04

## 2022-09-03 RX ORDER — DEXTROSE 50 % IN WATER 50 %
25 SYRINGE (ML) INTRAVENOUS ONCE
Refills: 0 | Status: DISCONTINUED | OUTPATIENT
Start: 2022-09-03 | End: 2022-09-08

## 2022-09-03 RX ORDER — DIGOXIN 250 MCG
125 TABLET ORAL DAILY
Refills: 0 | Status: DISCONTINUED | OUTPATIENT
Start: 2022-09-03 | End: 2022-09-04

## 2022-09-03 RX ORDER — FUROSEMIDE 40 MG
40 TABLET ORAL
Refills: 0 | Status: DISCONTINUED | OUTPATIENT
Start: 2022-09-03 | End: 2022-09-05

## 2022-09-03 RX ORDER — DEXTROSE 50 % IN WATER 50 %
15 SYRINGE (ML) INTRAVENOUS ONCE
Refills: 0 | Status: DISCONTINUED | OUTPATIENT
Start: 2022-09-03 | End: 2022-09-08

## 2022-09-03 RX ORDER — SPIRONOLACTONE 25 MG/1
25 TABLET, FILM COATED ORAL DAILY
Refills: 0 | Status: DISCONTINUED | OUTPATIENT
Start: 2022-09-03 | End: 2022-09-08

## 2022-09-03 RX ORDER — INSULIN LISPRO 100/ML
6 VIAL (ML) SUBCUTANEOUS ONCE
Refills: 0 | Status: COMPLETED | OUTPATIENT
Start: 2022-09-03 | End: 2022-09-03

## 2022-09-03 RX ORDER — IBUPROFEN 200 MG
400 TABLET ORAL ONCE
Refills: 0 | Status: COMPLETED | OUTPATIENT
Start: 2022-09-03 | End: 2022-09-03

## 2022-09-03 RX ADMIN — CARVEDILOL PHOSPHATE 12.5 MILLIGRAM(S): 80 CAPSULE, EXTENDED RELEASE ORAL at 17:47

## 2022-09-03 RX ADMIN — Medication 125 MICROGRAM(S): at 17:48

## 2022-09-03 RX ADMIN — Medication 400 MILLIGRAM(S): at 23:45

## 2022-09-03 RX ADMIN — LOSARTAN POTASSIUM 100 MILLIGRAM(S): 100 TABLET, FILM COATED ORAL at 17:48

## 2022-09-03 RX ADMIN — SPIRONOLACTONE 25 MILLIGRAM(S): 25 TABLET, FILM COATED ORAL at 17:48

## 2022-09-03 RX ADMIN — Medication 1 SPRAY(S): at 23:30

## 2022-09-03 RX ADMIN — Medication 40 MILLIGRAM(S): at 18:18

## 2022-09-03 RX ADMIN — Medication 650 MILLIGRAM(S): at 18:00

## 2022-09-03 RX ADMIN — Medication 650 MILLIGRAM(S): at 17:47

## 2022-09-03 RX ADMIN — Medication 40 MILLIGRAM(S): at 11:05

## 2022-09-03 NOTE — H&P ADULT - NSHPLABSRESULTS_GEN_ALL_CORE
14.6   9.66  )-----------( 198      ( 03 Sep 2022 11:13 )             44.4   09-03    136  |  101  |  14  ----------------------------<  414<H>  4.4   |  26  |  0.98    Ca    8.6      03 Sep 2022 11:13    TPro  6.5  /  Alb  4.0  /  TBili  0.8  /  DBili  x   /  AST  39  /  ALT  80<H>  /  AlkPhos  78  09-03

## 2022-09-03 NOTE — ED PROVIDER NOTE - PROGRESS NOTE DETAILS
Good, PGY2 - glucose >400, no AG, no altered kidney function. subq insulin ordered Florentino, PGY2 - Spoke with Dr. Greene for admission 2/2 CHF exacerbation, accepting patient

## 2022-09-03 NOTE — ED ADULT NURSE NOTE - NSIMPLEMENTINTERV_GEN_ALL_ED
Implemented All Fall Risk Interventions:  [place] to call system. Call bell, personal items and telephone within reach. Instruct patient to call for assistance. Room bathroom lighting operational. Non-slip footwear when patient is off stretcher. Physically safe environment: no spills, clutter or unnecessary equipment. Stretcher in lowest position, wheels locked, appropriate side rails in place. Provide visual cue, wrist band, yellow gown, etc. Monitor gait and stability. Monitor for mental status changes and reorient to person, place, and time. Review medications for side effects contributing to fall risk. Reinforce activity limits and safety measures with patient and family.

## 2022-09-03 NOTE — H&P ADULT - HISTORY OF PRESENT ILLNESS
43yo man with PMH CHF with EF 22%, HTN, DM, not on any medications since May 2/2 incarceration, not following any doctors, presenting with worsening chest pain and SOB for the last 1.5 weeks. Patient unable to lie flat 2/2 symptom exacerbation, states this feels similar to prior CHF exacerbation. Denies fevers, N/V. Endorsing vague abd pain, last BM 3 days ago.    he stopped his medication in May'22 , coz of insurance problem , and din't follow with any physician

## 2022-09-03 NOTE — ED PROVIDER NOTE - CHILD ABUSE FACILITY
----- Message from Janay Bello sent at 3/10/2017 12:00 PM CST -----  Contact: self  Pt returning a missed call, pt can be reached at [phone].   [facility]

## 2022-09-03 NOTE — H&P ADULT - NSHPPHYSICALEXAM_GEN_ALL_CORE
pt. seen and examined     Vital Signs Last 24 Hrs  T(C): 36.7 (03 Sep 2022 22:00), Max: 36.8 (03 Sep 2022 10:02)  T(F): 98 (03 Sep 2022 22:00), Max: 98.3 (03 Sep 2022 10:02)  HR: 100 (03 Sep 2022 22:00) (91 - 113)  BP: 142/99 (03 Sep 2022 22:00) (127/88 - 161/114)  BP(mean): --  RR: 18 (03 Sep 2022 22:00) (18 - 20)  SpO2: 96% (03 Sep 2022 22:00) (93% - 98%)    Parameters below as of 03 Sep 2022 22:00  Patient On (Oxygen Delivery Method): room air        heent :nc/at , no pallor   neck : supple, no JVD  lungs : B/L clear , no w/r/r  heart: s1s2 nml  abd : soft, NABS, NT/ND  ext : no e/c/c, pulses 2 +  neuro: aaox3

## 2022-09-03 NOTE — ED PROVIDER NOTE - ATTENDING CONTRIBUTION TO CARE
I, Tish Means, performed a history and physical exam of the patient and discussed their management with the resident and /or advanced care provider. I reviewed the resident and /or ACP's note and agree with the documented findings and plan of care except where otherwise noted in my note. I was present and available for all procedures.     41 yo M with pmh HTN, HFrEF (22% per last documentation), htn, dm on metformin, hld p/w 1.5 weeks of midsternal chest pain (with tingling to L shoulder), exertional and sob with orthopnea. has been off all medication since May 2/2 incarceration. no fevers, no cough.     PHYSICAL EXAM:   General: appear mildly tachypneic  HEENT: NC/AT, airway patent  Cardiovascular: tachycardic rate and regular rhythm, + S1/S2, no murmurs, rubs, gallops appreciated  Respiratory: mildly tachypneic, diminished breath sounds RLL  Abdominal: soft, nontender, nondistended, no rebound, guarding or rigidity  Back: no costovertebral tenderness,  Extremities: trace pitting LE edema b/l. Radial pulses equal and strong b/l  Neuro: Alert and oriented x3. Moving all extremities. Ambulatory.  Psychiatric: appropriate mood and affect.   -Tish Means MD Attending Physician     concern for ACS vs CHF exacerbation, possible PE. no concern for dissection at this time, lower suspicion PNA, no concern PTX. will likely require admission for med management/cardiology. currently not requiring resp support, saturating well on RA.

## 2022-09-03 NOTE — H&P ADULT - ASSESSMENT
A/P     ac on ch systolic heart failure   -2/2 noncompliance with meds   last EF : 22%  pt. was folowing with house cardiology : consult them in am   restarted on all home meds     Nonischemic cardiomyopathy :  -c/w above meds   counseling done regarding meds   c/w diuresis     DM-2 :   hold oral meds   Insulin / FSBS   on d/c restart all oral meds and d/c insulin     HTN / HLD   -c/w present meds     dispo: cardio consult

## 2022-09-03 NOTE — ED ADULT NURSE REASSESSMENT NOTE - NS ED NURSE REASSESS COMMENT FT1
12:50 Pt. made aware that 6units insulin were ordered. Pt. refusing to have insulin administered. Dr. Tish Means made aware. Pt. awaiting for repeat Troponin at 1400.

## 2022-09-03 NOTE — ED PROVIDER NOTE - PHYSICAL EXAMINATION
Gen: NAD, AOx3, able to make needs known, non-toxic  Head: NCAT  HEENT: EOMI, normal conjunctiva  Lung: tachypnea, CTAB, no respiratory distress, ?crackles in right lung base, speaking in full sentences  CV: tachycardia, regular rhythm, +murmur, pulses bilaterally   Abd: soft, NTND, no guarding, no CVA tenderness  MSK: no visible bony deformities  Neuro: No focal sensory or motor deficits  Skin: Warm, well perfused, no rash  Psych: normal affect

## 2022-09-03 NOTE — ED PROVIDER NOTE - NS ED ROS FT
GENERAL: No fever, no chills  EYES: No change in vision  HEENT: No trouble swallowing or speaking  CARDIAC: +chest pain  PULMONARY: +cough, +SOB  GI: +abdominal pain, no nausea, no vomiting, no diarrhea, no constipation  : No changes in urination  SKIN: No rashes  NEURO: No headache, no numbness  MSK: No joint pain  Otherwise as HPI or negative.

## 2022-09-04 DIAGNOSIS — I10 ESSENTIAL (PRIMARY) HYPERTENSION: ICD-10-CM

## 2022-09-04 DIAGNOSIS — E11.9 TYPE 2 DIABETES MELLITUS WITHOUT COMPLICATIONS: ICD-10-CM

## 2022-09-04 DIAGNOSIS — E78.5 HYPERLIPIDEMIA, UNSPECIFIED: ICD-10-CM

## 2022-09-04 PROBLEM — I50.9 HEART FAILURE, UNSPECIFIED: Chronic | Status: ACTIVE | Noted: 2019-12-04

## 2022-09-04 LAB
ANION GAP SERPL CALC-SCNC: 10 MMOL/L — SIGNIFICANT CHANGE UP (ref 5–17)
APPEARANCE UR: CLEAR — SIGNIFICANT CHANGE UP
BILIRUB UR-MCNC: NEGATIVE — SIGNIFICANT CHANGE UP
BUN SERPL-MCNC: 19 MG/DL — SIGNIFICANT CHANGE UP (ref 7–23)
CALCIUM SERPL-MCNC: 8.7 MG/DL — SIGNIFICANT CHANGE UP (ref 8.4–10.5)
CHLORIDE SERPL-SCNC: 97 MMOL/L — SIGNIFICANT CHANGE UP (ref 96–108)
CO2 SERPL-SCNC: 26 MMOL/L — SIGNIFICANT CHANGE UP (ref 22–31)
COLOR SPEC: YELLOW — SIGNIFICANT CHANGE UP
CREAT SERPL-MCNC: 1.06 MG/DL — SIGNIFICANT CHANGE UP (ref 0.5–1.3)
DIFF PNL FLD: NEGATIVE — SIGNIFICANT CHANGE UP
EGFR: 89 ML/MIN/1.73M2 — SIGNIFICANT CHANGE UP
GLUCOSE BLDC GLUCOMTR-MCNC: 206 MG/DL — HIGH (ref 70–99)
GLUCOSE BLDC GLUCOMTR-MCNC: 233 MG/DL — HIGH (ref 70–99)
GLUCOSE BLDC GLUCOMTR-MCNC: 310 MG/DL — HIGH (ref 70–99)
GLUCOSE BLDC GLUCOMTR-MCNC: 346 MG/DL — HIGH (ref 70–99)
GLUCOSE SERPL-MCNC: 335 MG/DL — HIGH (ref 70–99)
GLUCOSE UR QL: ABNORMAL
HCT VFR BLD CALC: 45.5 % — SIGNIFICANT CHANGE UP (ref 39–50)
HGB BLD-MCNC: 15.1 G/DL — SIGNIFICANT CHANGE UP (ref 13–17)
KETONES UR-MCNC: NEGATIVE — SIGNIFICANT CHANGE UP
LEUKOCYTE ESTERASE UR-ACNC: NEGATIVE — SIGNIFICANT CHANGE UP
MAGNESIUM SERPL-MCNC: 1.8 MG/DL — SIGNIFICANT CHANGE UP (ref 1.6–2.6)
MCHC RBC-ENTMCNC: 29.5 PG — SIGNIFICANT CHANGE UP (ref 27–34)
MCHC RBC-ENTMCNC: 33.2 GM/DL — SIGNIFICANT CHANGE UP (ref 32–36)
MCV RBC AUTO: 89 FL — SIGNIFICANT CHANGE UP (ref 80–100)
NITRITE UR-MCNC: NEGATIVE — SIGNIFICANT CHANGE UP
NRBC # BLD: 0 /100 WBCS — SIGNIFICANT CHANGE UP (ref 0–0)
NT-PROBNP SERPL-SCNC: 1121 PG/ML — HIGH (ref 0–300)
PH UR: 6.5 — SIGNIFICANT CHANGE UP (ref 5–8)
PLATELET # BLD AUTO: 208 K/UL — SIGNIFICANT CHANGE UP (ref 150–400)
POTASSIUM SERPL-MCNC: 3.9 MMOL/L — SIGNIFICANT CHANGE UP (ref 3.5–5.3)
POTASSIUM SERPL-SCNC: 3.9 MMOL/L — SIGNIFICANT CHANGE UP (ref 3.5–5.3)
PROT UR-MCNC: NEGATIVE — SIGNIFICANT CHANGE UP
RBC # BLD: 5.11 M/UL — SIGNIFICANT CHANGE UP (ref 4.2–5.8)
RBC # FLD: 13.6 % — SIGNIFICANT CHANGE UP (ref 10.3–14.5)
SODIUM SERPL-SCNC: 133 MMOL/L — LOW (ref 135–145)
SP GR SPEC: 1.03 — HIGH (ref 1.01–1.02)
UROBILINOGEN FLD QL: ABNORMAL
WBC # BLD: 11.03 K/UL — HIGH (ref 3.8–10.5)
WBC # FLD AUTO: 11.03 K/UL — HIGH (ref 3.8–10.5)

## 2022-09-04 PROCEDURE — 99253 IP/OBS CNSLTJ NEW/EST LOW 45: CPT

## 2022-09-04 PROCEDURE — 93306 TTE W/DOPPLER COMPLETE: CPT | Mod: 26

## 2022-09-04 RX ORDER — HYDRALAZINE HCL 50 MG
25 TABLET ORAL EVERY 8 HOURS
Refills: 0 | Status: DISCONTINUED | OUTPATIENT
Start: 2022-09-04 | End: 2022-09-08

## 2022-09-04 RX ORDER — POLYETHYLENE GLYCOL 3350 17 G/17G
17 POWDER, FOR SOLUTION ORAL
Refills: 0 | Status: DISCONTINUED | OUTPATIENT
Start: 2022-09-04 | End: 2022-09-08

## 2022-09-04 RX ORDER — INSULIN LISPRO 100/ML
VIAL (ML) SUBCUTANEOUS
Refills: 0 | Status: DISCONTINUED | OUTPATIENT
Start: 2022-09-04 | End: 2022-09-08

## 2022-09-04 RX ORDER — ONDANSETRON 8 MG/1
4 TABLET, FILM COATED ORAL EVERY 8 HOURS
Refills: 0 | Status: DISCONTINUED | OUTPATIENT
Start: 2022-09-04 | End: 2022-09-08

## 2022-09-04 RX ORDER — INSULIN GLARGINE 100 [IU]/ML
22 INJECTION, SOLUTION SUBCUTANEOUS
Refills: 0 | Status: DISCONTINUED | OUTPATIENT
Start: 2022-09-04 | End: 2022-09-05

## 2022-09-04 RX ORDER — INSULIN LISPRO 100/ML
VIAL (ML) SUBCUTANEOUS AT BEDTIME
Refills: 0 | Status: DISCONTINUED | OUTPATIENT
Start: 2022-09-04 | End: 2022-09-08

## 2022-09-04 RX ORDER — INSULIN LISPRO 100/ML
6 VIAL (ML) SUBCUTANEOUS
Refills: 0 | Status: DISCONTINUED | OUTPATIENT
Start: 2022-09-05 | End: 2022-09-05

## 2022-09-04 RX ORDER — OXYMETAZOLINE HYDROCHLORIDE 0.5 MG/ML
2 SPRAY NASAL
Refills: 0 | Status: COMPLETED | OUTPATIENT
Start: 2022-09-04 | End: 2022-09-06

## 2022-09-04 RX ORDER — INSULIN LISPRO 100/ML
6 VIAL (ML) SUBCUTANEOUS
Refills: 0 | Status: DISCONTINUED | OUTPATIENT
Start: 2022-09-04 | End: 2022-09-05

## 2022-09-04 RX ORDER — SENNA PLUS 8.6 MG/1
1 TABLET ORAL AT BEDTIME
Refills: 0 | Status: DISCONTINUED | OUTPATIENT
Start: 2022-09-04 | End: 2022-09-08

## 2022-09-04 RX ADMIN — Medication 8: at 11:52

## 2022-09-04 RX ADMIN — Medication 40 MILLIGRAM(S): at 14:09

## 2022-09-04 RX ADMIN — SPIRONOLACTONE 25 MILLIGRAM(S): 25 TABLET, FILM COATED ORAL at 06:08

## 2022-09-04 RX ADMIN — POLYETHYLENE GLYCOL 3350 17 GRAM(S): 17 POWDER, FOR SOLUTION ORAL at 16:39

## 2022-09-04 RX ADMIN — Medication 25 MILLIGRAM(S): at 21:42

## 2022-09-04 RX ADMIN — Medication 40 MILLIGRAM(S): at 06:08

## 2022-09-04 RX ADMIN — Medication 125 MICROGRAM(S): at 05:20

## 2022-09-04 RX ADMIN — INSULIN GLARGINE 22 UNIT(S): 100 INJECTION, SOLUTION SUBCUTANEOUS at 16:35

## 2022-09-04 RX ADMIN — ONDANSETRON 4 MILLIGRAM(S): 8 TABLET, FILM COATED ORAL at 08:31

## 2022-09-04 RX ADMIN — LOSARTAN POTASSIUM 100 MILLIGRAM(S): 100 TABLET, FILM COATED ORAL at 05:20

## 2022-09-04 RX ADMIN — Medication 400 MILLIGRAM(S): at 00:20

## 2022-09-04 RX ADMIN — Medication 1000 MILLIGRAM(S): at 00:02

## 2022-09-04 RX ADMIN — OXYMETAZOLINE HYDROCHLORIDE 2 SPRAY(S): 0.5 SPRAY NASAL at 22:46

## 2022-09-04 RX ADMIN — CARVEDILOL PHOSPHATE 12.5 MILLIGRAM(S): 80 CAPSULE, EXTENDED RELEASE ORAL at 05:20

## 2022-09-04 NOTE — CONSULT NOTE ADULT - ASSESSMENT
42M history of NICM who presents with CHF 2/2 medication noncompliance.     #Acute on chronic decompensated HF - NICM EF 27%. Clean [facility] 2019. NST in 2022 with reversible defects in inferior wall, no cath done.   #HTN    Recommendations:  Continue Lasix 40mg IVP with strict I/O, daily weights, and repletion of electrolytes to goal (k 4-5, Mg 2-3).   Continue Losartan 100mg QD & Spironolactone 25mg QD.  Hold beta blocker for now as patient still decompensated and has not been on BB in several months (Would re-introduce once euvolemic).   Start Hydralazine 25mg TID for better afterload reduction.   Hold Digoxin as it is unclear why patient had Digoxin in his medical reconciliation.     
42 yr old M with Hx of CHF with EF 22%, HTN, DM2 uncontrolled A1C 9.6%, not on any medications since May 2/2 incarceration here with chest pain and SOB, being treated for CHF exacerbation.

## 2022-09-04 NOTE — CONSULT NOTE ADULT - PROBLEM SELECTOR RECOMMENDATION 9
While inpatient CHO diet and FS AC nad HS While inpatient CHO diet and FS AC and HS  Goal Glucose 100-180  Recommend Lantus 22 Units (give first dose now and at same time daily)  Start Admelog 6 units before meals plus low correction scale before meals and bedtime  Discharge plan: Avoid metformin use in setting of decompensated heart failure  Consider jardiance and GLP-1 agonist for discharge  Please see if Trulicity and Ozempic are covered by his insurance  F/U at [facility] [place]  [address] [facility]  [phone]

## 2022-09-04 NOTE — PROGRESS NOTE ADULT - SUBJECTIVE AND OBJECTIVE BOX
Patient seen and evaluated at bedside    HPI:  43yo man with PMH CHF with EF 22%, HTN, DM, not on any medications since May 2/2 incarceration, not following any doctors, presenting with worsening chest pain and SOB for the last 1.5 weeks. Patient unable to lie flat 2/2 symptom exacerbation, states this feels similar to prior CHF exacerbation. Denies fevers, N/V. Endorsing vague abd pain, last BM 3 days ago.    Additionally, notes that he stopped his medication in May'22 due to insurance issue as well. Saw a cardiologist associated with his primary, does not remember name.     Currently feels better but still unable to lay flat. 2+LE edema.     PMHx:   No pertinent past medical history    HTN (hypertension)    Asthma    Congestive heart failure (CHF)    Type 2 diabetes mellitus    Hyperlipidemia        PSHx:   No significant past surgical history    No significant past surgical history        Allergies:  No Known Allergies      Home Meds: As per admission medication reconcilliation.     Current Medications:   carvedilol 12.5 milliGRAM(s) Oral every 12 hours  dextrose 5%. 1000 milliLiter(s) IV Continuous <Continuous>  dextrose 5%. 1000 milliLiter(s) IV Continuous <Continuous>  dextrose 50% Injectable 25 Gram(s) IV Push once  dextrose 50% Injectable 12.5 Gram(s) IV Push once  dextrose 50% Injectable 25 Gram(s) IV Push once  dextrose Oral Gel 15 Gram(s) Oral once PRN  digoxin     Tablet 125 MICROGram(s) Oral daily  enoxaparin Injectable 40 milliGRAM(s) SubCutaneous every 24 hours  furosemide   Injectable 40 milliGRAM(s) IV Push two times a day  glucagon  Injectable 1 milliGRAM(s) IntraMuscular once  insulin glargine Injectable (LANTUS) 14 Unit(s) SubCutaneous at bedtime  insulin lispro (ADMELOG) corrective regimen sliding scale   SubCutaneous three times a day before meals  lidocaine 2% Viscous 10 milliLiter(s) Swish and Spit every 4 hours PRN  losartan 100 milliGRAM(s) Oral daily  ondansetron Injectable 4 milliGRAM(s) IV Push every 8 hours PRN  polyethylene glycol 3350 17 Gram(s) Oral two times a day  senna 1 Tablet(s) Oral at bedtime  spironolactone 25 milliGRAM(s) Oral daily      FAMILY HISTORY:  FH: HTN (hypertension)        Social History:    REVIEW OF SYSTEMS:  Constitutional:     [x ] negative [ ] fevers [ ] chills [ ] weight loss [ ] weight gain  HEENT:                  [x ] negative [ ] dry eyes [ ] eye irritation [ ] postnasal drip [ ] nasal congestion  CV:                         [ ] negative  [ ] chest pain [x ] orthopnea [ ] palpitations [ ] murmur  Resp:                     [x ] negative [ ] cough [ ] shortness of breath [ ] dyspnea [ ] wheezing [ ] sputum [ ]hemoptysis  GI:                          [ x] negative [ ] nausea [ ] vomiting [ ] diarrhea [ ] constipation [ ] abd pain [ ] dysphagia   :                        [ x] negative [ ] dysuria [ ] nocturia [ ] hematuria [ ] increased urinary frequency  Musculoskeletal: [x ] negative [ ] back pain [ ] myalgias [ ] arthralgias [ ] fracture  Skin:                       [ x] negative [ ] rash [ ] itch  Neurological:        [ x] negative [ ] headache [ ] dizziness [ ] syncope [ ] weakness [ ] numbness  Psychiatric:           [ x] negative [ ] anxiety [ ] depression  Endocrine:            [ x] negative [ ] diabetes [ ] thyroid problem  Heme/Lymph:      [ x] negative [ ] anemia [ ] bleeding problem  Allergic/Immune: [ x] negative [ ] itchy eyes [ ] nasal discharge [ ] hives [ ] angioedema    [ x] All other systems negative  [ ] Unable to assess ROS due to      Physical Exam:  T(F): 97.7 (09-04), Max: 98 (09-03)  HR: 86 (09-04) (86 - 102)  BP: 124/90 (09-04) (124/90 - 161/108)  RR: 18 (09-04)  SpO2: 96% (09-04)  General: Alert, no acute distress, appears comfortable   HEENT: No scleral icterus, EOMI, no facial dysmorphia, no external ear lesions   Cardiac: Regular rate and rhythm, no murmurs, no rubs, no gallops   Pulmonary: Clear breath sounds throughout, no wheezing, no stridor, no crackles   Abdomen: Nondistended, nontender, appears soft   Skin: no obvious rash or lesions   Extremities: no LE edema  Neurological: Moving all 4 extremities, no overt focal deficits noted   Psych: normal mood and affect     Cardiovascular Diagnostic Testing:    ECG: Personally reviewed:    Echo: Personally reviewed:    Stress Testing:    Cath:    Imaging:    CXR: Personally reviewed    Labs: Personally reviewed                        15.1 11.03 )-----------( 208      ( 04 Sep 2022 06:25 )             45.5     09-04    133<L>  |  97  |  19  ----------------------------<  335<H>  3.9   |  26  |  1.06    Ca    8.7      04 Sep 2022 06:21  Mg     1.8     09-04    TPro  6.5  /  Alb  4.0  /  TBili  0.8  /  DBili  x   /  AST  39  /  ALT  80<H>  /  AlkPhos  78  09-03      Serum Pro-Brain Natriuretic Peptide: 1121 pg/mL (09-04 @ 06:21)

## 2022-09-04 NOTE — CONSULT NOTE ADULT - SUBJECTIVE AND OBJECTIVE BOX
Patient seen and evaluated at bedside      HPI:  42 man with PMH CHF with EF 22%, HTN, DM, not on any medications since May 2/2 incarceration, not following any doctors, presenting with worsening chest pain and SOB for the last 1.5 weeks. Patient unable to lie flat 2/2 symptom exacerbation, states this feels similar to prior CHF exacerbation. Denies fevers, N/V. Endorsing vague abd pain, last BM 3 days ago.    Notes he stopped his medication in May'22 , due to insurance issue. Saw cardiologist associated with primary as outpatient, does not remember name.     Currently feels better but still with orthopnea and LE edema.      PMHx:   No pertinent past medical history    HTN (hypertension)    Asthma    Congestive heart failure (CHF)    Type 2 diabetes mellitus    Hyperlipidemia        PSHx:   No significant past surgical history    No significant past surgical history        Allergies:  No Known Allergies      Home Meds: As per admission medication reconcilliation.     Current Medications:   carvedilol 12.5 milliGRAM(s) Oral every 12 hours  dextrose 5%. 1000 milliLiter(s) IV Continuous <Continuous>  dextrose 5%. 1000 milliLiter(s) IV Continuous <Continuous>  dextrose 50% Injectable 25 Gram(s) IV Push once  dextrose 50% Injectable 12.5 Gram(s) IV Push once  dextrose 50% Injectable 25 Gram(s) IV Push once  dextrose Oral Gel 15 Gram(s) Oral once PRN  digoxin     Tablet 125 MICROGram(s) Oral daily  enoxaparin Injectable 40 milliGRAM(s) SubCutaneous every 24 hours  furosemide   Injectable 40 milliGRAM(s) IV Push two times a day  glucagon  Injectable 1 milliGRAM(s) IntraMuscular once  insulin glargine Injectable (LANTUS) 14 Unit(s) SubCutaneous at bedtime  insulin lispro (ADMELOG) corrective regimen sliding scale   SubCutaneous three times a day before meals  lidocaine 2% Viscous 10 milliLiter(s) Swish and Spit every 4 hours PRN  losartan 100 milliGRAM(s) Oral daily  ondansetron Injectable 4 milliGRAM(s) IV Push every 8 hours PRN  polyethylene glycol 3350 17 Gram(s) Oral two times a day  senna 1 Tablet(s) Oral at bedtime  spironolactone 25 milliGRAM(s) Oral daily      FAMILY HISTORY:  FH: HTN (hypertension)        Social History:    REVIEW OF SYSTEMS:  Constitutional:     [x ] negative [ ] fevers [ ] chills [ ] weight loss [ ] weight gain  HEENT:                  [x ] negative [ ] dry eyes [ ] eye irritation [ ] postnasal drip [ ] nasal congestion  CV:                         [ ] negative  [ ] chest pain [x ] orthopnea [ ] palpitations [ ] murmur  Resp:                     [x ] negative [ ] cough [ ] shortness of breath [ ] dyspnea [ ] wheezing [ ] sputum [ ]hemoptysis  GI:                          [ x] negative [ ] nausea [ ] vomiting [ ] diarrhea [ ] constipation [ ] abd pain [ ] dysphagia   :                        [ x] negative [ ] dysuria [ ] nocturia [ ] hematuria [ ] increased urinary frequency  Musculoskeletal: [x ] negative [ ] back pain [ ] myalgias [ ] arthralgias [ ] fracture  Skin:                       [ x] negative [ ] rash [ ] itch  Neurological:        [ x] negative [ ] headache [ ] dizziness [ ] syncope [ ] weakness [ ] numbness  Psychiatric:           [ x] negative [ ] anxiety [ ] depression  Endocrine:            [ x] negative [ ] diabetes [ ] thyroid problem  Heme/Lymph:      [ x] negative [ ] anemia [ ] bleeding problem  Allergic/Immune: [ x] negative [ ] itchy eyes [ ] nasal discharge [ ] hives [ ] angioedema    [ x] All other systems negative  [ ] Unable to assess ROS due to      Physical Exam:  T(F): 97.7 (09-04), Max: 98 (09-03)  HR: 86 (09-04) (86 - 102)  BP: 124/90 (09-04) (124/90 - 161/108)  RR: 18 (09-04)  SpO2: 96% (09-04)  General: Alert, no acute distress, appears comfortable   HEENT: No scleral icterus, EOMI, no facial dysmorphia, no external ear lesions   Cardiac: Regular rate and rhythm, no murmurs, no rubs, no gallops. +JVP  Pulmonary: Clear breath sounds throughout, no wheezing, no stridor, no crackles   Abdomen: Nondistended, nontender, appears soft   Skin: no obvious rash or lesions   Extremities: 2+ edema to distal shin bilaterally.   Neurological: Moving all 4 extremities, no overt focal deficits noted   Psych: normal mood and affect     Cardiovascular Diagnostic Testing:    Echo: Personally reviewed:  CONCLUSIONS:  1. Normal mitral valve. Mild mitral regurgitation.  2. Mildly dilated left atrium.  LA volume index = 39 cc/m2.  3. Eccentric left ventricular hypertrophy (dilated left  ventricle with normal relative wall thickness).  4. Severe global left ventricular systolic dysfunction.  5. Mild diastolic dysfunction (Stage I).  6. Normal right ventricular size and function.  *** No previous Echo exam.  ------------------------------------------------------------------------  Confirmed on  2/3/2022 - 12:00:20 by Brandon Ashraf M.D.,  [facility]    Cath:  CORONARY VESSELS: The coronary circulation is left dominant.  LM:   --  LM: Normal.  LAD:   --  Mid LAD: Angiography showed mild atherosclerosis with no flow  limiting lesions.  CX:   --  Circumflex: Normal.  RCA:   --  RCA: Normal.  COMPLICATIONS: There were no complications.  DIAGNOSTIC RECOMMENDATIONS: The patient's diuretic regimenshould be  carefully increased.      IMPRESSIONS:Abnormal Study  * Myocardial Perfusion SPECT results are abnormal.  * Review of raw data shows: The study is of good technical  quality.  * The left ventricle was enlarged. There is a small,  moderate defect in the distal inferior wall that is  predominantly reversible, suggestive of ischemia.  * The images with attenuation correctionshow no  significant changes.  * Post-stress gated wall motion analysis was performed  (LVEF = 22 %;LVEDV = 281 ml.), revealing diffuse  hypokinesis. The RV function appears reduced.  * NOTE, the degree of LV dysfunction is out of proportion  to extent of ischemia, suggesting NICM  ------------------------------------------------------------------------  Confirmed on  2/7/2022 - 12:49:22 by Xu Moreau M.D.    Imaging:    CXR: Personally reviewed    Labs: Personally reviewed                        15.1   11.03 )-----------( 208      ( 04 Sep 2022 06:25 )             45.5     09-04    133<L>  |  97  |  19  ----------------------------<  335<H>  3.9   |  26  |  1.06    Ca    8.7      04 Sep 2022 06:21  Mg     1.8     09-04    TPro  6.5  /  Alb  4.0  /  TBili  0.8  /  DBili  x   /  AST  39  /  ALT  80<H>  /  AlkPhos  78  09-03      Serum Pro-Brain Natriuretic Peptide: 1121 pg/mL (09-04 @ 06:21)

## 2022-09-04 NOTE — PROGRESS NOTE ADULT - ASSESSMENT
A/P     ac on ch systolic heart failure   -2/2 noncompliance with meds   last EF : 22%  seen by cardiology:   recommend to d/c BBlocker   started on hydralazine 25 mg tid   d/c digoxin     Nonischemic cardiomyopathy :  -c/w above meds   counseling done regarding meds   c/w diuresis     DM-2 :   pt. refusing for insulin and want meds   seen by endo   agree for Lantus and FSBS while in hospital   on d/c will change to oral meds as per endo    to check his medication can be covered by his insurance ( as per endo metformin would not be given )    HTN / HLD   -c/w present meds     dispo: c/w diuresis , meds adjusted   once vol status improve , d/c planning with out pt. f/u   pt. will need all scripts on d/c as he is not having any medication at home

## 2022-09-04 NOTE — PROGRESS NOTE ADULT - SUBJECTIVE AND OBJECTIVE BOX
Patient is a 42y old  Male who presents with a chief complaint of SOB / Chest pain (04 Sep 2022 12:06)      INTERVAL HPI/OVERNIGHT EVENTS: still c/o ALAS , refusing insulin inj   T(C): 37 (22 @ 19:58), Max: 37 (22 @ 19:58)  HR: 104 (22 @ 19:58) (86 - 104)  BP: 143/96 (22 @ 19:58) (124/90 - 143/96)  RR: 18 (22 @ 19:58) (18 - 19)  SpO2: 94% (22 @ 19:58) (94% - 96%)  Wt(kg): --  I&O's Summary    04 Sep 2022 07:01  -  04 Sep 2022 22:44  --------------------------------------------------------  IN: 420 mL / OUT: 2550 mL / NET: -2130 mL        PAST MEDICAL & SURGICAL HISTORY:  HTN (hypertension)      Asthma      Congestive heart failure (CHF)  EF 22%      Type 2 diabetes mellitus      Hyperlipidemia      No significant past surgical history          SOCIAL HISTORY  Alcohol:  Tobacco:  Illicit substance use:    FAMILY HISTORY:    REVIEW OF SYSTEMS:  CONSTITUTIONAL: No fever, weight loss, or fatigue  EYES: No eye pain, visual disturbances, or discharge  ENMT:  No difficulty hearing, tinnitus, vertigo; No sinus or throat pain  NECK: No pain or stiffness  RESPIRATORY: No cough, wheezing, chills or hemoptysis; No shortness of breath  CARDIOVASCULAR: No chest pain, palpitations, dizziness, or leg swelling  GASTROINTESTINAL: No abdominal or epigastric pain. No nausea, vomiting, or hematemesis; No diarrhea or constipation. No melena or hematochezia.  GENITOURINARY: No dysuria, frequency, hematuria, or incontinence  NEUROLOGICAL: No headaches, memory loss, loss of strength, numbness, or tremors  SKIN: No itching, burning, rashes, or lesions   LYMPH NODES: No enlarged glands  ENDOCRINE: No heat or cold intolerance; No hair loss  MUSCULOSKELETAL: No joint pain or swelling; No muscle, back, or extremity pain  PSYCHIATRIC: No depression, anxiety, mood swings, or difficulty sleeping  HEME/LYMPH: No easy bruising, or bleeding gums  ALLERY AND IMMUNOLOGIC: No hives or eczema    RADIOLOGY & ADDITIONAL TESTS:    Imaging Personally Reviewed:  [ ] YES  [ ] NO    Consultant(s) Notes Reviewed:  [ ] YES  [ ] NO    PHYSICAL EXAM:  GENERAL: NAD, well-groomed, well-developed  HEAD:  Atraumatic, Normocephalic  EYES: EOMI, PERRLA, conjunctiva and sclera clear  ENMT: No tonsillar erythema, exudates, or enlargement; Moist mucous membranes, Good dentition, No lesions  NECK: Supple, No JVD, Normal thyroid  NERVOUS SYSTEM:  Alert & Oriented X3, Good concentration; Motor Strength 5/5 B/L upper and lower extremities; DTRs 2+ intact and symmetric  CHEST/LUNG: Clear to percussion bilaterally; No rales, rhonchi, wheezing, or rubs  HEART: Regular rate and rhythm; No murmurs, rubs, or gallops  ABDOMEN: Soft, Nontender, Nondistended; Bowel sounds present  EXTREMITIES:  2+ Peripheral Pulses, No clubbing, cyanosis, or edema  LYMPH: No lymphadenopathy noted  SKIN: No rashes or lesions    LABS:                        15.1   11.03 )-----------( 208      ( 04 Sep 2022 06:25 )             45.5     -    133<L>  |  97  |  19  ----------------------------<  335<H>  3.9   |  26  |  1.06    Ca    8.7      04 Sep 2022 06:21  Mg     1.8         TPro  6.5  /  Alb  4.0  /  TBili  0.8  /  DBili  x   /  AST  39  /  ALT  80<H>  /  AlkPhos  78  -      Urinalysis Basic - ( 04 Sep 2022 12:36 )    Color: Yellow / Appearance: Clear / S.031 / pH: x  Gluc: x / Ketone: Negative  / Bili: Negative / Urobili: >8mg/dL   Blood: x / Protein: Negative / Nitrite: Negative   Leuk Esterase: Negative / RBC: x / WBC x   Sq Epi: x / Non Sq Epi: x / Bacteria: x      CAPILLARY BLOOD GLUCOSE      POCT Blood Glucose.: 233 mg/dL (04 Sep 2022 20:49)  POCT Blood Glucose.: 206 mg/dL (04 Sep 2022 16:20)  POCT Blood Glucose.: 346 mg/dL (04 Sep 2022 11:34)  POCT Blood Glucose.: 310 mg/dL (04 Sep 2022 07:52)        Urinalysis Basic - ( 04 Sep 2022 12:36 )    Color: Yellow / Appearance: Clear / S.031 / pH: x  Gluc: x / Ketone: Negative  / Bili: Negative / Urobili: >8mg/dL   Blood: x / Protein: Negative / Nitrite: Negative   Leuk Esterase: Negative / RBC: x / WBC x   Sq Epi: x / Non Sq Epi: x / Bacteria: x        MEDICATIONS  (STANDING):  dextrose 5%. 1000 milliLiter(s) (50 mL/Hr) IV Continuous <Continuous>  dextrose 5%. 1000 milliLiter(s) (100 mL/Hr) IV Continuous <Continuous>  dextrose 50% Injectable 25 Gram(s) IV Push once  dextrose 50% Injectable 12.5 Gram(s) IV Push once  dextrose 50% Injectable 25 Gram(s) IV Push once  enoxaparin Injectable 40 milliGRAM(s) SubCutaneous every 24 hours  furosemide   Injectable 40 milliGRAM(s) IV Push two times a day  glucagon  Injectable 1 milliGRAM(s) IntraMuscular once  hydrALAZINE 25 milliGRAM(s) Oral every 8 hours  insulin glargine Injectable (LANTUS) 22 Unit(s) SubCutaneous <User Schedule>  insulin lispro (ADMELOG) corrective regimen sliding scale   SubCutaneous three times a day before meals  insulin lispro (ADMELOG) corrective regimen sliding scale   SubCutaneous at bedtime  insulin lispro Injectable (ADMELOG) 6 Unit(s) SubCutaneous before dinner  losartan 100 milliGRAM(s) Oral daily  oxymetazoline 0.05% Nasal Spray 2 Spray(s) Both Nostrils two times a day  polyethylene glycol 3350 17 Gram(s) Oral two times a day  senna 1 Tablet(s) Oral at bedtime  spironolactone 25 milliGRAM(s) Oral daily    MEDICATIONS  (PRN):  dextrose Oral Gel 15 Gram(s) Oral once PRN Blood Glucose LESS THAN 70 milliGRAM(s)/deciliter  lidocaine 2% Viscous 10 milliLiter(s) Swish and Spit every 4 hours PRN tooth pain  ondansetron Injectable 4 milliGRAM(s) IV Push every 8 hours PRN Nausea and/or Vomiting      Care Discussed with Consultants/Other Providers [ ] YES  [ ] NO

## 2022-09-04 NOTE — CONSULT NOTE ADULT - SUBJECTIVE AND OBJECTIVE BOX
HPI:  41yo man with PMH CHF with EF 22%, HTN, DM, not on any medications since May 2/2 incarceration, not following any doctors, presenting with worsening chest pain and SOB for the last 1.5 weeks. Patient unable to lie flat 2/2 symptom exacerbation, states this feels similar to prior CHF exacerbation. Denies fevers, N/V. Endorsing vague abd pain, last BM 3 days ago. He stopped his medication in May'22 ,cause of insurance problem , and didn't follow with any physician  (03 Sep 2022 13:20)    Endocrine History:          PAST MEDICAL & SURGICAL HISTORY:  HTN (hypertension)      Asthma      Congestive heart failure (CHF)  EF 22%      Type 2 diabetes mellitus      Hyperlipidemia      No significant past surgical history          FAMILY HISTORY:  FH: HTN (hypertension)        Social History:    Outpatient Medications:    MEDICATIONS  (STANDING):  carvedilol 12.5 milliGRAM(s) Oral every 12 hours  dextrose 5%. 1000 milliLiter(s) (50 mL/Hr) IV Continuous <Continuous>  dextrose 5%. 1000 milliLiter(s) (100 mL/Hr) IV Continuous <Continuous>  dextrose 50% Injectable 25 Gram(s) IV Push once  dextrose 50% Injectable 12.5 Gram(s) IV Push once  dextrose 50% Injectable 25 Gram(s) IV Push once  digoxin     Tablet 125 MICROGram(s) Oral daily  enoxaparin Injectable 40 milliGRAM(s) SubCutaneous every 24 hours  furosemide   Injectable 40 milliGRAM(s) IV Push two times a day  glucagon  Injectable 1 milliGRAM(s) IntraMuscular once  insulin glargine Injectable (LANTUS) 14 Unit(s) SubCutaneous at bedtime  insulin lispro (ADMELOG) corrective regimen sliding scale   SubCutaneous three times a day before meals  losartan 100 milliGRAM(s) Oral daily  polyethylene glycol 3350 17 Gram(s) Oral two times a day  senna 1 Tablet(s) Oral at bedtime  spironolactone 25 milliGRAM(s) Oral daily    MEDICATIONS  (PRN):  dextrose Oral Gel 15 Gram(s) Oral once PRN Blood Glucose LESS THAN 70 milliGRAM(s)/deciliter  lidocaine 2% Viscous 10 milliLiter(s) Swish and Spit every 4 hours PRN tooth pain  ondansetron Injectable 4 milliGRAM(s) IV Push every 8 hours PRN Nausea and/or Vomiting      Allergies    No Known Allergies    Intolerances      Review of Systems:  Constitutional: No fever  Eyes: No blurry vision  Neuro: No tremors  HEENT: No pain  Cardiovascular: No chest pain, palpitations  Respiratory: No SOB, no cough  GI: No nausea, vomiting, abdominal pain  : No dysuria  Skin: no rash  Psych: no depression  Endocrine: no polyuria, polydipsia  Hem/lymph: no swelling  Osteoporosis: no fractures    ALL OTHER SYSTEMS REVIEWED AND NEGATIVE    UNABLE TO OBTAIN    PHYSICAL EXAM:  VITALS: T(C): 36.5 (09-04-22 @ 08:14)  T(F): 97.7 (09-04-22 @ 08:14), Max: 98 (09-03-22 @ 22:00)  HR: 86 (09-04-22 @ 08:14) (86 - 102)  BP: 124/90 (09-04-22 @ 08:14) (124/90 - 161/108)  RR:  (18 - 19)  SpO2:  (95% - 98%)  Wt(kg): --  GENERAL: NAD, well-groomed, well-developed  EYES: No proptosis, no lid lag, anicteric  HEENT:  Atraumatic, Normocephalic, moist mucous membranes  THYROID: Normal size, no palpable nodules  RESPIRATORY: Clear to auscultation bilaterally; No rales, rhonchi, wheezing, or rubs  CARDIOVASCULAR: Regular rate and rhythm; No murmurs; no peripheral edema  GI: Soft, nontender, non distended, normal bowel sounds  SKIN: Dry, intact, No rashes or lesions  MUSCULOSKELETAL: Full range of motion, normal strength  NEURO: sensation intact, extraocular movements intact, no tremor, normal reflexes  PSYCH: Alert and oriented x 3, normal affect, normal mood  CUSHING'S SIGNS: no striae    POCT Blood Glucose.: 346 mg/dL (09-04-22 @ 11:34)  POCT Blood Glucose.: 310 mg/dL (09-04-22 @ 07:52)  POCT Blood Glucose.: 308 mg/dL (09-03-22 @ 21:35)  POCT Blood Glucose.: 230 mg/dL (09-03-22 @ 17:38)  POCT Blood Glucose.: 334 mg/dL (09-03-22 @ 12:00)  POCT Blood Glucose.: 381 mg/dL (09-03-22 @ 10:53)                            15.1   11.03 )-----------( 208      ( 04 Sep 2022 06:25 )             45.5       09-04    133<L>  |  97  |  19  ----------------------------<  335<H>  3.9   |  26  |  1.06    eGFR: 89    Ca    8.7      09-04  Mg     1.8     09-04    TPro  6.5  /  Alb  4.0  /  TBili  0.8  /  DBili  x   /  AST  39  /  ALT  80<H>  /  AlkPhos  78  09-03                                 HPI:  43yo man with PMH CHF with EF 22%, HTN, DM, not on any medications since May 2/2 incarceration, not following any doctors, presenting with worsening chest pain and SOB for the last 1.5 weeks. Patient unable to lie flat 2/2 symptom exacerbation, states this feels similar to prior CHF exacerbation. Denies fevers, N/V. Endorsing vague abd pain, last BM 3 days ago. He stopped his medication in May'22 ,cause of insurance problem , and didn't follow with any physician  (03 Sep 2022 13:20)    Endocrine History: 42 yr old M with Hx of CHF with EF 22%, HTN, DM2 uncontrolled A1C 9.6%, not on any medications since May 2/2 incarceration here with chest pain and SOB, being treated for CHF exacerbation. Patient was seen by endocrine team in Feb 2022 and recommended discharge on metformin and jardiance. He was not able to acquire these medications after his incarceration. Has not been checking his FS at home as he knows they will be high. Denies retinopathy or neuropathy. Tries to moderate his intake of carbs. Has not had follow up with a medical provider for management of his diabetes.           PAST MEDICAL & SURGICAL HISTORY:  HTN (hypertension)      Asthma      Congestive heart failure (CHF)  EF 22%      Type 2 diabetes mellitus      Hyperlipidemia      No significant past surgical history          FAMILY HISTORY:  FH: HTN (hypertension)        Social History: No ETOH abuse    Outpatient Medications: (NOT TAKING)  · 	hydrALAZINE 100 mg oral tablet: 1 tab(s) orally 3 times a day  · 	digoxin 250 mcg (0.25 mg) oral tablet: 1 tab(s) orally once a day  · 	torsemide 20 mg oral tablet: 2 tab(s) orally 2 times a day  · 	spironolactone 25 mg oral tablet: 1 tab(s) orally once a day  · 	budesonide-formoterol 160 mcg-4.5 mcg/inh inhalation aerosol: 2 puff(s) inhaled 2 times a day  · 	albuterol 90 mcg/inh inhalation aerosol: 2 puff(s) inhaled every 6 hours, As needed, Shortness of Breath and/or Wheezing  · 	carvedilol 12.5 mg oral tablet: 1 tab(s) orally every 12 hours  · 	losartan 100 mg oral tablet: 1 tab(s) orally once a day  · 	metFORMIN 1000 mg oral tablet: 1 tab(s) orally 2 times a day  · 	Jardiance 25 mg oral tablet: 1 tab(s) orally once a day (in the morning)    MEDICATIONS  (STANDING):  carvedilol 12.5 milliGRAM(s) Oral every 12 hours  dextrose 5%. 1000 milliLiter(s) (50 mL/Hr) IV Continuous <Continuous>  dextrose 5%. 1000 milliLiter(s) (100 mL/Hr) IV Continuous <Continuous>  dextrose 50% Injectable 25 Gram(s) IV Push once  dextrose 50% Injectable 12.5 Gram(s) IV Push once  dextrose 50% Injectable 25 Gram(s) IV Push once  digoxin     Tablet 125 MICROGram(s) Oral daily  enoxaparin Injectable 40 milliGRAM(s) SubCutaneous every 24 hours  furosemide   Injectable 40 milliGRAM(s) IV Push two times a day  glucagon  Injectable 1 milliGRAM(s) IntraMuscular once  insulin glargine Injectable (LANTUS) 14 Unit(s) SubCutaneous at bedtime  insulin lispro (ADMELOG) corrective regimen sliding scale   SubCutaneous three times a day before meals  losartan 100 milliGRAM(s) Oral daily  polyethylene glycol 3350 17 Gram(s) Oral two times a day  senna 1 Tablet(s) Oral at bedtime  spironolactone 25 milliGRAM(s) Oral daily    MEDICATIONS  (PRN):  dextrose Oral Gel 15 Gram(s) Oral once PRN Blood Glucose LESS THAN 70 milliGRAM(s)/deciliter  lidocaine 2% Viscous 10 milliLiter(s) Swish and Spit every 4 hours PRN tooth pain  ondansetron Injectable 4 milliGRAM(s) IV Push every 8 hours PRN Nausea and/or Vomiting      Allergies    No Known Allergies    Intolerances      Review of Systems:  Constitutional: No fever  Eyes: No blurry vision  Neuro: No tremors  HEENT: No pain  Cardiovascular: + chest pain, palpitations  Respiratory: + SOB, no cough  GI: No nausea, vomiting, abdominal pain  : No dysuria  Skin: no rash  Psych: no depression  Endocrine: no polyuria, polydipsia  Osteoporosis: no fractures    ALL OTHER SYSTEMS REVIEWED AND NEGATIVE      PHYSICAL EXAM:  VITALS: T(C): 36.5 (09-04-22 @ 08:14)  T(F): 97.7 (09-04-22 @ 08:14), Max: 98 (09-03-22 @ 22:00)  HR: 86 (09-04-22 @ 08:14) (86 - 102)  BP: 124/90 (09-04-22 @ 08:14) (124/90 - 161/108)  RR:  (18 - 19)  SpO2:  (95% - 98%)  Wt(kg): --  GENERAL: NAD, well-groomed, well-developed  EYES: No proptosis, no lid lag, anicteric  HEENT:  Atraumatic, Normocephalic, moist mucous membranes  RESPIRATORY: Clear to auscultation bilaterally  CARDIOVASCULAR: Regular rate and rhythm  GI: Soft, nontender, non distended, normal bowel sounds  SKIN: Dry, intact, No rashes or lesions  MUSCULOSKELETAL: Full range of motion, normal strength  NEURO: sensation intact, extraocular movements intact, no tremor, normal reflexes  PSYCH: Alert and oriented x 3, normal affect, normal mood      POCT Blood Glucose.: 346 mg/dL (09-04-22 @ 11:34)  POCT Blood Glucose.: 310 mg/dL (09-04-22 @ 07:52)  POCT Blood Glucose.: 308 mg/dL (09-03-22 @ 21:35)  POCT Blood Glucose.: 230 mg/dL (09-03-22 @ 17:38)  POCT Blood Glucose.: 334 mg/dL (09-03-22 @ 12:00)  POCT Blood Glucose.: 381 mg/dL (09-03-22 @ 10:53)                            15.1   11.03 )-----------( 208      ( 04 Sep 2022 06:25 )             45.5       09-04    133<L>  |  97  |  19  ----------------------------<  335<H>  3.9   |  26  |  1.06    eGFR: 89    Ca    8.7      09-04  Mg     1.8     09-04    TPro  6.5  /  Alb  4.0  /  TBili  0.8  /  DBili  x   /  AST  39  /  ALT  80<H>  /  AlkPhos  78  09-03

## 2022-09-05 LAB
ALBUMIN SERPL ELPH-MCNC: 3.5 G/DL — SIGNIFICANT CHANGE UP (ref 3.3–5)
ALP SERPL-CCNC: 88 U/L — SIGNIFICANT CHANGE UP (ref 40–120)
ALT FLD-CCNC: 49 U/L — HIGH (ref 10–45)
ANION GAP SERPL CALC-SCNC: 12 MMOL/L — SIGNIFICANT CHANGE UP (ref 5–17)
AST SERPL-CCNC: 15 U/L — SIGNIFICANT CHANGE UP (ref 10–40)
BILIRUB SERPL-MCNC: 0.4 MG/DL — SIGNIFICANT CHANGE UP (ref 0.2–1.2)
BUN SERPL-MCNC: 18 MG/DL — SIGNIFICANT CHANGE UP (ref 7–23)
CALCIUM SERPL-MCNC: 9.2 MG/DL — SIGNIFICANT CHANGE UP (ref 8.4–10.5)
CHLORIDE SERPL-SCNC: 98 MMOL/L — SIGNIFICANT CHANGE UP (ref 96–108)
CO2 SERPL-SCNC: 26 MMOL/L — SIGNIFICANT CHANGE UP (ref 22–31)
CREAT SERPL-MCNC: 1.11 MG/DL — SIGNIFICANT CHANGE UP (ref 0.5–1.3)
EGFR: 84 ML/MIN/1.73M2 — SIGNIFICANT CHANGE UP
GLUCOSE BLDC GLUCOMTR-MCNC: 250 MG/DL — HIGH (ref 70–99)
GLUCOSE BLDC GLUCOMTR-MCNC: 253 MG/DL — HIGH (ref 70–99)
GLUCOSE BLDC GLUCOMTR-MCNC: 281 MG/DL — HIGH (ref 70–99)
GLUCOSE BLDC GLUCOMTR-MCNC: 309 MG/DL — HIGH (ref 70–99)
GLUCOSE SERPL-MCNC: 324 MG/DL — HIGH (ref 70–99)
HCT VFR BLD CALC: 46.3 % — SIGNIFICANT CHANGE UP (ref 39–50)
HGB BLD-MCNC: 15 G/DL — SIGNIFICANT CHANGE UP (ref 13–17)
MCHC RBC-ENTMCNC: 29.4 PG — SIGNIFICANT CHANGE UP (ref 27–34)
MCHC RBC-ENTMCNC: 32.4 GM/DL — SIGNIFICANT CHANGE UP (ref 32–36)
MCV RBC AUTO: 90.6 FL — SIGNIFICANT CHANGE UP (ref 80–100)
NRBC # BLD: 0 /100 WBCS — SIGNIFICANT CHANGE UP (ref 0–0)
PLATELET # BLD AUTO: 205 K/UL — SIGNIFICANT CHANGE UP (ref 150–400)
POTASSIUM SERPL-MCNC: 4.4 MMOL/L — SIGNIFICANT CHANGE UP (ref 3.5–5.3)
POTASSIUM SERPL-SCNC: 4.4 MMOL/L — SIGNIFICANT CHANGE UP (ref 3.5–5.3)
PROT SERPL-MCNC: 6.6 G/DL — SIGNIFICANT CHANGE UP (ref 6–8.3)
RBC # BLD: 5.11 M/UL — SIGNIFICANT CHANGE UP (ref 4.2–5.8)
RBC # FLD: 13.6 % — SIGNIFICANT CHANGE UP (ref 10.3–14.5)
SODIUM SERPL-SCNC: 136 MMOL/L — SIGNIFICANT CHANGE UP (ref 135–145)
WBC # BLD: 12.74 K/UL — HIGH (ref 3.8–10.5)
WBC # FLD AUTO: 12.74 K/UL — HIGH (ref 3.8–10.5)

## 2022-09-05 RX ORDER — INSULIN LISPRO 100/ML
7 VIAL (ML) SUBCUTANEOUS
Refills: 0 | Status: DISCONTINUED | OUTPATIENT
Start: 2022-09-05 | End: 2022-09-06

## 2022-09-05 RX ORDER — FUROSEMIDE 40 MG
80 TABLET ORAL
Refills: 0 | Status: DISCONTINUED | OUTPATIENT
Start: 2022-09-05 | End: 2022-09-08

## 2022-09-05 RX ORDER — SACUBITRIL AND VALSARTAN 24; 26 MG/1; MG/1
1 TABLET, FILM COATED ORAL EVERY 12 HOURS
Refills: 0 | Status: DISCONTINUED | OUTPATIENT
Start: 2022-09-06 | End: 2022-09-08

## 2022-09-05 RX ORDER — INSULIN GLARGINE 100 [IU]/ML
26 INJECTION, SOLUTION SUBCUTANEOUS
Refills: 0 | Status: DISCONTINUED | OUTPATIENT
Start: 2022-09-05 | End: 2022-09-06

## 2022-09-05 RX ADMIN — Medication 25 MILLIGRAM(S): at 22:39

## 2022-09-05 RX ADMIN — Medication 25 MILLIGRAM(S): at 05:46

## 2022-09-05 RX ADMIN — Medication 3: at 08:12

## 2022-09-05 RX ADMIN — Medication 80 MILLIGRAM(S): at 14:24

## 2022-09-05 RX ADMIN — Medication 2: at 11:43

## 2022-09-05 RX ADMIN — OXYMETAZOLINE HYDROCHLORIDE 2 SPRAY(S): 0.5 SPRAY NASAL at 05:53

## 2022-09-05 RX ADMIN — OXYMETAZOLINE HYDROCHLORIDE 2 SPRAY(S): 0.5 SPRAY NASAL at 17:02

## 2022-09-05 RX ADMIN — POLYETHYLENE GLYCOL 3350 17 GRAM(S): 17 POWDER, FOR SOLUTION ORAL at 17:02

## 2022-09-05 RX ADMIN — SENNA PLUS 1 TABLET(S): 8.6 TABLET ORAL at 22:38

## 2022-09-05 RX ADMIN — Medication 25 MILLIGRAM(S): at 14:24

## 2022-09-05 RX ADMIN — INSULIN GLARGINE 22 UNIT(S): 100 INJECTION, SOLUTION SUBCUTANEOUS at 16:59

## 2022-09-05 RX ADMIN — Medication 6 UNIT(S): at 11:44

## 2022-09-05 RX ADMIN — Medication 40 MILLIGRAM(S): at 05:48

## 2022-09-05 RX ADMIN — POLYETHYLENE GLYCOL 3350 17 GRAM(S): 17 POWDER, FOR SOLUTION ORAL at 05:54

## 2022-09-05 RX ADMIN — SPIRONOLACTONE 25 MILLIGRAM(S): 25 TABLET, FILM COATED ORAL at 05:46

## 2022-09-05 RX ADMIN — LOSARTAN POTASSIUM 100 MILLIGRAM(S): 100 TABLET, FILM COATED ORAL at 05:46

## 2022-09-05 RX ADMIN — Medication 6 UNIT(S): at 08:13

## 2022-09-05 NOTE — PROGRESS NOTE ADULT - ASSESSMENT
42M history of NICM who presents with CHF 2/2 medication noncompliance.     #Acute on chronic decompensated HF - NICM EF 27%. Clean [facility] 2019. NST in 2022 with reversible defects in inferior wall, no cath done.   #HTN    Recommendations:  Would increase lasix to 80mg IV BID with strict I/O, daily weights, and repletion of electrolytes to goal (k 4-5, Mg 2-3).   Limit fluid intact to 1.5L a day   Continue Losartan 100mg QD & Spironolactone 25mg QD.  Hold beta blocker for now as patient still decompensated and has not been on BB in several months (Would re-introduce once euvolemic).   Continue Hydralazine 25mg TID for better afterload reduction.   Hold Digoxin as it is unclear why patient had Digoxin in his medical reconciliation.  42M history of NICM who presents with CHF 2/2 medication noncompliance.     #Acute on chronic decompensated HF - NICM EF 27%. Clean [facility] 2019. NST in 2022 with reversible defects in inferior wall, no cath done.   #HTN    Recommendations:  Would increase lasix to 80mg IV BID with strict I/O, daily weights, and repletion of electrolytes to goal (k 4-5, Mg 2-3).   Limit fluid intact to 1.5L a day   Would stop losartan and change to entresto 24/26 BID, hold for SBP <90  Continue Spironolactone 25mg QD.  Hold beta blocker for now as patient still decompensated and has not been on BB in several months (Would re-introduce once euvolemic).   Continue Hydralazine 25mg TID for better afterload reduction.   Hold Digoxin as it is unclear why patient had Digoxin in his medical reconciliation.

## 2022-09-05 NOTE — PROGRESS NOTE ADULT - ASSESSMENT
A/P     ac on ch systolic heart failure   -2/2 noncompliance with meds   last EF : 22%  seen by cardiology:   recommend to d/c BBlocker   started on hydralazine 25 mg tid   d/c digoxin     Nonischemic cardiomyopathy :  -c/w above meds   counseling done regarding meds   c/w diuresis     DM-2 :   pt. refusing for insulin and want meds   seen by endo   agree for Lantus and FSBS while in hospital   on d/c will change to oral meds as per endo    to check his medication can be covered by his insurance ( as per endo metformin would not be given )    HTN / HLD   -c/w present meds     dispo: seems much better , plan for d/c home in am on lasix 40 mg PO daily   pt. will need all scripts on d/c as he is not having any medication at home   also please d/c all insulin and start on oral meds : as per advised by endo   f/u with PCP Dr. Irene Cuello and cardiology as out pt.

## 2022-09-05 NOTE — PROGRESS NOTE ADULT - SUBJECTIVE AND OBJECTIVE BOX
Patient seen and examined at bedside.    Overnight Events:     No AEON     Denies any chest pain but still reporting SOB at rest and orthopnea            Current Meds:  dextrose 5%. 1000 milliLiter(s) IV Continuous <Continuous>  dextrose 5%. 1000 milliLiter(s) IV Continuous <Continuous>  dextrose 50% Injectable 25 Gram(s) IV Push once  dextrose 50% Injectable 12.5 Gram(s) IV Push once  dextrose 50% Injectable 25 Gram(s) IV Push once  dextrose Oral Gel 15 Gram(s) Oral once PRN  enoxaparin Injectable 40 milliGRAM(s) SubCutaneous every 24 hours  furosemide   Injectable 40 milliGRAM(s) IV Push two times a day  glucagon  Injectable 1 milliGRAM(s) IntraMuscular once  hydrALAZINE 25 milliGRAM(s) Oral every 8 hours  insulin glargine Injectable (LANTUS) 22 Unit(s) SubCutaneous <User Schedule>  insulin lispro (ADMELOG) corrective regimen sliding scale   SubCutaneous three times a day before meals  insulin lispro (ADMELOG) corrective regimen sliding scale   SubCutaneous at bedtime  insulin lispro Injectable (ADMELOG) 6 Unit(s) SubCutaneous before breakfast  insulin lispro Injectable (ADMELOG) 6 Unit(s) SubCutaneous before lunch  insulin lispro Injectable (ADMELOG) 6 Unit(s) SubCutaneous before dinner  lidocaine 2% Viscous 10 milliLiter(s) Swish and Spit every 4 hours PRN  losartan 100 milliGRAM(s) Oral daily  ondansetron Injectable 4 milliGRAM(s) IV Push every 8 hours PRN  oxymetazoline 0.05% Nasal Spray 2 Spray(s) Both Nostrils two times a day  polyethylene glycol 3350 17 Gram(s) Oral two times a day  senna 1 Tablet(s) Oral at bedtime  spironolactone 25 milliGRAM(s) Oral daily      Vitals:  T(F): 98 (09-05), Max: 98.6 (09-04)  HR: 96 (09-05) (86 - 104)  BP: 150/99 (09-05) (124/90 - 150/99)  RR: 18 (09-05)  SpO2: 95% (09-05)  I&O's Summary    04 Sep 2022 07:01  -  05 Sep 2022 07:00  --------------------------------------------------------  IN: 670 mL / OUT: 2950 mL / NET: -2280 mL        Physical Exam:  Appearance: No acute distress   ENT: JVD noted   Cardiovascular: RRR, S1, S2, no murmurs, rubs, or gallops; LE edema   Respiratory: Clear to auscultation bilaterally  Musculoskeletal: No clubbing; no joint deformity   Neurologic: Non-focal  Psychiatry: AAOx3, mood & affect appropriate  Skin: No rashes, ecchymoses, or cyanosis                          15.0   12.74 )-----------( 205      ( 05 Sep 2022 05:36 )             46.3     09-05    136  |  98  |  18  ----------------------------<  324<H>  4.4   |  26  |  1.11    Ca    9.2      05 Sep 2022 05:37  Mg     1.8     09-04    TPro  6.6  /  Alb  3.5  /  TBili  0.4  /  DBili  x   /  AST  15  /  ALT  49<H>  /  AlkPhos  88  09-05      CARDIAC MARKERS ( 03 Sep 2022 14:36 )  30 ng/L / x     / x     / x     / x     / x      CARDIAC MARKERS ( 03 Sep 2022 11:13 )  31 ng/L / x     / x     / x     / x     / x          Serum Pro-Brain Natriuretic Peptide: 1121 pg/mL (09-04 @ 06:21)          New ECG(s): Personally reviewed    Echo:    Stress Testing:     Cath:    Imaging:    Interpretation of Telemetry:

## 2022-09-05 NOTE — PROGRESS NOTE ADULT - SUBJECTIVE AND OBJECTIVE BOX
Patient is a 42y old  Male who presents with a chief complaint of SOB / Chest pain (05 Sep 2022 07:04)      INTERVAL HPI/OVERNIGHT EVENTS:  T(C): 36.9 (22 @ 19:22), Max: 37.1 (22 @ 12:09)  HR: 105 (22 @ 19:22) (96 - 105)  BP: 153/99 (22 @ 19:22) (135/95 - 153/99)  RR: 18 (22 @ 19:22) (18 - 20)  SpO2: 92% (22 @ 19:22) (92% - 98%)  Wt(kg): --  I&O's Summary    04 Sep 2022 07:01  -  05 Sep 2022 07:00  --------------------------------------------------------  IN: 670 mL / OUT: 2950 mL / NET: -2280 mL    05 Sep 2022 07:01  -  05 Sep 2022 23:25  --------------------------------------------------------  IN: 750 mL / OUT: 1900 mL / NET: -1150 mL        PAST MEDICAL & SURGICAL HISTORY:  HTN (hypertension)      Asthma      Congestive heart failure (CHF)  EF 22%      Type 2 diabetes mellitus      Hyperlipidemia      No significant past surgical history          SOCIAL HISTORY  Alcohol:  Tobacco:  Illicit substance use:    FAMILY HISTORY:    REVIEW OF SYSTEMS:  CONSTITUTIONAL: No fever, weight loss, or fatigue  EYES: No eye pain, visual disturbances, or discharge  ENMT:  No difficulty hearing, tinnitus, vertigo; No sinus or throat pain  NECK: No pain or stiffness  RESPIRATORY: No cough, wheezing, chills or hemoptysis; No shortness of breath  CARDIOVASCULAR: No chest pain, palpitations, dizziness, or leg swelling  GASTROINTESTINAL: No abdominal or epigastric pain. No nausea, vomiting, or hematemesis; No diarrhea or constipation. No melena or hematochezia.  GENITOURINARY: No dysuria, frequency, hematuria, or incontinence  NEUROLOGICAL: No headaches, memory loss, loss of strength, numbness, or tremors  SKIN: No itching, burning, rashes, or lesions   LYMPH NODES: No enlarged glands  ENDOCRINE: No heat or cold intolerance; No hair loss  MUSCULOSKELETAL: No joint pain or swelling; No muscle, back, or extremity pain  PSYCHIATRIC: No depression, anxiety, mood swings, or difficulty sleeping  HEME/LYMPH: No easy bruising, or bleeding gums  ALLERY AND IMMUNOLOGIC: No hives or eczema    RADIOLOGY & ADDITIONAL TESTS:    Imaging Personally Reviewed:  [ ] YES  [ ] NO    Consultant(s) Notes Reviewed:  [ ] YES  [ ] NO    PHYSICAL EXAM:  GENERAL: NAD, well-groomed, well-developed  HEAD:  Atraumatic, Normocephalic  EYES: EOMI, PERRLA, conjunctiva and sclera clear  ENMT: No tonsillar erythema, exudates, or enlargement; Moist mucous membranes, Good dentition, No lesions  NECK: Supple, No JVD, Normal thyroid  NERVOUS SYSTEM:  Alert & Oriented X3, Good concentration; Motor Strength 5/5 B/L upper and lower extremities; DTRs 2+ intact and symmetric  CHEST/LUNG: Clear to percussion bilaterally; No rales, rhonchi, wheezing, or rubs  HEART: Regular rate and rhythm; No murmurs, rubs, or gallops  ABDOMEN: Soft, Nontender, Nondistended; Bowel sounds present  EXTREMITIES:  2+ Peripheral Pulses, No clubbing, cyanosis, or edema  LYMPH: No lymphadenopathy noted  SKIN: No rashes or lesions    LABS:                        15.0   12.74 )-----------( 205      ( 05 Sep 2022 05:36 )             46.3         136  |  98  |  18  ----------------------------<  324<H>  4.4   |  26  |  1.11    Ca    9.2      05 Sep 2022 05:37  Mg     1.8         TPro  6.6  /  Alb  3.5  /  TBili  0.4  /  DBili  x   /  AST  15  /  ALT  49<H>  /  AlkPhos  88        Urinalysis Basic - ( 04 Sep 2022 12:36 )    Color: Yellow / Appearance: Clear / S.031 / pH: x  Gluc: x / Ketone: Negative  / Bili: Negative / Urobili: >8mg/dL   Blood: x / Protein: Negative / Nitrite: Negative   Leuk Esterase: Negative / RBC: x / WBC x   Sq Epi: x / Non Sq Epi: x / Bacteria: x      CAPILLARY BLOOD GLUCOSE      POCT Blood Glucose.: 309 mg/dL (05 Sep 2022 21:32)  POCT Blood Glucose.: 253 mg/dL (05 Sep 2022 16:22)  POCT Blood Glucose.: 250 mg/dL (05 Sep 2022 11:29)  POCT Blood Glucose.: 281 mg/dL (05 Sep 2022 07:37)        Urinalysis Basic - ( 04 Sep 2022 12:36 )    Color: Yellow / Appearance: Clear / S.031 / pH: x  Gluc: x / Ketone: Negative  / Bili: Negative / Urobili: >8mg/dL   Blood: x / Protein: Negative / Nitrite: Negative   Leuk Esterase: Negative / RBC: x / WBC x   Sq Epi: x / Non Sq Epi: x / Bacteria: x        MEDICATIONS  (STANDING):  dextrose 5%. 1000 milliLiter(s) (50 mL/Hr) IV Continuous <Continuous>  dextrose 5%. 1000 milliLiter(s) (100 mL/Hr) IV Continuous <Continuous>  dextrose 50% Injectable 25 Gram(s) IV Push once  dextrose 50% Injectable 12.5 Gram(s) IV Push once  dextrose 50% Injectable 25 Gram(s) IV Push once  enoxaparin Injectable 40 milliGRAM(s) SubCutaneous every 24 hours  furosemide   Injectable 80 milliGRAM(s) IV Push two times a day  glucagon  Injectable 1 milliGRAM(s) IntraMuscular once  hydrALAZINE 25 milliGRAM(s) Oral every 8 hours  insulin glargine Injectable (LANTUS) 26 Unit(s) SubCutaneous <User Schedule>  insulin lispro (ADMELOG) corrective regimen sliding scale   SubCutaneous three times a day before meals  insulin lispro (ADMELOG) corrective regimen sliding scale   SubCutaneous at bedtime  insulin lispro Injectable (ADMELOG) 7 Unit(s) SubCutaneous before dinner  insulin lispro Injectable (ADMELOG) 7 Unit(s) SubCutaneous before breakfast  insulin lispro Injectable (ADMELOG) 7 Unit(s) SubCutaneous before lunch  oxymetazoline 0.05% Nasal Spray 2 Spray(s) Both Nostrils two times a day  polyethylene glycol 3350 17 Gram(s) Oral two times a day  senna 1 Tablet(s) Oral at bedtime  spironolactone 25 milliGRAM(s) Oral daily    MEDICATIONS  (PRN):  dextrose Oral Gel 15 Gram(s) Oral once PRN Blood Glucose LESS THAN 70 milliGRAM(s)/deciliter  lidocaine 2% Viscous 10 milliLiter(s) Swish and Spit every 4 hours PRN tooth pain  ondansetron Injectable 4 milliGRAM(s) IV Push every 8 hours PRN Nausea and/or Vomiting      Care Discussed with Consultants/Other Providers [ ] YES  [ ] NO Patient is a 42y old  Male who presents with a chief complaint of SOB / Chest pain (05 Sep 2022 07:04)      INTERVAL HPI/OVERNIGHT EVENTS: feeling better   T(C): 36.9 (22 @ 19:22), Max: 37.1 (22 @ 12:09)  HR: 105 (22 @ 19:22) (96 - 105)  BP: 153/99 (22 @ 19:22) (135/95 - 153/99)  RR: 18 (22 @ 19:22) (18 - 20)  SpO2: 92% (22 @ 19:22) (92% - 98%)  Wt(kg): --  I&O's Summary    04 Sep 2022 07:01  -  05 Sep 2022 07:00  --------------------------------------------------------  IN: 670 mL / OUT: 2950 mL / NET: -2280 mL    05 Sep 2022 07:01  -  05 Sep 2022 23:25  --------------------------------------------------------  IN: 750 mL / OUT: 1900 mL / NET: -1150 mL        PAST MEDICAL & SURGICAL HISTORY:  HTN (hypertension)      Asthma      Congestive heart failure (CHF)  EF 22%      Type 2 diabetes mellitus      Hyperlipidemia      No significant past surgical history          SOCIAL HISTORY  Alcohol:  Tobacco:  Illicit substance use:    FAMILY HISTORY:    REVIEW OF SYSTEMS:  CONSTITUTIONAL: No fever, weight loss, or fatigue  EYES: No eye pain, visual disturbances, or discharge  ENMT:  No difficulty hearing, tinnitus, vertigo; No sinus or throat pain  NECK: No pain or stiffness  RESPIRATORY: No cough, wheezing, chills or hemoptysis; No shortness of breath  CARDIOVASCULAR: No chest pain, palpitations, dizziness, or leg swelling  GASTROINTESTINAL: No abdominal or epigastric pain. No nausea, vomiting, or hematemesis; No diarrhea or constipation. No melena or hematochezia.  GENITOURINARY: No dysuria, frequency, hematuria, or incontinence  NEUROLOGICAL: No headaches, memory loss, loss of strength, numbness, or tremors  SKIN: No itching, burning, rashes, or lesions   LYMPH NODES: No enlarged glands  ENDOCRINE: No heat or cold intolerance; No hair loss  MUSCULOSKELETAL: No joint pain or swelling; No muscle, back, or extremity pain  PSYCHIATRIC: No depression, anxiety, mood swings, or difficulty sleeping  HEME/LYMPH: No easy bruising, or bleeding gums  ALLERY AND IMMUNOLOGIC: No hives or eczema    RADIOLOGY & ADDITIONAL TESTS:    Imaging Personally Reviewed:  [ ] YES  [ ] NO    Consultant(s) Notes Reviewed:  [ ] YES  [ ] NO    PHYSICAL EXAM:  GENERAL: NAD, well-groomed, well-developed  HEAD:  Atraumatic, Normocephalic  EYES: EOMI, PERRLA, conjunctiva and sclera clear  ENMT: No tonsillar erythema, exudates, or enlargement; Moist mucous membranes, Good dentition, No lesions  NECK: Supple, No JVD, Normal thyroid  NERVOUS SYSTEM:  Alert & Oriented X3, Good concentration; Motor Strength 5/5 B/L upper and lower extremities; DTRs 2+ intact and symmetric  CHEST/LUNG: Clear to percussion bilaterally; No rales, rhonchi, wheezing, or rubs  HEART: Regular rate and rhythm; No murmurs, rubs, or gallops  ABDOMEN: Soft, Nontender, Nondistended; Bowel sounds present  EXTREMITIES:  2+ Peripheral Pulses, No clubbing, cyanosis, or edema  LYMPH: No lymphadenopathy noted  SKIN: No rashes or lesions    LABS:                        15.0   12.74 )-----------( 205      ( 05 Sep 2022 05:36 )             46.3         136  |  98  |  18  ----------------------------<  324<H>  4.4   |  26  |  1.11    Ca    9.2      05 Sep 2022 05:37  Mg     1.8         TPro  6.6  /  Alb  3.5  /  TBili  0.4  /  DBili  x   /  AST  15  /  ALT  49<H>  /  AlkPhos  88        Urinalysis Basic - ( 04 Sep 2022 12:36 )    Color: Yellow / Appearance: Clear / S.031 / pH: x  Gluc: x / Ketone: Negative  / Bili: Negative / Urobili: >8mg/dL   Blood: x / Protein: Negative / Nitrite: Negative   Leuk Esterase: Negative / RBC: x / WBC x   Sq Epi: x / Non Sq Epi: x / Bacteria: x      CAPILLARY BLOOD GLUCOSE      POCT Blood Glucose.: 309 mg/dL (05 Sep 2022 21:32)  POCT Blood Glucose.: 253 mg/dL (05 Sep 2022 16:22)  POCT Blood Glucose.: 250 mg/dL (05 Sep 2022 11:29)  POCT Blood Glucose.: 281 mg/dL (05 Sep 2022 07:37)        Urinalysis Basic - ( 04 Sep 2022 12:36 )    Color: Yellow / Appearance: Clear / S.031 / pH: x  Gluc: x / Ketone: Negative  / Bili: Negative / Urobili: >8mg/dL   Blood: x / Protein: Negative / Nitrite: Negative   Leuk Esterase: Negative / RBC: x / WBC x   Sq Epi: x / Non Sq Epi: x / Bacteria: x        MEDICATIONS  (STANDING):  dextrose 5%. 1000 milliLiter(s) (50 mL/Hr) IV Continuous <Continuous>  dextrose 5%. 1000 milliLiter(s) (100 mL/Hr) IV Continuous <Continuous>  dextrose 50% Injectable 25 Gram(s) IV Push once  dextrose 50% Injectable 12.5 Gram(s) IV Push once  dextrose 50% Injectable 25 Gram(s) IV Push once  enoxaparin Injectable 40 milliGRAM(s) SubCutaneous every 24 hours  furosemide   Injectable 80 milliGRAM(s) IV Push two times a day  glucagon  Injectable 1 milliGRAM(s) IntraMuscular once  hydrALAZINE 25 milliGRAM(s) Oral every 8 hours  insulin glargine Injectable (LANTUS) 26 Unit(s) SubCutaneous <User Schedule>  insulin lispro (ADMELOG) corrective regimen sliding scale   SubCutaneous three times a day before meals  insulin lispro (ADMELOG) corrective regimen sliding scale   SubCutaneous at bedtime  insulin lispro Injectable (ADMELOG) 7 Unit(s) SubCutaneous before dinner  insulin lispro Injectable (ADMELOG) 7 Unit(s) SubCutaneous before breakfast  insulin lispro Injectable (ADMELOG) 7 Unit(s) SubCutaneous before lunch  oxymetazoline 0.05% Nasal Spray 2 Spray(s) Both Nostrils two times a day  polyethylene glycol 3350 17 Gram(s) Oral two times a day  senna 1 Tablet(s) Oral at bedtime  spironolactone 25 milliGRAM(s) Oral daily    MEDICATIONS  (PRN):  dextrose Oral Gel 15 Gram(s) Oral once PRN Blood Glucose LESS THAN 70 milliGRAM(s)/deciliter  lidocaine 2% Viscous 10 milliLiter(s) Swish and Spit every 4 hours PRN tooth pain  ondansetron Injectable 4 milliGRAM(s) IV Push every 8 hours PRN Nausea and/or Vomiting      Care Discussed with Consultants/Other Providers [ ] YES  [ ] NO

## 2022-09-06 DIAGNOSIS — I50.9 HEART FAILURE, UNSPECIFIED: ICD-10-CM

## 2022-09-06 LAB
ANION GAP SERPL CALC-SCNC: 12 MMOL/L — SIGNIFICANT CHANGE UP (ref 5–17)
BUN SERPL-MCNC: 19 MG/DL — SIGNIFICANT CHANGE UP (ref 7–23)
CALCIUM SERPL-MCNC: 9.3 MG/DL — SIGNIFICANT CHANGE UP (ref 8.4–10.5)
CHLORIDE SERPL-SCNC: 96 MMOL/L — SIGNIFICANT CHANGE UP (ref 96–108)
CO2 SERPL-SCNC: 26 MMOL/L — SIGNIFICANT CHANGE UP (ref 22–31)
CREAT SERPL-MCNC: 1.09 MG/DL — SIGNIFICANT CHANGE UP (ref 0.5–1.3)
EGFR: 86 ML/MIN/1.73M2 — SIGNIFICANT CHANGE UP
GLUCOSE BLDC GLUCOMTR-MCNC: 304 MG/DL — HIGH (ref 70–99)
GLUCOSE SERPL-MCNC: 378 MG/DL — HIGH (ref 70–99)
HCT VFR BLD CALC: 49.1 % — SIGNIFICANT CHANGE UP (ref 39–50)
HGB BLD-MCNC: 16.2 G/DL — SIGNIFICANT CHANGE UP (ref 13–17)
MAGNESIUM SERPL-MCNC: 1.9 MG/DL — SIGNIFICANT CHANGE UP (ref 1.6–2.6)
MCHC RBC-ENTMCNC: 29.6 PG — SIGNIFICANT CHANGE UP (ref 27–34)
MCHC RBC-ENTMCNC: 33 GM/DL — SIGNIFICANT CHANGE UP (ref 32–36)
MCV RBC AUTO: 89.6 FL — SIGNIFICANT CHANGE UP (ref 80–100)
NRBC # BLD: 0 /100 WBCS — SIGNIFICANT CHANGE UP (ref 0–0)
PHOSPHATE SERPL-MCNC: 4.5 MG/DL — SIGNIFICANT CHANGE UP (ref 2.5–4.5)
PLATELET # BLD AUTO: 231 K/UL — SIGNIFICANT CHANGE UP (ref 150–400)
POTASSIUM SERPL-MCNC: 4.4 MMOL/L — SIGNIFICANT CHANGE UP (ref 3.5–5.3)
POTASSIUM SERPL-SCNC: 4.4 MMOL/L — SIGNIFICANT CHANGE UP (ref 3.5–5.3)
RBC # BLD: 5.48 M/UL — SIGNIFICANT CHANGE UP (ref 4.2–5.8)
RBC # FLD: 13.7 % — SIGNIFICANT CHANGE UP (ref 10.3–14.5)
SODIUM SERPL-SCNC: 134 MMOL/L — LOW (ref 135–145)
WBC # BLD: 11.56 K/UL — HIGH (ref 3.8–10.5)
WBC # FLD AUTO: 11.56 K/UL — HIGH (ref 3.8–10.5)

## 2022-09-06 PROCEDURE — 99232 SBSQ HOSP IP/OBS MODERATE 35: CPT

## 2022-09-06 PROCEDURE — 99233 SBSQ HOSP IP/OBS HIGH 50: CPT

## 2022-09-06 RX ORDER — INSULIN GLARGINE 100 [IU]/ML
30 INJECTION, SOLUTION SUBCUTANEOUS
Refills: 0 | Status: DISCONTINUED | OUTPATIENT
Start: 2022-09-06 | End: 2022-09-08

## 2022-09-06 RX ORDER — ACETAMINOPHEN 500 MG
1000 TABLET ORAL ONCE
Refills: 0 | Status: COMPLETED | OUTPATIENT
Start: 2022-09-06 | End: 2022-09-06

## 2022-09-06 RX ORDER — INSULIN LISPRO 100/ML
9 VIAL (ML) SUBCUTANEOUS
Refills: 0 | Status: DISCONTINUED | OUTPATIENT
Start: 2022-09-06 | End: 2022-09-08

## 2022-09-06 RX ADMIN — Medication 80 MILLIGRAM(S): at 06:12

## 2022-09-06 RX ADMIN — SACUBITRIL AND VALSARTAN 1 TABLET(S): 24; 26 TABLET, FILM COATED ORAL at 18:04

## 2022-09-06 RX ADMIN — Medication 25 MILLIGRAM(S): at 13:39

## 2022-09-06 RX ADMIN — OXYMETAZOLINE HYDROCHLORIDE 2 SPRAY(S): 0.5 SPRAY NASAL at 06:05

## 2022-09-06 RX ADMIN — Medication 25 MILLIGRAM(S): at 21:43

## 2022-09-06 RX ADMIN — Medication 1000 MILLIGRAM(S): at 02:46

## 2022-09-06 RX ADMIN — SPIRONOLACTONE 25 MILLIGRAM(S): 25 TABLET, FILM COATED ORAL at 06:04

## 2022-09-06 RX ADMIN — Medication 80 MILLIGRAM(S): at 13:39

## 2022-09-06 RX ADMIN — Medication 400 MILLIGRAM(S): at 02:16

## 2022-09-06 RX ADMIN — Medication 25 MILLIGRAM(S): at 06:04

## 2022-09-06 NOTE — PROGRESS NOTE ADULT - ASSESSMENT
42 yr old M with Hx of CHF with EF 22%, HTN, DM2 uncontrolled A1C 9.6%, not on any medications since May 2/2 incarceration here with chest pain and SOB, being treated for CHF exacerbation.     1) T2DM with hyperglycemia  Goal Glucose 100-180  Pt has been refusing insulin injections.  Discussed with pt at length importance of glucose control  and benefits of insulin therapy.  He is now agreeing to basal insulin and associated glucose monitoring, will consider prandial insulin, though states he will refuse if/when he feels that it is "too much"  Recommend increasing Lantus to 30 Units, will move timing slightly to coincide with dinner so that POC glucose can be used for both insulin administrations (in case he declines poc glucose at bedtime)  Increase Admelog to 9 units before meals plus low correction scale before meals and bedtime  Discharge plan: Avoid metformin use in setting of decompensated heart failure  Consider jardiance and dpp4 at bedtime ( not currently amenable to injectable medications)    F/U at [facility]  [address] [facility]  [phone].    2) Hyperlipidemia/  /Check lipids    Lara Bah MD  pager  or via Teams on 9/6/22  Other times:  Diabetes team: [phone] business hours  [phone] night/weekend  or email [email or web address]

## 2022-09-06 NOTE — PROGRESS NOTE ADULT - ASSESSMENT
A/P     ac on ch systolic heart failure   -2/2 noncompliance with meds   last EF : 22%  seen by cardiology:   recommend to d/c BBlocker   started on hydralazine 25 mg tid   d/c digoxin     Nonischemic cardiomyopathy :  -c/w above meds   counseling done regarding meds   c/w diuresis     DM-2 :   pt. refusing for insulin and want meds   seen by endo   agree for Lantus and FSBS while in hospital   on d/c will change to oral meds as per endo    to check his medication can be covered by his insurance ( as per endo metformin would not be given )    HTN / HLD   -c/w present meds     dispo: as per cardio c/w diuresis , once euvolemic , will do ischemic evaluation   pt. refusing Insulin , though explained that while in hospital he need insulin, will change to oral meds on discharge

## 2022-09-06 NOTE — PROGRESS NOTE ADULT - SUBJECTIVE AND OBJECTIVE BOX
Patient is a 42y old  Male who presents with a chief complaint of SOB / Chest pain (06 Sep 2022 14:31)      INTERVAL HPI/OVERNIGHT EVENTS: feeling better   T(C): 36.8 (09-06-22 @ 20:10), Max: 36.8 (09-06-22 @ 12:05)  HR: 103 (09-06-22 @ 20:10) (103 - 107)  BP: 138/95 (09-06-22 @ 20:10) (138/95 - 163/131)  RR: 18 (09-06-22 @ 20:10) (18 - 18)  SpO2: 98% (09-06-22 @ 20:10) (91% - 98%)  Wt(kg): --  I&O's Summary    05 Sep 2022 07:01  -  06 Sep 2022 07:00  --------------------------------------------------------  IN: 950 mL / OUT: 1900 mL / NET: -950 mL    06 Sep 2022 07:01  -  06 Sep 2022 23:09  --------------------------------------------------------  IN: 840 mL / OUT: 900 mL / NET: -60 mL        PAST MEDICAL & SURGICAL HISTORY:  HTN (hypertension)      Asthma      Congestive heart failure (CHF)  EF 22%      Type 2 diabetes mellitus      Hyperlipidemia      No significant past surgical history          SOCIAL HISTORY  Alcohol:  Tobacco:  Illicit substance use:    FAMILY HISTORY:    REVIEW OF SYSTEMS:  CONSTITUTIONAL: No fever, weight loss, or fatigue  EYES: No eye pain, visual disturbances, or discharge  ENMT:  No difficulty hearing, tinnitus, vertigo; No sinus or throat pain  NECK: No pain or stiffness  RESPIRATORY: No cough, wheezing, chills or hemoptysis; No shortness of breath  CARDIOVASCULAR: No chest pain, palpitations, dizziness, or leg swelling  GASTROINTESTINAL: No abdominal or epigastric pain. No nausea, vomiting, or hematemesis; No diarrhea or constipation. No melena or hematochezia.  GENITOURINARY: No dysuria, frequency, hematuria, or incontinence  NEUROLOGICAL: No headaches, memory loss, loss of strength, numbness, or tremors  SKIN: No itching, burning, rashes, or lesions   LYMPH NODES: No enlarged glands  ENDOCRINE: No heat or cold intolerance; No hair loss  MUSCULOSKELETAL: No joint pain or swelling; No muscle, back, or extremity pain  PSYCHIATRIC: No depression, anxiety, mood swings, or difficulty sleeping  HEME/LYMPH: No easy bruising, or bleeding gums  ALLERY AND IMMUNOLOGIC: No hives or eczema    RADIOLOGY & ADDITIONAL TESTS:    Imaging Personally Reviewed:  [ ] YES  [ ] NO    Consultant(s) Notes Reviewed:  [ ] YES  [ ] NO    PHYSICAL EXAM:  GENERAL: NAD, well-groomed, well-developed  HEAD:  Atraumatic, Normocephalic  EYES: EOMI, PERRLA, conjunctiva and sclera clear  ENMT: No tonsillar erythema, exudates, or enlargement; Moist mucous membranes, Good dentition, No lesions  NECK: Supple, No JVD, Normal thyroid  NERVOUS SYSTEM:  Alert & Oriented X3, Good concentration; Motor Strength 5/5 B/L upper and lower extremities; DTRs 2+ intact and symmetric  CHEST/LUNG: Clear to percussion bilaterally; No rales, rhonchi, wheezing, or rubs  HEART: Regular rate and rhythm; No murmurs, rubs, or gallops  ABDOMEN: Soft, Nontender, Nondistended; Bowel sounds present  EXTREMITIES:  2+ Peripheral Pulses, No clubbing, cyanosis, or edema  LYMPH: No lymphadenopathy noted  SKIN: No rashes or lesions    LABS:                        16.2   11.56 )-----------( 231      ( 06 Sep 2022 06:51 )             49.1     09-06    134<L>  |  96  |  19  ----------------------------<  378<H>  4.4   |  26  |  1.09    Ca    9.3      06 Sep 2022 06:51  Phos  4.5     09-06  Mg     1.9     09-06    TPro  6.6  /  Alb  3.5  /  TBili  0.4  /  DBili  x   /  AST  15  /  ALT  49<H>  /  AlkPhos  88  09-05        CAPILLARY BLOOD GLUCOSE      POCT Blood Glucose.: 304 mg/dL (06 Sep 2022 07:15)            MEDICATIONS  (STANDING):  dextrose 5%. 1000 milliLiter(s) (50 mL/Hr) IV Continuous <Continuous>  dextrose 5%. 1000 milliLiter(s) (100 mL/Hr) IV Continuous <Continuous>  dextrose 50% Injectable 25 Gram(s) IV Push once  dextrose 50% Injectable 12.5 Gram(s) IV Push once  dextrose 50% Injectable 25 Gram(s) IV Push once  enoxaparin Injectable 40 milliGRAM(s) SubCutaneous every 24 hours  furosemide   Injectable 80 milliGRAM(s) IV Push two times a day  glucagon  Injectable 1 milliGRAM(s) IntraMuscular once  hydrALAZINE 25 milliGRAM(s) Oral every 8 hours  insulin glargine Injectable (LANTUS) 30 Unit(s) SubCutaneous <User Schedule>  insulin lispro (ADMELOG) corrective regimen sliding scale   SubCutaneous three times a day before meals  insulin lispro (ADMELOG) corrective regimen sliding scale   SubCutaneous at bedtime  insulin lispro Injectable (ADMELOG) 9 Unit(s) SubCutaneous three times a day before meals  polyethylene glycol 3350 17 Gram(s) Oral two times a day  sacubitril 24 mG/valsartan 26 mG 1 Tablet(s) Oral every 12 hours  senna 1 Tablet(s) Oral at bedtime  spironolactone 25 milliGRAM(s) Oral daily    MEDICATIONS  (PRN):  dextrose Oral Gel 15 Gram(s) Oral once PRN Blood Glucose LESS THAN 70 milliGRAM(s)/deciliter  lidocaine 2% Viscous 10 milliLiter(s) Swish and Spit every 4 hours PRN tooth pain  ondansetron Injectable 4 milliGRAM(s) IV Push every 8 hours PRN Nausea and/or Vomiting      Care Discussed with Consultants/Other Providers [ ] YES  [ ] NO

## 2022-09-06 NOTE — PROGRESS NOTE ADULT - SUBJECTIVE AND OBJECTIVE BOX
Diabetes  Follow up note    Interval History/ROS: pt with good appetite, though does not like the food.  Has been refusing insulin and now glucose monitoring as well.  Does not want injections and finds finger pricks painful    MEDICATIONS  (STANDING):  dextrose 5%. 1000 milliLiter(s) (50 mL/Hr) IV Continuous <Continuous>  dextrose 5%. 1000 milliLiter(s) (100 mL/Hr) IV Continuous <Continuous>  dextrose 50% Injectable 25 Gram(s) IV Push once  dextrose 50% Injectable 12.5 Gram(s) IV Push once  dextrose 50% Injectable 25 Gram(s) IV Push once  enoxaparin Injectable 40 milliGRAM(s) SubCutaneous every 24 hours  furosemide   Injectable 80 milliGRAM(s) IV Push two times a day  glucagon  Injectable 1 milliGRAM(s) IntraMuscular once  hydrALAZINE 25 milliGRAM(s) Oral every 8 hours  insulin glargine Injectable (LANTUS) 26 Unit(s) SubCutaneous <User Schedule>  insulin lispro (ADMELOG) corrective regimen sliding scale   SubCutaneous three times a day before meals  insulin lispro (ADMELOG) corrective regimen sliding scale   SubCutaneous at bedtime  insulin lispro Injectable (ADMELOG) 7 Unit(s) SubCutaneous before dinner  insulin lispro Injectable (ADMELOG) 7 Unit(s) SubCutaneous before breakfast  insulin lispro Injectable (ADMELOG) 7 Unit(s) SubCutaneous before lunch  oxymetazoline 0.05% Nasal Spray 2 Spray(s) Both Nostrils two times a day  polyethylene glycol 3350 17 Gram(s) Oral two times a day  sacubitril 24 mG/valsartan 26 mG 1 Tablet(s) Oral every 12 hours  senna 1 Tablet(s) Oral at bedtime  spironolactone 25 milliGRAM(s) Oral daily    MEDICATIONS  (PRN):  dextrose Oral Gel 15 Gram(s) Oral once PRN Blood Glucose LESS THAN 70 milliGRAM(s)/deciliter  lidocaine 2% Viscous 10 milliLiter(s) Swish and Spit every 4 hours PRN tooth pain  ondansetron Injectable 4 milliGRAM(s) IV Push every 8 hours PRN Nausea and/or Vomiting      Allergies    No Known Allergies    Intolerances          PHYSICAL EXAM:  VITALS: T(C): 36.7 (09-06-22 @ 04:16)  T(F): 98.1 (09-06-22 @ 04:16), Max: 98.4 (09-05-22 @ 19:22)  HR: 107 (09-06-22 @ 12:05) (104 - 111)  BP: 139/96 (09-06-22 @ 12:05) (139/96 - 163/131)  RR:  (18 - 18)  SpO2:  (91% - 95%)  GENERAL: Sitting up in chair in NAD,   EYES: No proptosis,  HEENT:  Atraumatic, Normocephalic,   RESPIRATORY: Clear to auscultation bilaterally  CARDIOVASCULAR: Regular rhythm;   GI: Soft, nontender, non distended, normal bowel sounds  MUSCULOSKELETAL: normal strength      POCT Blood Glucose.: 304 mg/dL (09-06-22 @ 07:15)  POCT Blood Glucose.: 309 mg/dL (09-05-22 @ 21:32)  POCT Blood Glucose.: 253 mg/dL (09-05-22 @ 16:22)  POCT Blood Glucose.: 250 mg/dL (09-05-22 @ 11:29)  POCT Blood Glucose.: 281 mg/dL (09-05-22 @ 07:37)  POCT Blood Glucose.: 233 mg/dL (09-04-22 @ 20:49)  POCT Blood Glucose.: 206 mg/dL (09-04-22 @ 16:20)  POCT Blood Glucose.: 346 mg/dL (09-04-22 @ 11:34)  POCT Blood Glucose.: 310 mg/dL (09-04-22 @ 07:52)  POCT Blood Glucose.: 308 mg/dL (09-03-22 @ 21:35)  POCT Blood Glucose.: 230 mg/dL (09-03-22 @ 17:38)        09-06    134<L>  |  96  |  19  ----------------------------<  378<H>  4.4   |  26  |  1.09    eGFR: 86    Ca    9.3      09-06  Mg     1.9     09-06  Phos  4.5     09-06    TPro  6.6  /  Alb  3.5  /  TBili  0.4  /  DBili  x   /  AST  15  /  ALT  49<H>  /  AlkPhos  88  09-05        A1C with Estimated Average Glucose Result: 9.6 % (09-03-22 @ 11:13)  A1C with Estimated Average Glucose Result: 11.1 % (02-02-22 @ 07:43)  A1C with Estimated Average Glucose Result: 11.3 % (02-01-22 @ 16:13)  A1C with Estimated Average Glucose Result: 11.2 % (02-01-22 @ 13:51)

## 2022-09-06 NOTE — PROGRESS NOTE ADULT - SUBJECTIVE AND OBJECTIVE BOX
INTERVAL EVENTS/SUBJ:  Clinically improving; titrating meds and ongoing diuresis.    Home Medications:  albuterol 90 mcg/inh inhalation aerosol: 2 puff(s) inhaled every 6 hours, As needed, Shortness of Breath and/or Wheezing  budesonide-formoterol 160 mcg-4.5 mcg/inh inhalation aerosol: 2 puff(s) inhaled 2 times a day  carvedilol 12.5 mg oral tablet: 1 tab(s) orally every 12 hours  digoxin 250 mcg (0.25 mg) oral tablet: 1 tab(s) orally once a day  hydrALAZINE 100 mg oral tablet: 1 tab(s) orally 3 times a day  Jardiance 25 mg oral tablet: 1 tab(s) orally once a day (in the morning)  losartan 100 mg oral tablet: 1 tab(s) orally once a day  metFORMIN 1000 mg oral tablet: 1 tab(s) orally 2 times a day  spironolactone 25 mg oral tablet: 1 tab(s) orally once a day  torsemide 20 mg oral tablet: 2 tab(s) orally 2 times a day    MEDICATIONS  (STANDING):  dextrose 5%. 1000 milliLiter(s) (50 mL/Hr) IV Continuous <Continuous>  dextrose 5%. 1000 milliLiter(s) (100 mL/Hr) IV Continuous <Continuous>  dextrose 50% Injectable 25 Gram(s) IV Push once  dextrose 50% Injectable 12.5 Gram(s) IV Push once  dextrose 50% Injectable 25 Gram(s) IV Push once  enoxaparin Injectable 40 milliGRAM(s) SubCutaneous every 24 hours  furosemide   Injectable 80 milliGRAM(s) IV Push two times a day  glucagon  Injectable 1 milliGRAM(s) IntraMuscular once  hydrALAZINE 25 milliGRAM(s) Oral every 8 hours  insulin glargine Injectable (LANTUS) 26 Unit(s) SubCutaneous <User Schedule>  insulin lispro (ADMELOG) corrective regimen sliding scale   SubCutaneous three times a day before meals  insulin lispro (ADMELOG) corrective regimen sliding scale   SubCutaneous at bedtime  insulin lispro Injectable (ADMELOG) 7 Unit(s) SubCutaneous before dinner  insulin lispro Injectable (ADMELOG) 7 Unit(s) SubCutaneous before breakfast  insulin lispro Injectable (ADMELOG) 7 Unit(s) SubCutaneous before lunch  oxymetazoline 0.05% Nasal Spray 2 Spray(s) Both Nostrils two times a day  polyethylene glycol 3350 17 Gram(s) Oral two times a day  sacubitril 24 mG/valsartan 26 mG 1 Tablet(s) Oral every 12 hours  senna 1 Tablet(s) Oral at bedtime  spironolactone 25 milliGRAM(s) Oral daily    MEDICATIONS  (PRN):  dextrose Oral Gel 15 Gram(s) Oral once PRN Blood Glucose LESS THAN 70 milliGRAM(s)/deciliter  lidocaine 2% Viscous 10 milliLiter(s) Swish and Spit every 4 hours PRN tooth pain  ondansetron Injectable 4 milliGRAM(s) IV Push every 8 hours PRN Nausea and/or Vomiting      Vital Signs Last 24 Hrs  T(C): 36.7 (06 Sep 2022 04:16), Max: 37.1 (05 Sep 2022 12:09)  T(F): 98.1 (06 Sep 2022 04:16), Max: 98.8 (05 Sep 2022 12:09)  HR: 106 (06 Sep 2022 04:16) (97 - 111)  BP: 163/131 (06 Sep 2022 04:16) (135/95 - 163/131)  BP(mean): 142 (06 Sep 2022 04:16) (142 - 142)  RR: 18 (06 Sep 2022 04:16) (18 - 20)  SpO2: 91% (06 Sep 2022 04:16) (91% - 98%)    Parameters below as of 06 Sep 2022 04:16  Patient On (Oxygen Delivery Method): room air    REVIEW OF SYSTEMS:  As per HPI, otherwise unremarkable.     PHYSICAL EXAM:  Constitutional/Appearance: Normal, Well-developed  HEENT:   Normal oral mucosa, no drainage or redness, supple neck  Lymphatic: No lymphadenopathy  Cardiovascular: Normal S1 S2, No edema, RRR  Respiratory: Lungs clear to auscultation, respirations non-labored  Psychiatry: A & O x 3, appropriate affect.   Gastrointestinal:  Soft, Non-tender, no distention  Skin: No rashes, No ecchymoses, No cyanosis	  Neurologic: Non-focal, Alert and oriented x 3  Extremities: Normal range of motion  Vascular: Peripheral pulses palpable 2+ bilaterally (radial)    LABS:  CBC Full  -  ( 06 Sep 2022 06:51 )  WBC Count : 11.56 K/uL  RBC Count : 5.48 M/uL  Hemoglobin : 16.2 g/dL  Hematocrit : 49.1 %  Platelet Count - Automated : 231 K/uL  Mean Cell Volume : 89.6 fl  Mean Cell Hemoglobin : 29.6 pg  Mean Cell Hemoglobin Concentration : 33.0 gm/dL      09-06    134<L>  |  96  |  19  ----------------------------<  378<H>  4.4   |  26  |  1.09    Ca    9.3      06 Sep 2022 06:51  Phos  4.5     09-06  Mg     1.9     09-06    TPro  6.6  /  Alb  3.5  /  TBili  0.4  /  DBili  x   /  AST  15  /  ALT  49<H>  /  AlkPhos  88  09-05    IMPRESSION AND PLAN: 42M w NICM here with ADHF 2/2 medication noncompliance. EF 27% with clean LHC 2019. NST 2022 with reversible inferior defects, no LHC at that time. Ongoing medical management as follows:  -Ongoing workup: will consider ischemic assessment once medically stabilized  -Volume: cont lasix 80mg IV bid with strict I/O and limit fluids to 1.5L, daily standing weight, electrolytes replete to K 4-5 and Mg 2-3   -GDMT: cont entresto 24/26 BID, holding for SBP <90; reinitiate beta-blockade once euvolemic; cont spironolactone 25mg QD; cont hydralazine 25mg tid for afterload; consider SGLT2i on discharge; holding digoxin.  -close outpatient follow-up and med adherence counseling needed    ***    Campbell Kitchen MD, MPhil, [facility]  Cardiologist, [facility]  ; [employer] at [facility]  email: [email or web address] Card[facility] and Cardiovascular Surgery on-service contact/call information, go to [email or web address] and use [email or web address]" to login.  Outpatient Cardiology appointments, call  [phone] to arrange with a colleague; I do not have outpatient Cardiology clinic.

## 2022-09-07 DIAGNOSIS — E11.65 TYPE 2 DIABETES MELLITUS WITH HYPERGLYCEMIA: ICD-10-CM

## 2022-09-07 LAB
ANION GAP SERPL CALC-SCNC: 12 MMOL/L — SIGNIFICANT CHANGE UP (ref 5–17)
BUN SERPL-MCNC: 16 MG/DL — SIGNIFICANT CHANGE UP (ref 7–23)
CALCIUM SERPL-MCNC: 9.6 MG/DL — SIGNIFICANT CHANGE UP (ref 8.4–10.5)
CHLORIDE SERPL-SCNC: 94 MMOL/L — LOW (ref 96–108)
CO2 SERPL-SCNC: 29 MMOL/L — SIGNIFICANT CHANGE UP (ref 22–31)
CREAT SERPL-MCNC: 1.06 MG/DL — SIGNIFICANT CHANGE UP (ref 0.5–1.3)
EGFR: 89 ML/MIN/1.73M2 — SIGNIFICANT CHANGE UP
GLUCOSE SERPL-MCNC: 305 MG/DL — HIGH (ref 70–99)
HCT VFR BLD CALC: 54.2 % — HIGH (ref 39–50)
HGB BLD-MCNC: 17.6 G/DL — HIGH (ref 13–17)
MCHC RBC-ENTMCNC: 28.9 PG — SIGNIFICANT CHANGE UP (ref 27–34)
MCHC RBC-ENTMCNC: 32.5 GM/DL — SIGNIFICANT CHANGE UP (ref 32–36)
MCV RBC AUTO: 89.1 FL — SIGNIFICANT CHANGE UP (ref 80–100)
NRBC # BLD: 0 /100 WBCS — SIGNIFICANT CHANGE UP (ref 0–0)
PLATELET # BLD AUTO: 256 K/UL — SIGNIFICANT CHANGE UP (ref 150–400)
POTASSIUM SERPL-MCNC: 4 MMOL/L — SIGNIFICANT CHANGE UP (ref 3.5–5.3)
POTASSIUM SERPL-SCNC: 4 MMOL/L — SIGNIFICANT CHANGE UP (ref 3.5–5.3)
RBC # BLD: 6.08 M/UL — HIGH (ref 4.2–5.8)
RBC # FLD: 13.7 % — SIGNIFICANT CHANGE UP (ref 10.3–14.5)
SODIUM SERPL-SCNC: 135 MMOL/L — SIGNIFICANT CHANGE UP (ref 135–145)
WBC # BLD: 10.54 K/UL — HIGH (ref 3.8–10.5)
WBC # FLD AUTO: 10.54 K/UL — HIGH (ref 3.8–10.5)

## 2022-09-07 PROCEDURE — 99232 SBSQ HOSP IP/OBS MODERATE 35: CPT

## 2022-09-07 PROCEDURE — 99233 SBSQ HOSP IP/OBS HIGH 50: CPT

## 2022-09-07 RX ORDER — CARVEDILOL PHOSPHATE 80 MG/1
6.25 CAPSULE, EXTENDED RELEASE ORAL EVERY 12 HOURS
Refills: 0 | Status: DISCONTINUED | OUTPATIENT
Start: 2022-09-08 | End: 2022-09-08

## 2022-09-07 RX ORDER — ACETAMINOPHEN 500 MG
1000 TABLET ORAL ONCE
Refills: 0 | Status: COMPLETED | OUTPATIENT
Start: 2022-09-07 | End: 2022-09-07

## 2022-09-07 RX ORDER — CARVEDILOL PHOSPHATE 80 MG/1
3.12 CAPSULE, EXTENDED RELEASE ORAL EVERY 12 HOURS
Refills: 0 | Status: DISCONTINUED | OUTPATIENT
Start: 2022-09-07 | End: 2022-09-07

## 2022-09-07 RX ORDER — CARVEDILOL PHOSPHATE 80 MG/1
6.25 CAPSULE, EXTENDED RELEASE ORAL EVERY 12 HOURS
Refills: 0 | Status: DISCONTINUED | OUTPATIENT
Start: 2022-09-07 | End: 2022-09-07

## 2022-09-07 RX ADMIN — SACUBITRIL AND VALSARTAN 1 TABLET(S): 24; 26 TABLET, FILM COATED ORAL at 17:10

## 2022-09-07 RX ADMIN — POLYETHYLENE GLYCOL 3350 17 GRAM(S): 17 POWDER, FOR SOLUTION ORAL at 05:44

## 2022-09-07 RX ADMIN — Medication 25 MILLIGRAM(S): at 05:44

## 2022-09-07 RX ADMIN — SACUBITRIL AND VALSARTAN 1 TABLET(S): 24; 26 TABLET, FILM COATED ORAL at 05:44

## 2022-09-07 RX ADMIN — SENNA PLUS 1 TABLET(S): 8.6 TABLET ORAL at 21:37

## 2022-09-07 RX ADMIN — Medication 80 MILLIGRAM(S): at 05:45

## 2022-09-07 RX ADMIN — CARVEDILOL PHOSPHATE 3.12 MILLIGRAM(S): 80 CAPSULE, EXTENDED RELEASE ORAL at 17:10

## 2022-09-07 RX ADMIN — Medication 80 MILLIGRAM(S): at 14:00

## 2022-09-07 RX ADMIN — SPIRONOLACTONE 25 MILLIGRAM(S): 25 TABLET, FILM COATED ORAL at 05:44

## 2022-09-07 RX ADMIN — Medication 1000 MILLIGRAM(S): at 23:30

## 2022-09-07 RX ADMIN — Medication 25 MILLIGRAM(S): at 21:37

## 2022-09-07 RX ADMIN — Medication 400 MILLIGRAM(S): at 23:02

## 2022-09-07 RX ADMIN — Medication 25 MILLIGRAM(S): at 12:55

## 2022-09-07 NOTE — PROVIDER CONTACT NOTE (MEDICATION) - SITUATION
Pt refusing fingerstick glucose check and lantus.
Patient bld. sugar  mg/dl. Refused Admelog insulin coverage prior to breakfast.
Patient refused PM lantus.

## 2022-09-07 NOTE — PROGRESS NOTE ADULT - SUBJECTIVE AND OBJECTIVE BOX
INTERVAL EVENTS/SUBJ:  out of bed  ongoing diuresis  difficulty sleeping    MEDICATIONS  (STANDING):  dextrose 5%. 1000 milliLiter(s) (50 mL/Hr) IV Continuous <Continuous>  dextrose 5%. 1000 milliLiter(s) (100 mL/Hr) IV Continuous <Continuous>  dextrose 50% Injectable 25 Gram(s) IV Push once  dextrose 50% Injectable 12.5 Gram(s) IV Push once  dextrose 50% Injectable 25 Gram(s) IV Push once  enoxaparin Injectable 40 milliGRAM(s) SubCutaneous every 24 hours  furosemide   Injectable 80 milliGRAM(s) IV Push two times a day  glucagon  Injectable 1 milliGRAM(s) IntraMuscular once  hydrALAZINE 25 milliGRAM(s) Oral every 8 hours  insulin glargine Injectable (LANTUS) 30 Unit(s) SubCutaneous <User Schedule>  insulin lispro (ADMELOG) corrective regimen sliding scale   SubCutaneous three times a day before meals  insulin lispro (ADMELOG) corrective regimen sliding scale   SubCutaneous at bedtime  insulin lispro Injectable (ADMELOG) 9 Unit(s) SubCutaneous three times a day before meals  polyethylene glycol 3350 17 Gram(s) Oral two times a day  sacubitril 24 mG/valsartan 26 mG 1 Tablet(s) Oral every 12 hours  senna 1 Tablet(s) Oral at bedtime  spironolactone 25 milliGRAM(s) Oral daily    MEDICATIONS  (PRN):  dextrose Oral Gel 15 Gram(s) Oral once PRN Blood Glucose LESS THAN 70 milliGRAM(s)/deciliter  lidocaine 2% Viscous 10 milliLiter(s) Swish and Spit every 4 hours PRN tooth pain  ondansetron Injectable 4 milliGRAM(s) IV Push every 8 hours PRN Nausea and/or Vomiting      Vital Signs Last 24 Hrs  T(C): 36.3 (07 Sep 2022 05:13), Max: 36.8 (06 Sep 2022 12:05)  T(F): 97.4 (07 Sep 2022 05:13), Max: 98.2 (06 Sep 2022 12:05)  HR: 100 (07 Sep 2022 05:13) (100 - 107)  BP: 124/86 (07 Sep 2022 05:13) (124/86 - 154/94)  BP(mean): 109 (06 Sep 2022 20:10) (109 - 109)  RR: 18 (07 Sep 2022 05:13) (18 - 18)  SpO2: 94% (07 Sep 2022 05:13) (94% - 98%)    Parameters below as of 07 Sep 2022 05:13  Patient On (Oxygen Delivery Method): room air        REVIEW OF SYSTEMS:  As per HPI, otherwise unremarkable.     PHYSICAL EXAM:  Constitutional/Appearance: Normal, Well-developed  HEENT:   Normal oral mucosa, no drainage or redness, supple neck  Lymphatic: No lymphadenopathy  Cardiovascular: Normal S1 S2, No edema, II/VI JASWANT  Respiratory: Lungs clear to auscultation, respirations non-labored  Psychiatry: A & O x 3, appropriate affect.   Gastrointestinal:  Soft, Non-tender, no distention  Skin: No rashes, No ecchymoses, No cyanosis	  Neurologic: Non-focal, Alert and oriented x 3  Extremities: Normal range of motion  Vascular: Peripheral pulses palpable 2+ bilaterally (radial), 1+ edema    LABS:  CBC Full  -  ( 06 Sep 2022 06:51 )  WBC Count : 11.56 K/uL  RBC Count : 5.48 M/uL  Hemoglobin : 16.2 g/dL  Hematocrit : 49.1 %  Platelet Count - Automated : 231 K/uL  Mean Cell Volume : 89.6 fl  Mean Cell Hemoglobin : 29.6 pg  Mean Cell Hemoglobin Concentration : 33.0 gm/dL      09-06    134<L>  |  96  |  19  ----------------------------<  378<H>  4.4   |  26  |  1.09    Ca    9.3      06 Sep 2022 06:51  Phos  4.5     09-06  Mg     1.9     09-06    IMPRESSION AND PLAN: 42M w NICM here with ADHF 2/2 medication noncompliance. EF 27% with clean LHC 2019. NST 2022 with reversible inferior defects, no LHC at that time. Ongoing medical management as follows:  -Ongoing workup: will consider ischemic assessment once medically stabilized; may need ICD eval once on GDMT; rpt home sleep testing at home for TAISHA eval.  -Volume: cont lasix 80mg IV bid with strict I/O and limit fluids to 1.5L, daily standing weight, electrolytes replete to K 4-5 and Mg 2-3   -GDMT: cont entresto 24/26 BID, holding for SBP <90; reinitiate low dose beta-blockade; cont spironolactone 25mg QD; cont hydralazine 25mg tid for afterload; consider SGLT2i on discharge; holding digoxin.  -close outpatient follow-up and med adherence counseling needed      ***    Campbell Kitchen MD, MPhil, [facility]  Cardiologist, [facility]  ; [employer] at [facility]  email: [email or web address] and Cardiovascular Surgery on-service contact/call information, go to [email or web address] and use "[email or web address]" to login.  Outpatient Cardiology appointments, call  [phone] to arrange with a colleague; I do not have outpatient Cardiology clinic.

## 2022-09-07 NOTE — PROGRESS NOTE ADULT - PROBLEM SELECTOR PLAN 1
-test BG AC/HS  -current orders is Lantus 30 units QHS and Admelog 9 units AC meals  Pt refusing all testing and insulin at this time.   Reviewed rationale and risks w/pt  Discharge plan: Avoid metformin use in setting of decompensated heart failure  Consider jardiance and dpp4 at bedtime ( not currently amenable to injectable medications) -test BG AC/HS  -current orders is Lantus 30 units QHS and Admelog 9 units AC meals  Pt refusing all testing and insulin at this time.   Reviewed rationale and risks w/pt  Discharge plan: Avoid metformin use in setting of decompensated heart failure  Can send Jardiance 10mg PO daily  Can send Januvia 100mg PO daily  Pt would benefit from GLP-1agonist and insulin however refusing injectables at this time.  Follow up outpt w/PCP-Pt declined endo appointment

## 2022-09-07 NOTE — PROGRESS NOTE ADULT - SUBJECTIVE AND OBJECTIVE BOX
SUBJECTIVE/ OVERNIGHT EVENTS:  --- Coverage for Dr. Greene ---   Pt sitting up out of bed.  feels okay. guarded and defensve with questions, sarcastic.   "why don't you look in the chart!"  "I ran out of medications!"  Tele: stable.  no chest pain, no shortness of breath.   comfortable. no n/v/d. no abd pain.  no HA/dizziness.   HR ~ 100.   low dose betablocker Coreg restarted      --------------------------------------------------------------------------------------------  LABS:                        17.6   10.54 )-----------( 256      ( 07 Sep 2022 07:25 )             54.2     09-07    135  |  94<L>  |  16  ----------------------------<  305<H>  4.0   |  29  |  1.06    Ca    9.6      07 Sep 2022 07:21  Phos  4.5     09-06  Mg     1.9     09-06        CAPILLARY BLOOD GLUCOSE                RADIOLOGY & ADDITIONAL TESTS:    Imaging Personally Reviewed:  [x] YES  [ ] NO    Consultant(s) Notes Reviewed:  [x] YES  [ ] NO    MEDICATIONS  (STANDING):  carvedilol 6.25 milliGRAM(s) Oral every 12 hours  dextrose 5%. 1000 milliLiter(s) (50 mL/Hr) IV Continuous <Continuous>  dextrose 5%. 1000 milliLiter(s) (100 mL/Hr) IV Continuous <Continuous>  dextrose 50% Injectable 25 Gram(s) IV Push once  dextrose 50% Injectable 12.5 Gram(s) IV Push once  dextrose 50% Injectable 25 Gram(s) IV Push once  enoxaparin Injectable 40 milliGRAM(s) SubCutaneous every 24 hours  furosemide   Injectable 80 milliGRAM(s) IV Push two times a day  glucagon  Injectable 1 milliGRAM(s) IntraMuscular once  hydrALAZINE 25 milliGRAM(s) Oral every 8 hours  insulin glargine Injectable (LANTUS) 30 Unit(s) SubCutaneous <User Schedule>  insulin lispro (ADMELOG) corrective regimen sliding scale   SubCutaneous three times a day before meals  insulin lispro (ADMELOG) corrective regimen sliding scale   SubCutaneous at bedtime  insulin lispro Injectable (ADMELOG) 9 Unit(s) SubCutaneous three times a day before meals  polyethylene glycol 3350 17 Gram(s) Oral two times a day  sacubitril 24 mG/valsartan 26 mG 1 Tablet(s) Oral every 12 hours  senna 1 Tablet(s) Oral at bedtime  spironolactone 25 milliGRAM(s) Oral daily    MEDICATIONS  (PRN):  dextrose Oral Gel 15 Gram(s) Oral once PRN Blood Glucose LESS THAN 70 milliGRAM(s)/deciliter  lidocaine 2% Viscous 10 milliLiter(s) Swish and Spit every 4 hours PRN tooth pain  ondansetron Injectable 4 milliGRAM(s) IV Push every 8 hours PRN Nausea and/or Vomiting      Care Discussed with Consultants/Other Providers [x] YES  [ ] NO    Vital Signs Last 24 Hrs  T(C): 36.3 (07 Sep 2022 05:13), Max: 36.8 (06 Sep 2022 12:05)  T(F): 97.4 (07 Sep 2022 05:13), Max: 98.2 (06 Sep 2022 12:05)  HR: 100 (07 Sep 2022 05:13) (100 - 107)  BP: 124/86 (07 Sep 2022 05:13) (124/86 - 139/96)  BP(mean): 109 (06 Sep 2022 20:10) (109 - 109)  RR: 18 (07 Sep 2022 05:13) (18 - 18)  SpO2: 94% (07 Sep 2022 05:13) (94% - 98%)    Parameters below as of 07 Sep 2022 05:13  Patient On (Oxygen Delivery Method): room air      I&O's Summary    06 Sep 2022 07:01  -  07 Sep 2022 07:00  --------------------------------------------------------  IN: 1440 mL / OUT: 1550 mL / NET: -110 mL    07 Sep 2022 07:01  -  07 Sep 2022 10:58  --------------------------------------------------------  IN: 240 mL / OUT: 0 mL / NET: 240 mL      PHYSICAL EXAM:  GENERAL: NAD, well-developed, comfortable on room air  HEAD:  Atraumatic, Normocephalic  EYES: EOMI, PERRLA, conjunctiva and sclera clear  NECK: Supple, No JVD  CHEST/LUNG: Clear to auscultation bilaterally; No wheeze  HEART: Regular rate and rhythm; No murmurs, rubs, or gallops  ABDOMEN: Soft, Nontender, Nondistended; Bowel sounds present  Neuro: AAOx3, no focal weakness, 5/5 b/l extremity strength  EXTREMITIES:  2+ Peripheral Pulses, No clubbing, cyanosis, +1 b/l LE edema  SKIN: No rashes or lesions

## 2022-09-07 NOTE — PROGRESS NOTE ADULT - ASSESSMENT
A/P:  43 yo man with PMH CHF with EF 22%, HTN, DM, not on any medications since May 2/2 incarceration, not following any doctors, presenting with worsening chest pain and SOB for the last 1.5 weeks. Patient unable to lie flat 2/2 symptom exacerbation, states this feels similar to prior CHF exacerbation. He stopped his medication in May'22 , coz of insurance problem , and didn't follow with any physician.      Acute on chronic systolic heart failure   - 2/2 noncompliance with meds   last EF: 22%  seen by cardiology:   started on hydralazine 25 mg tid   IV Lasix. uptitrate Entresto.  d/c digoxin   restart Coreg low dose 6.25 mg BID  card f/u appreciated     Nonischemic cardiomyopathy:  - c/w above meds   counseling done regarding meds   c/w diuresis   will need outpt cardiology follow up    DM-2 :   pt. refusing for insulin and want meds   seen by endo   now agree for Lantus and FSBS while in hospital   on discharge, will change to oral meds as per endo    to check his medication can be covered by his insurance (as per endo metformin would not be given)    HTN / HLD   -c/w present meds     dispo: as per cardio c/w diuresis, uptitrate cardiac meds, once euvolemic, will do ischemic evaluation.   A/P:  43 yo man with PMH CHF with EF 22%, HTN, DM, not on any medications since May 2/2 incarceration, not following any doctors, presenting with worsening chest pain and SOB for the last 1.5 weeks. Patient unable to lie flat 2/2 symptom exacerbation, states this feels similar to prior CHF exacerbation. He stopped his medication in May'22 , coz of insurance problem , and didn't follow with any physician.      Acute on chronic systolic heart failure   - 2/2 noncompliance with meds   last EF: 22%  seen by cardiology:   started on hydralazine 25 mg tid   IV Lasix. uptitrate Entresto.  d/c digoxin   restart Coreg low dose 6.25 mg BID  card f/u appreciated     Nonischemic cardiomyopathy:  - c/w above meds   counseling done regarding meds   c/w diuresis   will need outpt cardiology follow up    DM-2 :   pt. refusing for insulin and want meds   seen by endo   now agree for Lantus and FSBS while in hospital   on discharge, will change to oral meds as per endo    to check his medication can be covered by his insurance (as per endo metformin would not be given)    HTN / HLD   -c/w present meds     dispo: as per cardio c/w diuresis, uptitrate cardiac meds, once euvolemic, will do ischemic evaluation.  refusing insulin. risk of hyperglycemia explained.

## 2022-09-07 NOTE — PROGRESS NOTE ADULT - SUBJECTIVE AND OBJECTIVE BOX
Diabetes Follow up note:    Chief complaint: T2DM    Interval Hx: Pt continues to refuse all FS testing and insulin administration. Pt seen at bedside. Pt said he will not take insulin and does not want fingersticks cause "it's painful". Said his PCP manages his DM and he checks his glucose at home.     Review of Systems:  General: no complaints.   GI: Tolerating POs. Denies N/V/D/Abd pain  CV: Denies CP/SOB  ENDO: No S&Sx of hypoglycemia    MEDS:  insulin glargine Injectable (LANTUS) 30 Unit(s) SubCutaneous <User Schedule>  insulin lispro (ADMELOG) corrective regimen sliding scale   SubCutaneous three times a day before meals  insulin lispro (ADMELOG) corrective regimen sliding scale   SubCutaneous at bedtime  insulin lispro Injectable (ADMELOG) 9 Unit(s) SubCutaneous three times a day before meals      Allergies    No Known Allergies        PE:  General: Male sitting in chair. NAD.   Vital Signs Last 24 Hrs  T(C): 36.7 (07 Sep 2022 11:52), Max: 36.8 (06 Sep 2022 20:10)  T(F): 98.1 (07 Sep 2022 11:52), Max: 98.2 (06 Sep 2022 20:10)  HR: 105 (07 Sep 2022 11:52) (100 - 105)  BP: 174/125 (07 Sep 2022 11:53) (124/86 - 174/125)  BP(mean): 109 (06 Sep 2022 20:10) (109 - 109)  RR: 18 (07 Sep 2022 11:52) (18 - 18)  SpO2: 96% (07 Sep 2022 11:52) (94% - 98%)    Parameters below as of 07 Sep 2022 11:52  Patient On (Oxygen Delivery Method): room air    Abd: Soft, NT,ND, Obese.   Extremities: Warm  Neuro: A&O X3    LABS:  POCT Blood Glucose.: 304 mg/dL (09-06-22 @ 07:15)  POCT Blood Glucose.: 309 mg/dL (09-05-22 @ 21:32)  POCT Blood Glucose.: 253 mg/dL (09-05-22 @ 16:22)  POCT Blood Glucose.: 250 mg/dL (09-05-22 @ 11:29)  POCT Blood Glucose.: 281 mg/dL (09-05-22 @ 07:37)  POCT Blood Glucose.: 233 mg/dL (09-04-22 @ 20:49)  POCT Blood Glucose.: 206 mg/dL (09-04-22 @ 16:20)                            17.6   10.54 )-----------( 256      ( 07 Sep 2022 07:25 )             54.2       09-07    135  |  94<L>  |  16  ----------------------------<  305<H>  4.0   |  29  |  1.06    eGFR: 89    Ca    9.6      09-07  Mg     1.9     09-06  Phos  4.5     09-06    TPro  6.6  /  Alb  3.5  /  TBili  0.4  /  DBili  x   /  AST  15  /  ALT  49<H>  /  AlkPhos  88  09-05      A1C with Estimated Average Glucose Result: 9.6 % (09-03-22 @ 11:13)  A1C with Estimated Average Glucose Result: 11.1 % (02-02-22 @ 07:43)  A1C with Estimated Average Glucose Result: 11.3 % (02-01-22 @ 16:13)  A1C with Estimated Average Glucose Result: 11.2 % (02-01-22 @ 13:51)          Contact number: magali [phone] or [phone]

## 2022-09-07 NOTE — PROVIDER CONTACT NOTE (MEDICATION) - ACTION/TREATMENT ORDERED:
ACP notified. Continue to monitor.
Pt. was seen by Med- Cindy Reyes NP. Explained to pt. need for Insulin treatment and coverage for increased Bld. sugar. Explained to pt. signs and Symptoms of Elevated Blood sugar and risk for complication (Diabetic Coma) if not treated. Pt. verbalized understanding. Pt. still refused. Continue monitor Blood sugar and V/S.
Pt reeducated on several occasions of risks and benefits of use of insulin and for tight blood glucose control. Pt still refuses medicine and fingersticks without reasoning. Pt A&Ox4

## 2022-09-07 NOTE — PROGRESS NOTE ADULT - PROBLEM SELECTOR PLAN 3
check lipids      Will sign off at this time. Please contact endocrine team if pt becomes amenable to DM treatment while inpatient.   Discussed w/pt and medicine ACP  Can be reached via TEAMS/pager: [phone]   office:  [phone] (M-F 9a-5pm)               [phone] (nights/weekends)   [email or web address] password [email or web address]

## 2022-09-07 NOTE — PROVIDER CONTACT NOTE (MEDICATION) - ASSESSMENT
Pt A& O x4. Pt VSS. Pt educated on use of blood glucose checks and lantus/insulin use.
Alert and oriented X4 Patient V/S stable. Complaining of nausea. Denies chest pain no shortness   of breath.
Patient is A+OX4, VSS, RA.

## 2022-09-07 NOTE — PROGRESS NOTE ADULT - ASSESSMENT
42 yr old M with Hx of CHF with EF 22%, HTN, DM2 uncontrolled A1C 9.6%, not on any medications since May 2/2 incarceration here with chest pain and SOB, being treated for CHF exacerbation. Pt refusing insulin and FS testing while inpatient. Pt understands the risks of uncontrolled hyperglycemia and rationale for insulin while inpatient but continues to refuse (reviewed w/pt). BG goal (100-180mg/dl).

## 2022-09-08 ENCOUNTER — TRANSCRIPTION ENCOUNTER (OUTPATIENT)
Age: 42
End: 2022-09-08

## 2022-09-08 VITALS
SYSTOLIC BLOOD PRESSURE: 126 MMHG | HEART RATE: 96 BPM | RESPIRATION RATE: 18 BRPM | TEMPERATURE: 98 F | OXYGEN SATURATION: 98 % | DIASTOLIC BLOOD PRESSURE: 89 MMHG

## 2022-09-08 LAB
ANION GAP SERPL CALC-SCNC: 13 MMOL/L — SIGNIFICANT CHANGE UP (ref 5–17)
BUN SERPL-MCNC: 18 MG/DL — SIGNIFICANT CHANGE UP (ref 7–23)
CALCIUM SERPL-MCNC: 9.2 MG/DL — SIGNIFICANT CHANGE UP (ref 8.4–10.5)
CHLORIDE SERPL-SCNC: 98 MMOL/L — SIGNIFICANT CHANGE UP (ref 96–108)
CO2 SERPL-SCNC: 24 MMOL/L — SIGNIFICANT CHANGE UP (ref 22–31)
CREAT SERPL-MCNC: 1.05 MG/DL — SIGNIFICANT CHANGE UP (ref 0.5–1.3)
EGFR: 90 ML/MIN/1.73M2 — SIGNIFICANT CHANGE UP
GLUCOSE SERPL-MCNC: 320 MG/DL — HIGH (ref 70–99)
HCT VFR BLD CALC: 51.2 % — HIGH (ref 39–50)
HGB BLD-MCNC: 16.8 G/DL — SIGNIFICANT CHANGE UP (ref 13–17)
MCHC RBC-ENTMCNC: 29.5 PG — SIGNIFICANT CHANGE UP (ref 27–34)
MCHC RBC-ENTMCNC: 32.8 GM/DL — SIGNIFICANT CHANGE UP (ref 32–36)
MCV RBC AUTO: 89.8 FL — SIGNIFICANT CHANGE UP (ref 80–100)
NRBC # BLD: 0 /100 WBCS — SIGNIFICANT CHANGE UP (ref 0–0)
PLATELET # BLD AUTO: 236 K/UL — SIGNIFICANT CHANGE UP (ref 150–400)
POTASSIUM SERPL-MCNC: 4.7 MMOL/L — SIGNIFICANT CHANGE UP (ref 3.5–5.3)
POTASSIUM SERPL-SCNC: 4.7 MMOL/L — SIGNIFICANT CHANGE UP (ref 3.5–5.3)
RBC # BLD: 5.7 M/UL — SIGNIFICANT CHANGE UP (ref 4.2–5.8)
RBC # FLD: 13.6 % — SIGNIFICANT CHANGE UP (ref 10.3–14.5)
SODIUM SERPL-SCNC: 135 MMOL/L — SIGNIFICANT CHANGE UP (ref 135–145)
WBC # BLD: 10.88 K/UL — HIGH (ref 3.8–10.5)
WBC # FLD AUTO: 10.88 K/UL — HIGH (ref 3.8–10.5)

## 2022-09-08 PROCEDURE — 85379 FIBRIN DEGRADATION QUANT: CPT

## 2022-09-08 PROCEDURE — 82947 ASSAY GLUCOSE BLOOD QUANT: CPT

## 2022-09-08 PROCEDURE — 84100 ASSAY OF PHOSPHORUS: CPT

## 2022-09-08 PROCEDURE — 82803 BLOOD GASES ANY COMBINATION: CPT

## 2022-09-08 PROCEDURE — 84132 ASSAY OF SERUM POTASSIUM: CPT

## 2022-09-08 PROCEDURE — 84484 ASSAY OF TROPONIN QUANT: CPT

## 2022-09-08 PROCEDURE — 0225U NFCT DS DNA&RNA 21 SARSCOV2: CPT

## 2022-09-08 PROCEDURE — 96374 THER/PROPH/DIAG INJ IV PUSH: CPT

## 2022-09-08 PROCEDURE — 85025 COMPLETE CBC W/AUTO DIFF WBC: CPT

## 2022-09-08 PROCEDURE — 82962 GLUCOSE BLOOD TEST: CPT

## 2022-09-08 PROCEDURE — 82330 ASSAY OF CALCIUM: CPT

## 2022-09-08 PROCEDURE — 83036 HEMOGLOBIN GLYCOSYLATED A1C: CPT

## 2022-09-08 PROCEDURE — 85018 HEMOGLOBIN: CPT

## 2022-09-08 PROCEDURE — 84295 ASSAY OF SERUM SODIUM: CPT

## 2022-09-08 PROCEDURE — 99285 EMERGENCY DEPT VISIT HI MDM: CPT

## 2022-09-08 PROCEDURE — 93306 TTE W/DOPPLER COMPLETE: CPT

## 2022-09-08 PROCEDURE — 71046 X-RAY EXAM CHEST 2 VIEWS: CPT

## 2022-09-08 PROCEDURE — 83880 ASSAY OF NATRIURETIC PEPTIDE: CPT

## 2022-09-08 PROCEDURE — 36415 COLL VENOUS BLD VENIPUNCTURE: CPT

## 2022-09-08 PROCEDURE — 81003 URINALYSIS AUTO W/O SCOPE: CPT

## 2022-09-08 PROCEDURE — 83605 ASSAY OF LACTIC ACID: CPT

## 2022-09-08 PROCEDURE — 82435 ASSAY OF BLOOD CHLORIDE: CPT

## 2022-09-08 PROCEDURE — 99233 SBSQ HOSP IP/OBS HIGH 50: CPT

## 2022-09-08 PROCEDURE — 83735 ASSAY OF MAGNESIUM: CPT

## 2022-09-08 PROCEDURE — 85014 HEMATOCRIT: CPT

## 2022-09-08 PROCEDURE — 80053 COMPREHEN METABOLIC PANEL: CPT

## 2022-09-08 PROCEDURE — 80048 BASIC METABOLIC PNL TOTAL CA: CPT

## 2022-09-08 PROCEDURE — 85027 COMPLETE CBC AUTOMATED: CPT

## 2022-09-08 RX ORDER — SPIRONOLACTONE 25 MG/1
1 TABLET, FILM COATED ORAL
Qty: 30 | Refills: 0
Start: 2022-09-08 | End: 2022-10-07

## 2022-09-08 RX ORDER — SENNA PLUS 8.6 MG/1
1 TABLET ORAL
Qty: 5 | Refills: 0
Start: 2022-09-08 | End: 2022-09-12

## 2022-09-08 RX ORDER — EMPAGLIFLOZIN 10 MG/1
1 TABLET, FILM COATED ORAL
Qty: 0 | Refills: 0 | DISCHARGE

## 2022-09-08 RX ORDER — SACUBITRIL AND VALSARTAN 24; 26 MG/1; MG/1
1 TABLET, FILM COATED ORAL
Qty: 60 | Refills: 0
Start: 2022-09-08 | End: 2022-10-07

## 2022-09-08 RX ORDER — EMPAGLIFLOZIN 10 MG/1
1 TABLET, FILM COATED ORAL
Qty: 30 | Refills: 0
Start: 2022-09-08 | End: 2022-10-07

## 2022-09-08 RX ORDER — CARVEDILOL PHOSPHATE 80 MG/1
1 CAPSULE, EXTENDED RELEASE ORAL
Qty: 60 | Refills: 0
Start: 2022-09-08 | End: 2022-10-07

## 2022-09-08 RX ORDER — FUROSEMIDE 40 MG
1 TABLET ORAL
Qty: 60 | Refills: 0
Start: 2022-09-08 | End: 2022-10-07

## 2022-09-08 RX ORDER — FUROSEMIDE 40 MG
80 TABLET ORAL EVERY 12 HOURS
Refills: 0 | Status: DISCONTINUED | OUTPATIENT
Start: 2022-09-08 | End: 2022-09-08

## 2022-09-08 RX ORDER — HYDRALAZINE HCL 50 MG
1 TABLET ORAL
Qty: 90 | Refills: 0
Start: 2022-09-08 | End: 2022-10-07

## 2022-09-08 RX ORDER — METFORMIN HYDROCHLORIDE 850 MG/1
1 TABLET ORAL
Qty: 0 | Refills: 0 | DISCHARGE

## 2022-09-08 RX ADMIN — CARVEDILOL PHOSPHATE 6.25 MILLIGRAM(S): 80 CAPSULE, EXTENDED RELEASE ORAL at 06:34

## 2022-09-08 RX ADMIN — Medication 80 MILLIGRAM(S): at 15:14

## 2022-09-08 RX ADMIN — SPIRONOLACTONE 25 MILLIGRAM(S): 25 TABLET, FILM COATED ORAL at 06:34

## 2022-09-08 RX ADMIN — SACUBITRIL AND VALSARTAN 1 TABLET(S): 24; 26 TABLET, FILM COATED ORAL at 06:34

## 2022-09-08 RX ADMIN — Medication 25 MILLIGRAM(S): at 06:34

## 2022-09-08 RX ADMIN — Medication 25 MILLIGRAM(S): at 15:14

## 2022-09-08 RX ADMIN — Medication 80 MILLIGRAM(S): at 06:34

## 2022-09-08 NOTE — DISCHARGE NOTE PROVIDER - NSDCPNSUBOBJ_GEN_ALL_CORE
Patient seen at bedside in [facility]. Patient is adamant about leaving today. Also refusing finger sticks and insulin injections.  Spoke to cardiologist Dr. Kitchen, patient can transition to PO lasix 80BID. F/U 1 week with [facility].   As per Endocrine, send on oral Jardiance 10 mg daily and Januvia 100mg PO daily.     Vital Signs Last 24 Hrs  T(C): 36.7 (08 Sep 2022 06:27), Max: 36.7 (07 Sep 2022 20:00)  T(F): 98 (08 Sep 2022 06:27), Max: 98.1 (07 Sep 2022 20:00)  HR: 96 (08 Sep 2022 06:27) (96 - 121)  BP: 126/89 (08 Sep 2022 06:27) (126/89 - 159/98)  BP(mean): 102 (08 Sep 2022 06:27) (102 - 102)  RR: 18 (08 Sep 2022 06:27) (18 - 18)  SpO2: 98% (08 Sep 2022 06:27) (96% - 98%)    Parameters below as of 08 Sep 2022 06:27  Patient On (Oxygen Delivery Method): room air                          16.8   10.88 )-----------( 236      ( 08 Sep 2022 06:17 )             51.2     09-08    135  |  98  |  18  ----------------------------<  320<H>  4.7   |  24  |  1.05    Ca    9.2      08 Sep 2022 06:18      T(C): 36.7 (09-08-22 @ 06:27), Max: 36.7 (09-07-22 @ 20:00)  HR: 96 (09-08-22 @ 06:27) (96 - 121)  BP: 126/89 (09-08-22 @ 06:27) (126/89 - 159/98)  RR: 18 (09-08-22 @ 06:27) (18 - 18)  SpO2: 98% (09-08-22 @ 06:27) (96% - 98%)    CONSTITUTIONAL: Well groomed, no apparent distress  EYES: PERRLA and symmetric, EOMI, No conjunctival or scleral injection, non-icteric  ENMT: Oral mucosa with moist membranes. Normal dentition; no pharyngeal injection or exudates             NECK: Supple, symmetric and without tracheal deviation   RESP: No respiratory distress, no use of accessory muscles; CTA b/l, no WRR  CV: RRR, +S1S2, no MRG; no JVD; no peripheral edema  GI: Soft, NT, ND, no rebound, no guarding; no palpable masses; no hepatosplenomegaly; no hernia palpated  LYMPH: No cervical LAD or tenderness; no axillary LAD or tenderness; no inguinal LAD or tenderness  MSK: Normal gait; No digital clubbing or cyanosis; examination of the (head/neck/spine/ribs/pelvis, RUE, LUE, RLE, LLE) without misalignment,            Normal ROM without pain, no spinal tenderness, normal muscle strength/tone  SKIN: No rashes or ulcers noted; no subcutaneous nodules or induration palpable  NEURO: CN II-XII intact; normal reflexes in upper and lower extremities, sensation intact in upper and lower extremities b/l to light touch   PSYCH: Appropriate insight/judgment; A+O x 3, mood and affect appropriate, recent/remote memory intact

## 2022-09-08 NOTE — PROGRESS NOTE ADULT - SUBJECTIVE AND OBJECTIVE BOX
INTERVAL EVENTS/SUBJ:  Started coreg  Tachy overnight now stable  Anxious to go home    Home Medications:  albuterol 90 mcg/inh inhalation aerosol: 2 puff(s) inhaled every 6 hours, As needed, Shortness of Breath and/or Wheezing  budesonide-formoterol 160 mcg-4.5 mcg/inh inhalation aerosol: 2 puff(s) inhaled 2 times a day  carvedilol 12.5 mg oral tablet: 1 tab(s) orally every 12 hours  digoxin 250 mcg (0.25 mg) oral tablet: 1 tab(s) orally once a day  hydrALAZINE 100 mg oral tablet: 1 tab(s) orally 3 times a day  Jardiance 25 mg oral tablet: 1 tab(s) orally once a day (in the morning)  losartan 100 mg oral tablet: 1 tab(s) orally once a day  metFORMIN 1000 mg oral tablet: 1 tab(s) orally 2 times a day  spironolactone 25 mg oral tablet: 1 tab(s) orally once a day  torsemide 20 mg oral tablet: 2 tab(s) orally 2 times a day    MEDICATIONS  (STANDING):  carvedilol 6.25 milliGRAM(s) Oral every 12 hours  dextrose 5%. 1000 milliLiter(s) (50 mL/Hr) IV Continuous <Continuous>  dextrose 5%. 1000 milliLiter(s) (100 mL/Hr) IV Continuous <Continuous>  dextrose 50% Injectable 25 Gram(s) IV Push once  dextrose 50% Injectable 12.5 Gram(s) IV Push once  dextrose 50% Injectable 25 Gram(s) IV Push once  enoxaparin Injectable 40 milliGRAM(s) SubCutaneous every 24 hours  furosemide   Injectable 80 milliGRAM(s) IV Push two times a day  glucagon  Injectable 1 milliGRAM(s) IntraMuscular once  hydrALAZINE 25 milliGRAM(s) Oral every 8 hours  insulin glargine Injectable (LANTUS) 30 Unit(s) SubCutaneous <User Schedule>  insulin lispro (ADMELOG) corrective regimen sliding scale   SubCutaneous three times a day before meals  insulin lispro (ADMELOG) corrective regimen sliding scale   SubCutaneous at bedtime  insulin lispro Injectable (ADMELOG) 9 Unit(s) SubCutaneous three times a day before meals  polyethylene glycol 3350 17 Gram(s) Oral two times a day  sacubitril 24 mG/valsartan 26 mG 1 Tablet(s) Oral every 12 hours  senna 1 Tablet(s) Oral at bedtime  spironolactone 25 milliGRAM(s) Oral daily    MEDICATIONS  (PRN):  dextrose Oral Gel 15 Gram(s) Oral once PRN Blood Glucose LESS THAN 70 milliGRAM(s)/deciliter  lidocaine 2% Viscous 10 milliLiter(s) Swish and Spit every 4 hours PRN tooth pain  ondansetron Injectable 4 milliGRAM(s) IV Push every 8 hours PRN Nausea and/or Vomiting      Vital Signs Last 24 Hrs  T(C): 36.7 (08 Sep 2022 06:27), Max: 36.7 (07 Sep 2022 20:00)  T(F): 98 (08 Sep 2022 06:27), Max: 98.1 (07 Sep 2022 20:00)  HR: 96 (08 Sep 2022 06:27) (96 - 121)  BP: 126/89 (08 Sep 2022 06:27) (126/89 - 159/98)  BP(mean): 102 (08 Sep 2022 06:27) (102 - 102)  RR: 18 (08 Sep 2022 06:27) (18 - 18)  SpO2: 98% (08 Sep 2022 06:27) (96% - 98%)    Parameters below as of 08 Sep 2022 06:27  Patient On (Oxygen Delivery Method): room air        REVIEW OF SYSTEMS:  As per HPI, otherwise unremarkable.     PHYSICAL EXAM:  Constitutional/Appearance: Normal, Well-developed  HEENT:   Normal oral mucosa, no drainage or redness, supple neck  Lymphatic: No lymphadenopathy  Cardiovascular: Normal S1 S2, No edema, II/VI JASWANT  Respiratory: Lungs clear to auscultation, respirations non-labored  Psychiatry: A & O x 3, appropriate affect.   Gastrointestinal:  Soft, Non-tender, no distention  Skin: No rashes, No ecchymoses, No cyanosis	  Neurologic: Non-focal, Alert and oriented x 3  Extremities: Normal range of motion  Vascular: Peripheral pulses palpable 2+ bilaterally (radial)    LABS:  CBC Full  -  ( 08 Sep 2022 06:17 )  WBC Count : 10.88 K/uL  RBC Count : 5.70 M/uL  Hemoglobin : 16.8 g/dL  Hematocrit : 51.2 %  Platelet Count - Automated : 236 K/uL  Mean Cell Volume : 89.8 fl  Mean Cell Hemoglobin : 29.5 pg  Mean Cell Hemoglobin Concentration : 32.8 gm/dL  Auto Neutrophil # : x  Auto Lymphocyte # : x  Auto Monocyte # : x  Auto Eosinophil # : x  Auto Basophil # : x  Auto Neutrophil % : x  Auto Lymphocyte % : x  Auto Monocyte % : x  Auto Eosinophil % : x  Auto Basophil % : x      09-08    135  |  98  |  18  ----------------------------<  320<H>  4.7   |  24  |  1.05    Ca    9.2      08 Sep 2022 06:18    IMPRESSION AND PLAN: 42M w NICM here with ADHF 2/2 medication noncompliance. EF 27% with clean LHC 2019. NST 2022 with reversible inferior defects, no LHC at that time. Ongoing medical management as follows:  -Ongoing workup: will consider ischemic assessment once medically stabilized as outpatient; may need ICD eval once on GDMT; rpt home sleep testing at home for TAISHA eval.  -Volume: transition back to oral lasix with strict I/O and limit fluids to 1.5L, daily standing weight, electrolytes replete to K 4-5 and Mg 2-3   -GDMT: cont entresto 24/26 BID, holding for SBP <90; cont coreg 6.25 bid; cont spironolactone 25mg QD; cont hydralazine 25mg tid for afterload; consider SGLT2i on discharge; holding digoxin.  -close outpatient follow-up and med adherence counseling needed    ***    Campbell Kitchen MD, MPhil, [facility]  Cardiologist, [facility]  ; [employer] at [facility]  email: [email or web address] on-service contact/call information, go to [email or web address] and use "[email or web address]" to login.  Outpatient Cardiology appointments, call  [phone] to arrange with a colleague; I do not have outpatient Cardiology clinic.

## 2022-09-08 NOTE — PROGRESS NOTE ADULT - SUBJECTIVE AND OBJECTIVE BOX
SUBJECTIVE/ OVERNIGHT EVENTS:  --- Coverage for Dr. Greene ---   eager to go home  still on IV diuretics  overnight event reviewed. brief tachy.  now HR stable on Coreg  no cp, no sob, no n/v/d. no abdominal pain.  no headache, no dizziness.       --------------------------------------------------------------------------------------------  LABS:                        16.8   10.88 )-----------( 236      ( 08 Sep 2022 06:17 )             51.2     09-08    135  |  98  |  18  ----------------------------<  320<H>  4.7   |  24  |  1.05    Ca    9.2      08 Sep 2022 06:18        CAPILLARY BLOOD GLUCOSE                RADIOLOGY & ADDITIONAL TESTS:    Imaging Personally Reviewed:  [x] YES  [ ] NO    Consultant(s) Notes Reviewed:  [x] YES  [ ] NO    MEDICATIONS  (STANDING):  carvedilol 6.25 milliGRAM(s) Oral every 12 hours  dextrose 5%. 1000 milliLiter(s) (50 mL/Hr) IV Continuous <Continuous>  dextrose 5%. 1000 milliLiter(s) (100 mL/Hr) IV Continuous <Continuous>  dextrose 50% Injectable 25 Gram(s) IV Push once  dextrose 50% Injectable 12.5 Gram(s) IV Push once  dextrose 50% Injectable 25 Gram(s) IV Push once  enoxaparin Injectable 40 milliGRAM(s) SubCutaneous every 24 hours  furosemide   Injectable 80 milliGRAM(s) IV Push two times a day  glucagon  Injectable 1 milliGRAM(s) IntraMuscular once  hydrALAZINE 25 milliGRAM(s) Oral every 8 hours  insulin glargine Injectable (LANTUS) 30 Unit(s) SubCutaneous <User Schedule>  insulin lispro (ADMELOG) corrective regimen sliding scale   SubCutaneous three times a day before meals  insulin lispro (ADMELOG) corrective regimen sliding scale   SubCutaneous at bedtime  insulin lispro Injectable (ADMELOG) 9 Unit(s) SubCutaneous three times a day before meals  polyethylene glycol 3350 17 Gram(s) Oral two times a day  sacubitril 24 mG/valsartan 26 mG 1 Tablet(s) Oral every 12 hours  senna 1 Tablet(s) Oral at bedtime  spironolactone 25 milliGRAM(s) Oral daily    MEDICATIONS  (PRN):  dextrose Oral Gel 15 Gram(s) Oral once PRN Blood Glucose LESS THAN 70 milliGRAM(s)/deciliter  lidocaine 2% Viscous 10 milliLiter(s) Swish and Spit every 4 hours PRN tooth pain  ondansetron Injectable 4 milliGRAM(s) IV Push every 8 hours PRN Nausea and/or Vomiting      Care Discussed with Consultants/Other Providers [x] YES  [ ] NO    Vital Signs Last 24 Hrs  T(C): 36.7 (08 Sep 2022 06:27), Max: 36.7 (07 Sep 2022 20:00)  T(F): 98 (08 Sep 2022 06:27), Max: 98.1 (07 Sep 2022 20:00)  HR: 96 (08 Sep 2022 06:27) (96 - 121)  BP: 126/89 (08 Sep 2022 06:27) (126/89 - 159/98)  BP(mean): 102 (08 Sep 2022 06:27) (102 - 102)  RR: 18 (08 Sep 2022 06:27) (18 - 18)  SpO2: 98% (08 Sep 2022 06:27) (96% - 98%)    Parameters below as of 08 Sep 2022 06:27  Patient On (Oxygen Delivery Method): room air      I&O's Summary    07 Sep 2022 07:01  -  08 Sep 2022 07:00  --------------------------------------------------------  IN: 730 mL / OUT: 650 mL / NET: 80 mL    08 Sep 2022 07:01  -  08 Sep 2022 12:16  --------------------------------------------------------  IN: 200 mL / OUT: 0 mL / NET: 200 mL        PHYSICAL EXAM:  GENERAL: NAD, well-developed, comfortable on room air  HEAD:  Atraumatic, Normocephalic  EYES: EOMI, PERRLA, conjunctiva and sclera clear  NECK: Supple, No JVD  CHEST/LUNG: Clear to auscultation bilaterally; No wheeze  HEART: Regular rate and rhythm; No murmurs, rubs, or gallops  ABDOMEN: Soft, Nontender, Nondistended; Bowel sounds present  Neuro: AAOx3, no focal weakness, 5/5 b/l extremity strength  EXTREMITIES:  2+ Peripheral Pulses, No clubbing, cyanosis, +1 b/l LE edema  SKIN: No rashes or lesions

## 2022-09-08 NOTE — DISCHARGE NOTE PROVIDER - NSDCMRMEDTOKEN_GEN_ALL_CORE_FT
albuterol 90 mcg/inh inhalation aerosol: 2 puff(s) inhaled every 6 hours, As needed, Shortness of Breath and/or Wheezing    NOTE: patient ran out of meds / not on home meds since ~May 2022. current list based on Feb 2022 Outpatient List as originally found on [facility].  budesonide-formoterol 160 mcg-4.5 mcg/inh inhalation aerosol: 2 puff(s) inhaled 2 times a day    NOTE: patient ran out of meds / not on home meds since ~May 2022. current list based on Feb 2022 Outpatient List as originally found on sun[facility].  carvedilol 6.25 mg oral tablet: 1 tab(s) orally every 12 hours  empagliflozin 10 mg oral tablet: 1 tab(s) orally once a day (in the morning)  furosemide 80 mg oral tablet: 1 tab(s) orally every 12 hours  hydrALAZINE 25 mg oral tablet: 1 tab(s) orally every 8 hours  sacubitril-valsartan 24 mg-26 mg oral tablet: 1 tab(s) orally every 12 hours  senna leaf extract oral tablet: 1 tab(s) orally once a day (at bedtime)  spironolactone 25 mg oral tablet: 1 tab(s) orally once a day

## 2022-09-08 NOTE — DISCHARGE NOTE NURSING/CASE MANAGEMENT/SOCIAL WORK - NSDCPEFALRISK_GEN_ALL_CORE
For information on Fall & Injury Prevention, visit: [email or web address] OR  [email or web address] OR  https://www.cdc.gov/steadi/patient.html

## 2022-09-08 NOTE — PROGRESS NOTE ADULT - PROVIDER SPECIALTY LIST ADULT
Cardiology
Internal Medicine
Internal Medicine
Cardiology
Endocrinology
Internal Medicine
Endocrinology

## 2022-09-08 NOTE — PROGRESS NOTE ADULT - REASON FOR ADMISSION
SOB / Chest pain

## 2022-09-08 NOTE — DISCHARGE NOTE NURSING/CASE MANAGEMENT/SOCIAL WORK - PATIENT PORTAL LINK FT
You can access the FollowMyHealth Patient Portal offered by [facility] by registering at the following website: [email or web address]. By joining [email or web address]’s FollowMyHealth portal, you will also be able to view your health information using other applications (apps) compatible with our system.

## 2022-09-08 NOTE — PROGRESS NOTE ADULT - ASSESSMENT
A/P:  41 yo man with PMH CHF with EF 22%, HTN, DM, not on any medications since May 2/2 incarceration, not following any doctors, presenting with worsening chest pain and SOB for the last 1.5 weeks. Patient unable to lie flat 2/2 symptom exacerbation, states this feels similar to prior CHF exacerbation. He stopped his medication in May'22 , coz of insurance problem , and didn't follow with any physician.      Acute on chronic systolic heart failure   - 2/2 noncompliance with meds   last EF: 22%  seen by cardiology: started on hydralazine 25 mg tid   on IV Lasix. uptitrate Entresto.  d/c digoxin   restart Coreg low dose 6.25 mg BID  card f/u appreciated     Nonischemic cardiomyopathy:  - c/w above meds   counseling done regarding meds   diuresis as above  will need a close outpt cardiology follow up    DM-2 :   pt. refusing insulin   seen by endo  Lantus and FSALIREZA while in hospital if he agrees  on discharge, will change to oral meds as per endo    to check his medication can be covered by his insurance (as per endo metformin would not be given)    HTN / HLD   -c/w present meds     Dispo: as per cardio c/w diuresis, uptitrate cardiac meds, once euvolemic, will do ischemic evaluation.  refusing insulin. risk of hyperglycemia explained. He is eager to go home despite still on IV diuretics. Explained to him that his EF is less than 35% and he may need ICD evaluation once euvolemic.  Risk of morbidity and mortality including sudden cardiac death explained. If pt insists, he will be leaving against medical advise.   Cardiology follow up.

## 2022-09-08 NOTE — DISCHARGE NOTE PROVIDER - NSDCFUADDAPPT_GEN_ALL_CORE_FT
APPTS ARE READY TO BE MADE: [X ] YES    Best Family or Patient Contact (if needed):    Additional Information about above appointments (if needed):    1: Cardiology clinic - 1 week   2:   3:     Other comments or requests:    Follow up at the cardiologist clinic at 9/13 @ 2:15pm. Call [phone] if you need to reschedule your appointment. Will need HIP referral from PCP prior to appointment.     Please follow up with PCP prior to your cardiology appt on 9/13.           APPTS ARE READY TO BE MADE: [X ] YES    Best Family or Patient Contact (if needed):    Additional Information about above appointments (if needed):    1: Cardiology clinic - 1 week   2:   3:     Other comments or requests:    Follow up at the cardiologist clinic at 9/13 @ 2:15pm. Call [phone] if you need to reschedule your appointment. Will need HIP referral from PCP prior to appointment.     Please follow up with PCP prior to your cardiology appt on 9/13.           APPTS ARE READY TO BE MADE: [X ] YES    Best Family or Patient Contact (if needed):    Additional Information about above appointments (if needed):    1: Cardiology clinic - 1 week   2:   3:     Other comments or requests:   Patient advised they currently have a specialist that they will follow up with. Patient did not have name handy.  Patient was outreached, but was unwilling to allow us to relay referral information as he seemed he was in a rush.

## 2022-09-08 NOTE — DISCHARGE NOTE NURSING/CASE MANAGEMENT/SOCIAL WORK - NSDCFUADDAPPT_GEN_ALL_CORE_FT
Follow up at the cardiologist clinic at 9/13 @ 2:15pm. Call [phone] if you need to reschedule your appointment. Will need HIP referral from PCP prior to appointment.     Please follow up with PCP prior to your cardiology appt on 9/13.           APPTS ARE READY TO BE MADE: [X ] YES    Best Family or Patient Contact (if needed):    Additional Information about above appointments (if needed):    1: Cardiology clinic - 1 week   2:   3:     Other comments or requests:

## 2022-09-08 NOTE — DISCHARGE NOTE PROVIDER - NSFOLLOWUPCLINICS_GEN_ALL_ED_FT
[facility]  Cardiology  [address] [place] [address]  Phone: [phone]  Fax:   Follow Up Time: 1 week     [facility]  Cardiology  [address] [place] [address]  Phone: [phone]  Fax:   Scheduled Appointment: 9/13/2022 2:15 AM

## 2022-09-08 NOTE — PROGRESS NOTE ADULT - NSPROGADDITIONALINFOA_GEN_ALL_CORE
d/w pt and NP.    --- Coverage for Dr. Greene ---   - Dr. ZHOU Barger ([facility])  - [phone]
d/w pt and ALPHONSE Mae.  Sean Doll will be covering for me starting 9/9/22. He can be reached at  if needed.     --- Coverage for Dr. Greene ---   - [facility]  - [phone]
26 minutes spent on the development of plan of care/coordination of care/glycemic control through review of labs, blood glucose values and vital signs.

## 2022-09-08 NOTE — DISCHARGE NOTE PROVIDER - HOSPITAL COURSE
43 yo man with PMH CHF with EF 22%, HTN, DM, not on any medications since May 2/2 incarceration, not following any doctors, presenting with worsening chest pain and SOB for the last 1.5 weeks. Patient unable to lie flat 2/2 symptom exacerbation, states this feels similar to prior CHF exacerbation. He stopped his medication in May'22 , coz of insurance problem , and didn't follow with any physician.      Acute on chronic systolic heart failure   - 2/2 noncompliance with meds   last EF: 22%  seen by cardiology: started on hydralazine 25 mg tid   on IV Lasix- will transition to lasix 80POBID as per cards  uptitrate Entresto 24/26BID   d/c digoxin   continue Coreg low dose 6.25 mg BID  card f/u appreciated     Nonischemic cardiomyopathy:  - c/w above meds   counseling done regarding meds   diuresis as above  will need a close outpt cardiology follow up    DM-2 :   pt. refusing insulin   seen by endo  Lantus and FSALIREZA while in hospital if he agrees  on discharge, will change to oral meds as per endo   will send on Jardiance 10mg daily and Januvia 100mg daily     HTN / HLD   -c/w present meds     Dispo: as per cardio c/w diuresis, continue with cardiac meds, once euvolemic, will do ischemic evaluation.  refusing insulin. risk of hyperglycemia explained. He is eager to go home despite still on IV diuretics. Explained to him that his EF is less than 35% and he may need ICD evaluation once euvolemic.  Risk of morbidity and mortality including sudden cardiac death explained. If pt insists, he will be leaving against medical advise.   Cardiology follow up.

## 2022-09-08 NOTE — PATIENT PROFILE ADULT - FALL HARM RISK - UNIVERSAL INTERVENTIONS
Bed in lowest position, wheels locked, appropriate side rails in place/Call bell, personal items and telephone in reach/Instruct patient to call for assistance before getting out of bed or chair/Non-slip footwear when patient is out of bed/[place] to call system/Physically safe environment - no spills, clutter or unnecessary equipment/Purposeful Proactive Rounding/Room/bathroom lighting operational, light cord in reach

## 2022-09-08 NOTE — DISCHARGE NOTE PROVIDER - NSDCCPCAREPLAN_GEN_ALL_CORE_FT
PRINCIPAL DISCHARGE DIAGNOSIS  Diagnosis: CHF exacerbation  Assessment and Plan of Treatment: Admitted to hospital for fluid overload/CHF exacerbation   weigh yourself daily  continue current prescriptions as ordered  increase activity as tolerated   no added salt; low fat; low cholesterol, low salt diet.  Due to your volume overload status- please restrict fluid to 1.5L a day   follow up with Cardiologist in 1 weeks. Call to schedule appointment.  please follow up with endocrinolgist in 1-2 weeks for glucose management. Call to schedule appointment.   Will need to check electrolytes at your cardiologist visit next week.   Ongoing workup: will consider ischemic assessment once medically stabilized as outpatient; may need ICD eval once on GDMT; rpt home sleep testing at home for TAISHA eval.  -Volume: transition back to oral lasix with strict I/O and limit fluids to 1.5L         PRINCIPAL DISCHARGE DIAGNOSIS  Diagnosis: CHF exacerbation  Assessment and Plan of Treatment: Admitted to hospital for fluid overload/CHF exacerbation   weigh yourself daily  continue current prescriptions as ordered  increase activity as tolerated   no added salt; low fat; low cholesterol, low salt diet.  Due to your volume overload status- please restrict fluid to 1.5L a day   follow up with Cardiologist on 9/13 @ 2:30pm   please follow up with endocrinolgist in 1-2 weeks for glucose management. Call to schedule appointment.   Will need to check electrolytes at your cardiologist visit next week.   Ongoing workup: will consider ischemic assessment once medically stabilized as outpatient; may need ICD eval once on GDMT; rpt home sleep testing at home for TAISHA eval.  -Volume: transition back to oral lasix with strict I/O and limit fluids to 1.5L

## 2022-09-08 NOTE — DISCHARGE NOTE PROVIDER - PROVIDER TOKENS
PROVIDER:[TOKEN:[[id number]],FOLLOWUP:[1 week]] PROVIDER:[TOKEN:[[id number]],FOLLOWUP:[1 week],SCHEDULEDAPPTTIME:[02:15 PM]]

## 2022-09-08 NOTE — DISCHARGE NOTE PROVIDER - CARE PROVIDER_API CALL
Lara Bah (MD)  EndocrinologyMetabDiabetes  [address]  [place] [address]  Phone: [phone]  Fax: [phone]  Follow Up Time: 1 week

## 2022-09-12 DIAGNOSIS — I51.9 HEART DISEASE, UNSPECIFIED: ICD-10-CM

## 2022-09-12 DIAGNOSIS — I42.8 OTHER CARDIOMYOPATHIES: ICD-10-CM

## 2022-09-12 DIAGNOSIS — E78.5 HYPERLIPIDEMIA, UNSPECIFIED: ICD-10-CM

## 2022-09-12 DIAGNOSIS — E11.9 TYPE 2 DIABETES MELLITUS W/OUT COMPLICATIONS: ICD-10-CM

## 2022-09-12 DIAGNOSIS — I10 ESSENTIAL (PRIMARY) HYPERTENSION: ICD-10-CM

## 2022-09-12 RX ORDER — SPIRONOLACTONE 25 MG/1
25 TABLET ORAL DAILY
Refills: 3 | Status: ACTIVE | COMMUNITY

## 2022-09-12 RX ORDER — FUROSEMIDE 80 MG/1
80 TABLET ORAL TWICE DAILY
Refills: 0 | Status: ACTIVE | COMMUNITY

## 2022-09-12 RX ORDER — HYDRALAZINE HYDROCHLORIDE 25 MG/1
25 TABLET ORAL 3 TIMES DAILY
Qty: 270 | Refills: 1 | Status: ACTIVE | COMMUNITY

## 2022-09-12 RX ORDER — SACUBITRIL AND VALSARTAN 24; 26 MG/1; MG/1
24-26 TABLET, FILM COATED ORAL
Qty: 60 | Refills: 0 | Status: ACTIVE | COMMUNITY

## 2022-09-12 RX ORDER — CARVEDILOL 6.25 MG/1
6.25 TABLET, FILM COATED ORAL TWICE DAILY
Refills: 1 | Status: ACTIVE | COMMUNITY

## 2022-09-12 RX ORDER — EMPAGLIFLOZIN 10 MG/1
10 TABLET, FILM COATED ORAL
Qty: 90 | Refills: 3 | Status: ACTIVE | COMMUNITY

## 2022-09-12 RX ORDER — CHLORHEXIDINE GLUCONATE 213 G/1000ML
4 SOLUTION TOPICAL
Qty: 1 | Refills: 5 | Status: DISCONTINUED | COMMUNITY
Start: 2020-10-02 | End: 2022-09-12

## 2022-09-13 ENCOUNTER — APPOINTMENT (OUTPATIENT)
Dept: CARDIOLOGY | Facility: CLINIC | Age: 42
End: 2022-09-13

## 2022-09-13 NOTE — CHART NOTE - NSCHARTNOTEFT_GEN_A_CORE
Informed by nurse patient with . Saw and evaluated patient at bedside. Patient A&[id number] chest pain, SOB, visual changes, N/V, headaches, lightheadness/dizziness/palpitations.     Vital Signs Last 24 Hrs  T(C): 36.7 (07 Sep 2022 11:52), Max: 36.8 (06 Sep 2022 20:10)  T(F): 98.1 (07 Sep 2022 11:52), Max: 98.2 (06 Sep 2022 20:10)  HR: 120 (07 Sep 2022 17:15) (100 - 120)  BP: 155/98 (07 Sep 2022 17:15) (124/86 - 174/125)  BP(mean): 109 (06 Sep 2022 20:10) (109 - 109)  RR: 18 (07 Sep 2022 11:52) (18 - 18)  SpO2: 96% (07 Sep 2022 11:52) (94% - 98%)    Parameters below as of 07 Sep 2022 11:52  Patient On (Oxygen Delivery Method): room air                          17.6   10.54 )-----------( 256      ( 07 Sep 2022 07:25 )             54.2   09-07    135  |  94<L>  |  16  ----------------------------<  305<H>  4.0   |  29  |  1.06    Ca    9.6      07 Sep 2022 07:21  Phos  4.5     09-06  Mg     1.9     09-06        Physical Exam:  General: WN/WD NAD  Neurology: A&Ox4, nonfocal   Head:  Normocephalic, atraumatic  Respiratory: CTA B/L  CV: RRR, S1S2, no murmur  Abdominal: Soft, NT, ND no palpable mass  MSK: No edema, + peripheral pulses, FROM all 4 extremity      Assessment  41 yo man with PMH CHF with EF 22%, HTN, DM, not on any medications since May 2/2 incarceration, not following any doctors, presenting with worsening chest pain and SOB for the last 1.5 weeks. Currently admitted for CHF exacerbation 2/2 noncompliance with meds; now on hydralazine 25 mg tid, IV Lasix. Entresto, and Coreg (started today) low dose 3.125mg BID        Plan:  EKG noted with sinus tachycardia - HR 119bpm - reviewed with cardiologist Dr. Kitchen and covering attending Dr. Barger  - as per cardiologist, increase coreg to 6.25 tomorrow  - C/w cardiac meds   - Informed nurse to inform ACP immediately if patient develops CP, SOB, visual changes, N/V/ palpitations.  - Will continue to monitor patient closely.   - C/w tele and VSq4hr.         Fior CUNHA  Spectra [id number]  Dept of Medicine.
Patient requested a callback on 09/13 10am.
FS reviewed. Recommend increase in Lantus to 26 Units daily and increase in Admelog to 7 units before meals and bedtime plus low correctional scale. Endocrine team will follow.       Georgina Fernandez DO
Patient requested call back on (9/13) at 2:15 PM.

## 2023-03-03 NOTE — PATIENT PROFILE ADULT - OVER THE PAST TWO WEEKS HAVE YOU FELT DOWN, DEPRESSED OR HOPELESS?
Diabetic Eye Exam Form    Date Requested: 23  Patient: Carlene Carpenter  Patient : 1943   Referring Provider: Elian Villarreal MD      DIABETIC Eye Exam Date _______________________________      Type of Exam MUST be documented for Diabetic Eye Exams  Please CHECK ONE  Retinal Exam       Dilated Retinal Exam       OCT       Optomap-Iris Exam      Fundus Photography       Left Eye - Please check Retinopathy or No Retinopathy        Exam did show retinopathy    Exam did not show retinopathy       Right Eye - Please check Retinopathy or No Retinopathy       Exam did show retinopathy    Exam did not show retinopathy       Comments __________________________________________________________    Practice Providing Exam ______________________________________________    Exam Performed By (print name) _______________________________________      Provider Signature ___________________________________________________      These reports are needed for  compliance  Please fax this completed form and a copy of the Diabetic Eye Exam report to our office located at [place] [address] as soon as possible via Fax [phone] [facility]: Phone [phone]  We thank you for your assistance in treating our mutual patient     [facility] [address] [phone] F [phone] no

## 2023-03-27 NOTE — ED PROVIDER NOTE - NS ED MD EM SELECTION
Report called Annita @ [facility]. Awaiting transport.   [id number] Critical Care - 30 to 74 minutes

## 2023-06-13 NOTE — PROVIDER CONTACT NOTE (MEDICATION) - BACKGROUND
Patient is a 43 y/o M admitted for chest pain/SOB.  Patient refused lantus dose. Pt was educated on importance of medication compliance and still refused.
9/3 Admitted C/O shortness of breath, Abdominal pain, chest pain. + trops, Hypertension. and elevated Blood sugar. PMH= DM type 2 AIC- 9.6, HTN, CHF
Pt admitted on 9/3 for abd pain, worsening chest pain, SOB, HTN. Negative trops. Non compliant with meds.
Other

## 2023-07-28 ENCOUNTER — INPATIENT (INPATIENT)
Facility: HOSPITAL | Age: 43
LOS: 4 days | Discharge: HOME | DRG: 291 | End: 2023-08-02
Attending: STUDENT IN AN ORGANIZED HEALTH CARE EDUCATION/TRAINING PROGRAM | Admitting: STUDENT IN AN ORGANIZED HEALTH CARE EDUCATION/TRAINING PROGRAM
Payer: MEDICAID

## 2023-07-28 VITALS
HEART RATE: 106 BPM | WEIGHT: 255.96 LBS | TEMPERATURE: 98 F | SYSTOLIC BLOOD PRESSURE: 158 MMHG | HEIGHT: 71 IN | OXYGEN SATURATION: 95 % | DIASTOLIC BLOOD PRESSURE: 112 MMHG | RESPIRATION RATE: 18 BRPM

## 2023-07-28 DIAGNOSIS — R07.9 CHEST PAIN, UNSPECIFIED: ICD-10-CM

## 2023-07-28 LAB
ALBUMIN SERPL ELPH-MCNC: 4 G/DL — SIGNIFICANT CHANGE UP (ref 3.3–5)
ALP SERPL-CCNC: 92 U/L — SIGNIFICANT CHANGE UP (ref 40–120)
ALT FLD-CCNC: 47 U/L — HIGH (ref 10–45)
ANION GAP SERPL CALC-SCNC: 15 MMOL/L — SIGNIFICANT CHANGE UP (ref 5–17)
APPEARANCE UR: CLEAR — SIGNIFICANT CHANGE UP
AST SERPL-CCNC: 34 U/L — SIGNIFICANT CHANGE UP (ref 10–40)
BASE EXCESS BLDV CALC-SCNC: 2.4 MMOL/L — SIGNIFICANT CHANGE UP (ref -2–3)
BASOPHILS # BLD AUTO: 0.02 K/UL — SIGNIFICANT CHANGE UP (ref 0–0.2)
BASOPHILS NFR BLD AUTO: 0.2 % — SIGNIFICANT CHANGE UP (ref 0–2)
BILIRUB SERPL-MCNC: 0.3 MG/DL — SIGNIFICANT CHANGE UP (ref 0.2–1.2)
BILIRUB UR-MCNC: NEGATIVE — SIGNIFICANT CHANGE UP
BUN SERPL-MCNC: 16 MG/DL — SIGNIFICANT CHANGE UP (ref 7–23)
CA-I SERPL-SCNC: 1.16 MMOL/L — SIGNIFICANT CHANGE UP (ref 1.15–1.33)
CALCIUM SERPL-MCNC: 9.4 MG/DL — SIGNIFICANT CHANGE UP (ref 8.4–10.5)
CHLORIDE BLDV-SCNC: 97 MMOL/L — SIGNIFICANT CHANGE UP (ref 96–108)
CHLORIDE SERPL-SCNC: 97 MMOL/L — SIGNIFICANT CHANGE UP (ref 96–108)
CO2 BLDV-SCNC: 30 MMOL/L — HIGH (ref 22–26)
CO2 SERPL-SCNC: 23 MMOL/L — SIGNIFICANT CHANGE UP (ref 22–31)
COLOR SPEC: COLORLESS — SIGNIFICANT CHANGE UP
CREAT SERPL-MCNC: 1.1 MG/DL — SIGNIFICANT CHANGE UP (ref 0.5–1.3)
DIFF PNL FLD: NEGATIVE — SIGNIFICANT CHANGE UP
EGFR: 85 ML/MIN/1.73M2 — SIGNIFICANT CHANGE UP
EOSINOPHIL # BLD AUTO: 0.14 K/UL — SIGNIFICANT CHANGE UP (ref 0–0.5)
EOSINOPHIL NFR BLD AUTO: 1.4 % — SIGNIFICANT CHANGE UP (ref 0–6)
GAS PNL BLDV: 131 MMOL/L — LOW (ref 136–145)
GAS PNL BLDV: SIGNIFICANT CHANGE UP
GLUCOSE BLDV-MCNC: 445 MG/DL — HIGH (ref 70–99)
GLUCOSE SERPL-MCNC: 425 MG/DL — HIGH (ref 70–99)
GLUCOSE UR QL: ABNORMAL
HCO3 BLDV-SCNC: 29 MMOL/L — SIGNIFICANT CHANGE UP (ref 22–29)
HCT VFR BLD CALC: 51.4 % — HIGH (ref 39–50)
HCT VFR BLDA CALC: 52 % — HIGH (ref 39–51)
HGB BLD CALC-MCNC: 17.4 G/DL — SIGNIFICANT CHANGE UP (ref 12.6–17.4)
HGB BLD-MCNC: 16.9 G/DL — SIGNIFICANT CHANGE UP (ref 13–17)
IMM GRANULOCYTES NFR BLD AUTO: 0.5 % — SIGNIFICANT CHANGE UP (ref 0–0.9)
KETONES UR-MCNC: NEGATIVE — SIGNIFICANT CHANGE UP
LACTATE BLDV-MCNC: 2.9 MMOL/L — HIGH (ref 0.5–2)
LACTATE SERPL-SCNC: 1.9 MMOL/L — SIGNIFICANT CHANGE UP (ref 0.5–2)
LEUKOCYTE ESTERASE UR-ACNC: NEGATIVE — SIGNIFICANT CHANGE UP
LIDOCAIN IGE QN: 26 U/L — SIGNIFICANT CHANGE UP (ref 7–60)
LYMPHOCYTES # BLD AUTO: 2.27 K/UL — SIGNIFICANT CHANGE UP (ref 1–3.3)
LYMPHOCYTES # BLD AUTO: 23.5 % — SIGNIFICANT CHANGE UP (ref 13–44)
MAGNESIUM SERPL-MCNC: 2.3 MG/DL — SIGNIFICANT CHANGE UP (ref 1.6–2.6)
MCHC RBC-ENTMCNC: 29.4 PG — SIGNIFICANT CHANGE UP (ref 27–34)
MCHC RBC-ENTMCNC: 32.9 GM/DL — SIGNIFICANT CHANGE UP (ref 32–36)
MCV RBC AUTO: 89.5 FL — SIGNIFICANT CHANGE UP (ref 80–100)
MONOCYTES # BLD AUTO: 0.59 K/UL — SIGNIFICANT CHANGE UP (ref 0–0.9)
MONOCYTES NFR BLD AUTO: 6.1 % — SIGNIFICANT CHANGE UP (ref 2–14)
NEUTROPHILS # BLD AUTO: 6.59 K/UL — SIGNIFICANT CHANGE UP (ref 1.8–7.4)
NEUTROPHILS NFR BLD AUTO: 68.3 % — SIGNIFICANT CHANGE UP (ref 43–77)
NITRITE UR-MCNC: NEGATIVE — SIGNIFICANT CHANGE UP
NRBC # BLD: 0 /100 WBCS — SIGNIFICANT CHANGE UP (ref 0–0)
NT-PROBNP SERPL-SCNC: 983 PG/ML — HIGH (ref 0–300)
PCO2 BLDV: 50 MMHG — SIGNIFICANT CHANGE UP (ref 42–55)
PH BLDV: 7.37 — SIGNIFICANT CHANGE UP (ref 7.32–7.43)
PH UR: 5 — SIGNIFICANT CHANGE UP (ref 5–8)
PLATELET # BLD AUTO: 216 K/UL — SIGNIFICANT CHANGE UP (ref 150–400)
PO2 BLDV: 21 MMHG — LOW (ref 25–45)
POTASSIUM BLDV-SCNC: 4.8 MMOL/L — SIGNIFICANT CHANGE UP (ref 3.5–5.1)
POTASSIUM SERPL-MCNC: 4.9 MMOL/L — SIGNIFICANT CHANGE UP (ref 3.5–5.3)
POTASSIUM SERPL-SCNC: 4.9 MMOL/L — SIGNIFICANT CHANGE UP (ref 3.5–5.3)
PROT SERPL-MCNC: 7.6 G/DL — SIGNIFICANT CHANGE UP (ref 6–8.3)
PROT UR-MCNC: NEGATIVE — SIGNIFICANT CHANGE UP
RBC # BLD: 5.74 M/UL — SIGNIFICANT CHANGE UP (ref 4.2–5.8)
RBC # FLD: 13.6 % — SIGNIFICANT CHANGE UP (ref 10.3–14.5)
SAO2 % BLDV: 33.9 % — LOW (ref 67–88)
SODIUM SERPL-SCNC: 135 MMOL/L — SIGNIFICANT CHANGE UP (ref 135–145)
SP GR SPEC: 1.04 — HIGH (ref 1.01–1.02)
TROPONIN T, HIGH SENSITIVITY RESULT: 39 NG/L — SIGNIFICANT CHANGE UP (ref 0–51)
TROPONIN T, HIGH SENSITIVITY RESULT: 40 NG/L — SIGNIFICANT CHANGE UP (ref 0–51)
UROBILINOGEN FLD QL: NEGATIVE — SIGNIFICANT CHANGE UP
WBC # BLD: 9.66 K/UL — SIGNIFICANT CHANGE UP (ref 3.8–10.5)
WBC # FLD AUTO: 9.66 K/UL — SIGNIFICANT CHANGE UP (ref 3.8–10.5)

## 2023-07-28 PROCEDURE — 99285 EMERGENCY DEPT VISIT HI MDM: CPT

## 2023-07-28 PROCEDURE — 71045 X-RAY EXAM CHEST 1 VIEW: CPT | Mod: 26

## 2023-07-28 RX ORDER — ACETAMINOPHEN 500 MG
975 TABLET ORAL ONCE
Refills: 0 | Status: COMPLETED | OUTPATIENT
Start: 2023-07-28 | End: 2023-07-28

## 2023-07-28 RX ORDER — FUROSEMIDE 40 MG
60 TABLET ORAL ONCE
Refills: 0 | Status: COMPLETED | OUTPATIENT
Start: 2023-07-28 | End: 2023-07-28

## 2023-07-28 RX ADMIN — Medication 60 MILLIGRAM(S): at 21:30

## 2023-07-28 RX ADMIN — Medication 975 MILLIGRAM(S): at 21:21

## 2023-07-28 RX ADMIN — Medication 975 MILLIGRAM(S): at 20:50

## 2023-07-28 NOTE — ED PROVIDER NOTE - PHYSICAL EXAMINATION
CONSTITUTIONAL: Patient is awake, alert and oriented x 3. Patient is well appearing and in no acute distress.  HEAD: NCAT  ENT: Airway patent, Nasal mucosa clear.   NECK: Supple,   LUNGS: CTA B/L,   HEART: RRR.+S1S2   ABDOMEN: Soft, mild lower abdominal ttp b/l;    MSK: mild edema to b/l calves; FROM upper and lower ext b/l,   SKIN: No rash or lesions  NEURO: No focal deficits,

## 2023-07-28 NOTE — ED PROVIDER NOTE - OBJECTIVE STATEMENT
4 42 y/o male with PMHx of HTN, HLD, CHF, DM presents to the ED complaining of chest pain x 1.5 weeks. Patient states that the pain occurs whenever he exerts himself. He reports feeling short of breath at night especially when he lays flat. He states that sometimes he wakes up in a "sweat". He sees cardiologist Dr. Leong. He was told to go to the hospital if his symptoms persisted. He states that he has been taking medication as directed. Last time he had ECHO was in March. Had angiogram 4 years ago which did not require a stent placement. He complains of intermittent headaches. He denies any fevers, chills, sob, n/v/d. He has wet cough lately.

## 2023-07-28 NOTE — ED PROVIDER NOTE - ABNORMAL RHYTHM
Problem: Chronic Conditions and Co-morbidities  Goal: Patient's chronic conditions and co-morbidity symptoms are monitored and maintained or improved  Outcome: Progressing     Problem: Cognitive:  Goal: Knowledge of wound care  Description: Knowledge of wound care  Outcome: Progressing  Goal: Understands risk factors for wounds  Description: Understands risk factors for wounds  Outcome: Progressing     Problem: Wound:  Goal: Will show signs of wound healing; wound closure and no evidence of infection  Description: Will show signs of wound healing; wound closure and no evidence of infection  Outcome: Progressing     Problem: Blood Glucose:  Goal: Ability to maintain appropriate glucose levels will improve  Description: Ability to maintain appropriate glucose levels will improve  Outcome: Progressing sinus tachycardia

## 2023-07-28 NOTE — ED ADULT NURSE NOTE - NSFALLUNIVINTERV_ED_ALL_ED
Bed/Stretcher in lowest position, wheels locked, appropriate side rails in place/Call bell, personal items and telephone in reach/Instruct patient to call for assistance before getting out of bed/chair/stretcher/Non-slip footwear applied when patient is off stretcher/[place] to call system/Physically safe environment - no spills, clutter or unnecessary equipment/Purposeful proactive rounding/Room/bathroom lighting operational, light cord in reach

## 2023-07-28 NOTE — ED PROVIDER NOTE - CLINICAL SUMMARY MEDICAL DECISION MAKING FREE TEXT BOX
43-year-old man with PMH of CHF (last EF 25% 9/2022), HTN, DM, presents with 1.5 weeks of mid-sternal, exertional, non-radiating chest pain, associated with shortness of breath, diaphoresis, and increasing orthopnea. Has also had productive, mild cough x3 weeks. Blood pressure 151/119, tachycardic, afebrile. Exam with clear lungs and 1+ pitting edema to mid-calves. Concern for exacerbation of heart failure. Differential includes ischemia, infectious process, thyroid or metabolic derangement.    Plan:  - CBC, CMP, VBG, thyroid studies, lipase  - CXR, EKG, troponin  - UA  - cardiac monitor reassess ZR  - reassess

## 2023-07-29 DIAGNOSIS — I10 ESSENTIAL (PRIMARY) HYPERTENSION: ICD-10-CM

## 2023-07-29 DIAGNOSIS — Z29.9 ENCOUNTER FOR PROPHYLACTIC MEASURES, UNSPECIFIED: ICD-10-CM

## 2023-07-29 DIAGNOSIS — E11.65 TYPE 2 DIABETES MELLITUS WITH HYPERGLYCEMIA: ICD-10-CM

## 2023-07-29 DIAGNOSIS — R10.30 LOWER ABDOMINAL PAIN, UNSPECIFIED: ICD-10-CM

## 2023-07-29 DIAGNOSIS — I50.23 ACUTE ON CHRONIC SYSTOLIC (CONGESTIVE) HEART FAILURE: ICD-10-CM

## 2023-07-29 DIAGNOSIS — R07.9 CHEST PAIN, UNSPECIFIED: ICD-10-CM

## 2023-07-29 LAB
ANION GAP SERPL CALC-SCNC: 14 MMOL/L — SIGNIFICANT CHANGE UP (ref 5–17)
B-OH-BUTYR SERPL-SCNC: 0.1 MMOL/L — SIGNIFICANT CHANGE UP
BASE EXCESS BLDV CALC-SCNC: 0.5 MMOL/L — SIGNIFICANT CHANGE UP (ref -2–3)
BUN SERPL-MCNC: 24 MG/DL — HIGH (ref 7–23)
CA-I SERPL-SCNC: 1.19 MMOL/L — SIGNIFICANT CHANGE UP (ref 1.15–1.33)
CALCIUM SERPL-MCNC: 9.4 MG/DL — SIGNIFICANT CHANGE UP (ref 8.4–10.5)
CHLORIDE BLDV-SCNC: 96 MMOL/L — SIGNIFICANT CHANGE UP (ref 96–108)
CHLORIDE SERPL-SCNC: 97 MMOL/L — SIGNIFICANT CHANGE UP (ref 96–108)
CO2 BLDV-SCNC: 30 MMOL/L — HIGH (ref 22–26)
CO2 SERPL-SCNC: 23 MMOL/L — SIGNIFICANT CHANGE UP (ref 22–31)
CREAT SERPL-MCNC: 1.35 MG/DL — HIGH (ref 0.5–1.3)
EGFR: 66 ML/MIN/1.73M2 — SIGNIFICANT CHANGE UP
GAS PNL BLDV: 129 MMOL/L — LOW (ref 136–145)
GAS PNL BLDV: SIGNIFICANT CHANGE UP
GLUCOSE BLDC GLUCOMTR-MCNC: 288 MG/DL — HIGH (ref 70–99)
GLUCOSE BLDC GLUCOMTR-MCNC: 343 MG/DL — HIGH (ref 70–99)
GLUCOSE BLDC GLUCOMTR-MCNC: 367 MG/DL — HIGH (ref 70–99)
GLUCOSE BLDC GLUCOMTR-MCNC: 395 MG/DL — HIGH (ref 70–99)
GLUCOSE BLDC GLUCOMTR-MCNC: 461 MG/DL — CRITICAL HIGH (ref 70–99)
GLUCOSE BLDV-MCNC: 357 MG/DL — HIGH (ref 70–99)
GLUCOSE SERPL-MCNC: 346 MG/DL — HIGH (ref 70–99)
HCO3 BLDV-SCNC: 28 MMOL/L — SIGNIFICANT CHANGE UP (ref 22–29)
HCT VFR BLDA CALC: 55 % — HIGH (ref 39–51)
HGB BLD CALC-MCNC: 18.3 G/DL — HIGH (ref 12.6–17.4)
LACTATE BLDV-MCNC: 1.8 MMOL/L — SIGNIFICANT CHANGE UP (ref 0.5–2)
PCO2 BLDV: 53 MMHG — SIGNIFICANT CHANGE UP (ref 42–55)
PH BLDV: 7.33 — SIGNIFICANT CHANGE UP (ref 7.32–7.43)
PO2 BLDV: 47 MMHG — HIGH (ref 25–45)
POTASSIUM BLDV-SCNC: 4.1 MMOL/L — SIGNIFICANT CHANGE UP (ref 3.5–5.1)
POTASSIUM SERPL-MCNC: 4.1 MMOL/L — SIGNIFICANT CHANGE UP (ref 3.5–5.3)
POTASSIUM SERPL-SCNC: 4.1 MMOL/L — SIGNIFICANT CHANGE UP (ref 3.5–5.3)
SAO2 % BLDV: 76.7 % — SIGNIFICANT CHANGE UP (ref 67–88)
SODIUM SERPL-SCNC: 134 MMOL/L — LOW (ref 135–145)
T3 SERPL-MCNC: 128 NG/DL — SIGNIFICANT CHANGE UP (ref 80–200)
T4 AB SER-ACNC: 7.4 UG/DL — SIGNIFICANT CHANGE UP (ref 4.6–12)
T4 FREE SERPL-MCNC: 1.2 NG/DL — SIGNIFICANT CHANGE UP (ref 0.9–1.8)
TSH SERPL-MCNC: 1.4 UIU/ML — SIGNIFICANT CHANGE UP (ref 0.27–4.2)

## 2023-07-29 PROCEDURE — 99223 1ST HOSP IP/OBS HIGH 75: CPT

## 2023-07-29 PROCEDURE — 76770 US EXAM ABDO BACK WALL COMP: CPT | Mod: 26

## 2023-07-29 PROCEDURE — 99255 IP/OBS CONSLTJ NEW/EST HI 80: CPT

## 2023-07-29 RX ORDER — ENOXAPARIN SODIUM 100 MG/ML
40 INJECTION SUBCUTANEOUS EVERY 12 HOURS
Refills: 0 | Status: DISCONTINUED | OUTPATIENT
Start: 2023-07-29 | End: 2023-08-02

## 2023-07-29 RX ORDER — INSULIN LISPRO 100/ML
VIAL (ML) SUBCUTANEOUS AT BEDTIME
Refills: 0 | Status: DISCONTINUED | OUTPATIENT
Start: 2023-07-29 | End: 2023-07-29

## 2023-07-29 RX ORDER — BUDESONIDE AND FORMOTEROL FUMARATE DIHYDRATE 160; 4.5 UG/1; UG/1
2 AEROSOL RESPIRATORY (INHALATION)
Refills: 0 | Status: DISCONTINUED | OUTPATIENT
Start: 2023-07-29 | End: 2023-08-02

## 2023-07-29 RX ORDER — INSULIN LISPRO 100/ML
8 VIAL (ML) SUBCUTANEOUS
Refills: 0 | Status: DISCONTINUED | OUTPATIENT
Start: 2023-07-29 | End: 2023-07-30

## 2023-07-29 RX ORDER — DEXTROSE 50 % IN WATER 50 %
12.5 SYRINGE (ML) INTRAVENOUS ONCE
Refills: 0 | Status: DISCONTINUED | OUTPATIENT
Start: 2023-07-29 | End: 2023-08-02

## 2023-07-29 RX ORDER — CARVEDILOL PHOSPHATE 80 MG/1
25 CAPSULE, EXTENDED RELEASE ORAL EVERY 12 HOURS
Refills: 0 | Status: DISCONTINUED | OUTPATIENT
Start: 2023-07-29 | End: 2023-08-02

## 2023-07-29 RX ORDER — ISOSORBIDE MONONITRATE 60 MG/1
60 TABLET, EXTENDED RELEASE ORAL DAILY
Refills: 0 | Status: DISCONTINUED | OUTPATIENT
Start: 2023-07-29 | End: 2023-08-02

## 2023-07-29 RX ORDER — ALBUTEROL 90 UG/1
2 AEROSOL, METERED ORAL EVERY 6 HOURS
Refills: 0 | Status: DISCONTINUED | OUTPATIENT
Start: 2023-07-29 | End: 2023-08-02

## 2023-07-29 RX ORDER — ACETAMINOPHEN 500 MG
650 TABLET ORAL EVERY 6 HOURS
Refills: 0 | Status: DISCONTINUED | OUTPATIENT
Start: 2023-07-29 | End: 2023-08-02

## 2023-07-29 RX ORDER — SODIUM CHLORIDE 9 MG/ML
1000 INJECTION, SOLUTION INTRAVENOUS
Refills: 0 | Status: DISCONTINUED | OUTPATIENT
Start: 2023-07-29 | End: 2023-08-02

## 2023-07-29 RX ORDER — HUMAN INSULIN 100 [IU]/ML
12 INJECTION, SUSPENSION SUBCUTANEOUS ONCE
Refills: 0 | Status: COMPLETED | OUTPATIENT
Start: 2023-07-29 | End: 2023-07-29

## 2023-07-29 RX ORDER — DEXTROSE 50 % IN WATER 50 %
25 SYRINGE (ML) INTRAVENOUS ONCE
Refills: 0 | Status: DISCONTINUED | OUTPATIENT
Start: 2023-07-29 | End: 2023-08-02

## 2023-07-29 RX ORDER — INSULIN LISPRO 100/ML
VIAL (ML) SUBCUTANEOUS AT BEDTIME
Refills: 0 | Status: DISCONTINUED | OUTPATIENT
Start: 2023-07-29 | End: 2023-08-02

## 2023-07-29 RX ORDER — GLUCAGON INJECTION, SOLUTION 0.5 MG/.1ML
1 INJECTION, SOLUTION SUBCUTANEOUS ONCE
Refills: 0 | Status: DISCONTINUED | OUTPATIENT
Start: 2023-07-29 | End: 2023-08-02

## 2023-07-29 RX ORDER — SPIRONOLACTONE 25 MG/1
25 TABLET, FILM COATED ORAL DAILY
Refills: 0 | Status: DISCONTINUED | OUTPATIENT
Start: 2023-07-29 | End: 2023-08-02

## 2023-07-29 RX ORDER — SENNA PLUS 8.6 MG/1
1 TABLET ORAL AT BEDTIME
Refills: 0 | Status: DISCONTINUED | OUTPATIENT
Start: 2023-07-29 | End: 2023-08-02

## 2023-07-29 RX ORDER — SACUBITRIL AND VALSARTAN 24; 26 MG/1; MG/1
1 TABLET, FILM COATED ORAL
Refills: 0 | Status: DISCONTINUED | OUTPATIENT
Start: 2023-07-29 | End: 2023-08-01

## 2023-07-29 RX ORDER — FUROSEMIDE 40 MG
80 TABLET ORAL
Refills: 0 | Status: DISCONTINUED | OUTPATIENT
Start: 2023-07-29 | End: 2023-07-30

## 2023-07-29 RX ORDER — INSULIN LISPRO 100/ML
VIAL (ML) SUBCUTANEOUS
Refills: 0 | Status: DISCONTINUED | OUTPATIENT
Start: 2023-07-29 | End: 2023-07-29

## 2023-07-29 RX ORDER — LANOLIN ALCOHOL/MO/W.PET/CERES
3 CREAM (GRAM) TOPICAL AT BEDTIME
Refills: 0 | Status: DISCONTINUED | OUTPATIENT
Start: 2023-07-29 | End: 2023-08-02

## 2023-07-29 RX ORDER — ONDANSETRON 8 MG/1
4 TABLET, FILM COATED ORAL EVERY 8 HOURS
Refills: 0 | Status: DISCONTINUED | OUTPATIENT
Start: 2023-07-29 | End: 2023-08-02

## 2023-07-29 RX ORDER — DEXTROSE 50 % IN WATER 50 %
15 SYRINGE (ML) INTRAVENOUS ONCE
Refills: 0 | Status: DISCONTINUED | OUTPATIENT
Start: 2023-07-29 | End: 2023-08-02

## 2023-07-29 RX ORDER — INSULIN GLARGINE 100 [IU]/ML
30 INJECTION, SOLUTION SUBCUTANEOUS AT BEDTIME
Refills: 0 | Status: DISCONTINUED | OUTPATIENT
Start: 2023-07-29 | End: 2023-07-30

## 2023-07-29 RX ORDER — FUROSEMIDE 40 MG
80 TABLET ORAL DAILY
Refills: 0 | Status: DISCONTINUED | OUTPATIENT
Start: 2023-07-29 | End: 2023-07-29

## 2023-07-29 RX ORDER — INSULIN LISPRO 100/ML
VIAL (ML) SUBCUTANEOUS
Refills: 0 | Status: DISCONTINUED | OUTPATIENT
Start: 2023-07-29 | End: 2023-08-02

## 2023-07-29 RX ORDER — HYDRALAZINE HCL 50 MG
25 TABLET ORAL EVERY 8 HOURS
Refills: 0 | Status: DISCONTINUED | OUTPATIENT
Start: 2023-07-29 | End: 2023-08-02

## 2023-07-29 RX ADMIN — ENOXAPARIN SODIUM 40 MILLIGRAM(S): 100 INJECTION SUBCUTANEOUS at 05:18

## 2023-07-29 RX ADMIN — Medication 6: at 17:47

## 2023-07-29 RX ADMIN — Medication 80 MILLIGRAM(S): at 05:19

## 2023-07-29 RX ADMIN — SENNA PLUS 1 TABLET(S): 8.6 TABLET ORAL at 21:59

## 2023-07-29 RX ADMIN — ISOSORBIDE MONONITRATE 60 MILLIGRAM(S): 60 TABLET, EXTENDED RELEASE ORAL at 11:20

## 2023-07-29 RX ADMIN — BUDESONIDE AND FORMOTEROL FUMARATE DIHYDRATE 2 PUFF(S): 160; 4.5 AEROSOL RESPIRATORY (INHALATION) at 05:18

## 2023-07-29 RX ADMIN — Medication 25 MILLIGRAM(S): at 13:57

## 2023-07-29 RX ADMIN — Medication 650 MILLIGRAM(S): at 23:18

## 2023-07-29 RX ADMIN — SACUBITRIL AND VALSARTAN 1 TABLET(S): 24; 26 TABLET, FILM COATED ORAL at 05:17

## 2023-07-29 RX ADMIN — Medication 25 MILLIGRAM(S): at 01:22

## 2023-07-29 RX ADMIN — HUMAN INSULIN 12 UNIT(S): 100 INJECTION, SUSPENSION SUBCUTANEOUS at 13:01

## 2023-07-29 RX ADMIN — BUDESONIDE AND FORMOTEROL FUMARATE DIHYDRATE 2 PUFF(S): 160; 4.5 AEROSOL RESPIRATORY (INHALATION) at 17:46

## 2023-07-29 RX ADMIN — Medication 80 MILLIGRAM(S): at 13:57

## 2023-07-29 RX ADMIN — SACUBITRIL AND VALSARTAN 1 TABLET(S): 24; 26 TABLET, FILM COATED ORAL at 17:45

## 2023-07-29 RX ADMIN — CARVEDILOL PHOSPHATE 25 MILLIGRAM(S): 80 CAPSULE, EXTENDED RELEASE ORAL at 01:21

## 2023-07-29 RX ADMIN — Medication 30 MILLILITER(S): at 22:00

## 2023-07-29 RX ADMIN — SPIRONOLACTONE 25 MILLIGRAM(S): 25 TABLET, FILM COATED ORAL at 05:17

## 2023-07-29 RX ADMIN — Medication 5: at 07:52

## 2023-07-29 RX ADMIN — Medication 8 UNIT(S): at 12:13

## 2023-07-29 RX ADMIN — Medication 4: at 22:51

## 2023-07-29 RX ADMIN — INSULIN GLARGINE 30 UNIT(S): 100 INJECTION, SOLUTION SUBCUTANEOUS at 22:00

## 2023-07-29 RX ADMIN — SACUBITRIL AND VALSARTAN 1 TABLET(S): 24; 26 TABLET, FILM COATED ORAL at 01:22

## 2023-07-29 RX ADMIN — Medication 25 MILLIGRAM(S): at 05:17

## 2023-07-29 RX ADMIN — CARVEDILOL PHOSPHATE 25 MILLIGRAM(S): 80 CAPSULE, EXTENDED RELEASE ORAL at 05:18

## 2023-07-29 RX ADMIN — Medication 650 MILLIGRAM(S): at 22:01

## 2023-07-29 RX ADMIN — CARVEDILOL PHOSPHATE 25 MILLIGRAM(S): 80 CAPSULE, EXTENDED RELEASE ORAL at 17:46

## 2023-07-29 RX ADMIN — ENOXAPARIN SODIUM 40 MILLIGRAM(S): 100 INJECTION SUBCUTANEOUS at 17:46

## 2023-07-29 NOTE — CONSULT NOTE ADULT - ASSESSMENT
Patient is a 42 y/o male with PMHx of HTN, HLD, HFref EF 20% , DMII ,presents for chest pain and shortness of breath over the past several days.     #Heart failure exacerbation  Patient with NICM/HFrEF with EF of 20  Exacerbation is most likely in the setting of medication non-compliance  -Continue with Lasix 80mg IV BID for now  -Strict I/Os  -Daily weights  -Please limit salt/fluid intake  -Please obtain repeat TTE  -Continue coreg  -Continue hydralazine/IMDUR   -Continue entresto 24/26mg for now  -Continue spirinolactone  -Please obtain a repeat serum lactate level Patient is a 44 y/o male with PMHx of HTN, HLD, HFref EF 20% , DMII ,presents for chest pain and shortness of breath over the past several days.     #Heart failure exacerbation  Patient with NICM/HFrEF with EF of 20  Exacerbation is most likely in the setting of medication non-compliance  -Continue with Lasix 80mg IV BID for now  -Strict I/Os  -Daily weights  -Please limit salt/fluid intake  -Please obtain repeat TTE  -Continue coreg  -Continue hydralazine/IMDUR   -Continue entresto 24/26mg for now  -Continue spirinolactone  -Please obtain a repeat serum lactate level  -Continue empaglifolozin when discharged [[email or web address]_OBGYN_ALL_OB:[id number]==]

## 2023-07-29 NOTE — CHART NOTE - NSCHARTNOTEFT_GEN_A_CORE
Endocrinology consulted for T2DM management for Mr. White who has HFrEF presenting for chest pain. He reportedly is supposed to be on gliipzide-metformin 5-500mg bid and farxiga 10mg daily. Per admission 9/22, patient was receiving lantus 30 and humalog 9 units tid premeal. No recent HbA1c. Most recent  at 1pm, did receive NPH 12 units around 1pm with 8 units admelog with lunch.    Recommend  - please repeat DKA labs now, did not appear to be in DKA last night with similar BG  - start lantus 30 units daily at 9pm  - start admelog 8 units tid premeal  - start moderate admelog correction scale tid with meals and separate moderate admelog correction scale at bedtime  - can check 2am fingerstick and use a LOW equivalent bedtime admelog correction scale at that time  - fingersticks with meals, bedtime and 2am tonight  - target -180  - hypoglycemia protocol prn  - carb consistent diet  - RD consult    Full consult by endocrinology tomorrow. Discussed with primary team.    Prince Rosa MD  Endocrinology Fellow Endocrinology consulted for T2DM management for Mr. White who has HFrEF presenting for chest pain. He reportedly is supposed to be on gliipzide-metformin 5-500mg bid and farxiga 10mg daily. Per admission 9/22, patient was receiving lantus 30 and humalog 9 units tid premeal. No recent HbA1c. Most recent  at 1pm, did receive NPH 12 units around 1pm with 8 units admelog with lunch.    Recommend  - please repeat DKA labs now, did not appear to be in DKA last night with similar BG  - start lantus 30 units daily at 9pm given NPH given earlier this afternoon and no recent BG  - start admelog 8 units tid premeal  - start moderate admelog correction scale tid with meals and separate moderate admelog correction scale at bedtime  - can check 2am fingerstick and use a LOW equivalent bedtime admelog correction scale at that time  - fingersticks with meals, bedtime and 2am tonight  - target -180  - hypoglycemia protocol prn  - carb consistent diet  - RD consult    Full consult by endocrinology tomorrow. Discussed with primary team.    Prince Rosa MD  Endocrinology Fellow

## 2023-07-29 NOTE — H&P ADULT - ASSESSMENT
42 y/o male with PMHx of HTN, HLD, HFref EF 20% , DMII ,presents for chest pain over the past several days.

## 2023-07-29 NOTE — H&P ADULT - NSHPREVIEWOFSYSTEMS_GEN_ALL_CORE
CONSTITUTIONAL: No weakness, + subjunctive  fevers no chills  EYES/ENT: No visual changes;  No dysphagia  NECK: No pain or stiffness  RESPIRATORY: +  cough, no wheezing, no hemoptysis; +  shortness of breath  CARDIOVASCULAR: +  chest pain  no  palpitations; No lower extremity edema  EXTREMITIES: no le edema, cyanosis, clubbing  GASTROINTESTINAL: +  abdominal pain. No nausea, vomiting, or hematemesis; No diarrhea or constipation. No melena or hematochezia.  BACK: + flank pain , no back pain  GENITOURINARY: No dysuria, frequency or hematuria  NEUROLOGICAL: No numbness or weakness  MSK: no joint swelling or pain  SKIN: No itching, burning, rashes, or lesions   PSYCH: no agitation  All other review of systems is negative unless indicated above.

## 2023-07-29 NOTE — H&P ADULT - NSHPLABSRESULTS_GEN_ALL_CORE
Labs personally reviewed:                          16.9   9.66  )-----------( 216      ( 2023 20:34 )             51.4         135  |  97  |  16  ----------------------------<  425<H>  4.9   |  23  |  1.10    Ca    9.4      2023 20:34  Mg     2.3         TPro  7.6  /  Alb  4.0  /  TBili  0.3  /  DBili  x   /  AST  34  /  ALT  47<H>  /  AlkPhos  92          LIVER FUNCTIONS - ( 2023 20:34 )  Alb: 4.0 g/dL / Pro: 7.6 g/dL / ALK PHOS: 92 U/L / ALT: 47 U/L / AST: 34 U/L / GGT: x             Urinalysis Basic - ( 2023 21:40 )    Color: Colorless / Appearance: Clear / S.036 / pH: x  Gluc: x / Ketone: Negative  / Bili: Negative / Urobili: Negative   Blood: x / Protein: Negative / Nitrite: Negative   Leuk Esterase: Negative / RBC: x / WBC x   Sq Epi: x / Non Sq Epi: x / Bacteria: x      CAPILLARY BLOOD GLUCOSE      POCT Blood Glucose.: 322 mg/dL (2023 21:16)      Imaging:  CXR personally reviewed: no focal opacity    EKG personally reviewed: Labs personally reviewed:                          16.9   9.66  )-----------( 216      ( 2023 20:34 )             51.4         135  |  97  |  16  ----------------------------<  425<H>  4.9   |  23  |  1.10    Ca    9.4      2023 20:34  Mg     2.3         TPro  7.6  /  Alb  4.0  /  TBili  0.3  /  DBili  x   /  AST  34  /  ALT  47<H>  /  AlkPhos  92          LIVER FUNCTIONS - ( 2023 20:34 )  Alb: 4.0 g/dL / Pro: 7.6 g/dL / ALK PHOS: 92 U/L / ALT: 47 U/L / AST: 34 U/L / GGT: x             Urinalysis Basic - ( 2023 21:40 )    Color: Colorless / Appearance: Clear / S.036 / pH: x  Gluc: x / Ketone: Negative  / Bili: Negative / Urobili: Negative   Blood: x / Protein: Negative / Nitrite: Negative   Leuk Esterase: Negative / RBC: x / WBC x   Sq Epi: x / Non Sq Epi: x / Bacteria: x      CAPILLARY BLOOD GLUCOSE      POCT Blood Glucose.: 322 mg/dL (2023 21:16)      Imaging:  CXR personally reviewed: no focal opacity    EKG personally reviewed: sinus tachycardia

## 2023-07-29 NOTE — H&P ADULT - HISTORY OF PRESENT ILLNESS
Patient is a 42 y/o male with PMHx of HTN, HLD, HFref EF 20% , DMII ,presents for chest pain over the past several days Patient is a 44 y/o male with PMHx of HTN, HLD, HFref EF 20% , DMII ,presents for chest pain over the past several days.   Patient reports midsternal sharp chest pain , occurring randomly , not worsened with deep breathing or exertion , non radiating. He also reports increased dyspnea with exertion and orthopnea , currently sleeping in chair , minimal lower extremity edema , but feels tightness in he ankles, he reports " hot flashes", but no fever , he reports intermittent non productvie cough. He also complains of bilateral lower abdominal and groin pain as well as pain in bilateral flanks, symptoms have been present on and off for years but worse over the past several days.

## 2023-07-29 NOTE — PATIENT PROFILE ADULT - NSPROEXTENSIONSOFSELF_GEN_A_NUR
Writer called patient regarding new plan to wean gabapentin. After discussing with Jose HUFFMAN plan was made to start gabapentin 300 mg TID for one week then 300 mg once daily for one week.       Jose Bentley PA  You; Tru Hernadez MD 18 minutes ago (9:22 AM)     AW    300mg twice daily for 1 week   300mg daily for 1 week     Then stop     Thanks!    Message text           Patient made aware of plan and will call with updates or any concerns if they arise.     Mahendra Gibson, RN, BSN.  RN Care Coordinator     [facility]   [phone]    
eyeglasses

## 2023-07-29 NOTE — H&P ADULT - PROBLEM SELECTOR PLAN 1
no acute ischemic changes , troponin 39 and 40 on repeat    - telemetry   - echocardiogram   - d-dimer

## 2023-07-29 NOTE — H&P ADULT - NSHPPHYSICALEXAM_GEN_ALL_CORE
Vital Signs Last 24 Hrs  T(C): 36.7 (28 Jul 2023 20:35), Max: 37.2 (28 Jul 2023 18:32)  T(F): 98 (28 Jul 2023 20:35), Max: 99 (28 Jul 2023 18:32)  HR: 99 (28 Jul 2023 21:11) (99 - 106)  BP: 147/93 (28 Jul 2023 21:11) (147/93 - 158/112)  BP(mean): 108 (28 Jul 2023 21:11) (108 - 121)  RR: 17 (28 Jul 2023 21:11) (17 - 19)  SpO2: 98% (28 Jul 2023 21:11) (95% - 98%)    Parameters below as of 28 Jul 2023 21:11  Patient On (Oxygen Delivery Method): room air Vital Signs Last 24 Hrs  T(C): 36.7 (28 Jul 2023 20:35), Max: 37.2 (28 Jul 2023 18:32)  T(F): 98 (28 Jul 2023 20:35), Max: 99 (28 Jul 2023 18:32)  HR: 99 (28 Jul 2023 21:11) (99 - 106)  BP: 147/93 (28 Jul 2023 21:11) (147/93 - 158/112)  BP(mean): 108 (28 Jul 2023 21:11) (108 - 121)  RR: 17 (28 Jul 2023 21:11) (17 - 19)  SpO2: 98% (28 Jul 2023 21:11) (95% - 98%)    Parameters below as of 28 Jul 2023 21:11  Patient On (Oxygen Delivery Method): room air    GENERAL: No acute distress, well-developed  HEAD:  Atraumatic, Normocephalic  ENT: EOMI, PERRLA, conjunctiva and sclera clear,  moist mucosa no pharyngeal erythema or exudates   NECK: supple , no JVD   CHEST/LUNG:  mild bibasilar crackles ; No wheeze, equal breath sounds bilaterally   BACK: No spinal tenderness,  No CVA tenderness   HEART: Regular rate and rhythm; No murmurs, rubs, or gallops  ABDOMEN: Soft, Nontender, Nondistended; Bowel sounds present   : declined scrotal exam  EXTREMITIES:  No clubbing, cyanosis, trace edema  MSK: No joint swelling or effusions, ROM intact   PSYCH: Normal behavior/affect  NEUROLOGY: AAOx3, non-focal, cranial nerves intact  SKIN: Normal color, No rashes or lesions

## 2023-07-29 NOTE — CONSULT NOTE ADULT - SUBJECTIVE AND OBJECTIVE BOX
Patient seen and evaluated at bedside    Chief Complaint:    HPI:  Patient is a 44 y/o male with PMHx of HTN, HLD, HFref EF 20% , DMII ,presents for chest pain/shortness of breath over the past several days.   Patient reports midsternal sharp chest pain , occurring randomly , not worsened with deep breathing or exertion , non radiating. He also reports increased dyspnea with exertion and orthopnea , currently sleeping in chair , minimal lower extremity edema , but feels tightness in he ankles, he reports " hot flashes", but no fever , he reports intermittent non productvie cough. He also complains of bilateral lower abdominal and groin pain as well as pain in bilateral flanks, symptoms have been present on and off for years but worse over the past several days.        PMHx:   No pertinent past medical history    HTN (hypertension)    Asthma    Congestive heart failure (CHF)    Type 2 diabetes mellitus    Hyperlipidemia        PSHx:   No significant past surgical history    No significant past surgical history        Allergies:  No Known Allergies      Home Meds:  No pertinent past medical history    HTN (hypertension)    Asthma    Congestive heart failure (CHF)    Type 2 diabetes mellitus    Hyperlipidemia        Current Medications:   acetaminophen     Tablet .. 650 milliGRAM(s) Oral every 6 hours PRN  albuterol    90 MICROgram(s) HFA Inhaler 2 Puff(s) Inhalation every 6 hours PRN  aluminum hydroxide/magnesium hydroxide/simethicone Suspension 30 milliLiter(s) Oral every 4 hours PRN  budesonide 160 MICROgram(s)/formoterol 4.5 MICROgram(s) Inhaler 2 Puff(s) Inhalation two times a day  carvedilol 25 milliGRAM(s) Oral every 12 hours  dextrose 5%. 1000 milliLiter(s) IV Continuous <Continuous>  dextrose 5%. 1000 milliLiter(s) IV Continuous <Continuous>  dextrose 50% Injectable 25 Gram(s) IV Push once  dextrose 50% Injectable 12.5 Gram(s) IV Push once  dextrose Oral Gel 15 Gram(s) Oral once PRN  enoxaparin Injectable 40 milliGRAM(s) SubCutaneous every 12 hours  furosemide   Injectable 80 milliGRAM(s) IV Push two times a day  glucagon  Injectable 1 milliGRAM(s) IntraMuscular once  hydrALAZINE 25 milliGRAM(s) Oral every 8 hours  insulin glargine Injectable (LANTUS) 30 Unit(s) SubCutaneous at bedtime  insulin lispro (ADMELOG) corrective regimen sliding scale   SubCutaneous three times a day before meals  insulin lispro (ADMELOG) corrective regimen sliding scale   SubCutaneous at bedtime  insulin lispro Injectable (ADMELOG) 8 Unit(s) SubCutaneous three times a day before meals  insulin NPH human recombinant 12 Unit(s) SubCutaneous once  isosorbide   mononitrate ER Tablet (IMDUR) 60 milliGRAM(s) Oral daily  melatonin 3 milliGRAM(s) Oral at bedtime PRN  ondansetron Injectable 4 milliGRAM(s) IV Push every 8 hours PRN  sacubitril 24 mG/valsartan 26 mG 1 Tablet(s) Oral two times a day  senna 1 Tablet(s) Oral at bedtime  spironolactone 25 milliGRAM(s) Oral daily      FAMILY HISTORY:  FH: HTN (hypertension)        Social History:  Smoking History:  Alcohol Use:  Drug Use:    REVIEW OF SYSTEMS:  Constitutional:     [ ] negative [ ] fevers [ ] chills [ ] weight loss [ ] weight gain  HEENT:                  [ ] negative [ ] dry eyes [ ] eye irritation [ ] postnasal drip [ ] nasal congestion  CV:                         [ ] negative  [ ] chest pain [ ] orthopnea [ ] palpitations [ ] murmur  Resp:                     [ ] negative [ ] cough [ ] shortness of breath [ ] dyspnea [ ] wheezing [ ] sputum [ ]hemoptysis  GI:                          [ ] negative [ ] nausea [ ] vomiting [ ] diarrhea [ ] constipation [ ] abd pain [ ] dysphagia   :                        [ ] negative [ ] dysuria [ ] nocturia [ ] hematuria [ ] increased urinary frequency  Musculoskeletal: [ ] negative [ ] back pain [ ] myalgias [ ] arthralgias [ ] fracture  Skin:                       [ ] negative [ ] rash [ ] itch  Neurological:        [ ] negative [ ] headache [ ] dizziness [ ] syncope [ ] weakness [ ] numbness  Psychiatric:           [ ] negative [ ] anxiety [ ] depression  Endocrine:            [ ] negative [ ] diabetes [ ] thyroid problem  Heme/Lymph:      [ ] negative [ ] anemia [ ] bleeding problem  Allergic/Immune: [ ] negative [ ] itchy eyes [ ] nasal discharge [ ] hives [ ] angioedema    [ ] All other systems negative  [ ] Unable to assess ROS due to      Physical Exam:  T(F): 98.9 (07-29), Max: 99 (07-28)  HR: 86 (07-29) (86 - 106)  BP: 132/88 (07-29) (132/88 - 163/118)  RR: 18 (07-29)  SpO2: 93% (07-29)  GENERAL: No acute distress, well-developed  HEAD:  Atraumatic, Normocephalic  ENT: EOMI, PERRLA, conjunctiva and sclera clear, Neck supple, No JVD, moist mucosa  CHEST/LUNG: Clear to auscultation bilaterally; No wheeze, equal breath sounds bilaterally   BACK: No spinal tenderness  HEART: Regular rate and rhythm; No murmurs, rubs, or gallops  ABDOMEN: Soft, Nontender, Nondistended; Bowel sounds present  EXTREMITIES:  No clubbing, cyanosis, or edema  PSYCH: Nl behavior, nl affect  NEUROLOGY: AAOx3, non-focal, cranial nerves intact  SKIN: Normal color, No rashes or lesions  LINES:    Cardiovascular Diagnostic Testing:    ECG: Personally reviewed:    Echo: Personally reviewed:    Patient name: JOSE ARMANDO SANTIAGO  YOB: 1980   Age: 42 (M)   MR#: [id number]  Study Date: 9/4/2022  Location: [address] [place], [place] [id number]: Emmy Tom [place]  Study quality: Technically difficult  Referring Physician: Fortunato Greene MD  Blood Pressure: 124/90 mmHg  Height: 180 cm  Weight: 113 kg  BSA: 2.3 m2  Heart Rate: 87 mmHg  ------------------------------------------------------------------------  PROCEDURE: Transthoracic echocardiogram with 2-D, M-Mode  and complete spectral and color flow Doppler. Verbal  consent was obtained for injection of  Ultrasonic Enhancing  Agent following a discussion of risks and benefits.  Following intravenous injection of Ultrasonic Enhancing  Agent, harmonic imaging was performed.  INDICATION: Heart failure, unspecified (I50.9)  ------------------------------------------------------------------------  Dimensions:    Normal Values:  LA:     5.2    2.0 - 4.0 cm  Ao:     3.6    2.0 - 3.8 cm  SEPTUM: 1.4    0.6 - 1.2 cm  PWT:    1.3    0.6 - 1.1 cm  LVIDd:  6.4    3.0 - 5.6 cm  LVIDs:  5.2    1.8 - 4.0 cm  Derived variables:  LVMI: 177 g/m2  RWT: 0.40  Fractional short: 19 %  EF (Modified Cote Rule): 25 %Doppler Peak Velocity  (m/sec): AoV=0.8  ------------------------------------------------------------------------  Observations:  Mitral Valve: Tethered mitral valve leaflets with normal  opening. Minimal mitral regurgitation.  Aortic Valve/Aorta: Normal trileaflet aortic valve. Peak  transaortic valve gradient equals 3 mm Hg. No aortic valve  regurgitation seen. Peak left ventricular outflow tract  gradient equals 1 mm Hg.  Aortic Root: 3.6 cm.  Left Atrium: Moderately dilated left atrium.  LA volume  index = 47 cc/m2.  Left Ventricle: Severe global left ventricular systolic  dysfunction.  Endocardial visualization enhanced with  intravenous injection of Ultrasonic Enhancing Agent  (Lumason). No left ventricular thrombus. Mild concentric  left ventricular hypertrophy. Moderate diastolic  dysfunction (Stage II).  Right Heart: Normal right atrium. Right ventricular  enlargement with decreased right ventricular systolic  function. Tethered tricuspid valve. Mild tricuspid  regurgitation. Normal pulmonic valve. Minimal pulmonic  regurgitation.  Pericardium/Pleura: Normal pericardium with no pericardial  effusion.  Hemodynamic: Estimated right atrial pressure is 8 mm Hg.  Estimated right ventricular systolic pressure equals 49 mm  Hg, assuming right atrial pressure equals 8 mm Hg,  consistent with mild pulmonary hypertension.  ------------------------------------------------------------------------  Conclusions:  1. Tethered mitral valve leaflets with normal opening.  Minimal mitral regurgitation.  2. Normal trileaflet aortic valve. No aortic valve  regurgitation seen.  3. Severe global left ventricular systolic dysfunction.  Endocardial visualization enhanced with intravenous  injection of Ultrasonic Enhancing Agent (Lumason). No left  ventricular thrombus.  4. Moderate diastolic dysfunction (Stage II).  5. Right ventricular enlargement with decreased right  ventricular systolic function.  6. Tethered tricuspid valve. Mild tricuspid regurgitation.  7. Estimated pulmonary artery systolic pressure equals 49  mm Hg, assuming right atrial pressure equals 8 mm Hg,  consistent with mild pulmonary pressures.  ------------------------------------------------------------------------  Confirmed on  9/4/2022 - 11:18:57 by Twin Chua M.D.  ------------------------------------------------------------------------      Stress Testing:    Cath:    CORONARY VESSELS: The coronary circulation is left dominant.  LM:   --  LM: Normal.  LAD:   --  Mid LAD: Angiography showed mild atherosclerosis with no flow  limiting lesions.  CX:   --  Circumflex: Normal.  RCA:   --  RCA: Normal.  COMPLICATIONS: There were no complications.  DIAGNOSTIC RECOMMENDATIONS: The patient's diuretic regimenshould be  carefully increased.  Prepared and signed by  Edward Cardona M.D.  Signed 12/12/2019 13:09:42      Imaging:    CXR: Personally reviewed    Labs: Personally reviewed                        16.9   9.66  )-----------( 216      ( 28 Jul 2023 20:34 )             51.4     07-28    135  |  97  |  16  ----------------------------<  425<H>  4.9   |  23  |  1.10    Ca    9.4      28 Jul 2023 20:34  Mg     2.3     07-28    TPro  7.6  /  Alb  4.0  /  TBili  0.3  /  DBili  x   /  AST  34  /  ALT  47<H>  /  AlkPhos  92  07-28            Thyroid Stimulating Hormone, Serum: 1.40 uIU/mL (07-28 @ 20:34)      CARDIAC MARKERS ( 28 Jul 2023 21:40 )  40 ng/L / x     / x     / x     / x     / x      CARDIAC MARKERS ( 28 Jul 2023 20:34 )  39 ng/L / x     / x     / x     / x     / x          No Known Allergies    acetaminophen     Tablet .. 650 milliGRAM(s) Oral every 6 hours PRN  albuterol    90 MICROgram(s) HFA Inhaler 2 Puff(s) Inhalation every 6 hours PRN  aluminum hydroxide/magnesium hydroxide/simethicone Suspension 30 milliLiter(s) Oral every 4 hours PRN  budesonide 160 MICROgram(s)/formoterol 4.5 MICROgram(s) Inhaler 2 Puff(s) Inhalation two times a day  carvedilol 25 milliGRAM(s) Oral every 12 hours  dextrose 5%. 1000 milliLiter(s) IV Continuous <Continuous>  dextrose 5%. 1000 milliLiter(s) IV Continuous <Continuous>  dextrose 50% Injectable 25 Gram(s) IV Push once  dextrose 50% Injectable 12.5 Gram(s) IV Push once  dextrose Oral Gel 15 Gram(s) Oral once PRN  enoxaparin Injectable 40 milliGRAM(s) SubCutaneous every 12 hours  furosemide   Injectable 80 milliGRAM(s) IV Push two times a day  glucagon  Injectable 1 milliGRAM(s) IntraMuscular once  hydrALAZINE 25 milliGRAM(s) Oral every 8 hours  insulin glargine Injectable (LANTUS) 30 Unit(s) SubCutaneous at bedtime  insulin lispro (ADMELOG) corrective regimen sliding scale   SubCutaneous three times a day before meals  insulin lispro (ADMELOG) corrective regimen sliding scale   SubCutaneous at bedtime  insulin lispro Injectable (ADMELOG) 8 Unit(s) SubCutaneous three times a day before meals  insulin NPH human recombinant 12 Unit(s) SubCutaneous once  isosorbide   mononitrate ER Tablet (IMDUR) 60 milliGRAM(s) Oral daily  melatonin 3 milliGRAM(s) Oral at bedtime PRN  ondansetron Injectable 4 milliGRAM(s) IV Push every 8 hours PRN  sacubitril 24 mG/valsartan 26 mG 1 Tablet(s) Oral two times a day  senna 1 Tablet(s) Oral at bedtime  spironolactone 25 milliGRAM(s) Oral daily    T(F): 98.9 (07-29), Max: 99 (07-28)  HR: 86 (07-29) (86 - 106)  BP: 132/88 (07-29) (132/88 - 163/118)  RR: 18 (07-29)  SpO2: 93% (07-29) Patient seen and evaluated at bedside    Chief Complaint: Shortness of breath/orthopnea    HPI:  Patient is a 42 y/o male with PMHx of HTN, HLD, HFref EF 20% , DMII ,presents for chest pain/shortness of breath over the past several days.   Patient reports midsternal sharp chest pain , occurring randomly , not worsened with deep breathing or exertion , non radiating. He also reports increased dyspnea with exertion and orthopnea , currently sleeping in recliner for the past 4 weeks. HE now gets short of breath with tying his shoes. He reports minimal lower extremity edema , but feels tightness in he ankles. He reports " hot flashes", but no fever , he reports intermittent non productvie cough.   He also complains of bilateral lower abdominal and groin pain as well as pain in bilateral flanks, symptoms have been present on and off for years but worse over the past several days.    HE says that he has been compliant with his medications but recently had a mix up with his PCP and had stopped taking some of his meds for 5 days (5/19->7/25). He is unsure which meds he was not taking.     PMHx:   No pertinent past medical history    HTN (hypertension)    Asthma    Congestive heart failure (CHF)    Type 2 diabetes mellitus    Hyperlipidemia        PSHx:   No significant past surgical history    No significant past surgical history        Allergies:  · 	hydrALAZINE 25 mg oral tablet: Last Dose Taken:  , 1 tab(s) orally every 8 hours  · 	senna leaf extract oral tablet: Last Dose Taken:  , 1 tab(s) orally once a day (at bedtime)  · 	spironolactone 25 mg oral tablet: Last Dose Taken:  , 1 tab(s) orally once a day  · 	furosemide 80 mg oral tablet: Last Dose Taken:  , 1 tab(s) orally every 12 hours  · 	empagliflozin 10 mg oral tablet: Last Dose Taken:  , 1 tab(s) orally once a day (in the morning)  · 	sacubitril-valsartan 24 mg-26 mg oral tablet: Last Dose Taken:  , 1 tab(s) orally every 12 hours  · 	budesonide-formoterol 160 mcg-4.5 mcg/inh inhalation aerosol: Last Dose Taken:  , 2 puff(s) inhaled 2 times a day  	  	NOTE: patient ran out of meds / not on home meds since ~May 2022. current list based on Feb 2022 Outpatient List as originally found on [facility].  · 	albuterol 90 mcg/inh inhalation aerosol: Last Dose Taken:  , 2 puff(s) inhaled every 6 hours, As needed, Shortness of Breath and/or Wheezing  	  	NOTE: patient ran out of meds / not on home meds since ~May 2022. current list based on Feb 2022 Outpatient List as originally found on [facility].  · 	Imdur 60 mg oral tablet, extended release: 1 orally once a day  · 	glipizide-metformin 5 mg-500 mg oral tablet: 1 orally 2 times a day  · 	carvedilol 25 mg oral tablet: Last Dose Taken:  , 1 orally 2 times a day          Current Medications:   acetaminophen     Tablet .. 650 milliGRAM(s) Oral every 6 hours PRN  albuterol    90 MICROgram(s) HFA Inhaler 2 Puff(s) Inhalation every 6 hours PRN  aluminum hydroxide/magnesium hydroxide/simethicone Suspension 30 milliLiter(s) Oral every 4 hours PRN  budesonide 160 MICROgram(s)/formoterol 4.5 MICROgram(s) Inhaler 2 Puff(s) Inhalation two times a day  carvedilol 25 milliGRAM(s) Oral every 12 hours  dextrose 5%. 1000 milliLiter(s) IV Continuous <Continuous>  dextrose 5%. 1000 milliLiter(s) IV Continuous <Continuous>  dextrose 50% Injectable 25 Gram(s) IV Push once  dextrose 50% Injectable 12.5 Gram(s) IV Push once  dextrose Oral Gel 15 Gram(s) Oral once PRN  enoxaparin Injectable 40 milliGRAM(s) SubCutaneous every 12 hours  furosemide   Injectable 80 milliGRAM(s) IV Push two times a day  glucagon  Injectable 1 milliGRAM(s) IntraMuscular once  hydrALAZINE 25 milliGRAM(s) Oral every 8 hours  insulin glargine Injectable (LANTUS) 30 Unit(s) SubCutaneous at bedtime  insulin lispro (ADMELOG) corrective regimen sliding scale   SubCutaneous three times a day before meals  insulin lispro (ADMELOG) corrective regimen sliding scale   SubCutaneous at bedtime  insulin lispro Injectable (ADMELOG) 8 Unit(s) SubCutaneous three times a day before meals  insulin NPH human recombinant 12 Unit(s) SubCutaneous once  isosorbide   mononitrate ER Tablet (IMDUR) 60 milliGRAM(s) Oral daily  melatonin 3 milliGRAM(s) Oral at bedtime PRN  ondansetron Injectable 4 milliGRAM(s) IV Push every 8 hours PRN  sacubitril 24 mG/valsartan 26 mG 1 Tablet(s) Oral two times a day  senna 1 Tablet(s) Oral at bedtime  spironolactone 25 milliGRAM(s) Oral daily      FAMILY HISTORY:  FH: HTN (hypertension)        Social History:  PRevious smoker    REVIEW OF SYSTEMS:  Constitutional:     [ ] negative [ ] fevers [ ] chills [ ] weight loss [ ] weight gain  HEENT:                  [ ] negative [ ] dry eyes [ ] eye irritation [ ] postnasal drip [ ] nasal congestion  CV:                         [ ] negative  [ ] chest pain [ ] orthopnea [ ] palpitations [ ] murmur  Resp:                     [ ] negative [ ] cough [ ] shortness of breath [ ] dyspnea [ ] wheezing [ ] sputum [ ]hemoptysis  GI:                          [ ] negative [ ] nausea [ ] vomiting [ ] diarrhea [ ] constipation [ ] abd pain [ ] dysphagia   :                        [ ] negative [ ] dysuria [ ] nocturia [ ] hematuria [ ] increased urinary frequency  Musculoskeletal: [ ] negative [ ] back pain [ ] myalgias [ ] arthralgias [ ] fracture  Skin:                       [ ] negative [ ] rash [ ] itch  Neurological:        [ ] negative [ ] headache [ ] dizziness [ ] syncope [ ] weakness [ ] numbness  Psychiatric:           [ ] negative [ ] anxiety [ ] depression  Endocrine:            [ ] negative [ ] diabetes [ ] thyroid problem  Heme/Lymph:      [ ] negative [ ] anemia [ ] bleeding problem  Allergic/Immune: [ ] negative [ ] itchy eyes [ ] nasal discharge [ ] hives [ ] angioedema    [ ] All other systems negative  [ ] Unable to assess ROS due to      Physical Exam:  T(F): 98.9 (07-29), Max: 99 (07-28)  HR: 86 (07-29) (86 - 106)  BP: 132/88 (07-29) (132/88 - 163/118)  RR: 18 (07-29)  SpO2: 93% (07-29)  GENERAL: No acute distress, well-developed  HEAD:  Atraumatic, Normocephalic  ENT: EOMI, PERRLA, conjunctiva and sclera clear, Neck supple, No JVD, moist mucosa  CHEST/LUNG: Crackles in the lower lung fiellds  BACK: No spinal tenderness  HEART: Regular rate and rhythm; No murmurs, rubs, or gallops  ABDOMEN: Soft, Nontender, Nondistended; Bowel sounds present  EXTREMITIES:  Trace edema    Cardiovascular Diagnostic Testing:    ECG: Personally reviewed: Sinus tach, no St segment elevation or depression    Echo: Personally reviewed:    Patient name: JOSE ARMANDO SANTIAGO  YOB: 1980   Age: 42 (M)   MR#: [id number]  Study Date: 9/4/2022  Location: [address] [place], [place] [id number]: Emmy Tom [facility]  Study quality: Technically difficult  Referring Physician: Fortunato Greene MD  Blood Pressure: 124/90 mmHg  Height: 180 cm  Weight: 113 kg  BSA: 2.3 m2  Heart Rate: 87 mmHg  ------------------------------------------------------------------------  PROCEDURE: Transthoracic echocardiogram with 2-D, M-Mode  and complete spectral and color flow Doppler. Verbal  consent was obtained for injection of  Ultrasonic Enhancing  Agent following a discussion of risks and benefits.  Following intravenous injection of Ultrasonic Enhancing  Agent, harmonic imaging was performed.  INDICATION: Heart failure, unspecified (I50.9)  ------------------------------------------------------------------------  Dimensions:    Normal Values:  LA:     5.2    2.0 - 4.0 cm  Ao:     3.6    2.0 - 3.8 cm  SEPTUM: 1.4    0.6 - 1.2 cm  PWT:    1.3    0.6 - 1.1 cm  LVIDd:  6.4    3.0 - 5.6 cm  LVIDs:  5.2    1.8 - 4.0 cm  Derived variables:  LVMI: 177 g/m2  RWT: 0.40  Fractional short: 19 %  EF (Modified Cote Rule): 25 %Doppler Peak Velocity  (m/sec): AoV=0.8  ------------------------------------------------------------------------  Observations:  Mitral Valve: Tethered mitral valve leaflets with normal  opening. Minimal mitral regurgitation.  Aortic Valve/Aorta: Normal trileaflet aortic valve. Peak  transaortic valve gradient equals 3 mm Hg. No aortic valve  regurgitation seen. Peak left ventricular outflow tract  gradient equals 1 mm Hg.  Aortic Root: 3.6 cm.  Left Atrium: Moderately dilated left atrium.  LA volume  index = 47 cc/m2.  Left Ventricle: Severe global left ventricular systolic  dysfunction.  Endocardial visualization enhanced with  intravenous injection of Ultrasonic Enhancing Agent  (Lumason). No left ventricular thrombus. Mild concentric  left ventricular hypertrophy. Moderate diastolic  dysfunction (Stage II).  Right Heart: Normal right atrium. Right ventricular  enlargement with decreased right ventricular systolic  function. Tethered tricuspid valve. Mild tricuspid  regurgitation. Normal pulmonic valve. Minimal pulmonic  regurgitation.  Pericardium/Pleura: Normal pericardium with no pericardial  effusion.  Hemodynamic: Estimated right atrial pressure is 8 mm Hg.  Estimated right ventricular systolic pressure equals 49 mm  Hg, assuming right atrial pressure equals 8 mm Hg,  consistent with mild pulmonary hypertension.  ------------------------------------------------------------------------  Conclusions:  1. Tethered mitral valve leaflets with normal opening.  Minimal mitral regurgitation.  2. Normal trileaflet aortic valve. No aortic valve  regurgitation seen.  3. Severe global left ventricular systolic dysfunction.  Endocardial visualization enhanced with intravenous  injection of Ultrasonic Enhancing Agent (Lumason). No left  ventricular thrombus.  4. Moderate diastolic dysfunction (Stage II).  5. Right ventricular enlargement with decreased right  ventricular systolic function.  6. Tethered tricuspid valve. Mild tricuspid regurgitation.  7. Estimated pulmonary artery systolic pressure equals 49  mm Hg, assuming right atrial pressure equals 8 mm Hg,  consistent with mild pulmonary pressures.  ------------------------------------------------------------------------  Confirmed on  9/4/2022 - 11:18:57 by Twin Chua M.D.  ------------------------------------------------------------------------      Stress Testing:    Cath:    CORONARY VESSELS: The coronary circulation is left dominant.  LM:   --  LM: Normal.  LAD:   --  Mid LAD: Angiography showed mild atherosclerosis with no flow  limiting lesions.  CX:   --  Circumflex: Normal.  RCA:   --  RCA: Normal.  COMPLICATIONS: There were no complications.  DIAGNOSTIC RECOMMENDATIONS: The patient's diuretic regimenshould be  carefully increased.  Prepared and signed by  Edward Cardona M.D.  Signed 12/12/2019 13:09:42      Imaging:    CXR: Personally reviewed    Labs: Personally reviewed                        16.9   9.66  )-----------( 216      ( 28 Jul 2023 20:34 )             51.4     07-28    135  |  97  |  16  ----------------------------<  425<H>  4.9   |  23  |  1.10    Ca    9.4      28 Jul 2023 20:34  Mg     2.3     07-28    TPro  7.6  /  Alb  4.0  /  TBili  0.3  /  DBili  x   /  AST  34  /  ALT  47<H>  /  AlkPhos  92  07-28            Thyroid Stimulating Hormone, Serum: 1.40 uIU/mL (07-28 @ 20:34)      CARDIAC MARKERS ( 28 Jul 2023 21:40 )  40 ng/L / x     / x     / x     / x     / x      CARDIAC MARKERS ( 28 Jul 2023 20:34 )  39 ng/L / x     / x     / x     / x     / x          No Known Allergies    acetaminophen     Tablet .. 650 milliGRAM(s) Oral every 6 hours PRN  albuterol    90 MICROgram(s) HFA Inhaler 2 Puff(s) Inhalation every 6 hours PRN  aluminum hydroxide/magnesium hydroxide/simethicone Suspension 30 milliLiter(s) Oral every 4 hours PRN  budesonide 160 MICROgram(s)/formoterol 4.5 MICROgram(s) Inhaler 2 Puff(s) Inhalation two times a day  carvedilol 25 milliGRAM(s) Oral every 12 hours  dextrose 5%. 1000 milliLiter(s) IV Continuous <Continuous>  dextrose 5%. 1000 milliLiter(s) IV Continuous <Continuous>  dextrose 50% Injectable 25 Gram(s) IV Push once  dextrose 50% Injectable 12.5 Gram(s) IV Push once  dextrose Oral Gel 15 Gram(s) Oral once PRN  enoxaparin Injectable 40 milliGRAM(s) SubCutaneous every 12 hours  furosemide   Injectable 80 milliGRAM(s) IV Push two times a day  glucagon  Injectable 1 milliGRAM(s) IntraMuscular once  hydrALAZINE 25 milliGRAM(s) Oral every 8 hours  insulin glargine Injectable (LANTUS) 30 Unit(s) SubCutaneous at bedtime  insulin lispro (ADMELOG) corrective regimen sliding scale   SubCutaneous three times a day before meals  insulin lispro (ADMELOG) corrective regimen sliding scale   SubCutaneous at bedtime  insulin lispro Injectable (ADMELOG) 8 Unit(s) SubCutaneous three times a day before meals  insulin NPH human recombinant 12 Unit(s) SubCutaneous once  isosorbide   mononitrate ER Tablet (IMDUR) 60 milliGRAM(s) Oral daily  melatonin 3 milliGRAM(s) Oral at bedtime PRN  ondansetron Injectable 4 milliGRAM(s) IV Push every 8 hours PRN  sacubitril 24 mG/valsartan 26 mG 1 Tablet(s) Oral two times a day  senna 1 Tablet(s) Oral at bedtime  spironolactone 25 milliGRAM(s) Oral daily    T(F): 98.9 (07-29), Max: 99 (07-28)  HR: 86 (07-29) (86 - 106)  BP: 132/88 (07-29) (132/88 - 163/118)  RR: 18 (07-29)  SpO2: 93% (07-29) Patient seen and evaluated at bedside    Chief Complaint: Shortness of breath/orthopnea    HPI:  Patient is a 44 y/o male with PMHx of HTN, HLD, HFref EF 20% , DMII ,presents for chest pain/shortness of breath over the past several days.   Patient reports midsternal sharp chest pain , occurring randomly , not worsened with deep breathing or exertion , non radiating. He also reports increased dyspnea with exertion and orthopnea , currently sleeping in recliner for the past 4 weeks. HE now gets short of breath with tying his shoes. He reports minimal lower extremity edema , but feels tightness in he ankles. He reports " hot flashes", but no fever , he reports intermittent non productvie cough.   He also complains of bilateral lower abdominal and groin pain as well as pain in bilateral flanks, symptoms have been present on and off for years but worse over the past several days.    HE says that he has been compliant with his medications but recently had a mix up with his PCP and had stopped taking some of his meds for 5 days (7/19->7/25). He is unsure which meds he was not taking.     PMHx:   No pertinent past medical history    HTN (hypertension)    Asthma    Congestive heart failure (CHF)    Type 2 diabetes mellitus    Hyperlipidemia        PSHx:   No significant past surgical history    No significant past surgical history        Allergies:  · 	hydrALAZINE 25 mg oral tablet: Last Dose Taken:  , 1 tab(s) orally every 8 hours  · 	senna leaf extract oral tablet: Last Dose Taken:  , 1 tab(s) orally once a day (at bedtime)  · 	spironolactone 25 mg oral tablet: Last Dose Taken:  , 1 tab(s) orally once a day  · 	furosemide 80 mg oral tablet: Last Dose Taken:  , 1 tab(s) orally every 12 hours  · 	empagliflozin 10 mg oral tablet: Last Dose Taken:  , 1 tab(s) orally once a day (in the morning)  · 	sacubitril-valsartan 24 mg-26 mg oral tablet: Last Dose Taken:  , 1 tab(s) orally every 12 hours  · 	budesonide-formoterol 160 mcg-4.5 mcg/inh inhalation aerosol: Last Dose Taken:  , 2 puff(s) inhaled 2 times a day  	  	NOTE: patient ran out of meds / not on home meds since ~May 2022. current list based on Feb 2022 Outpatient List as originally found on [facility].  · 	albuterol 90 mcg/inh inhalation aerosol: Last Dose Taken:  , 2 puff(s) inhaled every 6 hours, As needed, Shortness of Breath and/or Wheezing  	  	NOTE: patient ran out of meds / not on home meds since ~May 2022. current list based on Feb 2022 Outpatient List as originally found on [facility].  · 	Imdur 60 mg oral tablet, extended release: 1 orally once a day  · 	glipizide-metformin 5 mg-500 mg oral tablet: 1 orally 2 times a day  · 	carvedilol 25 mg oral tablet: Last Dose Taken:  , 1 orally 2 times a day          Current Medications:   acetaminophen     Tablet .. 650 milliGRAM(s) Oral every 6 hours PRN  albuterol    90 MICROgram(s) HFA Inhaler 2 Puff(s) Inhalation every 6 hours PRN  aluminum hydroxide/magnesium hydroxide/simethicone Suspension 30 milliLiter(s) Oral every 4 hours PRN  budesonide 160 MICROgram(s)/formoterol 4.5 MICROgram(s) Inhaler 2 Puff(s) Inhalation two times a day  carvedilol 25 milliGRAM(s) Oral every 12 hours  dextrose 5%. 1000 milliLiter(s) IV Continuous <Continuous>  dextrose 5%. 1000 milliLiter(s) IV Continuous <Continuous>  dextrose 50% Injectable 25 Gram(s) IV Push once  dextrose 50% Injectable 12.5 Gram(s) IV Push once  dextrose Oral Gel 15 Gram(s) Oral once PRN  enoxaparin Injectable 40 milliGRAM(s) SubCutaneous every 12 hours  furosemide   Injectable 80 milliGRAM(s) IV Push two times a day  glucagon  Injectable 1 milliGRAM(s) IntraMuscular once  hydrALAZINE 25 milliGRAM(s) Oral every 8 hours  insulin glargine Injectable (LANTUS) 30 Unit(s) SubCutaneous at bedtime  insulin lispro (ADMELOG) corrective regimen sliding scale   SubCutaneous three times a day before meals  insulin lispro (ADMELOG) corrective regimen sliding scale   SubCutaneous at bedtime  insulin lispro Injectable (ADMELOG) 8 Unit(s) SubCutaneous three times a day before meals  insulin NPH human recombinant 12 Unit(s) SubCutaneous once  isosorbide   mononitrate ER Tablet (IMDUR) 60 milliGRAM(s) Oral daily  melatonin 3 milliGRAM(s) Oral at bedtime PRN  ondansetron Injectable 4 milliGRAM(s) IV Push every 8 hours PRN  sacubitril 24 mG/valsartan 26 mG 1 Tablet(s) Oral two times a day  senna 1 Tablet(s) Oral at bedtime  spironolactone 25 milliGRAM(s) Oral daily      FAMILY HISTORY:  FH: HTN (hypertension)        Social History:  PRevious smoker    REVIEW OF SYSTEMS:  Constitutional:     [ ] negative [ ] fevers [ ] chills [ ] weight loss [ ] weight gain  HEENT:                  [ ] negative [ ] dry eyes [ ] eye irritation [ ] postnasal drip [ ] nasal congestion  CV:                         [ ] negative  [ ] chest pain [ ] orthopnea [ ] palpitations [ ] murmur  Resp:                     [ ] negative [ ] cough [ ] shortness of breath [ ] dyspnea [ ] wheezing [ ] sputum [ ]hemoptysis  GI:                          [ ] negative [ ] nausea [ ] vomiting [ ] diarrhea [ ] constipation [ ] abd pain [ ] dysphagia   :                        [ ] negative [ ] dysuria [ ] nocturia [ ] hematuria [ ] increased urinary frequency  Musculoskeletal: [ ] negative [ ] back pain [ ] myalgias [ ] arthralgias [ ] fracture  Skin:                       [ ] negative [ ] rash [ ] itch  Neurological:        [ ] negative [ ] headache [ ] dizziness [ ] syncope [ ] weakness [ ] numbness  Psychiatric:           [ ] negative [ ] anxiety [ ] depression  Endocrine:            [ ] negative [ ] diabetes [ ] thyroid problem  Heme/Lymph:      [ ] negative [ ] anemia [ ] bleeding problem  Allergic/Immune: [ ] negative [ ] itchy eyes [ ] nasal discharge [ ] hives [ ] angioedema    [ ] All other systems negative  [ ] Unable to assess ROS due to      Physical Exam:  T(F): 98.9 (07-29), Max: 99 (07-28)  HR: 86 (07-29) (86 - 106)  BP: 132/88 (07-29) (132/88 - 163/118)  RR: 18 (07-29)  SpO2: 93% (07-29)  GENERAL: No acute distress, well-developed  HEAD:  Atraumatic, Normocephalic  ENT: EOMI, PERRLA, conjunctiva and sclera clear, Neck supple, No JVD, moist mucosa  CHEST/LUNG: Crackles in the lower lung fiellds  BACK: No spinal tenderness  HEART: Regular rate and rhythm; No murmurs, rubs, or gallops  ABDOMEN: Soft, Nontender, Nondistended; Bowel sounds present  EXTREMITIES:  Trace edema    Cardiovascular Diagnostic Testing:    ECG: Personally reviewed: Sinus tach, no St segment elevation or depression    Echo: Personally reviewed:    Patient name: JOSE ARMANDO SANTIAGO  YOB: 1980   Age: 42 (M)   MR#: [id number]  Study Date: 9/4/2022  Location: [address] [place], [place] [id number]: [facility]  Study quality: Technically difficult  Referring Physician: Fortunato Greene MD  Blood Pressure: 124/90 mmHg  Height: 180 cm  Weight: 113 kg  BSA: 2.3 m2  Heart Rate: 87 mmHg  ------------------------------------------------------------------------  PROCEDURE: Transthoracic echocardiogram with 2-D, M-Mode  and complete spectral and color flow Doppler. Verbal  consent was obtained for injection of  Ultrasonic Enhancing  Agent following a discussion of risks and benefits.  Following intravenous injection of Ultrasonic Enhancing  Agent, harmonic imaging was performed.  INDICATION: Heart failure, unspecified (I50.9)  ------------------------------------------------------------------------  Dimensions:    Normal Values:  LA:     5.2    2.0 - 4.0 cm  Ao:     3.6    2.0 - 3.8 cm  SEPTUM: 1.4    0.6 - 1.2 cm  PWT:    1.3    0.6 - 1.1 cm  LVIDd:  6.4    3.0 - 5.6 cm  LVIDs:  5.2    1.8 - 4.0 cm  Derived variables:  LVMI: 177 g/m2  RWT: 0.40  Fractional short: 19 %  EF (Modified Cote Rule): 25 %Doppler Peak Velocity  (m/sec): AoV=0.8  ------------------------------------------------------------------------  Observations:  Mitral Valve: Tethered mitral valve leaflets with normal  opening. Minimal mitral regurgitation.  Aortic Valve/Aorta: Normal trileaflet aortic valve. Peak  transaortic valve gradient equals 3 mm Hg. No aortic valve  regurgitation seen. Peak left ventricular outflow tract  gradient equals 1 mm Hg.  Aortic Root: 3.6 cm.  Left Atrium: Moderately dilated left atrium.  LA volume  index = 47 cc/m2.  Left Ventricle: Severe global left ventricular systolic  dysfunction.  Endocardial visualization enhanced with  intravenous injection of Ultrasonic Enhancing Agent  (Lumason). No left ventricular thrombus. Mild concentric  left ventricular hypertrophy. Moderate diastolic  dysfunction (Stage II).  Right Heart: Normal right atrium. Right ventricular  enlargement with decreased right ventricular systolic  function. Tethered tricuspid valve. Mild tricuspid  regurgitation. Normal pulmonic valve. Minimal pulmonic  regurgitation.  Pericardium/Pleura: Normal pericardium with no pericardial  effusion.  Hemodynamic: Estimated right atrial pressure is 8 mm Hg.  Estimated right ventricular systolic pressure equals 49 mm  Hg, assuming right atrial pressure equals 8 mm Hg,  consistent with mild pulmonary hypertension.  ------------------------------------------------------------------------  Conclusions:  1. Tethered mitral valve leaflets with normal opening.  Minimal mitral regurgitation.  2. Normal trileaflet aortic valve. No aortic valve  regurgitation seen.  3. Severe global left ventricular systolic dysfunction.  Endocardial visualization enhanced with intravenous  injection of Ultrasonic Enhancing Agent (Lumason). No left  ventricular thrombus.  4. Moderate diastolic dysfunction (Stage II).  5. Right ventricular enlargement with decreased right  ventricular systolic function.  6. Tethered tricuspid valve. Mild tricuspid regurgitation.  7. Estimated pulmonary artery systolic pressure equals 49  mm Hg, assuming right atrial pressure equals 8 mm Hg,  consistent with mild pulmonary pressures.  ------------------------------------------------------------------------  Confirmed on  9/4/2022 - 11:18:57 by Twin Chua M.D.  ------------------------------------------------------------------------      Stress Testing:    Cath:    CORONARY VESSELS: The coronary circulation is left dominant.  LM:   --  LM: Normal.  LAD:   --  Mid LAD: Angiography showed mild atherosclerosis with no flow  limiting lesions.  CX:   --  Circumflex: Normal.  RCA:   --  RCA: Normal.  COMPLICATIONS: There were no complications.  DIAGNOSTIC RECOMMENDATIONS: The patient's diuretic regimenshould be  carefully increased.  Prepared and signed by  Edward Cardona M.D.  Signed 12/12/2019 13:09:42      Imaging:    CXR: Personally reviewed    Labs: Personally reviewed                        16.9   9.66  )-----------( 216      ( 28 Jul 2023 20:34 )             51.4     07-28    135  |  97  |  16  ----------------------------<  425<H>  4.9   |  23  |  1.10    Ca    9.4      28 Jul 2023 20:34  Mg     2.3     07-28    TPro  7.6  /  Alb  4.0  /  TBili  0.3  /  DBili  x   /  AST  34  /  ALT  47<H>  /  AlkPhos  92  07-28            Thyroid Stimulating Hormone, Serum: 1.40 uIU/mL (07-28 @ 20:34)      CARDIAC MARKERS ( 28 Jul 2023 21:40 )  40 ng/L / x     / x     / x     / x     / x      CARDIAC MARKERS ( 28 Jul 2023 20:34 )  39 ng/L / x     / x     / x     / x     / x          No Known Allergies    acetaminophen     Tablet .. 650 milliGRAM(s) Oral every 6 hours PRN  albuterol    90 MICROgram(s) HFA Inhaler 2 Puff(s) Inhalation every 6 hours PRN  aluminum hydroxide/magnesium hydroxide/simethicone Suspension 30 milliLiter(s) Oral every 4 hours PRN  budesonide 160 MICROgram(s)/formoterol 4.5 MICROgram(s) Inhaler 2 Puff(s) Inhalation two times a day  carvedilol 25 milliGRAM(s) Oral every 12 hours  dextrose 5%. 1000 milliLiter(s) IV Continuous <Continuous>  dextrose 5%. 1000 milliLiter(s) IV Continuous <Continuous>  dextrose 50% Injectable 25 Gram(s) IV Push once  dextrose 50% Injectable 12.5 Gram(s) IV Push once  dextrose Oral Gel 15 Gram(s) Oral once PRN  enoxaparin Injectable 40 milliGRAM(s) SubCutaneous every 12 hours  furosemide   Injectable 80 milliGRAM(s) IV Push two times a day  glucagon  Injectable 1 milliGRAM(s) IntraMuscular once  hydrALAZINE 25 milliGRAM(s) Oral every 8 hours  insulin glargine Injectable (LANTUS) 30 Unit(s) SubCutaneous at bedtime  insulin lispro (ADMELOG) corrective regimen sliding scale   SubCutaneous three times a day before meals  insulin lispro (ADMELOG) corrective regimen sliding scale   SubCutaneous at bedtime  insulin lispro Injectable (ADMELOG) 8 Unit(s) SubCutaneous three times a day before meals  insulin NPH human recombinant 12 Unit(s) SubCutaneous once  isosorbide   mononitrate ER Tablet (IMDUR) 60 milliGRAM(s) Oral daily  melatonin 3 milliGRAM(s) Oral at bedtime PRN  ondansetron Injectable 4 milliGRAM(s) IV Push every 8 hours PRN  sacubitril 24 mG/valsartan 26 mG 1 Tablet(s) Oral two times a day  senna 1 Tablet(s) Oral at bedtime  spironolactone 25 milliGRAM(s) Oral daily    T(F): 98.9 (07-29), Max: 99 (07-28)  HR: 86 (07-29) (86 - 106)  BP: 132/88 (07-29) (132/88 - 163/118)  RR: 18 (07-29)  SpO2: 93% (07-29)

## 2023-07-29 NOTE — CHART NOTE - NSCHARTNOTEFT_GEN_A_CORE
42 y/o male with PMHx of HTN, HLD, HFref EF 20%, DMII presents with chest pain and sob over several days admitted for acute on chronic congestive heart failure exacerbation complicated with uncontrolled diabetes with sugars up to 460s. History of poor home meds compliance including lasix    Patient refusing SSI insulins and FS. Patient feels frequent finger sticks and short acting insulin unnecessary. >20 minutes spent on educating the importance of starting basal bolus insulin to help control sugars.     Plan  -cardiology consulted  -increased lasix to 80mg IV BID. Strict I/Os and daily weights  -s/p 12U NPH and 8U admelog  -start 30U lantus bedtime with 8U premeals  -check bmp, vbg with lactate, and bhb to rule out dka  -endocrinology consult    d/w ACP  Maxine Ramirez MD  Division of Hospital Medicine  Available on [employer] Teams

## 2023-07-29 NOTE — H&P ADULT - PROBLEM SELECTOR PLAN 2
worsened orthopnea , PND and ALAS , BNP not markedly elevated and minimal edema ,will continue diuresis and obtain echo    IV lasix   - monitor  mag and K, replete as needed  - monitor on telemetry  - strict ins and outs  - daily weight  -  not on  ASA or  Statin ( NICM)   - continue carvedilol   - continue Entresto  - continue spironolactone and IMdur   - holding Jardiance    - echocardiogram   - Cardiology/ HF consult to be arranged by day team  - dash diet

## 2023-07-29 NOTE — H&P ADULT - PROBLEM SELECTOR PLAN 3
reports stopped diabetes medications for two weeks due to miscommunication with medical provider     - Hold oral hypoglycemics  - insulin correction scale  - check fsg qac/qhs  - check a1c in am, IF > 9% will require endocrine consult   - consistent carb diet

## 2023-07-29 NOTE — CONSULT NOTE ADULT - ATTENDING COMMENTS
Agree with plan as outlined above.  dCHF + likely virus  Gentle diuresis                                                                                           Eighty minutes spent in direct patient care and communication

## 2023-07-29 NOTE — H&P ADULT - PROBLEM SELECTOR PLAN 4
abdomen benign on exam , UA negative for infection , deferred scrotal exam , will check US renal for stones , if non revealing can consider CT AP   - Us renal

## 2023-07-30 DIAGNOSIS — E11.65 TYPE 2 DIABETES MELLITUS WITH HYPERGLYCEMIA: ICD-10-CM

## 2023-07-30 DIAGNOSIS — E78.5 HYPERLIPIDEMIA, UNSPECIFIED: ICD-10-CM

## 2023-07-30 LAB
A1C WITH ESTIMATED AVERAGE GLUCOSE RESULT: 13.5 % — HIGH (ref 4–5.6)
A1C WITH ESTIMATED AVERAGE GLUCOSE RESULT: 13.6 % — HIGH (ref 4–5.6)
ANION GAP SERPL CALC-SCNC: 17 MMOL/L — SIGNIFICANT CHANGE UP (ref 5–17)
BASE EXCESS BLDV CALC-SCNC: 4.5 MMOL/L — HIGH (ref -2–3)
BUN SERPL-MCNC: 22 MG/DL — SIGNIFICANT CHANGE UP (ref 7–23)
CA-I SERPL-SCNC: 1.21 MMOL/L — SIGNIFICANT CHANGE UP (ref 1.15–1.33)
CALCIUM SERPL-MCNC: 9.8 MG/DL — SIGNIFICANT CHANGE UP (ref 8.4–10.5)
CHLORIDE BLDV-SCNC: 95 MMOL/L — LOW (ref 96–108)
CHLORIDE SERPL-SCNC: 97 MMOL/L — SIGNIFICANT CHANGE UP (ref 96–108)
CO2 BLDV-SCNC: 34 MMOL/L — HIGH (ref 22–26)
CO2 SERPL-SCNC: 26 MMOL/L — SIGNIFICANT CHANGE UP (ref 22–31)
CREAT SERPL-MCNC: 1.2 MG/DL — SIGNIFICANT CHANGE UP (ref 0.5–1.3)
EGFR: 76 ML/MIN/1.73M2 — SIGNIFICANT CHANGE UP
ESTIMATED AVERAGE GLUCOSE: 341 MG/DL — HIGH (ref 68–114)
ESTIMATED AVERAGE GLUCOSE: 344 MG/DL — HIGH (ref 68–114)
GAS PNL BLDV: 134 MMOL/L — LOW (ref 136–145)
GAS PNL BLDV: SIGNIFICANT CHANGE UP
GAS PNL BLDV: SIGNIFICANT CHANGE UP
GLUCOSE BLDC GLUCOMTR-MCNC: 261 MG/DL — HIGH (ref 70–99)
GLUCOSE BLDC GLUCOMTR-MCNC: 296 MG/DL — HIGH (ref 70–99)
GLUCOSE BLDC GLUCOMTR-MCNC: 309 MG/DL — HIGH (ref 70–99)
GLUCOSE BLDC GLUCOMTR-MCNC: 328 MG/DL — HIGH (ref 70–99)
GLUCOSE BLDV-MCNC: 323 MG/DL — HIGH (ref 70–99)
GLUCOSE SERPL-MCNC: 297 MG/DL — HIGH (ref 70–99)
HCO3 BLDV-SCNC: 32 MMOL/L — HIGH (ref 22–29)
HCT VFR BLD CALC: 53.9 % — HIGH (ref 39–50)
HCT VFR BLDA CALC: 54 % — HIGH (ref 39–51)
HGB BLD CALC-MCNC: 18.1 G/DL — HIGH (ref 12.6–17.4)
HGB BLD-MCNC: 18.2 G/DL — HIGH (ref 13–17)
LACTATE BLDV-MCNC: 1.7 MMOL/L — SIGNIFICANT CHANGE UP (ref 0.5–2)
MAGNESIUM SERPL-MCNC: 2.4 MG/DL — SIGNIFICANT CHANGE UP (ref 1.6–2.6)
MCHC RBC-ENTMCNC: 29.7 PG — SIGNIFICANT CHANGE UP (ref 27–34)
MCHC RBC-ENTMCNC: 33.8 GM/DL — SIGNIFICANT CHANGE UP (ref 32–36)
MCV RBC AUTO: 88.1 FL — SIGNIFICANT CHANGE UP (ref 80–100)
NRBC # BLD: 0 /100 WBCS — SIGNIFICANT CHANGE UP (ref 0–0)
PCO2 BLDV: 55 MMHG — SIGNIFICANT CHANGE UP (ref 42–55)
PH BLDV: 7.37 — SIGNIFICANT CHANGE UP (ref 7.32–7.43)
PLATELET # BLD AUTO: 230 K/UL — SIGNIFICANT CHANGE UP (ref 150–400)
PO2 BLDV: 29 MMHG — SIGNIFICANT CHANGE UP (ref 25–45)
POTASSIUM BLDV-SCNC: 3.6 MMOL/L — SIGNIFICANT CHANGE UP (ref 3.5–5.1)
POTASSIUM SERPL-MCNC: 3.8 MMOL/L — SIGNIFICANT CHANGE UP (ref 3.5–5.3)
POTASSIUM SERPL-SCNC: 3.8 MMOL/L — SIGNIFICANT CHANGE UP (ref 3.5–5.3)
RAPID RVP RESULT: SIGNIFICANT CHANGE UP
RBC # BLD: 6.12 M/UL — HIGH (ref 4.2–5.8)
RBC # FLD: 13.6 % — SIGNIFICANT CHANGE UP (ref 10.3–14.5)
SAO2 % BLDV: 52.5 % — LOW (ref 67–88)
SARS-COV-2 RNA SPEC QL NAA+PROBE: SIGNIFICANT CHANGE UP
SODIUM SERPL-SCNC: 140 MMOL/L — SIGNIFICANT CHANGE UP (ref 135–145)
WBC # BLD: 11.27 K/UL — HIGH (ref 3.8–10.5)
WBC # FLD AUTO: 11.27 K/UL — HIGH (ref 3.8–10.5)

## 2023-07-30 PROCEDURE — 99255 IP/OBS CONSLTJ NEW/EST HI 80: CPT | Mod: GC

## 2023-07-30 PROCEDURE — 99233 SBSQ HOSP IP/OBS HIGH 50: CPT

## 2023-07-30 RX ORDER — INSULIN GLARGINE 100 [IU]/ML
38 INJECTION, SOLUTION SUBCUTANEOUS AT BEDTIME
Refills: 0 | Status: DISCONTINUED | OUTPATIENT
Start: 2023-07-30 | End: 2023-07-31

## 2023-07-30 RX ORDER — INSULIN LISPRO 100/ML
14 VIAL (ML) SUBCUTANEOUS
Refills: 0 | Status: DISCONTINUED | OUTPATIENT
Start: 2023-07-30 | End: 2023-08-01

## 2023-07-30 RX ADMIN — Medication 6: at 12:06

## 2023-07-30 RX ADMIN — Medication 6: at 18:03

## 2023-07-30 RX ADMIN — Medication 8: at 07:52

## 2023-07-30 RX ADMIN — ISOSORBIDE MONONITRATE 60 MILLIGRAM(S): 60 TABLET, EXTENDED RELEASE ORAL at 12:08

## 2023-07-30 RX ADMIN — BUDESONIDE AND FORMOTEROL FUMARATE DIHYDRATE 2 PUFF(S): 160; 4.5 AEROSOL RESPIRATORY (INHALATION) at 17:12

## 2023-07-30 RX ADMIN — Medication 80 MILLIGRAM(S): at 06:47

## 2023-07-30 RX ADMIN — Medication 4: at 21:49

## 2023-07-30 RX ADMIN — INSULIN GLARGINE 38 UNIT(S): 100 INJECTION, SOLUTION SUBCUTANEOUS at 22:14

## 2023-07-30 RX ADMIN — Medication 25 MILLIGRAM(S): at 21:49

## 2023-07-30 RX ADMIN — SENNA PLUS 1 TABLET(S): 8.6 TABLET ORAL at 22:16

## 2023-07-30 RX ADMIN — SACUBITRIL AND VALSARTAN 1 TABLET(S): 24; 26 TABLET, FILM COATED ORAL at 17:11

## 2023-07-30 RX ADMIN — ENOXAPARIN SODIUM 40 MILLIGRAM(S): 100 INJECTION SUBCUTANEOUS at 17:12

## 2023-07-30 RX ADMIN — Medication 25 MILLIGRAM(S): at 14:19

## 2023-07-30 RX ADMIN — Medication 14 UNIT(S): at 18:04

## 2023-07-30 RX ADMIN — BUDESONIDE AND FORMOTEROL FUMARATE DIHYDRATE 2 PUFF(S): 160; 4.5 AEROSOL RESPIRATORY (INHALATION) at 06:14

## 2023-07-30 RX ADMIN — Medication 8 UNIT(S): at 07:52

## 2023-07-30 RX ADMIN — Medication 8 UNIT(S): at 12:06

## 2023-07-30 RX ADMIN — CARVEDILOL PHOSPHATE 25 MILLIGRAM(S): 80 CAPSULE, EXTENDED RELEASE ORAL at 17:12

## 2023-07-30 NOTE — PROGRESS NOTE ADULT - ASSESSMENT
44 y/o male with PMHx of HTN, HLD, HFref EF 20% , DMII ,presents for chest pain over the past several days admitted for chf exacerbation and hyperglycemia 2/2 medication noncompliance

## 2023-07-30 NOTE — CONSULT NOTE ADULT - ATTENDING COMMENTS
43M DM2 CHF HbA1c 13.5% after stopping his DM meds (misunderstood PCP instructions) just restarted them few days prior to admission.  Discussed that given A1c would likely require insulin based plan on dc, ideally combined with cardioprotective meds (GLP1, SGLT2i). Patient states he is needle phobic. Would try to encourage using basal, GLP1, SGLT2. Explained that sulfonylurea not ideal med. He may want to resume prior oral agents encouraged to consider other options including injectables. Also reinforced importance of endocrinology follow up. He prefers to ask pcp for referral and not have appointment scheduled for him. Endocrine team consulted for uncontrolled diabetes. Patient is high risk with high level decision making due to uncontrolled diabetes which places patient at high risk for cardiovascular and cerebrovascular events. Patient with lability of glucose requiring close monitoring and insulin adjustments.    Neptali Grant MD  Division of Endocrinology  Pager: [phone]    If after 6PM or before 9AM, or on weekends/holidays, please call endocrine answering service for assistance ([phone]).  For nonurgent matters email [email or web address] for assistance.

## 2023-07-30 NOTE — CONSULT NOTE ADULT - SUBJECTIVE AND OBJECTIVE BOX
ENDOCRINE INITIAL CONSULT -     HPI:  Patient is a 42 y/o male with PMHx of HTN, HLD, HFref EF 20% , DMII ,presents for chest pain over the past several days.   Patient reports midsternal sharp chest pain , occurring randomly , not worsened with deep breathing or exertion , non radiating. He also reports increased dyspnea with exertion and orthopnea , currently sleeping in chair , minimal lower extremity edema , but feels tightness in he ankles, he reports " hot flashes", but no fever , he reports intermittent non productvie cough. He also complains of bilateral lower abdominal and groin pain as well as pain in bilateral flanks, symptoms have been present on and off for years but worse over the past several days.     (29 Jul 2023 00:18)      ENDOCRINE HISTORY   Diagnosed with DM:   Last HbA1c: 13.5  Endocrinologist:   Home DM Meds:  Adherence:  Microvascular complications: Renal, opthalmologic, neuropathy  Macrovascular complications:  SMBG:  Symptoms:  Hypoglycemia episodes:  BG at home:  Diet at home:  Appetite in hospital:  Exercise:  PMHx:  PSHx:  Family hx:  Social hx:  Insurance:  Resides in:      PAST MEDICAL & SURGICAL HISTORY:  HTN (hypertension)      Asthma      Congestive heart failure (CHF)  EF 22%      Type 2 diabetes mellitus      Hyperlipidemia      No significant past surgical history          FAMILY HISTORY:  FH: HTN (hypertension)        Social History:  lives with family    no smoking    no alcohol (29 Jul 2023 00:18)      Home Medications:  albuterol 90 mcg/inh inhalation aerosol: 2 puff(s) inhaled every 6 hours, As needed, Shortness of Breath and/or Wheezing    NOTE: patient ran out of meds / not on home meds since ~May 2022. current list based on Feb 2022 Outpatient List as originally found on sunrise. (29 Jul 2023 00:48)  budesonide-formoterol 160 mcg-4.5 mcg/inh inhalation aerosol: 2 puff(s) inhaled 2 times a day    NOTE: patient ran out of meds / not on home meds since ~May 2022. current list based on Feb 2022 Outpatient List as originally found on sunrise. (29 Jul 2023 00:48)  carvedilol 25 mg oral tablet: 1 orally 2 times a day (29 Jul 2023 00:48)  glipizide-metformin 5 mg-500 mg oral tablet: 1 orally 2 times a day (29 Jul 2023 00:48)  Imdur 60 mg oral tablet, extended release: 1 orally once a day (29 Jul 2023 00:48)      MEDICATIONS  (STANDING):  budesonide 160 MICROgram(s)/formoterol 4.5 MICROgram(s) Inhaler 2 Puff(s) Inhalation two times a day  carvedilol 25 milliGRAM(s) Oral every 12 hours  dextrose 5%. 1000 milliLiter(s) (50 mL/Hr) IV Continuous <Continuous>  dextrose 5%. 1000 milliLiter(s) (100 mL/Hr) IV Continuous <Continuous>  dextrose 50% Injectable 25 Gram(s) IV Push once  dextrose 50% Injectable 12.5 Gram(s) IV Push once  enoxaparin Injectable 40 milliGRAM(s) SubCutaneous every 12 hours  furosemide   Injectable 80 milliGRAM(s) IV Push two times a day  glucagon  Injectable 1 milliGRAM(s) IntraMuscular once  hydrALAZINE 25 milliGRAM(s) Oral every 8 hours  insulin glargine Injectable (LANTUS) 30 Unit(s) SubCutaneous at bedtime  insulin lispro (ADMELOG) corrective regimen sliding scale   SubCutaneous at bedtime  insulin lispro (ADMELOG) corrective regimen sliding scale   SubCutaneous three times a day before meals  insulin lispro Injectable (ADMELOG) 8 Unit(s) SubCutaneous three times a day before meals  isosorbide   mononitrate ER Tablet (IMDUR) 60 milliGRAM(s) Oral daily  sacubitril 24 mG/valsartan 26 mG 1 Tablet(s) Oral two times a day  senna 1 Tablet(s) Oral at bedtime  spironolactone 25 milliGRAM(s) Oral daily    MEDICATIONS  (PRN):  acetaminophen     Tablet .. 650 milliGRAM(s) Oral every 6 hours PRN Temp greater or equal to 38C (100.4F), Mild Pain (1 - 3)  albuterol    90 MICROgram(s) HFA Inhaler 2 Puff(s) Inhalation every 6 hours PRN Shortness of Breath and/or Wheezing  aluminum hydroxide/magnesium hydroxide/simethicone Suspension 30 milliLiter(s) Oral every 4 hours PRN Dyspepsia  dextrose Oral Gel 15 Gram(s) Oral once PRN Blood Glucose LESS THAN 70 milliGRAM(s)/deciliter  melatonin 3 milliGRAM(s) Oral at bedtime PRN Insomnia  ondansetron Injectable 4 milliGRAM(s) IV Push every 8 hours PRN Nausea and/or Vomiting      Allergies    No Known Allergies    Intolerances        REVIEW OF SYSTEMS  Constitutional: No fever  Eyes: No blurry vision  Neuro: No tremors  HEENT: No pain  Cardiovascular: No chest pain, palpitations  Respiratory: No SOB, no cough  GI: No nausea, vomiting, abdominal pain  : No dysuria  Skin: no rash  Psych: no depression  Endocrine: no polyuria, polydipsia  Hem/lymph: no swelling  Osteoporosis: no fractures  ALL OTHER SYSTEMS REVIEWED AND NEGATIVE     UNABLE TO OBTAIN     PHYSICAL EXAM   Vital Signs Last 24 Hrs  T(C): 36.9 (29 Jul 2023 20:26), Max: 37.2 (29 Jul 2023 11:02)  T(F): 98.4 (29 Jul 2023 20:26), Max: 98.9 (29 Jul 2023 11:02)  HR: 78 (30 Jul 2023 06:47) (78 - 91)  BP: 103/67 (30 Jul 2023 06:47) (93/63 - 132/88)  BP(mean): --  RR: 18 (29 Jul 2023 20:26) (18 - 18)  SpO2: 94% (29 Jul 2023 20:26) (93% - 94%)    Parameters below as of 29 Jul 2023 20:26  Patient On (Oxygen Delivery Method): room air      GENERAL: NAD, well-groomed, well-developed  EYES: No proptosis, no lid lag, anicteric  HEENT:  Atraumatic, Normocephalic, moist mucous membranes  THYROID: Normal size, no palpable nodules  RESPIRATORY: Clear to auscultation bilaterally; No rales, rhonchi, wheezing  CARDIOVASCULAR: Regular rate and rhythm; No murmurs; no peripheral edema  GI: Soft, nontender, non distended, normal bowel sounds  SKIN: Dry, intact, No rashes or lesions  MUSCULOSKELETAL: Full range of motion, normal strength  NEURO: sensation intact, extraocular movements intact, no tremor  PSYCH: Alert and oriented x 3, normal affect, normal mood  CUSHING'S SIGNS: no striae    CAPILLARY BLOOD GLUCOSE      POCT Blood Glucose.: 328 mg/dL (30 Jul 2023 07:50)  POCT Blood Glucose.: 343 mg/dL (29 Jul 2023 22:29)  POCT Blood Glucose.: 288 mg/dL (29 Jul 2023 17:33)  POCT Blood Glucose.: 461 mg/dL (29 Jul 2023 13:00)  POCT Blood Glucose.: 395 mg/dL (29 Jul 2023 11:27)      A1C with Estimated Average Glucose Result: 13.5 % (07-29-23 @ 16:37)                            16.9   9.66  )-----------( 216      ( 28 Jul 2023 20:34 )             51.4       07-29    134<L>  |  97  |  24<H>  ----------------------------<  346<H>  4.1   |  23  |  1.35<H>    Ca    9.4      29 Jul 2023 16:37  Mg     2.3     07-28    TPro  7.6  /  Alb  4.0  /  TBili  0.3  /  DBili  x   /  AST  34  /  ALT  47<H>  /  AlkPhos  92  07-28      Thyroid Stimulating Hormone, Serum: 1.40 uIU/mL (07-28-23 @ 20:34)      LIPIDS    RADIOLOGY ENDOCRINE INITIAL CONSULT - diabetes    HPI:  Patient is a 42 y/o male with PMHx of HTN, HLD, HFref EF 20% , DMII ,presents for chest pain over the past several days.   Patient reports midsternal sharp chest pain , occurring randomly , not worsened with deep breathing or exertion , non radiating. He also reports increased dyspnea with exertion and orthopnea , currently sleeping in chair , minimal lower extremity edema , but feels tightness in he ankles, he reports " hot flashes", but no fever , he reports intermittent non productvie cough. He also complains of bilateral lower abdominal and groin pain as well as pain in bilateral flanks, symptoms have been present on and off for years but worse over the past several days.     (29 Jul 2023 00:18)      ENDOCRINE HISTORY   Diagnosed with DM: 2, 2019  Last HbA1c: 13.5  Endocrinologist: does not have  Home DM Meds: reports off of medications since 5/23 as confusion as to what to take, restarted medications 7/25/23 but unsure of names, unsure of medications  Microvascular complications: denies retinopathy, neuropathy, nephropathy  Macrovascular complications: denies MI or CVA  SMBG: does not have glucometer so has not been able to check sugars  Symptoms: denies polyuria, polydipsia  Hypoglycemia episodes:  BG at home:  Diet at home:  Appetite in hospital:  Exercise:  PMHx:  PSHx:  Family hx:  Social hx:  Insurance:  Resides in:      PAST MEDICAL & SURGICAL HISTORY:  HTN (hypertension)      Asthma      Congestive heart failure (CHF)  EF 22%      Type 2 diabetes mellitus      Hyperlipidemia      No significant past surgical history          FAMILY HISTORY:  FH: HTN (hypertension)        Social History:  lives with family    no smoking    no alcohol (29 Jul 2023 00:18)      Home Medications:  albuterol 90 mcg/inh inhalation aerosol: 2 puff(s) inhaled every 6 hours, As needed, Shortness of Breath and/or Wheezing    NOTE: patient ran out of meds / not on home meds since ~May 2022. current list based on Feb 2022 Outpatient List as originally found on sunrise. (29 Jul 2023 00:48)  budesonide-formoterol 160 mcg-4.5 mcg/inh inhalation aerosol: 2 puff(s) inhaled 2 times a day    NOTE: patient ran out of meds / not on home meds since ~May 2022. current list based on Feb 2022 Outpatient List as originally found on sunrise. (29 Jul 2023 00:48)  carvedilol 25 mg oral tablet: 1 orally 2 times a day (29 Jul 2023 00:48)  glipizide-metformin 5 mg-500 mg oral tablet: 1 orally 2 times a day (29 Jul 2023 00:48)  Imdur 60 mg oral tablet, extended release: 1 orally once a day (29 Jul 2023 00:48)      MEDICATIONS  (STANDING):  budesonide 160 MICROgram(s)/formoterol 4.5 MICROgram(s) Inhaler 2 Puff(s) Inhalation two times a day  carvedilol 25 milliGRAM(s) Oral every 12 hours  dextrose 5%. 1000 milliLiter(s) (50 mL/Hr) IV Continuous <Continuous>  dextrose 5%. 1000 milliLiter(s) (100 mL/Hr) IV Continuous <Continuous>  dextrose 50% Injectable 25 Gram(s) IV Push once  dextrose 50% Injectable 12.5 Gram(s) IV Push once  enoxaparin Injectable 40 milliGRAM(s) SubCutaneous every 12 hours  furosemide   Injectable 80 milliGRAM(s) IV Push two times a day  glucagon  Injectable 1 milliGRAM(s) IntraMuscular once  hydrALAZINE 25 milliGRAM(s) Oral every 8 hours  insulin glargine Injectable (LANTUS) 30 Unit(s) SubCutaneous at bedtime  insulin lispro (ADMELOG) corrective regimen sliding scale   SubCutaneous at bedtime  insulin lispro (ADMELOG) corrective regimen sliding scale   SubCutaneous three times a day before meals  insulin lispro Injectable (ADMELOG) 8 Unit(s) SubCutaneous three times a day before meals  isosorbide   mononitrate ER Tablet (IMDUR) 60 milliGRAM(s) Oral daily  sacubitril 24 mG/valsartan 26 mG 1 Tablet(s) Oral two times a day  senna 1 Tablet(s) Oral at bedtime  spironolactone 25 milliGRAM(s) Oral daily    MEDICATIONS  (PRN):  acetaminophen     Tablet .. 650 milliGRAM(s) Oral every 6 hours PRN Temp greater or equal to 38C (100.4F), Mild Pain (1 - 3)  albuterol    90 MICROgram(s) HFA Inhaler 2 Puff(s) Inhalation every 6 hours PRN Shortness of Breath and/or Wheezing  aluminum hydroxide/magnesium hydroxide/simethicone Suspension 30 milliLiter(s) Oral every 4 hours PRN Dyspepsia  dextrose Oral Gel 15 Gram(s) Oral once PRN Blood Glucose LESS THAN 70 milliGRAM(s)/deciliter  melatonin 3 milliGRAM(s) Oral at bedtime PRN Insomnia  ondansetron Injectable 4 milliGRAM(s) IV Push every 8 hours PRN Nausea and/or Vomiting      Allergies    No Known Allergies    Intolerances        REVIEW OF SYSTEMS  Constitutional: No fever  Eyes: No blurry vision  Neuro: No tremors  HEENT: No pain  Cardiovascular: No chest pain, palpitations  Respiratory: No SOB, no cough  GI: No nausea, vomiting, abdominal pain  : No dysuria  Skin: no rash  Psych: no depression  Endocrine: no polyuria, polydipsia  Hem/lymph: no swelling  Osteoporosis: no fractures  ALL OTHER SYSTEMS REVIEWED AND NEGATIVE     UNABLE TO OBTAIN     PHYSICAL EXAM   Vital Signs Last 24 Hrs  T(C): 36.9 (29 Jul 2023 20:26), Max: 37.2 (29 Jul 2023 11:02)  T(F): 98.4 (29 Jul 2023 20:26), Max: 98.9 (29 Jul 2023 11:02)  HR: 78 (30 Jul 2023 06:47) (78 - 91)  BP: 103/67 (30 Jul 2023 06:47) (93/63 - 132/88)  BP(mean): --  RR: 18 (29 Jul 2023 20:26) (18 - 18)  SpO2: 94% (29 Jul 2023 20:26) (93% - 94%)    Parameters below as of 29 Jul 2023 20:26  Patient On (Oxygen Delivery Method): room air      GENERAL: NAD, well-groomed, well-developed  EYES: No proptosis, no lid lag, anicteric  HEENT:  Atraumatic, Normocephalic, moist mucous membranes  THYROID: Normal size, no palpable nodules  RESPIRATORY: Clear to auscultation bilaterally; No rales, rhonchi, wheezing  CARDIOVASCULAR: Regular rate and rhythm; No murmurs; no peripheral edema  GI: Soft, nontender, non distended, normal bowel sounds  SKIN: Dry, intact, No rashes or lesions  MUSCULOSKELETAL: Full range of motion, normal strength  NEURO: sensation intact, extraocular movements intact, no tremor  PSYCH: Alert and oriented x 3, normal affect, normal mood  CUSHING'S SIGNS: no striae    CAPILLARY BLOOD GLUCOSE      POCT Blood Glucose.: 328 mg/dL (30 Jul 2023 07:50)  POCT Blood Glucose.: 343 mg/dL (29 Jul 2023 22:29)  POCT Blood Glucose.: 288 mg/dL (29 Jul 2023 17:33)  POCT Blood Glucose.: 461 mg/dL (29 Jul 2023 13:00)  POCT Blood Glucose.: 395 mg/dL (29 Jul 2023 11:27)      A1C with Estimated Average Glucose Result: 13.5 % (07-29-23 @ 16:37)                            16.9   9.66  )-----------( 216      ( 28 Jul 2023 20:34 )             51.4       07-29    134<L>  |  97  |  24<H>  ----------------------------<  346<H>  4.1   |  23  |  1.35<H>    Ca    9.4      29 Jul 2023 16:37  Mg     2.3     07-28    TPro  7.6  /  Alb  4.0  /  TBili  0.3  /  DBili  x   /  AST  34  /  ALT  47<H>  /  AlkPhos  92  07-28      Thyroid Stimulating Hormone, Serum: 1.40 uIU/mL (07-28-23 @ 20:34)      LIPIDS    RADIOLOGY ENDOCRINE INITIAL CONSULT - diabetes    HPI:  Patient is a 42 y/o male with PMHx of HTN, HLD, HFref EF 20% , DMII ,presents for chest pain over the past several days.   Patient reports midsternal sharp chest pain , occurring randomly , not worsened with deep breathing or exertion , non radiating. He also reports increased dyspnea with exertion and orthopnea , currently sleeping in chair , minimal lower extremity edema , but feels tightness in he ankles, he reports " hot flashes", but no fever , he reports intermittent non productvie cough. He also complains of bilateral lower abdominal and groin pain as well as pain in bilateral flanks, symptoms have been present on and off for years but worse over the past several days.     (29 Jul 2023 00:18)      ENDOCRINE HISTORY   Diagnosed with DM: 2, 2019  Last HbA1c: 13.5  Endocrinologist: does not have  Home DM Meds: reports off of medications since 5/23 as confusion as to what to take, restarted medications 7/25/23 but unsure of names, unsure of medications  Microvascular complications: denies retinopathy, neuropathy, nephropathy  Macrovascular complications: denies MI or CVA  SMBG: does not have glucometer so has not been able to check sugars  Symptoms: denies polyuria, polydipsia, endorses neuropathy in right pinky and 4th finger with numbness on left calf  Diet at home:  - Breakfast: skip  - Lunch: skip  - Dinner: burgers, pizza, "nothing that he is supposed to be having"  - Snacks: rice cakes, granola bars  - Lots of regular juice, rarely soda  Appetite in hospital: "off an on"  Exercise: does not exercise now because of his "heart condition"  PMHx: as above  PSHx: as above  Family hx: denies family history of diabetes, denies family history of thyroid disease  Social hx: denies tobacco use, alcohol use, recreational drug use or marijuana  Insurance: [facility] [email or web address]  Resides in: [place]      PAST MEDICAL & SURGICAL HISTORY:  HTN (hypertension)      Asthma      Congestive heart failure (CHF)  EF 22%      Type 2 diabetes mellitus      Hyperlipidemia      No significant past surgical history          FAMILY HISTORY:  FH: HTN (hypertension)        Social History:  lives with family    no smoking    no alcohol (29 Jul 2023 00:18)      Home Medications:  albuterol 90 mcg/inh inhalation aerosol: 2 puff(s) inhaled every 6 hours, As needed, Shortness of Breath and/or Wheezing    NOTE: patient ran out of meds / not on home meds since ~May 2022. current list based on Feb 2022 Outpatient List as originally found on sunrise. (29 Jul 2023 00:48)  budesonide-formoterol 160 mcg-4.5 mcg/inh inhalation aerosol: 2 puff(s) inhaled 2 times a day    NOTE: patient ran out of meds / not on home meds since ~May 2022. current list based on Feb 2022 Outpatient List as originally found on sunrise. (29 Jul 2023 00:48)  carvedilol 25 mg oral tablet: 1 orally 2 times a day (29 Jul 2023 00:48)  glipizide-metformin 5 mg-500 mg oral tablet: 1 orally 2 times a day (29 Jul 2023 00:48)  Imdur 60 mg oral tablet, extended release: 1 orally once a day (29 Jul 2023 00:48)      MEDICATIONS  (STANDING):  budesonide 160 MICROgram(s)/formoterol 4.5 MICROgram(s) Inhaler 2 Puff(s) Inhalation two times a day  carvedilol 25 milliGRAM(s) Oral every 12 hours  dextrose 5%. 1000 milliLiter(s) (50 mL/Hr) IV Continuous <Continuous>  dextrose 5%. 1000 milliLiter(s) (100 mL/Hr) IV Continuous <Continuous>  dextrose 50% Injectable 25 Gram(s) IV Push once  dextrose 50% Injectable 12.5 Gram(s) IV Push once  enoxaparin Injectable 40 milliGRAM(s) SubCutaneous every 12 hours  furosemide   Injectable 80 milliGRAM(s) IV Push two times a day  glucagon  Injectable 1 milliGRAM(s) IntraMuscular once  hydrALAZINE 25 milliGRAM(s) Oral every 8 hours  insulin glargine Injectable (LANTUS) 30 Unit(s) SubCutaneous at bedtime  insulin lispro (ADMELOG) corrective regimen sliding scale   SubCutaneous at bedtime  insulin lispro (ADMELOG) corrective regimen sliding scale   SubCutaneous three times a day before meals  insulin lispro Injectable (ADMELOG) 8 Unit(s) SubCutaneous three times a day before meals  isosorbide   mononitrate ER Tablet (IMDUR) 60 milliGRAM(s) Oral daily  sacubitril 24 mG/valsartan 26 mG 1 Tablet(s) Oral two times a day  senna 1 Tablet(s) Oral at bedtime  spironolactone 25 milliGRAM(s) Oral daily    MEDICATIONS  (PRN):  acetaminophen     Tablet .. 650 milliGRAM(s) Oral every 6 hours PRN Temp greater or equal to 38C (100.4F), Mild Pain (1 - 3)  albuterol    90 MICROgram(s) HFA Inhaler 2 Puff(s) Inhalation every 6 hours PRN Shortness of Breath and/or Wheezing  aluminum hydroxide/magnesium hydroxide/simethicone Suspension 30 milliLiter(s) Oral every 4 hours PRN Dyspepsia  dextrose Oral Gel 15 Gram(s) Oral once PRN Blood Glucose LESS THAN 70 milliGRAM(s)/deciliter  melatonin 3 milliGRAM(s) Oral at bedtime PRN Insomnia  ondansetron Injectable 4 milliGRAM(s) IV Push every 8 hours PRN Nausea and/or Vomiting      Allergies    No Known Allergies    Intolerances        REVIEW OF SYSTEMS  Constitutional: No fever  Eyes: No blurry vision  Neuro: No tremors  HEENT: No pain  Cardiovascular: No chest pain, palpitations  Respiratory: No SOB, no cough  GI: No nausea, vomiting, abdominal pain  : No dysuria  Skin: no rash  Psych: no depression  Endocrine: no polyuria, polydipsia  Hem/lymph: no swelling  Osteoporosis: no fractures  ALL OTHER SYSTEMS REVIEWED AND NEGATIVE     UNABLE TO OBTAIN     PHYSICAL EXAM   Vital Signs Last 24 Hrs  T(C): 36.9 (29 Jul 2023 20:26), Max: 37.2 (29 Jul 2023 11:02)  T(F): 98.4 (29 Jul 2023 20:26), Max: 98.9 (29 Jul 2023 11:02)  HR: 78 (30 Jul 2023 06:47) (78 - 91)  BP: 103/67 (30 Jul 2023 06:47) (93/63 - 132/88)  BP(mean): --  RR: 18 (29 Jul 2023 20:26) (18 - 18)  SpO2: 94% (29 Jul 2023 20:26) (93% - 94%)    Parameters below as of 29 Jul 2023 20:26  Patient On (Oxygen Delivery Method): room air      GENERAL: NAD, well-groomed, well-developed  EYES: No proptosis, no lid lag, anicteric  HEENT:  Atraumatic, Normocephalic, moist mucous membranes  THYROID: Normal size, no palpable nodules  RESPIRATORY: Clear to auscultation bilaterally; No rales, rhonchi, wheezing  CARDIOVASCULAR: Regular rate and rhythm; No murmurs; no peripheral edema  GI: Soft, nontender, non distended, normal bowel sounds  SKIN: Dry, intact, No rashes or lesions  MUSCULOSKELETAL: Full range of motion, normal strength  NEURO: sensation intact, extraocular movements intact, no tremor  PSYCH: Alert and oriented x 3, normal affect, normal mood  CUSHING'S SIGNS: no striae    CAPILLARY BLOOD GLUCOSE      POCT Blood Glucose.: 328 mg/dL (30 Jul 2023 07:50)  POCT Blood Glucose.: 343 mg/dL (29 Jul 2023 22:29)  POCT Blood Glucose.: 288 mg/dL (29 Jul 2023 17:33)  POCT Blood Glucose.: 461 mg/dL (29 Jul 2023 13:00)  POCT Blood Glucose.: 395 mg/dL (29 Jul 2023 11:27)      A1C with Estimated Average Glucose Result: 13.5 % (07-29-23 @ 16:37)                            16.9   9.66  )-----------( 216      ( 28 Jul 2023 20:34 )             51.4       07-29    134<L>  |  97  |  24<H>  ----------------------------<  346<H>  4.1   |  23  |  1.35<H>    Ca    9.4      29 Jul 2023 16:37  Mg     2.3     07-28    TPro  7.6  /  Alb  4.0  /  TBili  0.3  /  DBili  x   /  AST  34  /  ALT  47<H>  /  AlkPhos  92  07-28      Thyroid Stimulating Hormone, Serum: 1.40 uIU/mL (07-28-23 @ 20:34)      LIPIDS    RADIOLOGY ENDOCRINE INITIAL CONSULT - diabetes    HPI:  Patient is a 42 y/o male with PMHx of HTN, HLD, HFref EF 20% , DMII ,presents for chest pain over the past several days.   Patient reports midsternal sharp chest pain , occurring randomly , not worsened with deep breathing or exertion , non radiating. He also reports increased dyspnea with exertion and orthopnea , currently sleeping in chair , minimal lower extremity edema , but feels tightness in he ankles, he reports " hot flashes", but no fever , he reports intermittent non productvie cough. He also complains of bilateral lower abdominal and groin pain as well as pain in bilateral flanks, symptoms have been present on and off for years but worse over the past several days.     (29 Jul 2023 00:18)      ENDOCRINE HISTORY   Diagnosed with DM: 2, 2019  Last HbA1c: 13.5  Endocrinologist: does not have  Home DM Meds: reports off of medications since 5/23 as confusion as to what to take, restarted medications 7/25/23 but unsure of names, unsure of medications  Microvascular complications: denies retinopathy, neuropathy, nephropathy  Macrovascular complications: denies MI or CVA  SMBG: does not have glucometer so has not been able to check sugars  Symptoms: denies polyuria, polydipsia, endorses neuropathy in right pinky and 4th finger with numbness on left calf  Diet at home:  - Breakfast: skip  - Lunch: skip  - Dinner: burgers, pizza, "nothing that he is supposed to be having"  - Snacks: rice cakes, granola bars  - Lots of regular juice, rarely soda  Appetite in hospital: "off an on"  Exercise: does not exercise now because of his "heart condition"  PMHx: as above  PSHx: as above  Family hx: denies family history of diabetes, denies family history of thyroid disease  Social hx: denies tobacco use, alcohol use, recreational drug use or marijuana  Insurance: [facility] [facility] [employer]  Resides in: [place]      PAST MEDICAL & SURGICAL HISTORY:  HTN (hypertension)      Asthma      Congestive heart failure (CHF)  EF 22%      Type 2 diabetes mellitus      Hyperlipidemia      No significant past surgical history          FAMILY HISTORY:  FH: HTN (hypertension)        Social History:  lives with family    no smoking    no alcohol (29 Jul 2023 00:18)      Home Medications:  albuterol 90 mcg/inh inhalation aerosol: 2 puff(s) inhaled every 6 hours, As needed, Shortness of Breath and/or Wheezing    NOTE: patient ran out of meds / not on home meds since ~May 2022. current list based on Feb 2022 Outpatient List as originally found on sunrise. (29 Jul 2023 00:48)  budesonide-formoterol 160 mcg-4.5 mcg/inh inhalation aerosol: 2 puff(s) inhaled 2 times a day    NOTE: patient ran out of meds / not on home meds since ~May 2022. current list based on Feb 2022 Outpatient List as originally found on sunrise. (29 Jul 2023 00:48)  carvedilol 25 mg oral tablet: 1 orally 2 times a day (29 Jul 2023 00:48)  glipizide-metformin 5 mg-500 mg oral tablet: 1 orally 2 times a day (29 Jul 2023 00:48)  Imdur 60 mg oral tablet, extended release: 1 orally once a day (29 Jul 2023 00:48)      MEDICATIONS  (STANDING):  budesonide 160 MICROgram(s)/formoterol 4.5 MICROgram(s) Inhaler 2 Puff(s) Inhalation two times a day  carvedilol 25 milliGRAM(s) Oral every 12 hours  dextrose 5%. 1000 milliLiter(s) (50 mL/Hr) IV Continuous <Continuous>  dextrose 5%. 1000 milliLiter(s) (100 mL/Hr) IV Continuous <Continuous>  dextrose 50% Injectable 25 Gram(s) IV Push once  dextrose 50% Injectable 12.5 Gram(s) IV Push once  enoxaparin Injectable 40 milliGRAM(s) SubCutaneous every 12 hours  furosemide   Injectable 80 milliGRAM(s) IV Push two times a day  glucagon  Injectable 1 milliGRAM(s) IntraMuscular once  hydrALAZINE 25 milliGRAM(s) Oral every 8 hours  insulin glargine Injectable (LANTUS) 30 Unit(s) SubCutaneous at bedtime  insulin lispro (ADMELOG) corrective regimen sliding scale   SubCutaneous at bedtime  insulin lispro (ADMELOG) corrective regimen sliding scale   SubCutaneous three times a day before meals  insulin lispro Injectable (ADMELOG) 8 Unit(s) SubCutaneous three times a day before meals  isosorbide   mononitrate ER Tablet (IMDUR) 60 milliGRAM(s) Oral daily  sacubitril 24 mG/valsartan 26 mG 1 Tablet(s) Oral two times a day  senna 1 Tablet(s) Oral at bedtime  spironolactone 25 milliGRAM(s) Oral daily    MEDICATIONS  (PRN):  acetaminophen     Tablet .. 650 milliGRAM(s) Oral every 6 hours PRN Temp greater or equal to 38C (100.4F), Mild Pain (1 - 3)  albuterol    90 MICROgram(s) HFA Inhaler 2 Puff(s) Inhalation every 6 hours PRN Shortness of Breath and/or Wheezing  aluminum hydroxide/magnesium hydroxide/simethicone Suspension 30 milliLiter(s) Oral every 4 hours PRN Dyspepsia  dextrose Oral Gel 15 Gram(s) Oral once PRN Blood Glucose LESS THAN 70 milliGRAM(s)/deciliter  melatonin 3 milliGRAM(s) Oral at bedtime PRN Insomnia  ondansetron Injectable 4 milliGRAM(s) IV Push every 8 hours PRN Nausea and/or Vomiting      Allergies    No Known Allergies    Intolerances        REVIEW OF SYSTEMS  Constitutional: No fever, endorses "hot flashes"  Eyes: No blurry vision  Neuro: No tremors, endorses numbness in right hand 2 fingers and left calf  HEENT: No pain  Cardiovascular: No chest pain, palpitations  Respiratory: No SOB, endorses cough  GI: No nausea, vomiting, endorses abdominal pain, constipation  : No dysuria  Skin: no rash  Psych: no depression  Endocrine: no polyuria, polydipsia  Hem/lymph: endorses leg swelling  Osteoporosis: no fractures  ALL OTHER SYSTEMS REVIEWED AND NEGATIVE     UNABLE TO OBTAIN     PHYSICAL EXAM   Vital Signs Last 24 Hrs  T(C): 36.9 (29 Jul 2023 20:26), Max: 37.2 (29 Jul 2023 11:02)  T(F): 98.4 (29 Jul 2023 20:26), Max: 98.9 (29 Jul 2023 11:02)  HR: 78 (30 Jul 2023 06:47) (78 - 91)  BP: 103/67 (30 Jul 2023 06:47) (93/63 - 132/88)  BP(mean): --  RR: 18 (29 Jul 2023 20:26) (18 - 18)  SpO2: 94% (29 Jul 2023 20:26) (93% - 94%)    Parameters below as of 29 Jul 2023 20:26  Patient On (Oxygen Delivery Method): room air      GENERAL: NAD, well-groomed, well-developed  EYES: No proptosis, no lid lag, anicteric  HEENT:  Atraumatic, Normocephalic, moist mucous membranes  THYROID: Normal size, no palpable nodules  RESPIRATORY: Clear to auscultation bilaterally; No rales, rhonchi, wheezing  CARDIOVASCULAR: Regular rate and rhythm; No murmurs; no peripheral edema  GI: Soft, nontender, non distended, normal bowel sounds  SKIN: Dry, intact, No rashes or lesions  MUSCULOSKELETAL: Full range of motion, normal strength  NEURO: sensation intact, extraocular movements intact, no tremor  PSYCH: Alert and oriented x 3, normal affect, normal mood  CUSHING'S SIGNS: no striae    CAPILLARY BLOOD GLUCOSE      POCT Blood Glucose.: 328 mg/dL (30 Jul 2023 07:50)  POCT Blood Glucose.: 343 mg/dL (29 Jul 2023 22:29)  POCT Blood Glucose.: 288 mg/dL (29 Jul 2023 17:33)  POCT Blood Glucose.: 461 mg/dL (29 Jul 2023 13:00)  POCT Blood Glucose.: 395 mg/dL (29 Jul 2023 11:27)      A1C with Estimated Average Glucose Result: 13.5 % (07-29-23 @ 16:37)                            16.9   9.66  )-----------( 216      ( 28 Jul 2023 20:34 )             51.4       07-29    134<L>  |  97  |  24<H>  ----------------------------<  346<H>  4.1   |  23  |  1.35<H>    Ca    9.4      29 Jul 2023 16:37  Mg     2.3     07-28    TPro  7.6  /  Alb  4.0  /  TBili  0.3  /  DBili  x   /  AST  34  /  ALT  47<H>  /  AlkPhos  92  07-28      Thyroid Stimulating Hormone, Serum: 1.40 uIU/mL (07-28-23 @ 20:34)      LIPIDS    RADIOLOGY ENDOCRINE INITIAL CONSULT - diabetes    HPI:  Patient is a 42 y/o male with PMHx of HTN, HLD, HFref EF 20% , DMII ,presents for chest pain over the past several days.   Patient reports midsternal sharp chest pain , occurring randomly , not worsened with deep breathing or exertion , non radiating. He also reports increased dyspnea with exertion and orthopnea , currently sleeping in chair , minimal lower extremity edema , but feels tightness in he ankles, he reports " hot flashes", but no fever , he reports intermittent non productvie cough. He also complains of bilateral lower abdominal and groin pain as well as pain in bilateral flanks, symptoms have been present on and off for years but worse over the past several days.     (29 Jul 2023 00:18)      ENDOCRINE HISTORY   Diagnosed with DM: 2, 2019  Last HbA1c: 13.5  Endocrinologist: does not have  Home DM Meds: reports off of medications since 5/23 as confusion as to what to take, restarted medications 7/25/23 but unsure of names, unsure of medications  Microvascular complications: denies retinopathy, neuropathy, nephropathy  Macrovascular complications: denies MI or CVA  SMBG: does not have glucometer so has not been able to check sugars  Symptoms: denies polyuria, polydipsia, endorses neuropathy in right pinky and 4th finger with numbness on left calf  Diet at home:  - Breakfast: skip  - Lunch: skip  - Dinner: burgers, pizza, "nothing that he is supposed to be having"  - Snacks: rice cakes, granola bars  - Lots of regular juice, rarely soda  Appetite in hospital: "off an on"  Exercise: does not exercise now because of his "heart condition"  PMHx: as above  PSHx: as above  Family hx: denies family history of diabetes, denies family history of thyroid disease  Social hx: denies tobacco use, alcohol use, recreational drug use or marijuana  Insurance: [employer] [facility] [employer]  Resides in: [place]      PAST MEDICAL & SURGICAL HISTORY:  HTN (hypertension)      Asthma      Congestive heart failure (CHF)  EF 22%      Type 2 diabetes mellitus      Hyperlipidemia      No significant past surgical history          FAMILY HISTORY:  FH: HTN (hypertension)        Social History:  lives with family    no smoking    no alcohol (29 Jul 2023 00:18)      Home Medications:  albuterol 90 mcg/inh inhalation aerosol: 2 puff(s) inhaled every 6 hours, As needed, Shortness of Breath and/or Wheezing    NOTE: patient ran out of meds / not on home meds since ~May 2022. current list based on Feb 2022 Outpatient List as originally found on [facility]. (29 Jul 2023 00:48)  budesonide-formoterol 160 mcg-4.5 mcg/inh inhalation aerosol: 2 puff(s) inhaled 2 times a day    NOTE: patient ran out of meds / not on home meds since ~May 2022. current list based on Feb 2022 Outpatient List as originally found on [facility]. (29 Jul 2023 00:48)  carvedilol 25 mg oral tablet: 1 orally 2 times a day (29 Jul 2023 00:48)  glipizide-metformin 5 mg-500 mg oral tablet: 1 orally 2 times a day (29 Jul 2023 00:48)  Imdur 60 mg oral tablet, extended release: 1 orally once a day (29 Jul 2023 00:48)      MEDICATIONS  (STANDING):  budesonide 160 MICROgram(s)/formoterol 4.5 MICROgram(s) Inhaler 2 Puff(s) Inhalation two times a day  carvedilol 25 milliGRAM(s) Oral every 12 hours  dextrose 5%. 1000 milliLiter(s) (50 mL/Hr) IV Continuous <Continuous>  dextrose 5%. 1000 milliLiter(s) (100 mL/Hr) IV Continuous <Continuous>  dextrose 50% Injectable 25 Gram(s) IV Push once  dextrose 50% Injectable 12.5 Gram(s) IV Push once  enoxaparin Injectable 40 milliGRAM(s) SubCutaneous every 12 hours  furosemide   Injectable 80 milliGRAM(s) IV Push two times a day  glucagon  Injectable 1 milliGRAM(s) IntraMuscular once  hydrALAZINE 25 milliGRAM(s) Oral every 8 hours  insulin glargine Injectable (LANTUS) 30 Unit(s) SubCutaneous at bedtime  insulin lispro (ADMELOG) corrective regimen sliding scale   SubCutaneous at bedtime  insulin lispro (ADMELOG) corrective regimen sliding scale   SubCutaneous three times a day before meals  insulin lispro Injectable (ADMELOG) 8 Unit(s) SubCutaneous three times a day before meals  isosorbide   mononitrate ER Tablet (IMDUR) 60 milliGRAM(s) Oral daily  sacubitril 24 mG/valsartan 26 mG 1 Tablet(s) Oral two times a day  senna 1 Tablet(s) Oral at bedtime  spironolactone 25 milliGRAM(s) Oral daily    MEDICATIONS  (PRN):  acetaminophen     Tablet .. 650 milliGRAM(s) Oral every 6 hours PRN Temp greater or equal to 38C (100.4F), Mild Pain (1 - 3)  albuterol    90 MICROgram(s) HFA Inhaler 2 Puff(s) Inhalation every 6 hours PRN Shortness of Breath and/or Wheezing  aluminum hydroxide/magnesium hydroxide/simethicone Suspension 30 milliLiter(s) Oral every 4 hours PRN Dyspepsia  dextrose Oral Gel 15 Gram(s) Oral once PRN Blood Glucose LESS THAN 70 milliGRAM(s)/deciliter  melatonin 3 milliGRAM(s) Oral at bedtime PRN Insomnia  ondansetron Injectable 4 milliGRAM(s) IV Push every 8 hours PRN Nausea and/or Vomiting      Allergies    No Known Allergies    Intolerances        REVIEW OF SYSTEMS  Constitutional: No fever, endorses "hot flashes"  Eyes: No blurry vision  Neuro: No tremors, endorses numbness in right hand 2 fingers and left calf  HEENT: No pain  Cardiovascular: No chest pain, palpitations  Respiratory: No SOB, endorses cough  GI: No nausea, vomiting, endorses abdominal pain, constipation  : No dysuria  Skin: no rash  Psych: no depression  Endocrine: no polyuria, polydipsia  Hem/lymph: endorses leg swelling  Osteoporosis: no fractures  ALL OTHER SYSTEMS REVIEWED AND NEGATIVE     PHYSICAL EXAM   Vital Signs Last 24 Hrs  T(C): 36.9 (29 Jul 2023 20:26), Max: 37.2 (29 Jul 2023 11:02)  T(F): 98.4 (29 Jul 2023 20:26), Max: 98.9 (29 Jul 2023 11:02)  HR: 78 (30 Jul 2023 06:47) (78 - 91)  BP: 103/67 (30 Jul 2023 06:47) (93/63 - 132/88)  BP(mean): --  RR: 18 (29 Jul 2023 20:26) (18 - 18)  SpO2: 94% (29 Jul 2023 20:26) (93% - 94%)    Parameters below as of 29 Jul 2023 20:26  Patient On (Oxygen Delivery Method): room air      GENERAL: NAD, well-groomed, well-developed, obese  EYES: No proptosis, no lid lag, anicteric  HEENT:  Atraumatic, Normocephalic, moist mucous membranes  THYROID: Normal size, no palpable nodules  RESPIRATORY: Clear to auscultation bilaterally; No rales, rhonchi, wheezing  CARDIOVASCULAR: Regular rate and rhythm; No murmurs; no peripheral edema  GI: Soft, nontender, non distended, normal bowel sounds  SKIN: Dry, intact, No rashes or lesions  MUSCULOSKELETAL: Full range of motion, moving extremities spontaneously  NEURO: extraocular movements intact, no tremor  PSYCH: Answering questions appropriately, normal affect, normal mood  CUSHING'S SIGNS: no violaceous striae, acanthosis, dorsocervical fat pad    CAPILLARY BLOOD GLUCOSE      POCT Blood Glucose.: 328 mg/dL (30 Jul 2023 07:50)  POCT Blood Glucose.: 343 mg/dL (29 Jul 2023 22:29)  POCT Blood Glucose.: 288 mg/dL (29 Jul 2023 17:33)  POCT Blood Glucose.: 461 mg/dL (29 Jul 2023 13:00)  POCT Blood Glucose.: 395 mg/dL (29 Jul 2023 11:27)      A1C with Estimated Average Glucose Result: 13.5 % (07-29-23 @ 16:37)                            16.9   9.66  )-----------( 216      ( 28 Jul 2023 20:34 )             51.4       07-29    134<L>  |  97  |  24<H>  ----------------------------<  346<H>  4.1   |  23  |  1.35<H>    Ca    9.4      29 Jul 2023 16:37  Mg     2.3     07-28    TPro  7.6  /  Alb  4.0  /  TBili  0.3  /  DBili  x   /  AST  34  /  ALT  47<H>  /  AlkPhos  92  07-28      Thyroid Stimulating Hormone, Serum: 1.40 uIU/mL (07-28-23 @ 20:34)      LIPIDS    RADIOLOGY

## 2023-07-30 NOTE — CONSULT NOTE ADULT - ASSESSMENT
Patient is a 42 y/o male with PMHx of HTN, HLD, HFref EF 20% , DMII ,presents for chest pain over the past several days. Endocrinology consulted for management of T2DM.    Poorly controlled T2DM with hyperglycemia  - HbA1c: 13.5  - Home Regimen:   - Endocrinologist:  PLAN  - Hold oral DM agents while inpatient  - Start Lantus  units at bedtime. DO NOT HOLD IF NPO.  - Start Admelog  units TID pre-meal. HOLD IF NPO.  - Use moderate/Use low dose Admelog correction scale pre-meal  - Use moderate/Use low dose Admelog correction scale at bedtime  - Fingerstick BG before meals and bedtime  - Goal -180  - Carbohydrate consistent diet  - RD consult  Discharge plan:  - Discharge medications: ************************  - Patient to call doctor with persistent high or low BG at home.   - Ensure patient has glucometer, test strips and lancets on discharge.  - Recommend routine outpatient ophthalmology, podiatry and endocrinology f/u    HTN  - Home regimen:  PLAN  - Can check urine microalbumin outpatient  - Outpatient goal BP <130/80. Management per primary team.    HLD  - Home regimen:  PLAN  - Continue  - Would likely benefit from a statin if no contraindication  - Can check lipid profile if not done recently    Discussed with primary team.    Prince Rosa MD, Endocrinology Fellow  Pager [phone] from 9am to 5pm. After hours and on weekends, please call [phone].   Patient is a 44 y/o male with PMHx of HTN, HLD, HFref EF 20% , DMII ,presents for chest pain over the past several days. Endocrinology consulted for management of T2DM.    Poorly controlled T2DM with hyperglycemia  - HbA1c: 13.5  - Home Regimen: does not know medications, only started on 7/25/23  - Endocrinologist: does not have  PLAN  - Hold oral DM agents while inpatient  - Start Lantus  units at bedtime. DO NOT HOLD IF NPO.  - Start Admelog  units TID pre-meal. HOLD IF NPO.  - Use moderate/Use low dose Admelog correction scale pre-meal  - Use moderate/Use low dose Admelog correction scale at bedtime  - Fingerstick BG before meals and bedtime  - Goal -180  - Carbohydrate consistent diet  - RD consult  Discharge plan:  - Discharge medications: ************************  - Patient to call doctor with persistent high or low BG at home.   - Ensure patient has glucometer, test strips and lancets on discharge.  - Recommend routine outpatient ophthalmology, podiatry and endocrinology f/u    HTN  - Home regimen: has not been taking medications regularly  PLAN  - Can check urine microalbumin outpatient  - Outpatient goal BP <130/80. Management per primary team.    HLD  - Home regimen: has not been taking medications regularly  PLAN  - Would likely benefit from a statin if no contraindication  - Can check lipid profile if not done recently    Discussed with primary team.    Prince Rosa MD, Endocrinology Fellow  Pager [phone] from 9am to 5pm. After hours and on weekends, please call [phone].   Patient is a 42 y/o male with PMHx of HTN, HLD, HFref EF 20% , DMII ,presents for chest pain over the past several days. Endocrinology consulted for management of T2DM.    Poorly controlled T2DM with hyperglycemia  - HbA1c: 13.5  - Home Regimen: does not know medications, only started on 7/25/23, per chart review jardiance 10mg, glipizide-metformin 5-500mg twice daily  - Endocrinologist: does not have  PLAN  - Hold oral DM agents while inpatient  - Start Lantus  units at bedtime. DO NOT HOLD IF NPO.  - Start Admelog  units TID pre-meal. HOLD IF NPO.  - Use moderate/Use low dose Admelog correction scale pre-meal  - Use moderate/Use low dose Admelog correction scale at bedtime  - Fingerstick BG before meals and bedtime  - Goal -180  - Carbohydrate consistent diet  - RD consult  - provider to RN for insulin pen teaching and glucometer teaching  Discharge plan:  - Discharge medications: ************************  - Patient to call doctor with persistent high or low BG at home.   - Ensure patient has glucometer, test strips and lancets on discharge.  - Recommend routine outpatient ophthalmology, podiatry and endocrinology f/u    HTN  - Home regimen: has not been taking medications regularly  PLAN  - Can check urine microalbumin outpatient  - Outpatient goal BP <130/80. Management per primary team.    HLD  - Home regimen: has not been taking medications regularly  PLAN  - Would likely benefit from a statin if no contraindication  - Can check lipid profile if not done recently    Discussed with primary team.    Prince Rosa MD, Endocrinology Fellow  Pager [phone] from 9am to 5pm. After hours and on weekends, please call [phone].   Patient is a 44 y/o male with PMHx of HTN, HLD, HFref EF 20% , DMII ,presents for chest pain over the past several days. Endocrinology consulted for management of T2DM.    Poorly controlled T2DM with hyperglycemia  - HbA1c: 13.5  - Home Regimen: does not know medications, only started on 7/25/23, per chart review jardiance 10mg, glipizide-metformin 5-500mg twice daily  - Endocrinologist: does not have  PLAN  - Hold oral DM agents while inpatient  - Start Lantus  units at bedtime. DO NOT HOLD IF NPO.  - Start Admelog  units TID pre-meal. HOLD IF NPO.  - Use moderate/Use low dose Admelog correction scale pre-meal  - Use moderate/Use low dose Admelog correction scale at bedtime  - Fingerstick BG before meals and bedtime  - Goal -180  - Carbohydrate consistent diet  - RD consult  - provider to RN for insulin pen teaching and glucometer teaching  Discharge plan:  - Discharge medications: ************************.Patient will need glucometer, lancets and test strips prescribed on discharge.  - Patient to call doctor with persistent high or low BG at home.   - Recommend routine outpatient ophthalmology, podiatry and endocrinology f/u    HTN  - Home regimen: has not been taking medications regularly  PLAN  - Can check urine microalbumin outpatient  - Outpatient goal BP <130/80. Management per primary team.    HLD  - Home regimen: has not been taking medications regularly  PLAN  - Would likely benefit from a statin if no contraindication  - Can check lipid profile if not done recently    Discussed with primary team.    Prince Rosa MD, Endocrinology Fellow  Pager [phone] from 9am to 5pm. After hours and on weekends, please call [phone].   Patient is a 42 y/o male with PMHx of HTN, HLD, HFref EF 20% , DMII ,presents for chest pain over the past several days. Endocrinology consulted for management of T2DM.    Poorly controlled T2DM with hyperglycemia  - HbA1c: 13.5  - Home Regimen: does not know medications, only started on 7/25/23, per chart review jardiance 10mg, glipizide-metformin 5-500mg twice daily  - Endocrinologist: does not have  PLAN  - Hold oral DM agents while inpatient  - Start Lantus 38 units at bedtime. DO NOT HOLD IF NPO.  - Start Admelog 14 units TID pre-meal. HOLD IF NPO.  - Use moderate dose Admelog correction scale pre-meal  - Use moderate dose Admelog correction scale at bedtime  - Fingerstick BG before meals and bedtime  - Goal -180  - Carbohydrate consistent diet  - RD consult  - provider to RN for insulin pen teaching and glucometer teaching  Discharge plan:  - Discharge medications: Once daily basal insulin (lantus, basaglar, tresiba, semglee, etc) and three times a day bolus insulin (novolog, humalog, admelog, etc), increase jardiance to 25mg daily, consider GLP-1 agonist outpatient. Please send a prescription for freestyle janel 2 sensor and reader as well as back up glucometer, test strips, lancets. Patient should be prescribed glucagon as well given on insulin (can send prescription for gvoke hypopen or baqsimi)   - Patient to call doctor with persistent high or low BG at home.   - Recommend routine outpatient ophthalmology, podiatry and endocrinology f/u    HTN  - Home regimen: has not been taking medications regularly  PLAN  - Can check urine microalbumin outpatient  - Outpatient goal BP <130/80. Management per primary team.    HLD  - Home regimen: has not been taking medications regularly  PLAN  - Would likely benefit from a statin if no contraindication  - Can check lipid profile if not done recently    Will confirm patients preference of location for follow up, can call our office as well to schedule appointment.  [address] [place] [address]  Phone Number: [phone]    Discussed with primary team.    Prince Rosa MD, Endocrinology Fellow  Pager [phone] from 9am to 5pm. After hours and on weekends, please call [phone].   Patient is a 42 y/o male with PMHx of HTN, HLD, HFref EF 20% , DMII ,presents for chest pain over the past several days. Endocrinology consulted for management of T2DM.    Poorly controlled T2DM with hyperglycemia  - HbA1c: 13.5  - Home Regimen: does not know medications, only started on 7/25/23, per chart review jardiance 10mg, glipizide-metformin 5-500mg twice daily  - Endocrinologist: does not have  PLAN  - Hold oral DM agents while inpatient  - Start Lantus 38 units at bedtime. DO NOT HOLD IF NPO.  - Start Admelog 14 units TID pre-meal. HOLD IF NPO.  - Use moderate dose Admelog correction scale pre-meal  - Use moderate dose Admelog correction scale at bedtime  - Fingerstick BG before meals and bedtime  - Goal -180  - Carbohydrate consistent diet  - RD consult  - provider to RN for insulin pen teaching and glucometer teaching  Discharge plan:  - Discharge medications: Once daily basal insulin (lantus, basaglar, tresiba, semglee, etc) and three times a day bolus insulin (novolog, humalog, admelog, etc), increase jardiance to 25mg daily, consider GLP-1 agonist outpatient. Stop glipizide and metformin. Please send a prescription for freestyle janel 2 sensor and reader as well as back up glucometer, test strips, lancets. Patient should be prescribed glucagon as well given on insulin (can send prescription for gvoke hypopen or baqsimi)   - Patient to call doctor with persistent high or low BG at home.   - Recommend routine outpatient ophthalmology, podiatry and endocrinology f/u    HTN  - Home regimen: has not been taking medications regularly  PLAN  - Can check urine microalbumin outpatient  - Outpatient goal BP <130/80. Management per primary team.    HLD  - Home regimen: has not been taking medications regularly  PLAN  - Would likely benefit from a statin if no contraindication  - Can check lipid profile if not done recently    Will confirm patients preference of location for follow up, can call our office as well to schedule appointment.  [address] [place] [address]  Phone Number: [phone]    Discussed with primary team.    Prince Rosa MD, Endocrinology Fellow  Pager [phone] from 9am to 5pm. After hours and on weekends, please call [phone].   Patient is a 44 y/o male with PMHx of HTN, HLD, HFref EF 20% , DMII ,presents for chest pain over the past several days. Endocrinology consulted for management of T2DM.    Poorly controlled T2DM with hyperglycemia  - HbA1c: 13.5  - Home Regimen: does not know medications, only started on 7/25/23, per chart review jardiance 10mg, glipizide-metformin 5-500mg twice daily  - Endocrinologist: does not have  PLAN  - Hold oral DM agents while inpatient  - Start Lantus 38 units at bedtime. DO NOT HOLD IF NPO.  - Start Admelog 14 units TID pre-meal. HOLD IF NPO.  - Use moderate dose Admelog correction scale pre-meal  - Use moderate dose Admelog correction scale at bedtime  - Fingerstick BG before meals and bedtime  - Goal -180  - Carbohydrate consistent diet  - RD consult  - provider to RN for insulin pen teaching and glucometer teaching  Discharge plan:  - Discharge medications: Once daily basal insulin (lantus, basaglar, tresiba, semglee, etc) and three times a day bolus insulin (novolog, humalog, admelog, etc), increase jardiance to 25mg daily, consider GLP-1 agonist outpatient. Doses of insulin to be determined. Stop glipizide and metformin. Please send a prescription for freestyle janel 2 sensor and reader as well as back up glucometer, test strips, lancets. Patient should be prescribed glucagon as well given on insulin (can send prescription for gvoke hypopen or baqsimi)   - Patient to call doctor with persistent high or low BG at home.   - Recommend routine outpatient ophthalmology, podiatry and endocrinology f/u    HTN  - Home regimen: has not been taking medications regularly  PLAN  - Can check urine microalbumin outpatient  - Outpatient goal BP <130/80. Management per primary team.    HLD  - Home regimen: has not been taking medications regularly  PLAN  - Would likely benefit from a statin if no contraindication  - Can check lipid profile if not done recently    Will confirm patients preference of location for follow up, can call our office as well to schedule appointment.  [address] [place] [address]  Phone Number: [phone]    Discussed with primary team.    Prince Rosa MD, Endocrinology Fellow  Pager [phone] from 9am to 5pm. After hours and on weekends, please call [phone].   Patient is a 44 y/o male with PMHx of HTN, HLD, HFref EF 20% , DMII ,presents for chest pain over the past several days. Endocrinology consulted for management of T2DM.    Poorly controlled T2DM with hyperglycemia  - HbA1c: 13.5  - Home Regimen: does not know medications, only started on 7/25/23, per chart review jardiance 10mg, glipizide-metformin 5-500mg twice daily  - Endocrinologist: does not have  PLAN  - Hold oral DM agents while inpatient  - Start Lantus 38 units at bedtime. DO NOT HOLD IF NPO.  - Start Admelog 14 units TID pre-meal. HOLD IF NPO.  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  Please discuss further with patient. Also consider below plan without bolus component to reduce injection burden)  Once daily basal insulin (lantus, basaglar, tresiba, semglee, etc) and three times a day bolus insulin (novolog, humalog, admelog, etc), increase jardiance to 25mg daily, consider GLP-1 agonist outpatient. Doses of insulin to be determined. Stop glipizide and metformin. Please send a prescription for freestyle janel 2 sensor and reader as well as back up glucometer, test strips, lancets. Patient should be prescribed glucagon as well given on insulin (can send prescription for gvoke hypopen or baqsimi)   - Patient to call doctor with persistent high or low BG at home.   - Recommend routine outpatient ophthalmology, podiatry and endocrinology f/u    HTN  - Home regimen: has not been taking medications regularly  PLAN  - Can check urine microalbumin outpatient  - Outpatient goal BP <130/80. Management per primary team.    HLD  - Home regimen: has not been taking medications regularly  PLAN  - Would likely benefit from a statin if no contraindication  - Can check lipid profile if not done recently    If desired can follow up at below location for endocrinology (patient states he will contact pcp for referral).  [address] [place] [address]  [address]  Phone Number: [phone]    Discussed with primary team.    Prince Rosa MD, Endocrinology Fellow  Pager [phone] from 9am to 5pm. After hours and on weekends, please call [phone].

## 2023-07-30 NOTE — PROGRESS NOTE ADULT - SUBJECTIVE AND OBJECTIVE BOX
Patient is a 43y old  Male who presents with a chief complaint of chest pain x several days (30 Jul 2023 09:50)        SUBJECTIVE / OVERNIGHT EVENTS: Patient had no acute events overnight. Patient seen and examined at bedside this morning. Patient frustrated frequent fingersticks and confusion with insulin regimen. Went over the purpose of basal bolus insulin with patient and mom (via phone). Continues to have dyspnea with exertion     On telemetry, ep 12 beats of WCT at 7:50am, asymmptomatic     ROS: [ - ] Fever [ - ] Chills [ - ] Nausea/Vomiting [ - ] Chest Pain [ + ] Shortness of breath     MEDICATIONS  (STANDING):  budesonide 160 MICROgram(s)/formoterol 4.5 MICROgram(s) Inhaler 2 Puff(s) Inhalation two times a day  carvedilol 25 milliGRAM(s) Oral every 12 hours  dextrose 5%. 1000 milliLiter(s) (50 mL/Hr) IV Continuous <Continuous>  dextrose 5%. 1000 milliLiter(s) (100 mL/Hr) IV Continuous <Continuous>  dextrose 50% Injectable 25 Gram(s) IV Push once  dextrose 50% Injectable 12.5 Gram(s) IV Push once  enoxaparin Injectable 40 milliGRAM(s) SubCutaneous every 12 hours  furosemide   Injectable 80 milliGRAM(s) IV Push two times a day  glucagon  Injectable 1 milliGRAM(s) IntraMuscular once  hydrALAZINE 25 milliGRAM(s) Oral every 8 hours  insulin glargine Injectable (LANTUS) 30 Unit(s) SubCutaneous at bedtime  insulin lispro (ADMELOG) corrective regimen sliding scale   SubCutaneous at bedtime  insulin lispro (ADMELOG) corrective regimen sliding scale   SubCutaneous three times a day before meals  insulin lispro Injectable (ADMELOG) 8 Unit(s) SubCutaneous three times a day before meals  isosorbide   mononitrate ER Tablet (IMDUR) 60 milliGRAM(s) Oral daily  sacubitril 24 mG/valsartan 26 mG 1 Tablet(s) Oral two times a day  senna 1 Tablet(s) Oral at bedtime  spironolactone 25 milliGRAM(s) Oral daily    MEDICATIONS  (PRN):  acetaminophen     Tablet .. 650 milliGRAM(s) Oral every 6 hours PRN Temp greater or equal to 38C (100.4F), Mild Pain (1 - 3)  albuterol    90 MICROgram(s) HFA Inhaler 2 Puff(s) Inhalation every 6 hours PRN Shortness of Breath and/or Wheezing  aluminum hydroxide/magnesium hydroxide/simethicone Suspension 30 milliLiter(s) Oral every 4 hours PRN Dyspepsia  dextrose Oral Gel 15 Gram(s) Oral once PRN Blood Glucose LESS THAN 70 milliGRAM(s)/deciliter  melatonin 3 milliGRAM(s) Oral at bedtime PRN Insomnia  ondansetron Injectable 4 milliGRAM(s) IV Push every 8 hours PRN Nausea and/or Vomiting      Vital Signs Last 24 Hrs  T(C): 36.9 (29 Jul 2023 20:26), Max: 37.2 (29 Jul 2023 11:02)  T(F): 98.4 (29 Jul 2023 20:26), Max: 98.9 (29 Jul 2023 11:02)  HR: 78 (30 Jul 2023 06:47) (78 - 91)  BP: 103/67 (30 Jul 2023 06:47) (93/63 - 132/88)  BP(mean): --  RR: 18 (29 Jul 2023 20:26) (18 - 18)  SpO2: 94% (29 Jul 2023 20:26) (93% - 94%)    Parameters below as of 29 Jul 2023 20:26  Patient On (Oxygen Delivery Method): room air      CAPILLARY BLOOD GLUCOSE      POCT Blood Glucose.: 328 mg/dL (30 Jul 2023 07:50)  POCT Blood Glucose.: 343 mg/dL (29 Jul 2023 22:29)  POCT Blood Glucose.: 288 mg/dL (29 Jul 2023 17:33)  POCT Blood Glucose.: 461 mg/dL (29 Jul 2023 13:00)  POCT Blood Glucose.: 395 mg/dL (29 Jul 2023 11:27)    I&O's Summary    29 Jul 2023 07:01  -  30 Jul 2023 07:00  --------------------------------------------------------  IN: 480 mL / OUT: 0 mL / NET: 480 mL    30 Jul 2023 07:01  -  30 Jul 2023 10:12  --------------------------------------------------------  IN: 240 mL / OUT: 0 mL / NET: 240 mL        PHYSICAL EXAM  GENERAL: NAD, lying comfortably in bed   HEENT:  Atraumatic, Normocephalic, EOMI, conjunctiva and sclera clear, no LAD  CHEST/LUNG: bibasilar crackles; No wheeze  HEART: RRR, S1 and S2 No murmurs, rubs, or gallops  ABDOMEN: Soft, Nontender, Nondistended; Bowel sounds present  EXTREMITIES:  2+ Peripheral Pulses, trace edema b/l  NEURO: AAOx3, non-focal  SKIN: No rashes or lesions    LABS:                        16.9   9.66  )-----------( 216      ( 28 Jul 2023 20:34 )             51.4     07-29    134<L>  |  97  |  24<H>  ----------------------------<  346<H>  4.1   |  23  |  1.35<H>    Ca    9.4      29 Jul 2023 16:37  Mg     2.3     07-28    TPro  7.6  /  Alb  4.0  /  TBili  0.3  /  DBili  x   /  AST  34  /  ALT  47<H>  /  AlkPhos  92  07-28          Urinalysis Basic - ( 29 Jul 2023 16:37 )    Color: x / Appearance: x / SG: x / pH: x  Gluc: 346 mg/dL / Ketone: x  / Bili: x / Urobili: x   Blood: x / Protein: x / Nitrite: x   Leuk Esterase: x / RBC: x / WBC x   Sq Epi: x / Non Sq Epi: x / Bacteria: x          RADIOLOGY & ADDITIONAL TESTS:      Labs Personally Reviewed  Imaging Personally Reviewed  Consultant(s) Notes Reviewed

## 2023-07-30 NOTE — PROGRESS NOTE ADULT - SUBJECTIVE AND OBJECTIVE BOX
HPI:  Patient is a 44 y/o male with PMHx of HTN, HLD, HFref EF 20% , DMII ,presents for chest pain/shortness of breath over the past several days.   Patient reports midsternal sharp chest pain , occurring randomly , not worsened with deep breathing or exertion , non radiating. He also reports increased dyspnea with exertion and orthopnea , currently sleeping in recliner for the past 4 weeks. HE now gets short of breath with tying his shoes. He reports minimal lower extremity edema , but feels tightness in he ankles. He reports " hot flashes", but no fever , he reports intermittent non productvie cough.   He also complains of bilateral lower abdominal and groin pain as well as pain in bilateral flanks, symptoms have been present on and off for years but worse over the past several days.    HE says that he has been compliant with his medications but recently had a mix up with his PCP and had stopped taking some of his meds for 5 days (7/19->7/25). He is unsure which meds he was not taking.     Interval Events  - Breathing still reduced, but cough p/o phlegm more prominent    PMHx:   No pertinent past medical history    HTN (hypertension)    Asthma    Congestive heart failure (CHF)    Type 2 diabetes mellitus    Hyperlipidemia        PSHx:   No significant past surgical history    No significant past surgical history        Allergies:  · 	hydrALAZINE 25 mg oral tablet: Last Dose Taken:  , 1 tab(s) orally every 8 hours  · 	senna leaf extract oral tablet: Last Dose Taken:  , 1 tab(s) orally once a day (at bedtime)  · 	spironolactone 25 mg oral tablet: Last Dose Taken:  , 1 tab(s) orally once a day  · 	furosemide 80 mg oral tablet: Last Dose Taken:  , 1 tab(s) orally every 12 hours  · 	empagliflozin 10 mg oral tablet: Last Dose Taken:  , 1 tab(s) orally once a day (in the morning)  · 	sacubitril-valsartan 24 mg-26 mg oral tablet: Last Dose Taken:  , 1 tab(s) orally every 12 hours  · 	budesonide-formoterol 160 mcg-4.5 mcg/inh inhalation aerosol: Last Dose Taken:  , 2 puff(s) inhaled 2 times a day  	  	NOTE: patient ran out of meds / not on home meds since ~May 2022. current list based on Feb 2022 Outpatient List as originally found on [facility].  · 	albuterol 90 mcg/inh inhalation aerosol: Last Dose Taken:  , 2 puff(s) inhaled every 6 hours, As needed, Shortness of Breath and/or Wheezing  	  	NOTE: patient ran out of meds / not on home meds since ~May 2022. current list based on Feb 2022 Outpatient List as originally found on [facility].  · 	Imdur 60 mg oral tablet, extended release: 1 orally once a day  · 	glipizide-metformin 5 mg-500 mg oral tablet: 1 orally 2 times a day  · 	carvedilol 25 mg oral tablet: Last Dose Taken:  , 1 orally 2 times a day          Current Medications:   acetaminophen     Tablet .. 650 milliGRAM(s) Oral every 6 hours PRN  albuterol    90 MICROgram(s) HFA Inhaler 2 Puff(s) Inhalation every 6 hours PRN  aluminum hydroxide/magnesium hydroxide/simethicone Suspension 30 milliLiter(s) Oral every 4 hours PRN  budesonide 160 MICROgram(s)/formoterol 4.5 MICROgram(s) Inhaler 2 Puff(s) Inhalation two times a day  carvedilol 25 milliGRAM(s) Oral every 12 hours  dextrose 5%. 1000 milliLiter(s) IV Continuous <Continuous>  dextrose 5%. 1000 milliLiter(s) IV Continuous <Continuous>  dextrose 50% Injectable 25 Gram(s) IV Push once  dextrose 50% Injectable 12.5 Gram(s) IV Push once  dextrose Oral Gel 15 Gram(s) Oral once PRN  enoxaparin Injectable 40 milliGRAM(s) SubCutaneous every 12 hours  furosemide   Injectable 80 milliGRAM(s) IV Push two times a day  glucagon  Injectable 1 milliGRAM(s) IntraMuscular once  hydrALAZINE 25 milliGRAM(s) Oral every 8 hours  insulin glargine Injectable (LANTUS) 30 Unit(s) SubCutaneous at bedtime  insulin lispro (ADMELOG) corrective regimen sliding scale   SubCutaneous three times a day before meals  insulin lispro (ADMELOG) corrective regimen sliding scale   SubCutaneous at bedtime  insulin lispro Injectable (ADMELOG) 8 Unit(s) SubCutaneous three times a day before meals  insulin NPH human recombinant 12 Unit(s) SubCutaneous once  isosorbide   mononitrate ER Tablet (IMDUR) 60 milliGRAM(s) Oral daily  melatonin 3 milliGRAM(s) Oral at bedtime PRN  ondansetron Injectable 4 milliGRAM(s) IV Push every 8 hours PRN  sacubitril 24 mG/valsartan 26 mG 1 Tablet(s) Oral two times a day  senna 1 Tablet(s) Oral at bedtime  spironolactone 25 milliGRAM(s) Oral daily      FAMILY HISTORY:  FH: HTN (hypertension)        Social History:  PRevious smoker    REVIEW OF SYSTEMS:  Constitutional:     [ ] negative [ ] fevers [ ] chills [ ] weight loss [ ] weight gain  HEENT:                  [ ] negative [ ] dry eyes [ ] eye irritation [ ] postnasal drip [ ] nasal congestion  CV:                         [ ] negative  [ ] chest pain [ ] orthopnea [ ] palpitations [ ] murmur  Resp:                     [ ] negative [ ] cough [ ] shortness of breath [ ] dyspnea [ ] wheezing [ ] sputum [ ]hemoptysis  GI:                          [ ] negative [ ] nausea [ ] vomiting [ ] diarrhea [ ] constipation [ ] abd pain [ ] dysphagia   :                        [ ] negative [ ] dysuria [ ] nocturia [ ] hematuria [ ] increased urinary frequency  Musculoskeletal: [ ] negative [ ] back pain [ ] myalgias [ ] arthralgias [ ] fracture  Skin:                       [ ] negative [ ] rash [ ] itch  Neurological:        [ ] negative [ ] headache [ ] dizziness [ ] syncope [ ] weakness [ ] numbness  Psychiatric:           [ ] negative [ ] anxiety [ ] depression  Endocrine:            [ ] negative [ ] diabetes [ ] thyroid problem  Heme/Lymph:      [ ] negative [ ] anemia [ ] bleeding problem  Allergic/Immune: [ ] negative [ ] itchy eyes [ ] nasal discharge [ ] hives [ ] angioedema    [ ] All other systems negative  [ ] Unable to assess ROS due to      Physical Exam:  T(F): 98.9 (07-29), Max: 99 (07-28)  HR: 86 (07-29) (86 - 106)  BP: 132/88 (07-29) (132/88 - 163/118)  RR: 18 (07-29)  SpO2: 93% (07-29)  GENERAL: No acute distress, well-developed  HEAD:  Atraumatic, Normocephalic  ENT: EOMI, PERRLA, conjunctiva and sclera clear, Neck supple, No JVD, moist mucosa  CHEST/LUNG: Crackles in the lower lung fiellds  BACK: No spinal tenderness  HEART: Regular rate and rhythm; No murmurs, rubs, or gallops  ABDOMEN: Soft, Nontender, Nondistended; Bowel sounds present  EXTREMITIES:  Trace edema    Cardiovascular Diagnostic Testing:    ECG: Personally reviewed: Sinus tach, no St segment elevation or depression    Echo: Personally reviewed:    Patient name: JOSE ARMANDO SANTIAGO  YOB: 1980   Age: 42 (M)   MR#: [id number]  Study Date: 9/4/2022  Location: [address] [place], [place] [id number]: [facility], [place]  Study quality: Technically difficult  Referring Physician: Fortunato Greene MD  Blood Pressure: 124/90 mmHg  Height: 180 cm  Weight: 113 kg  BSA: 2.3 m2  Heart Rate: 87 mmHg  ------------------------------------------------------------------------  PROCEDURE: Transthoracic echocardiogram with 2-D, M-Mode  and complete spectral and color flow Doppler. Verbal  consent was obtained for injection of  Ultrasonic Enhancing  Agent following a discussion of risks and benefits.  Following intravenous injection of Ultrasonic Enhancing  Agent, harmonic imaging was performed.  INDICATION: Heart failure, unspecified (I50.9)  ------------------------------------------------------------------------  Dimensions:    Normal Values:  LA:     5.2    2.0 - 4.0 cm  Ao:     3.6    2.0 - 3.8 cm  SEPTUM: 1.4    0.6 - 1.2 cm  PWT:    1.3    0.6 - 1.1 cm  LVIDd:  6.4    3.0 - 5.6 cm  LVIDs:  5.2    1.8 - 4.0 cm  Derived variables:  LVMI: 177 g/m2  RWT: 0.40  Fractional short: 19 %  EF (Modified Cote Rule): 25 %Doppler Peak Velocity  (m/sec): AoV=0.8  ------------------------------------------------------------------------  Observations:  Mitral Valve: Tethered mitral valve leaflets with normal  opening. Minimal mitral regurgitation.  Aortic Valve/Aorta: Normal trileaflet aortic valve. Peak  transaortic valve gradient equals 3 mm Hg. No aortic valve  regurgitation seen. Peak left ventricular outflow tract  gradient equals 1 mm Hg.  Aortic Root: 3.6 cm.  Left Atrium: Moderately dilated left atrium.  LA volume  index = 47 cc/m2.  Left Ventricle: Severe global left ventricular systolic  dysfunction.  Endocardial visualization enhanced with  intravenous injection of Ultrasonic Enhancing Agent  (Lumason). No left ventricular thrombus. Mild concentric  left ventricular hypertrophy. Moderate diastolic  dysfunction (Stage II).  Right Heart: Normal right atrium. Right ventricular  enlargement with decreased right ventricular systolic  function. Tethered tricuspid valve. Mild tricuspid  regurgitation. Normal pulmonic valve. Minimal pulmonic  regurgitation.  Pericardium/Pleura: Normal pericardium with no pericardial  effusion.  Hemodynamic: Estimated right atrial pressure is 8 mm Hg.  Estimated right ventricular systolic pressure equals 49 mm  Hg, assuming right atrial pressure equals 8 mm Hg,  consistent with mild pulmonary hypertension.  ------------------------------------------------------------------------  Conclusions:  1. Tethered mitral valve leaflets with normal opening.  Minimal mitral regurgitation.  2. Normal trileaflet aortic valve. No aortic valve  regurgitation seen.  3. Severe global left ventricular systolic dysfunction.  Endocardial visualization enhanced with intravenous  injection of Ultrasonic Enhancing Agent (Lumason). No left  ventricular thrombus.  4. Moderate diastolic dysfunction (Stage II).  5. Right ventricular enlargement with decreased right  ventricular systolic function.  6. Tethered tricuspid valve. Mild tricuspid regurgitation.  7. Estimated pulmonary artery systolic pressure equals 49  mm Hg, assuming right atrial pressure equals 8 mm Hg,  consistent with mild pulmonary pressures.  ------------------------------------------------------------------------  Confirmed on  9/4/2022 - 11:18:57 by Twin Chua M.D.  ------------------------------------------------------------------------      Stress Testing:    Cath:    CORONARY VESSELS: The coronary circulation is left dominant.  LM:   --  LM: Normal.  LAD:   --  Mid LAD: Angiography showed mild atherosclerosis with no flow  limiting lesions.  CX:   --  Circumflex: Normal.  RCA:   --  RCA: Normal.  COMPLICATIONS: There were no complications.  DIAGNOSTIC RECOMMENDATIONS: The patient's diuretic regimenshould be  carefully increased.  Prepared and signed by  Edward Cardona M.D.  Signed 12/12/2019 13:09:42      Imaging:    CXR: Personally reviewed    Labs: Personally reviewed                        16.9   9.66  )-----------( 216      ( 28 Jul 2023 20:34 )             51.4     07-28    135  |  97  |  16  ----------------------------<  425<H>  4.9   |  23  |  1.10    Ca    9.4      28 Jul 2023 20:34  Mg     2.3     07-28    TPro  7.6  /  Alb  4.0  /  TBili  0.3  /  DBili  x   /  AST  34  /  ALT  47<H>  /  AlkPhos  92  07-28            Thyroid Stimulating Hormone, Serum: 1.40 uIU/mL (07-28 @ 20:34)      CARDIAC MARKERS ( 28 Jul 2023 21:40 )  40 ng/L / x     / x     / x     / x     / x      CARDIAC MARKERS ( 28 Jul 2023 20:34 )  39 ng/L / x     / x     / x     / x     / x          No Known Allergies    acetaminophen     Tablet .. 650 milliGRAM(s) Oral every 6 hours PRN  albuterol    90 MICROgram(s) HFA Inhaler 2 Puff(s) Inhalation every 6 hours PRN  aluminum hydroxide/magnesium hydroxide/simethicone Suspension 30 milliLiter(s) Oral every 4 hours PRN  budesonide 160 MICROgram(s)/formoterol 4.5 MICROgram(s) Inhaler 2 Puff(s) Inhalation two times a day  carvedilol 25 milliGRAM(s) Oral every 12 hours  dextrose 5%. 1000 milliLiter(s) IV Continuous <Continuous>  dextrose 5%. 1000 milliLiter(s) IV Continuous <Continuous>  dextrose 50% Injectable 25 Gram(s) IV Push once  dextrose 50% Injectable 12.5 Gram(s) IV Push once  dextrose Oral Gel 15 Gram(s) Oral once PRN  enoxaparin Injectable 40 milliGRAM(s) SubCutaneous every 12 hours  furosemide   Injectable 80 milliGRAM(s) IV Push two times a day  glucagon  Injectable 1 milliGRAM(s) IntraMuscular once  hydrALAZINE 25 milliGRAM(s) Oral every 8 hours  insulin glargine Injectable (LANTUS) 30 Unit(s) SubCutaneous at bedtime  insulin lispro (ADMELOG) corrective regimen sliding scale   SubCutaneous three times a day before meals  insulin lispro (ADMELOG) corrective regimen sliding scale   SubCutaneous at bedtime  insulin lispro Injectable (ADMELOG) 8 Unit(s) SubCutaneous three times a day before meals  insulin NPH human recombinant 12 Unit(s) SubCutaneous once  isosorbide   mononitrate ER Tablet (IMDUR) 60 milliGRAM(s) Oral daily  melatonin 3 milliGRAM(s) Oral at bedtime PRN  ondansetron Injectable 4 milliGRAM(s) IV Push every 8 hours PRN  sacubitril 24 mG/valsartan 26 mG 1 Tablet(s) Oral two times a day  senna 1 Tablet(s) Oral at bedtime  spironolactone 25 milliGRAM(s) Oral daily    T(F): 98.9 (07-29), Max: 99 (07-28)  HR: 86 (07-29) (86 - 106)  BP: 132/88 (07-29) (132/88 - 163/118)  RR: 18 (07-29)  SpO2: 93% (07-29)      Assessment and Recommendation:   · Assessment	  Patient is a 44 y/o male with PMHx of HTN, HLD, HFref EF 20% , DMII ,presents for chest pain and shortness of breath over the past several days.     #Heart failure exacerbation  Patient with NICM/HFrEF with EF of 20  However also with likely URI  - Think he is euvolemic at this point  - Would re-start home meds  - Check RVP as has productive cough   -Continue coreg  -Continue hydralazine/IMDUR   -Continue entresto 24/26mg for now  -Continue spirinolactone  -Continue empaglifolozin when discharged                                                                                                                                                                                                                                Forty-1 minutes spent in patient care management

## 2023-07-30 NOTE — PROGRESS NOTE ADULT - PROBLEM SELECTOR PLAN 2
worsened orthopnea , PND and ALAS , BNP not markedly elevated and minimal edema ,will continue diuresis and- monitor  mag and K, replete as needed  -cardiology recs appreciatd  -Diuresis with 80mg IV Lasix BID (home 80mg PO lasix BID)  - monitor on telemetry  - strict ins and outs  - daily weight  -  not on  ASA or  Statin ( NICM)   - continue carvedilol   - continue Entresto  - continue spironolactone and IMdur   - holding Jardiance    - echocardiogram   - dash consistent carb diet worsened orthopnea , PND and ALAS , BNP not markedly elevated and minimal edema ,will continue diuresis and- monitor  mag and K, replete as needed  -cardiology recs appreciatd  -s/p Diuresis with 80mg IV Lasix BID. Per cards- holding diuretics. Reassess to resume home lasix (80mg BID) on Monday  - monitor on telemetry  - strict ins and outs  - daily weight  -  not on  ASA or  Statin ( NICM)   - continue carvedilol   - continue Entresto  - continue spironolactone and IMdur   - holding Jardiance    - echocardiogram   - dash consistent carb diet

## 2023-07-30 NOTE — PROGRESS NOTE ADULT - PROBLEM SELECTOR PLAN 1
no acute ischemic changes , troponin 39 and 40. Reports similar chest pain with prior chf exacerbation  -TTE   -monitor on telemetry  -cards recs

## 2023-07-31 ENCOUNTER — TRANSCRIPTION ENCOUNTER (OUTPATIENT)
Age: 43
End: 2023-07-31

## 2023-07-31 LAB
GLUCOSE BLDC GLUCOMTR-MCNC: 165 MG/DL — HIGH (ref 70–99)
GLUCOSE BLDC GLUCOMTR-MCNC: 166 MG/DL — HIGH (ref 70–99)
GLUCOSE BLDC GLUCOMTR-MCNC: 344 MG/DL — HIGH (ref 70–99)
GLUCOSE BLDC GLUCOMTR-MCNC: 345 MG/DL — HIGH (ref 70–99)

## 2023-07-31 PROCEDURE — 99233 SBSQ HOSP IP/OBS HIGH 50: CPT | Mod: GC

## 2023-07-31 PROCEDURE — 99232 SBSQ HOSP IP/OBS MODERATE 35: CPT

## 2023-07-31 PROCEDURE — 93306 TTE W/DOPPLER COMPLETE: CPT | Mod: 26

## 2023-07-31 PROCEDURE — 99233 SBSQ HOSP IP/OBS HIGH 50: CPT

## 2023-07-31 RX ORDER — CHLORHEXIDINE GLUCONATE 213 G/1000ML
1 SOLUTION TOPICAL
Refills: 0 | Status: DISCONTINUED | OUTPATIENT
Start: 2023-07-31 | End: 2023-08-02

## 2023-07-31 RX ORDER — INSULIN GLARGINE 100 [IU]/ML
40 INJECTION, SOLUTION SUBCUTANEOUS AT BEDTIME
Refills: 0 | Status: DISCONTINUED | OUTPATIENT
Start: 2023-07-31 | End: 2023-08-01

## 2023-07-31 RX ORDER — FUROSEMIDE 40 MG
80 TABLET ORAL DAILY
Refills: 0 | Status: DISCONTINUED | OUTPATIENT
Start: 2023-07-31 | End: 2023-08-01

## 2023-07-31 RX ORDER — ATORVASTATIN CALCIUM 80 MG/1
40 TABLET, FILM COATED ORAL AT BEDTIME
Refills: 0 | Status: DISCONTINUED | OUTPATIENT
Start: 2023-07-31 | End: 2023-08-02

## 2023-07-31 RX ADMIN — CHLORHEXIDINE GLUCONATE 1 APPLICATION(S): 213 SOLUTION TOPICAL at 12:46

## 2023-07-31 RX ADMIN — SACUBITRIL AND VALSARTAN 1 TABLET(S): 24; 26 TABLET, FILM COATED ORAL at 06:25

## 2023-07-31 RX ADMIN — CARVEDILOL PHOSPHATE 25 MILLIGRAM(S): 80 CAPSULE, EXTENDED RELEASE ORAL at 17:39

## 2023-07-31 RX ADMIN — Medication 25 MILLIGRAM(S): at 13:36

## 2023-07-31 RX ADMIN — Medication 14 UNIT(S): at 17:39

## 2023-07-31 RX ADMIN — BUDESONIDE AND FORMOTEROL FUMARATE DIHYDRATE 2 PUFF(S): 160; 4.5 AEROSOL RESPIRATORY (INHALATION) at 17:39

## 2023-07-31 RX ADMIN — Medication 8: at 07:58

## 2023-07-31 RX ADMIN — Medication 25 MILLIGRAM(S): at 06:25

## 2023-07-31 RX ADMIN — CARVEDILOL PHOSPHATE 25 MILLIGRAM(S): 80 CAPSULE, EXTENDED RELEASE ORAL at 06:25

## 2023-07-31 RX ADMIN — Medication 4: at 21:46

## 2023-07-31 RX ADMIN — Medication 14 UNIT(S): at 07:58

## 2023-07-31 RX ADMIN — ENOXAPARIN SODIUM 40 MILLIGRAM(S): 100 INJECTION SUBCUTANEOUS at 17:38

## 2023-07-31 RX ADMIN — Medication 2: at 17:38

## 2023-07-31 RX ADMIN — SPIRONOLACTONE 25 MILLIGRAM(S): 25 TABLET, FILM COATED ORAL at 06:25

## 2023-07-31 RX ADMIN — SENNA PLUS 1 TABLET(S): 8.6 TABLET ORAL at 21:47

## 2023-07-31 RX ADMIN — ATORVASTATIN CALCIUM 40 MILLIGRAM(S): 80 TABLET, FILM COATED ORAL at 21:47

## 2023-07-31 RX ADMIN — Medication 25 MILLIGRAM(S): at 21:47

## 2023-07-31 RX ADMIN — SACUBITRIL AND VALSARTAN 1 TABLET(S): 24; 26 TABLET, FILM COATED ORAL at 17:38

## 2023-07-31 RX ADMIN — Medication 2: at 11:46

## 2023-07-31 RX ADMIN — ISOSORBIDE MONONITRATE 60 MILLIGRAM(S): 60 TABLET, EXTENDED RELEASE ORAL at 13:36

## 2023-07-31 RX ADMIN — Medication 14 UNIT(S): at 11:46

## 2023-07-31 RX ADMIN — INSULIN GLARGINE 40 UNIT(S): 100 INJECTION, SOLUTION SUBCUTANEOUS at 21:46

## 2023-07-31 NOTE — PROGRESS NOTE ADULT - SUBJECTIVE AND OBJECTIVE BOX
Patient seen and examined at bedside.    Overnight Events:   - MOSES  - On tele SR 80s-90s    REVIEW OF SYSTEMS:  General: no fatigue/malaise, weight loss/gain.  Skin: no rashes.  Ophthalmologic: no blurred vision, no loss of vision. 	  ENT: no sore throat, rhinorrhea, sinus congestion.  Respiratory: no SOB, cough or wheeze.  CV: No chest pain. No palpitations.   Gastrointestinal:  no N/V/D, no melena/hematemesis/hematochezia.  Genitourinary: no dysuria/hesitancy or hematuria.  Musculoskeletal: no myalgias or arthralgias.  Neurological: no changes in vision or hearing, no lightheadedness/dizziness, no syncope/near syncope	  Psychiatric: no unusual stress/anxiety.   Hematology/Lymphatics: no unusual bleeding, bruising and no lymphadenopathy.  Endocrine: no unusual thirst.           Current Meds:  acetaminophen     Tablet .. 650 milliGRAM(s) Oral every 6 hours PRN  albuterol    90 MICROgram(s) HFA Inhaler 2 Puff(s) Inhalation every 6 hours PRN  aluminum hydroxide/magnesium hydroxide/simethicone Suspension 30 milliLiter(s) Oral every 4 hours PRN  budesonide 160 MICROgram(s)/formoterol 4.5 MICROgram(s) Inhaler 2 Puff(s) Inhalation two times a day  carvedilol 25 milliGRAM(s) Oral every 12 hours  chlorhexidine 2% Cloths 1 Application(s) Topical <User Schedule>  dextrose 5%. 1000 milliLiter(s) IV Continuous <Continuous>  dextrose 5%. 1000 milliLiter(s) IV Continuous <Continuous>  dextrose 50% Injectable 25 Gram(s) IV Push once  dextrose 50% Injectable 12.5 Gram(s) IV Push once  dextrose Oral Gel 15 Gram(s) Oral once PRN  enoxaparin Injectable 40 milliGRAM(s) SubCutaneous every 12 hours  glucagon  Injectable 1 milliGRAM(s) IntraMuscular once  hydrALAZINE 25 milliGRAM(s) Oral every 8 hours  insulin glargine Injectable (LANTUS) 38 Unit(s) SubCutaneous at bedtime  insulin lispro (ADMELOG) corrective regimen sliding scale   SubCutaneous at bedtime  insulin lispro (ADMELOG) corrective regimen sliding scale   SubCutaneous three times a day before meals  insulin lispro Injectable (ADMELOG) 14 Unit(s) SubCutaneous three times a day before meals  isosorbide   mononitrate ER Tablet (IMDUR) 60 milliGRAM(s) Oral daily  melatonin 3 milliGRAM(s) Oral at bedtime PRN  ondansetron Injectable 4 milliGRAM(s) IV Push every 8 hours PRN  sacubitril 24 mG/valsartan 26 mG 1 Tablet(s) Oral two times a day  senna 1 Tablet(s) Oral at bedtime  spironolactone 25 milliGRAM(s) Oral daily      Vitals:  T(F): 97.7 (07-31), Max: 98.1 (07-30)  HR: 98 (07-31) (87 - 98)  BP: 111/75 (07-31) (111/75 - 140/97)  RR: 18 (07-31)  SpO2: 98% (07-31)  I&O's Summary    30 Jul 2023 07:01  -  31 Jul 2023 07:00  --------------------------------------------------------  IN: 480 mL / OUT: 0 mL / NET: 480 mL    31 Jul 2023 07:01  -  31 Jul 2023 14:50  --------------------------------------------------------  IN: 480 mL / OUT: 0 mL / NET: 480 mL        Physical Exam:  GENERAL: No acute distress, well-developed  HEAD:  Atraumatic, Normocephalic  ENT: EOMI, PERRLA, conjunctiva and sclera clear, Neck supple, No JVD, moist mucosa  CHEST/LUNG: Crackles in the lower lung fiellds  BACK: No spinal tenderness  HEART: Regular rate and rhythm; No murmurs, rubs, or gallops  ABDOMEN: Soft, Nontender, Nondistended; Bowel sounds present  EXTREMITIES:  Trace edema                          18.2   11.27 )-----------( 230      ( 30 Jul 2023 11:53 )             53.9     07-30    140  |  97  |  22  ----------------------------<  297<H>  3.8   |  26  |  1.20    Ca    9.8      30 Jul 2023 11:53  Mg     2.4     07-30        CARDIAC MARKERS ( 28 Jul 2023 21:40 )  40 ng/L / x     / x     / x     / x     / x      CARDIAC MARKERS ( 28 Jul 2023 20:34 )  39 ng/L / x     / x     / x     / x     / x                  New ECG(s): Personally reviewed

## 2023-07-31 NOTE — DIETITIAN INITIAL EVALUATION ADULT - PERTINENT LABORATORY DATA
07-30    140  |  97  |  22  ----------------------------<  297<H>  3.8   |  26  |  1.20    Ca    9.8      30 Jul 2023 11:53  Mg     2.4     07-30    CAPILLARY BLOOD GLUCOSE  POCT Blood Glucose.: 165 mg/dL (31 Jul 2023 11:31)  POCT Blood Glucose.: 344 mg/dL (31 Jul 2023 07:36)  POCT Blood Glucose.: 309 mg/dL (30 Jul 2023 21:00)  POCT Blood Glucose.: 261 mg/dL (30 Jul 2023 17:49)    A1C with Estimated Average Glucose Result: 13.6 % (07-30-23 @ 11:53)  A1C with Estimated Average Glucose Result: 13.5 % (07-29-23 @ 16:37)  A1C with Estimated Average Glucose Result: 9.6 % (09-03-22 @ 11:13)

## 2023-07-31 NOTE — DIETITIAN INITIAL EVALUATION ADULT - REASON FOR ADMISSION
Chest pain    Per chart: 42 y/o male with PMHx of HTN, HLD, HFref EF 20% , DMII ,presents for chest pain over the past several days admitted for chf exacerbation and hyperglycemia 2/2 medication noncompliance

## 2023-07-31 NOTE — PROGRESS NOTE ADULT - SUBJECTIVE AND OBJECTIVE BOX
[facility] Division of Hospital Medicine  Edson Cheng DO  Reachable on [employer] Teams    Patient is a 43y old  Male who presents with a chief complaint of Chest pain    Per chart: 42 y/o male with PMHx of HTN, HLD, HFref EF 20% , DMII ,presents for chest pain over the past several days admitted for chf exacerbation and hyperglycemia 2/2 medication noncompliance (31 Jul 2023 16:13)    SUBJECTIVE / OVERNIGHT EVENTS: No acute events overnight. Patient seen and examined at bedside this morning, endorses palpitations but denies chest pain or shortness of breath.    REVIEW OF SYSTEMS:    CONSTITUTIONAL: No weakness, fevers or chills  EYES/ENT: No visual changes;  No vertigo or throat pain   NECK: No pain or stiffness  RESPIRATORY: No cough, wheezing, hemoptysis; No shortness of breath  CARDIOVASCULAR: No chest pain or palpitations  GASTROINTESTINAL: No abdominal or epigastric pain. No nausea, vomiting, or hematemesis; No diarrhea or constipation. No melena or hematochezia.  GENITOURINARY: No dysuria, frequency or hematuria  NEUROLOGICAL: No numbness or weakness  SKIN: No itching, burning, rashes, or lesions  MSK: No joint pain, no back pain  HEME: No easy bleeding, no easy bruising  All other review of systems is negative unless indicated above.    MEDICATIONS  (STANDING):  atorvastatin 40 milliGRAM(s) Oral at bedtime  budesonide 160 MICROgram(s)/formoterol 4.5 MICROgram(s) Inhaler 2 Puff(s) Inhalation two times a day  carvedilol 25 milliGRAM(s) Oral every 12 hours  chlorhexidine 2% Cloths 1 Application(s) Topical <User Schedule>  dextrose 5%. 1000 milliLiter(s) (50 mL/Hr) IV Continuous <Continuous>  dextrose 5%. 1000 milliLiter(s) (100 mL/Hr) IV Continuous <Continuous>  dextrose 50% Injectable 25 Gram(s) IV Push once  dextrose 50% Injectable 12.5 Gram(s) IV Push once  enoxaparin Injectable 40 milliGRAM(s) SubCutaneous every 12 hours  furosemide    Tablet 80 milliGRAM(s) Oral daily  glucagon  Injectable 1 milliGRAM(s) IntraMuscular once  hydrALAZINE 25 milliGRAM(s) Oral every 8 hours  insulin glargine Injectable (LANTUS) 40 Unit(s) SubCutaneous at bedtime  insulin lispro (ADMELOG) corrective regimen sliding scale   SubCutaneous at bedtime  insulin lispro (ADMELOG) corrective regimen sliding scale   SubCutaneous three times a day before meals  insulin lispro Injectable (ADMELOG) 14 Unit(s) SubCutaneous three times a day before meals  isosorbide   mononitrate ER Tablet (IMDUR) 60 milliGRAM(s) Oral daily  sacubitril 24 mG/valsartan 26 mG 1 Tablet(s) Oral two times a day  senna 1 Tablet(s) Oral at bedtime  spironolactone 25 milliGRAM(s) Oral daily    MEDICATIONS  (PRN):  acetaminophen     Tablet .. 650 milliGRAM(s) Oral every 6 hours PRN Temp greater or equal to 38C (100.4F), Mild Pain (1 - 3)  albuterol    90 MICROgram(s) HFA Inhaler 2 Puff(s) Inhalation every 6 hours PRN Shortness of Breath and/or Wheezing  aluminum hydroxide/magnesium hydroxide/simethicone Suspension 30 milliLiter(s) Oral every 4 hours PRN Dyspepsia  dextrose Oral Gel 15 Gram(s) Oral once PRN Blood Glucose LESS THAN 70 milliGRAM(s)/deciliter  melatonin 3 milliGRAM(s) Oral at bedtime PRN Insomnia  ondansetron Injectable 4 milliGRAM(s) IV Push every 8 hours PRN Nausea and/or Vomiting      CAPILLARY BLOOD GLUCOSE      POCT Blood Glucose.: 165 mg/dL (31 Jul 2023 11:31)  POCT Blood Glucose.: 344 mg/dL (31 Jul 2023 07:36)  POCT Blood Glucose.: 309 mg/dL (30 Jul 2023 21:00)  POCT Blood Glucose.: 261 mg/dL (30 Jul 2023 17:49)    I&O's Summary    30 Jul 2023 07:01  -  31 Jul 2023 07:00  --------------------------------------------------------  IN: 480 mL / OUT: 0 mL / NET: 480 mL    31 Jul 2023 07:01  -  31 Jul 2023 17:00  --------------------------------------------------------  IN: 480 mL / OUT: 0 mL / NET: 480 mL        PHYSICAL EXAM:  Vital Signs Last 24 Hrs  T(C): 36.5 (31 Jul 2023 16:04), Max: 36.7 (30 Jul 2023 21:01)  T(F): 97.7 (31 Jul 2023 16:04), Max: 98.1 (30 Jul 2023 21:01)  HR: 90 (31 Jul 2023 16:04) (87 - 98)  BP: 127/88 (31 Jul 2023 16:04) (111/75 - 140/97)  BP(mean): --  RR: 18 (31 Jul 2023 16:04) (18 - 18)  SpO2: 98% (31 Jul 2023 16:04) (95% - 98%)    Parameters below as of 31 Jul 2023 16:04  Patient On (Oxygen Delivery Method): room air    CONSTITUTIONAL: NAD, well-developed, well-groomed  EYES: EOMI; conjunctiva and sclera clear  ENMT: Moist oral mucosa, no pharyngeal injection or exudates  RESPIRATORY: Normal respiratory effort; lungs are clear to auscultation bilaterally  CARDIOVASCULAR: Regular rate and rhythm, normal S1 and S2, no murmur/rub/gallop; Tender to palpation around sternum  ABDOMEN: Nontender to palpation, soft, nondistended  MUSCULOSKELETAL: No clubbing or cyanosis of digits; no joint swelling or tenderness to palpation  PSYCH: A+O to person, place, and time; affect appropriate  NEUROLOGY: CN 2-12 are intact and symmetric; no gross sensory deficits   SKIN: No rashes; no palpable lesions    LABS:                        18.2   11.27 )-----------( 230      ( 30 Jul 2023 11:53 )             53.9     07-30    140  |  97  |  22  ----------------------------<  297<H>  3.8   |  26  |  1.20    Ca    9.8      30 Jul 2023 11:53  Mg     2.4     07-30            Urinalysis Basic - ( 30 Jul 2023 11:53 )    Color: x / Appearance: x / SG: x / pH: x  Gluc: 297 mg/dL / Ketone: x  / Bili: x / Urobili: x   Blood: x / Protein: x / Nitrite: x   Leuk Esterase: x / RBC: x / WBC x   Sq Epi: x / Non Sq Epi: x / Bacteria: x

## 2023-07-31 NOTE — DIETITIAN INITIAL EVALUATION ADULT - OTHER INFO
Reports UBW ranges from 230-254 lbs. Denies recent wt changes.   Dosing wt: 255.9 lbs (07-28)  Wt history per chart: 247.5 lbs (07-30, standing), 250 lbs (09/03/22). RD to continue to monitor weight trends as able/available.     Per chart, pt currently ordered for lantus, admelog before meals, admelog ISS, atorvastatin, hydralazine, and spironolactone in-house.

## 2023-07-31 NOTE — DIETITIAN INITIAL EVALUATION ADULT - ORAL INTAKE PTA/DIET HISTORY
Pt reports good appetite/PO intake PTA, was not following any therapeutic diet PTA. States he only eats 1 meal/day in the evening and doesn't eat breakfast or lunch. Eats mostly out from restaurants, foods such as pasta and burgers.  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

## 2023-07-31 NOTE — DIETITIAN INITIAL EVALUATION ADULT - NSFNSADHERENCEPTAFT_GEN_A_CORE
Pt noted with hx DM2. Reports he has not been taking his medication due to misunderstanding with his doctor and pharmacy. Reports he does not check his fingersticks. A1c 13.6% indicates poor glycemic control.

## 2023-07-31 NOTE — PROGRESS NOTE ADULT - SUBJECTIVE AND OBJECTIVE BOX
Interval history:  BG in the 300s this AM however improved after patient received 22 units of Admelog this AM with breakfast      MEDICATIONS  (STANDING):  budesonide 160 MICROgram(s)/formoterol 4.5 MICROgram(s) Inhaler 2 Puff(s) Inhalation two times a day  carvedilol 25 milliGRAM(s) Oral every 12 hours  dextrose 5%. 1000 milliLiter(s) (50 mL/Hr) IV Continuous <Continuous>  dextrose 5%. 1000 milliLiter(s) (100 mL/Hr) IV Continuous <Continuous>  dextrose 50% Injectable 25 Gram(s) IV Push once  dextrose 50% Injectable 12.5 Gram(s) IV Push once  enoxaparin Injectable 40 milliGRAM(s) SubCutaneous every 12 hours  glucagon  Injectable 1 milliGRAM(s) IntraMuscular once  hydrALAZINE 25 milliGRAM(s) Oral every 8 hours  insulin glargine Injectable (LANTUS) 38 Unit(s) SubCutaneous at bedtime  insulin lispro (ADMELOG) corrective regimen sliding scale   SubCutaneous three times a day before meals  insulin lispro (ADMELOG) corrective regimen sliding scale   SubCutaneous at bedtime  insulin lispro Injectable (ADMELOG) 14 Unit(s) SubCutaneous three times a day before meals  isosorbide   mononitrate ER Tablet (IMDUR) 60 milliGRAM(s) Oral daily  sacubitril 24 mG/valsartan 26 mG 1 Tablet(s) Oral two times a day  senna 1 Tablet(s) Oral at bedtime  spironolactone 25 milliGRAM(s) Oral daily    MEDICATIONS  (PRN):  acetaminophen     Tablet .. 650 milliGRAM(s) Oral every 6 hours PRN Temp greater or equal to 38C (100.4F), Mild Pain (1 - 3)  albuterol    90 MICROgram(s) HFA Inhaler 2 Puff(s) Inhalation every 6 hours PRN Shortness of Breath and/or Wheezing  aluminum hydroxide/magnesium hydroxide/simethicone Suspension 30 milliLiter(s) Oral every 4 hours PRN Dyspepsia  dextrose Oral Gel 15 Gram(s) Oral once PRN Blood Glucose LESS THAN 70 milliGRAM(s)/deciliter  melatonin 3 milliGRAM(s) Oral at bedtime PRN Insomnia  ondansetron Injectable 4 milliGRAM(s) IV Push every 8 hours PRN Nausea and/or Vomiting      Allergies    No Known Allergies    Intolerances      Review of Systems:  Constitutional: No fever  Eyes: No blurry vision  Neuro: No tremors  HEENT: No pain  Cardiovascular: No chest pain, palpitations  Respiratory: No SOB, no cough  GI: No nausea, vomiting, abdominal pain  : No dysuria  Skin: no rash  Psych: no depression  Endocrine: no polyuria, polydipsia  Hem/lymph: no swelling  Osteoporosis: no fractures    ALL OTHER SYSTEMS REVIEWED AND NEGATIVE    UNABLE TO OBTAIN    PHYSICAL EXAM:  VITALS: T(C): 36.5 (07-31-23 @ 12:02)  T(F): 97.7 (07-31-23 @ 12:02), Max: 98.1 (07-30-23 @ 21:01)  HR: 98 (07-31-23 @ 12:02) (87 - 98)  BP: 111/75 (07-31-23 @ 12:02) (111/75 - 140/97)  RR:  (18 - 18)  SpO2:  (95% - 98%)  Wt(kg): --  GENERAL: NAD, well-groomed, well-developed  EYES: No proptosis, no lid lag, anicteric  HEENT:  Atraumatic, Normocephalic, moist mucous membranes  THYROID: Normal size, no palpable nodules  RESPIRATORY: Clear to auscultation bilaterally; No rales, rhonchi, wheezing, or rubs  CARDIOVASCULAR: Regular rate and rhythm; No murmurs; no peripheral edema  GI: Soft, nontender, non distended, normal bowel sounds  SKIN: Dry, intact, No rashes or lesions  MUSCULOSKELETAL: Full range of motion, normal strength  NEURO: sensation intact, extraocular movements intact, no tremor, normal reflexes  PSYCH: Alert and oriented x 3, normal affect, normal mood  CUSHING'S SIGNS: no striae    POCT Blood Glucose.: 165 mg/dL (07-31-23 @ 11:31)  POCT Blood Glucose.: 344 mg/dL (07-31-23 @ 07:36)  POCT Blood Glucose.: 309 mg/dL (07-30-23 @ 21:00)  POCT Blood Glucose.: 261 mg/dL (07-30-23 @ 17:49)  POCT Blood Glucose.: 296 mg/dL (07-30-23 @ 11:32)  POCT Blood Glucose.: 328 mg/dL (07-30-23 @ 07:50)  POCT Blood Glucose.: 343 mg/dL (07-29-23 @ 22:29)  POCT Blood Glucose.: 288 mg/dL (07-29-23 @ 17:33)  POCT Blood Glucose.: 461 mg/dL (07-29-23 @ 13:00)  POCT Blood Glucose.: 395 mg/dL (07-29-23 @ 11:27)  POCT Blood Glucose.: 367 mg/dL (07-29-23 @ 07:46)  POCT Blood Glucose.: 322 mg/dL (07-28-23 @ 21:16)      07-30    140  |  97  |  22  ----------------------------<  297<H>  3.8   |  26  |  1.20    eGFR: 76    Ca    9.8      07-30  Mg     2.4     07-30    TPro  7.6  /  Alb  4.0  /  TBili  0.3  /  DBili  x   /  AST  34  /  ALT  47<H>  /  AlkPhos  92  07-28          Thyroid Function Tests:  07-28 @ 20:34 TSH 1.40 FreeT4 1.2 T3 128 Anti TPO -- Anti Thyroglobulin Ab -- TSI --

## 2023-07-31 NOTE — DIETITIAN INITIAL EVALUATION ADULT - REASON
Nutrition-focused physical examination deferred at this time - pt reporting good PO intake. No overt signs of fat/muscle wasting visually observed.

## 2023-07-31 NOTE — PROGRESS NOTE ADULT - PROBLEM SELECTOR PLAN 2
worsened orthopnea , PND and ALAS , BNP not markedly elevated and minimal edema ,will continue diuresis and- monitor  mag and K, replete as needed  -cardiology recs appreciatd  -Resume Lasx 80mg daily  - monitor on telemetry  - strict ins and outs  - daily weight  - continue carvedilol   - continue Entresto  - continue spironolactone and Imdur   - holding Jardiance    - dash consistent carb diet

## 2023-07-31 NOTE — DIETITIAN INITIAL EVALUATION ADULT - ENERGY INTAKE
Pt with good PO intake per flowsheets. Per discussion with RN liaison, pt has been requesting pizza from cafeteria because he isn't used to eating the type of food served in the hospital.

## 2023-07-31 NOTE — DIETITIAN INITIAL EVALUATION ADULT - ADD RECOMMEND
1) Continue DASH, Consistent Carbohydrate diet.   2) Monitor PO intake, GI tolerance, skin integrity, labs, weight, and bowel movement regularity.   3) Honor food preferences as feasible. Assist with meals PRN and encourage PO intake.  4) RD remains available upon request and will follow-up per protocol.

## 2023-07-31 NOTE — DIETITIAN INITIAL EVALUATION ADULT - NSFNSGIIOFT_GEN_A_CORE
Denies nausea, vomiting, diarrhea. Reports constipation. Reports last BM 7/31. Pt currently on bowel regimen (senna).

## 2023-07-31 NOTE — PROGRESS NOTE ADULT - ATTENDING COMMENTS
44 yo man with h/o NICM - ?compliance; poorly controlled DM - hbgA1c is 13  - Would re-start home meds  - results of the echo noted  -Continue coreg  -Continue hydralazine/IMDUR   -Continue entresto 24/26mg for now  -Continue spirinolactone  -Continue empaglifolozin when discharge  - would make sure the pt is closely followed in HF clinic

## 2023-07-31 NOTE — PROGRESS NOTE ADULT - PROBLEM SELECTOR PLAN 1
no acute ischemic changes , troponin 39 and 40. Reports similar chest pain with prior chf exacerbation  -TTE without WMA but does have EF of 17%  -monitor on telemetry  -cards recs, will need to be plugged in with CHF team prior to DC

## 2023-07-31 NOTE — DIETITIAN INITIAL EVALUATION ADULT - PERSON TAUGHT/METHOD
Provided education on Nutrition Therapy for Type 2 Diabetes. Emphasis on what foods contain carbohydrates, importance of pairing carbohydrates with protein for glycemic control; choosing whole grains vs refined carbohydrates; limiting refined sugars, portion sizes. Good comprehension noted. Pt made aware RD to remain available for any questions./verbal instruction/patient instructed

## 2023-07-31 NOTE — DIETITIAN INITIAL EVALUATION ADULT - PERTINENT MEDS FT
MEDICATIONS  (STANDING):  atorvastatin 40 milliGRAM(s) Oral at bedtime  budesonide 160 MICROgram(s)/formoterol 4.5 MICROgram(s) Inhaler 2 Puff(s) Inhalation two times a day  carvedilol 25 milliGRAM(s) Oral every 12 hours  chlorhexidine 2% Cloths 1 Application(s) Topical <User Schedule>  dextrose 5%. 1000 milliLiter(s) (50 mL/Hr) IV Continuous <Continuous>  dextrose 5%. 1000 milliLiter(s) (100 mL/Hr) IV Continuous <Continuous>  dextrose 50% Injectable 25 Gram(s) IV Push once  dextrose 50% Injectable 12.5 Gram(s) IV Push once  enoxaparin Injectable 40 milliGRAM(s) SubCutaneous every 12 hours  glucagon  Injectable 1 milliGRAM(s) IntraMuscular once  hydrALAZINE 25 milliGRAM(s) Oral every 8 hours  insulin glargine Injectable (LANTUS) 38 Unit(s) SubCutaneous at bedtime  insulin lispro (ADMELOG) corrective regimen sliding scale   SubCutaneous at bedtime  insulin lispro (ADMELOG) corrective regimen sliding scale   SubCutaneous three times a day before meals  insulin lispro Injectable (ADMELOG) 14 Unit(s) SubCutaneous three times a day before meals  isosorbide   mononitrate ER Tablet (IMDUR) 60 milliGRAM(s) Oral daily  sacubitril 24 mG/valsartan 26 mG 1 Tablet(s) Oral two times a day  senna 1 Tablet(s) Oral at bedtime  spironolactone 25 milliGRAM(s) Oral daily    MEDICATIONS  (PRN):  acetaminophen     Tablet .. 650 milliGRAM(s) Oral every 6 hours PRN Temp greater or equal to 38C (100.4F), Mild Pain (1 - 3)  albuterol    90 MICROgram(s) HFA Inhaler 2 Puff(s) Inhalation every 6 hours PRN Shortness of Breath and/or Wheezing  aluminum hydroxide/magnesium hydroxide/simethicone Suspension 30 milliLiter(s) Oral every 4 hours PRN Dyspepsia  dextrose Oral Gel 15 Gram(s) Oral once PRN Blood Glucose LESS THAN 70 milliGRAM(s)/deciliter  melatonin 3 milliGRAM(s) Oral at bedtime PRN Insomnia  ondansetron Injectable 4 milliGRAM(s) IV Push every 8 hours PRN Nausea and/or Vomiting  
Statement Selected

## 2023-07-31 NOTE — PROGRESS NOTE ADULT - ASSESSMENT
Patient is a 42 y/o male with PMHx of HTN, HLD, HFref EF 20% , DMII ,presents for chest pain over the past several days. Patient is high risk with high level decision making due to uncontrolled diabetes which places patient at high risk for cardiovascular and cerebrovascular events. Patient with lability of glucose requiring close monitoring and insulin adjustments. Endocrinology consulted for management of T2DM.    #Poorly controlled T2DM with hyperglycemia  - HbA1c: 13.5  - Home Regimen: does not know medications, only started on 7/25/23, per chart review jardiance 10mg, glipizide-metformin 5-500mg twice daily  - Endocrinologist: does not have  PLAN  - Increase Lantus to 40 units tonight   - Continue with Admelog 14 units TID pre-meal. HOLD IF NPO.  - C/w moderate dose SSI   - Fingerstick BG before meals and bedtime  - Goal -180  Discharge plan:  - Discharge medications:   Once daily basal insulin (lantus, basaglar, tresiba, semglee, etc) and three times a day bolus insulin (novolog, humalog, admelog, etc), increase jardiance to 25mg daily, consider GLP-1 agonist outpatient. Doses of insulin to be determined. Stop glipizide and metformin. Please send a prescription for freestyle janel 2 sensor and reader as well as back up glucometer, test strips, lancets. Patient should be prescribed glucagon as well given on insulin (can send prescription for gvoke hypopen or baqsimi)     If patient hesitant to take injections due to needle phobia- would recommend to do basal insulin plus GLP-1 + SGLT-2. Patient understands that this is not ideal given his high insulin requirements.   - Patient to call doctor with persistent high or low BG at home.   - Recommend routine outpatient ophthalmology, podiatry and endocrinology f/u    HTN  - Home regimen: has not been taking medications regularly  PLAN  - Can check urine microalbumin outpatient  - Outpatient goal BP <130/80. Management per primary team.    HLD  - Home regimen: has not been taking medications regularly  PLAN  - Would likely benefit from a statin if no contraindication  - Check fasting lipid panel     If desired can follow up at below location for endocrinology (patient states he will contact pcp for referral).  [address] [place] [address]  Phone Number: [phone]    Oliva Grover, DO   Attending Physician   Department of Endocrinology, Diabetes and Metabolism     If before 9AM or after 5PM, or on weekends/holidays, please call the Endocrine answering service for assistance ([phone]).  For nonurgent matters, please email [email or web address] for assistance.

## 2023-07-31 NOTE — DIETITIAN INITIAL EVALUATION ADULT - NS FNS DIET ORDER
Diet, Regular:   Consistent Carbohydrate {Evening Snack} (CSTCHOSN)  DASH/TLC {Sodium & Cholesterol Restricted} (DASH) (07-29-23 @ 00:52) [Active]

## 2023-07-31 NOTE — PROGRESS NOTE ADULT - ASSESSMENT
44 y/o male with PMHx of HTN, HLD, HFref EF 20% , DMII ,presents for chest pain and shortness of breath over the past several days.     1. NICM/HFrEF  2. HTN  3. T2DM    RECOMMENDATIONS:  - Would start Lasix 80mg PO daily  - Awaiting TTE may benefit from HF eval given age and low LVEF  - Continue Carvedilol 25mg Q12H, Hydralazine 25mg Q8H, Imdur 60mg daily, Spironolactone 25mg daily, Entresto 24/26mg Q12H  - Would benefit from SGLT2i       Note not final until signed by attending       Patricia Burch MD  Cardiology Fellow PGY-6  Phone: [phone]    For all New Consults  [email or web address]   Login: [email or web address]

## 2023-08-01 LAB
GLUCOSE BLDC GLUCOMTR-MCNC: 243 MG/DL — HIGH (ref 70–99)
GLUCOSE BLDC GLUCOMTR-MCNC: 259 MG/DL — HIGH (ref 70–99)
GLUCOSE BLDC GLUCOMTR-MCNC: 283 MG/DL — HIGH (ref 70–99)
GLUCOSE BLDC GLUCOMTR-MCNC: 308 MG/DL — HIGH (ref 70–99)

## 2023-08-01 PROCEDURE — 99232 SBSQ HOSP IP/OBS MODERATE 35: CPT

## 2023-08-01 PROCEDURE — 99233 SBSQ HOSP IP/OBS HIGH 50: CPT | Mod: GC

## 2023-08-01 PROCEDURE — 99223 1ST HOSP IP/OBS HIGH 75: CPT | Mod: GC

## 2023-08-01 RX ORDER — INSULIN GLARGINE 100 [IU]/ML
48 INJECTION, SOLUTION SUBCUTANEOUS AT BEDTIME
Refills: 0 | Status: DISCONTINUED | OUTPATIENT
Start: 2023-08-01 | End: 2023-08-02

## 2023-08-01 RX ORDER — INSULIN LISPRO 100/ML
16 VIAL (ML) SUBCUTANEOUS
Refills: 0 | Status: DISCONTINUED | OUTPATIENT
Start: 2023-08-02 | End: 2023-08-02

## 2023-08-01 RX ORDER — INSULIN LISPRO 100/ML
17 VIAL (ML) SUBCUTANEOUS
Refills: 0 | Status: DISCONTINUED | OUTPATIENT
Start: 2023-08-01 | End: 2023-08-02

## 2023-08-01 RX ORDER — SACUBITRIL AND VALSARTAN 24; 26 MG/1; MG/1
1 TABLET, FILM COATED ORAL EVERY 12 HOURS
Refills: 0 | Status: DISCONTINUED | OUTPATIENT
Start: 2023-08-01 | End: 2023-08-02

## 2023-08-01 RX ADMIN — BUDESONIDE AND FORMOTEROL FUMARATE DIHYDRATE 2 PUFF(S): 160; 4.5 AEROSOL RESPIRATORY (INHALATION) at 05:16

## 2023-08-01 RX ADMIN — SACUBITRIL AND VALSARTAN 1 TABLET(S): 24; 26 TABLET, FILM COATED ORAL at 05:16

## 2023-08-01 RX ADMIN — Medication 2: at 21:22

## 2023-08-01 RX ADMIN — Medication 6: at 18:20

## 2023-08-01 RX ADMIN — Medication 17 UNIT(S): at 18:21

## 2023-08-01 RX ADMIN — Medication 25 MILLIGRAM(S): at 21:23

## 2023-08-01 RX ADMIN — Medication 4: at 12:30

## 2023-08-01 RX ADMIN — SPIRONOLACTONE 25 MILLIGRAM(S): 25 TABLET, FILM COATED ORAL at 05:15

## 2023-08-01 RX ADMIN — ATORVASTATIN CALCIUM 40 MILLIGRAM(S): 80 TABLET, FILM COATED ORAL at 21:23

## 2023-08-01 RX ADMIN — BUDESONIDE AND FORMOTEROL FUMARATE DIHYDRATE 2 PUFF(S): 160; 4.5 AEROSOL RESPIRATORY (INHALATION) at 18:22

## 2023-08-01 RX ADMIN — INSULIN GLARGINE 48 UNIT(S): 100 INJECTION, SOLUTION SUBCUTANEOUS at 21:22

## 2023-08-01 RX ADMIN — Medication 14 UNIT(S): at 08:21

## 2023-08-01 RX ADMIN — Medication 14 UNIT(S): at 12:30

## 2023-08-01 RX ADMIN — ISOSORBIDE MONONITRATE 60 MILLIGRAM(S): 60 TABLET, EXTENDED RELEASE ORAL at 12:32

## 2023-08-01 RX ADMIN — ENOXAPARIN SODIUM 40 MILLIGRAM(S): 100 INJECTION SUBCUTANEOUS at 18:20

## 2023-08-01 RX ADMIN — Medication 80 MILLIGRAM(S): at 05:15

## 2023-08-01 RX ADMIN — Medication 8: at 08:21

## 2023-08-01 RX ADMIN — CHLORHEXIDINE GLUCONATE 1 APPLICATION(S): 213 SOLUTION TOPICAL at 06:06

## 2023-08-01 RX ADMIN — SENNA PLUS 1 TABLET(S): 8.6 TABLET ORAL at 21:23

## 2023-08-01 RX ADMIN — CARVEDILOL PHOSPHATE 25 MILLIGRAM(S): 80 CAPSULE, EXTENDED RELEASE ORAL at 18:19

## 2023-08-01 RX ADMIN — Medication 25 MILLIGRAM(S): at 13:32

## 2023-08-01 RX ADMIN — Medication 25 MILLIGRAM(S): at 05:16

## 2023-08-01 RX ADMIN — CARVEDILOL PHOSPHATE 25 MILLIGRAM(S): 80 CAPSULE, EXTENDED RELEASE ORAL at 05:16

## 2023-08-01 RX ADMIN — SACUBITRIL AND VALSARTAN 1 TABLET(S): 24; 26 TABLET, FILM COATED ORAL at 18:19

## 2023-08-01 NOTE — DISCHARGE NOTE PROVIDER - CARE PROVIDERS DIRECT ADDRESSES
[email or web address]

## 2023-08-01 NOTE — CONSULT NOTE ADULT - SUBJECTIVE AND OBJECTIVE BOX
CARDIOLOGY FELLOW CONSULT NOTE    HPI:  42 y/o male with PMHx of HTN, HLD, NICM with EF 20%, DMII who presented with chest pain and orthopnea over the past several weeks. He also reported increased dyspnea with exertion and orthopnea and had to sleeping in chair , minimal lower extremity edema. Patient reports having missed his medications for about a month due to a miscommunication with between his PCP/cardiologist. He was admitted for ADHF and required IV diuresis with IV lasix, with significant improvement in his symptoms.     PMHx:   No pertinent past medical history  HTN (hypertension)  Asthma  Congestive heart failure (CHF)  Type 2 diabetes mellitus  Hyperlipidemia      PSHx:   No significant past surgical history  No significant past surgical history    Allergies:  shellfish (Swelling)  No Known Drug Allergies    Home Meds:    Current Medications:   acetaminophen     Tablet .. 650 milliGRAM(s) Oral every 6 hours PRN  albuterol    90 MICROgram(s) HFA Inhaler 2 Puff(s) Inhalation every 6 hours PRN  aluminum hydroxide/magnesium hydroxide/simethicone Suspension 30 milliLiter(s) Oral every 4 hours PRN  atorvastatin 40 milliGRAM(s) Oral at bedtime  budesonide 160 MICROgram(s)/formoterol 4.5 MICROgram(s) Inhaler 2 Puff(s) Inhalation two times a day  carvedilol 25 milliGRAM(s) Oral every 12 hours  chlorhexidine 2% Cloths 1 Application(s) Topical <User Schedule>  dextrose 5%. 1000 milliLiter(s) IV Continuous <Continuous>  dextrose 5%. 1000 milliLiter(s) IV Continuous <Continuous>  dextrose 50% Injectable 25 Gram(s) IV Push once  dextrose 50% Injectable 12.5 Gram(s) IV Push once  dextrose Oral Gel 15 Gram(s) Oral once PRN  enoxaparin Injectable 40 milliGRAM(s) SubCutaneous every 12 hours  glucagon  Injectable 1 milliGRAM(s) IntraMuscular once  hydrALAZINE 25 milliGRAM(s) Oral every 8 hours  insulin glargine Injectable (LANTUS) 40 Unit(s) SubCutaneous at bedtime  insulin lispro (ADMELOG) corrective regimen sliding scale   SubCutaneous at bedtime  insulin lispro (ADMELOG) corrective regimen sliding scale   SubCutaneous three times a day before meals  insulin lispro Injectable (ADMELOG) 14 Unit(s) SubCutaneous three times a day before meals  isosorbide   mononitrate ER Tablet (IMDUR) 60 milliGRAM(s) Oral daily  melatonin 3 milliGRAM(s) Oral at bedtime PRN  ondansetron Injectable 4 milliGRAM(s) IV Push every 8 hours PRN  sacubitril 49 mG/valsartan 51 mG 1 Tablet(s) Oral every 12 hours  senna 1 Tablet(s) Oral at bedtime  spironolactone 25 milliGRAM(s) Oral daily    FAMILY HISTORY:  FH: HTN (hypertension)    ROS:  CV: chest pain (-), palpitation (-), orthopnea (-), PND (-), edema (-)  PULM: SOB (-), cough (-), wheezing (-), hemoptysis (-).   CONST: fever (-), chills (-) or fatigue (-)  GI: abdominal distension (-), abdominal pain (-) , nausea/vomiting (-), hematemesis, (-), melena (-), hematochezia (-)  : dysuria (-), frequency (-), hematuria (-).   NEURO: numbness (-), weakness (-), dizziness (-)  SKIN: itching (-), rash (-)  HEENT:  visual changes (-); vertigo or throat pain (-);  neck stiffness (-)     Physical Exam:  T(F): 97.9 (08-01), Max: 97.9 (07-31)  HR: 87 (08-01) (84 - 87)  BP: 117/74 (08-01) (112/66 - 127/87)  RR: 18 (08-01)  SpO2: 98% (08-01)    GENERAL: NAD  HEAD:  Atraumatic, Normocephalic.  EYES: EOMI, PERRLA, conjunctiva and sclera clear.  NECK: Supple, No JVD.  CHEST/LUNG: Clear to auscultation bilaterally; No rales, rhonchi, wheezing, or rubs. Speaking in full sentences  HEART: Regular rate and rhythm; No murmurs, rubs, or gallops.  ABDOMEN: Bowel sounds present; Soft, Nontender, Nondistended.   EXTREMITIES:  2+ Peripheral Pulses, brisk capillary refill. No clubbing, cyanosis, or edema.  PSYCH: Normal affect.  SKIN: No rashes or lesions.    ECG: Personally reviewed    Echo: Personally reviewed  7/31/23  CONCLUSIONS:      1. Normal left ventricular cavity size. The left ventricular wallthickness is normal. The left ventricular systolic function is severely decreased with an ejection fraction of 17 % by Cote's method of disks. There are no regional wall motion abnormalities seen. No evidence of a thrombus in the left ventricle.  2. There is moderate (grade 2) left ventricular diastolic dysfunction.   3. Normal atria.   4. Mildly enlarged right ventricular cavity size, normal right ventricular wall thickness and reduced systolic right ventricular function. The tricuspid annular plane systolic excursion (TAPSE) is 1.5 cm (normal >=1.7 cm).   5. No significant valvular disease.   6. No pericardial effusion seen.   7. Compared to the transthoracic echocardiogram performed on 9/4/2022 there have been no significant interval changes.    ________________________________________________________________________________________  FINDINGS:     Left Ventricle:  After obtaining consent, Definity ultrasound enhancing agent was given for enhanced left ventricular opacification and improved delineation of the left ventricular endocardial borders. Normal left ventricular cavity size. The left ventricular wall thickness is normal. The left ventricular systolic function is severely decreased with a calculated ejection fraction of 17 % by the Cote's biplane method of disks. There are no regional wall motion abnormalities seen. There is moderate (grade 2) left ventricular diastolic dysfunction. There is increased LV mass and concentric hypertrophy. There is no evidence of a thrombus in the left ventricle.     Right Ventricle:  Mildly enlarged right ventricular cavity size, normal wall thickness and reduced right ventricular systolic function. Tricuspid annular plane systolic excursion (TAPSE) is 1.5 cm (normal >=1.7 cm).    Left Atrium:  The left atrium is normal in size with an indexed volume of 35.56 ml/m².     Right Atrium:  The right atrium is normal in size with an indexed volume of 6.05 ml/m².     Aortic Valve:  The aortic valve is tricuspid with normal structure without stenosis. There is no evidence of aortic regurgitation.     Mitral Valve:  Structurally normal mitral valve with normal leaflet excursion. Mitral valve leaflets are diffusely calcified. There is symmetric leaflet tethering. There is trace mitral regurgitation.     Tricuspid Valve:  Structurally normal tricuspid valve with normal leaflet excursion. There is mild tricuspid regurgitation.     Pulmonic Valve:  Structurally normal pulmonic valve with normal leaflet excursion. There is trace pulmonic regurgitation.     Aorta:  The aortic annulus and aortic root appear normal in size.     Pericardium:  No pericardial effusion seen.     Systemic Veins:  The inferior vena cava was not well visualized.      Cath: Personally reviewed  2019  CORONARY VESSELS: The coronary circulation is left dominant.  LM:   --  LM: Normal.  LAD:   --  Mid LAD: Angiography showed mild atherosclerosis with no flow  limiting lesions.  CX:   --  Circumflex: Normal.  RCA:   --  RCA: Normal.  CXR: Personally reviewed    Labs: Personally reviewed    CARDIAC MARKERS ( 28 Jul 2023 21:40 )  40 ng/L / x     / x     / x     / x     / x      CARDIAC MARKERS ( 28 Jul 2023 20:34 )  39 ng/L / x     / x     / x     / x     / x        Thyroid Stimulating Hormone, Serum: 1.40 uIU/mL (07-28 @ 20:34)

## 2023-08-01 NOTE — PROGRESS NOTE ADULT - ASSESSMENT
Patient is a 42 y/o male with PMHx of HTN, HLD, HFref EF 20% , DMII ,presents for chest pain over the past several days. Patient is high risk with high level decision making due to uncontrolled diabetes which places patient at high risk for cardiovascular and cerebrovascular events. Patient with lability of glucose requiring close monitoring and insulin adjustments. Endocrinology consulted for management of T2DM. BG Goal 100-180mg/dl   Tolerating POs. BGs still above goal with BG 100s- 345  Given elevated A1C 13.5 and DM complication, he would benefit with basal and bolus insulin regimen at this time. He agrees with basal and bolus regimen for discharge and willing to learn insulin pen use.    HbA1c: 13.5  Home Regimen: does not know medications, only started on 7/25/23, per chart review jardiance 10mg, glipizide-metformin 5-500mg twice daily    Met with patient and reviewed the following:    -A1c LEVEL: Present and goal  -Blood glucose goals: 100s to 150s as out pt  -Glucose monitoring frequency: ac and hs  -Hypoglycemia prevention,detection and treatment ( 15: 15 rule )  -Healthy eating and portion control  -Insulin(s) action, time of administration and side effects  -Importance of follow up care  Pt able to give teach back with no assistance  Spent over 30 minutes providing face to face education.

## 2023-08-01 NOTE — PROGRESS NOTE ADULT - PROBLEM SELECTOR PLAN 1
- Test BGs ACTID and at HS  - Increase Lantus to 48 units tonight   - Adjust premeal Admelog to 14-14- 17. HOLD IF NPO.  - C/w moderate Admelog correction scale ACTID and moderate scale at HS   - RD consult for education on consistent carbohydrate diet   - Please keep hypoglycemia protocol in place     Discharge plan:  - Discharge medications: Basal and bolus regimen PLUS Jardiance daily  -->Once daily basal insulin (lantus, basaglar, tresiba, semglee, etc) and three times a day bolus insulin (novolog, humalog, admelog, etc), Dose TBD  -->increase jardiance to 25mg daily,   -->consider GLP-1 agonist outpatient.   -->Stop glipizide and metformin.   -->Please send a prescription for freestyle janel 2 sensor and reader as well as back up glucometer, test strips, lancets. Patient should be prescribed glucagon as well given on insulin (can send prescription for gvoke hypopen or baqsimi)   - Patient to call doctor with persistent high or low BG at home  - Recommend routine outpatient ophthalmology, podiatry and endocrinology f/u  - Should follow up with endocrinology (patient states he will contact pcp for referral).  He can follow up with Endocrine faculty at [address] [place] [address], [place] [address]  Phone Number: [phone]

## 2023-08-01 NOTE — DISCHARGE NOTE PROVIDER - HOSPITAL COURSE
42 y/o male with PMHx of HTN, HLD, HFref EF, DMII, presents for chest pain over the past several days admitted for chf exacerbation and hyperglycemia 2/2 medication noncompliance     Problem/Plan - 1:  ·  Problem: Chest pain.   ·  Plan: no acute ischemic changes , troponin 39 and 40. Reports similar chest pain with prior chf exacerbation  -TTE without WMA but does have a decreased EF of 17%  -monitor on telemetry       Problem/Plan - 2:  ·  Problem: Acute on chronic HFrEF (heart failure with reduced ejection fraction).   ·  Plan: worsened orthopnea , PND and ALAS , BNP not markedly elevated and minimal edema ,will continue diuresis and- monitor  mag and K, replete as needed  -cardiology recs appreciatd  -Resumed Lasx 80mg daily  - monitor on telemetry  - strict ins and outs  - daily weight  - continue carvedilol, Entresto, spironolactone and Imdur   - holding Jardiance    - dash consistent carb diet.     Problem/Plan - 3:  ·  Problem: Diabetes mellitus with hyperglycemia.   ·  Plan: reports stopped diabetes medications for two weeks due to miscommunication with medical provider. A1c >13  Hyperglycemia 2/2 missed home meds  - Hold oral hypoglycemics  - Endocrinology consulted-follow up insulin recs  - patient unwilling to be dc on insulin, preferring orals  - consistent carb diet.     Problem/Plan - 4:  ·  Problem: Groin pain.   ·  Plan: abdomen benign on exam , UA negative for infection , deferred scrotal exam. Renal US negative for stones and no hydronephrosis.     Problem/Plan - 5:  ·  Problem: HTN (hypertension).   ·  Plan: - continue hydralazine   - continue carvedilol   - continue Imdur  - on Entresto.   44 y/o male with PMHx of HTN, HLD, HFref EF, DMII, presents for chest pain over the past several days admitted for chf exacerbation and hyperglycemia 2/2 medication noncompliance     Problem/Plan - 1:  ·  Problem: Chest pain.   ·  Plan: no acute ischemic changes , troponin 39 and 40. Reports similar chest pain with prior chf exacerbation  -TTE without WMA but does have a decreased EF of 17%  -monitor on telemetry       Problem/Plan - 2:  ·  Problem: Acute on chronic HFrEF (heart failure with reduced ejection fraction).   ·  Plan: worsened orthopnea , PND and ALAS , BNP not markedly elevated and minimal edema ,will continue diuresis and- monitor  mag and K, replete as needed  Evaluated by cardiology and CHF team.   -Changed to bumex for discharge   - monitor on telemetry  - strict ins and outs  - daily weight  - continue carvedilol, Entresto, spironolactone and Imdur   - holding Jardiance    - dash consistent carb diet.  Follow up with cardiology and CHF team as scheduled   - appears to be more euvolemic on physical exam - would consider a RHC to assess his filling pressures objectively and then consider a cardiomems implant     Problem/Plan - 3:  ·  Problem: Diabetes mellitus with hyperglycemia.     Seen by endocrine team   ·  Plan: reports stopped diabetes medications for two weeks due to miscommunication with medical provider. A1c >13  Hyperglycemia 2/2 missed home meds  Pt refused insulin for home after many attempts at teaching. Feels he prefers oral medications only  - patient unwilling to be dc on insulin, preferring orals  - consistent carb diet.     Problem/Plan - 4:  ·  Problem: Groin pain.   ·  Plan: abdomen benign on exam , UA negative for infection , deferred scrotal exam. Renal US negative for stones and no hydronephrosis.     Problem/Plan - 5:  ·  Problem: HTN (hypertension).   ·  Plan: - continue hydralazine   - continue carvedilol   - continue Imdur  - on Entresto.    Discharged home on oral diabetic regimen, oral bumex and spironolactone. Pt to follow up in CHF team on 8/14 and Endocrine. Pt understands importance of taking medications and following up with providers. 42 y/o male with PMHx of HTN, HLD, HFref EF, DMII, presents for chest pain over the past several days admitted for chf exacerbation and hyperglycemia 2/2 medication noncompliance     Problem/Plan - 1:  ·  Problem: Chest pain.   ·  Plan: no acute ischemic changes , troponin 39 and 40. Reports similar chest pain with prior chf exacerbation  -TTE without WMA but does have a decreased EF of 17%  -monitor on telemetry       Problem/Plan - 2:  ·  Problem: Acute on chronic HFrEF (heart failure with reduced ejection fraction).   ·  Plan: worsened orthopnea , PND and ALAS , BNP not markedly elevated and minimal edema ,will continue diuresis and- monitor  mag and K, replete as needed  Evaluated by cardiology and CHF team.   -Changed to bumex for discharge   - monitor on telemetry  - strict ins and outs  - daily weight  - continue carvedilol, Entresto, spironolactone and Imdur   - holding Jardiance    - dash consistent carb diet.  Follow up with cardiology and CHF team as scheduled   - appears to be more euvolemic on physical exam - would consider a RHC to assess his filling pressures objectively and then consider a cardiomems implant     Problem/Plan - 3:  ·  Problem: Diabetes mellitus with hyperglycemia.     Seen by endocrine team   ·  Plan: reports stopped diabetes medications for two weeks due to miscommunication with medical provider. A1c >13  Hyperglycemia 2/2 missed home meds  Pt refused insulin for home after many attempts at teaching. Feels he prefers oral medications only  - patient unwilling to be dc on insulin, preferring orals  - consistent carb diet.     Problem/Plan - 4:  ·  Problem: Groin pain.   ·  Plan: abdomen benign on exam , UA negative for infection , deferred scrotal exam. Renal US negative for stones and no hydronephrosis.     Problem/Plan - 5:  ·  Problem: HTN (hypertension).   ·  Plan: - continue hydralazine   - continue carvedilol   - continue Imdur  - on Entresto.    Discharged home on oral diabetic regimen, oral bumex and spironolactone. Pt to follow up in CHF team on 8/14 and Endocrine. Pt understands importance of taking medications and following up with providers.     Discharge Diagnoses  #Chest pain  #Acute on chronic heart failure with reduced EF  #Essential hypertension  #Uncontrolled diabetes mellitus

## 2023-08-01 NOTE — CONSULT NOTE ADULT - PROBLEM SELECTOR RECOMMENDATION 9
- Increase entresto to 49/51mg BID  - If tolerating increased entresto dose will try to further optimize entresto dose and eventually wean off hydralazine  - Stop lasix  - Continue coreg 25mg Q12  - Isodil 60mg daily  - Hydralazine 25mg TID for now  - Aldactone 25mg daily  - ICD: None  - SGLT2i: will start once better glycemic control

## 2023-08-01 NOTE — DISCHARGE NOTE PROVIDER - NSDCCPCAREPLAN_GEN_ALL_CORE_FT
PRINCIPAL DISCHARGE DIAGNOSIS  Diagnosis: Acute on chronic HFrEF (heart failure with reduced ejection fraction)  Assessment and Plan of Treatment: Weigh yourself daily.  If you gain 3lbs in 3 days, or 5lbs in a week call your Health Care Provider.  Do not eat or drink foods containing more than 2000mg of salt (sodium) in your diet every day.  Call your Health Care Provider if you have any swelling or increased swelling in your feet, ankles, and/or stomach.  Take all of your medication as directed.  If you become dizzy call your Health Care Provider.        SECONDARY DISCHARGE DIAGNOSES  Diagnosis: HTN (hypertension)  Assessment and Plan of Treatment: Low salt diet  Activity as tolerated.  Take all medication as prescribed.  Follow up with your medical doctor for routine blood pressure monitoring at your next visit.  Notify your doctor if you have any of the following symptoms:   Dizziness, Lightheadedness, Blurry vision, Headache, Chest pain, Shortness of breath      Diagnosis: Diabetes mellitus with hyperglycemia  Assessment and Plan of Treatment: Make sure you get your HgA1c checked every three months.  If you take oral diabetes medications, check your blood glucose two times a day.  If you take insulin, check your blood glucose before meals and at bedtime.  It's important not to skip any meals.  Keep a log of your blood glucose results and always take it with you to your doctor appointments.  Keep a list of your current medications including injectables and over the counter medications and bring this medication list with you to all your doctor appointments.  If you have not seen your ophthalmologist this year call for appointment.  Check your feet daily for redness, sores, or openings. Do not self treat. If no improvement in two days call your primary care physician for an appointment.  Low blood sugar (hypoglycemia) is a blood sugar below 70mg/dl. Check your blood sugar if you feel signs/symptoms of hypoglycemia. If your blood sugar is below 70 take 15 grams of carbohydrates (ex 4 oz of apple juice, 3-4 glucose tablets, or 4-6 oz of regular soda) wait 15 minutes and repeat blood sugar to make sure it comes up above 70.  If your blood sugar is above 70 and you are due for a meal, have a meal.  If you are not due for a meal have a snack.  This snack helps keeps your blood sugar at a safe range.     PRINCIPAL DISCHARGE DIAGNOSIS  Diagnosis: Acute on chronic HFrEF (heart failure with reduced ejection fraction)  Assessment and Plan of Treatment: Weigh yourself daily.  If you gain 3lbs in 3 days, or 5lbs in a week call your Health Care Provider.  Do not eat or drink foods containing more than 2000mg of salt (sodium) in your diet every day.  Call your Health Care Provider if you have any swelling or increased swelling in your feet, ankles, and/or stomach.  Take all of your medication as directed.  If you become dizzy call your Health Care Provider.  Continue all current medications. Follow up with Heart Failure , Dr. Mckinney on 8/14/2023 at 830am *   Outpatient [facility] to assess his filling pressures objectively and then consider a cardiomems implant        SECONDARY DISCHARGE DIAGNOSES  Diagnosis: Diabetes mellitus with hyperglycemia  Assessment and Plan of Treatment: A1c 13.5 this hospitalization  Make sure you get your HgA1c checked every three months.  If you take oral diabetes medications, check your blood glucose two times a day.  If you take insulin, check your blood glucose before meals and at bedtime.  It's important not to skip any meals.  Keep a log of your blood glucose results and always take it with you to your doctor appointments.  Keep a list of your current medications including injectables and over the counter medications and bring this medication list with you to all your doctor appointments.  If you have not seen your ophthalmologist this year call for appointment.  Check your feet daily for redness, sores, or openings. Do not self treat. If no improvement in two days call your primary care physician for an appointment.  Low blood sugar (hypoglycemia) is a blood sugar below 70mg/dl. Check your blood sugar if you feel signs/symptoms of hypoglycemia. If your blood sugar is below 70 take 15 grams of carbohydrates (ex 4 oz of apple juice, 3-4 glucose tablets, or 4-6 oz of regular soda) wait 15 minutes and repeat blood sugar to make sure it comes up above 70.  If your blood sugar is above 70 and you are due for a meal, have a meal.  If you are not due for a meal have a snack.  This snack helps keeps your blood sugar at a safe range.  - Recommend routine outpatient ophthalmology, podiatry and endocrinology f/u  -FOLLOW UP with endocrinology    Endocrine faculty at [address] [place] [address]  Phone Number: [phone].    Diagnosis: HTN (hypertension)  Assessment and Plan of Treatment: Low salt diet  Activity as tolerated.  Take all medication as prescribed.  Follow up with your medical doctor for routine blood pressure monitoring at your next visit.  Notify your doctor if you have any of the following symptoms:   Dizziness, Lightheadedness, Blurry vision, Headache, Chest pain, Shortness of breath       PRINCIPAL DISCHARGE DIAGNOSIS  Diagnosis: Acute on chronic HFrEF (heart failure with reduced ejection fraction)  Assessment and Plan of Treatment: You have a history of heart failure with reduced ejection fraction and it is currently 17%. Please continue to take your Imdur, Spironolactone, Coreg, Bumex, Entresto and Hydralazine as prescribed.  Weigh yourself daily.  If you gain 3lbs in 3 days, or 5lbs in a week call your Health Care Provider.  Do not eat or drink foods containing more than 2000mg of salt (sodium) in your diet every day.  Call your Health Care Provider if you have any swelling or increased swelling in your feet, ankles, and/or stomach.  Take all of your medication as directed.  If you become dizzy call your Health Care Provider.  Follow up with Heart Failure , Dr. Mckinney on 8/14/2023 at 830am *   Outpatient [facility] to assess his filling pressures objectively and then consider a cardiomems implant        SECONDARY DISCHARGE DIAGNOSES  Diagnosis: Diabetes mellitus with hyperglycemia  Assessment and Plan of Treatment: A1c 13.5 this hospitalization  Make sure you get your HgA1c checked every three months.  If you take oral diabetes medications, check your blood glucose two times a day.  If you take insulin, check your blood glucose before meals and at bedtime.  It's important not to skip any meals.  Keep a log of your blood glucose results and always take it with you to your doctor appointments.  Keep a list of your current medications including injectables and over the counter medications and bring this medication list with you to all your doctor appointments.  If you have not seen your ophthalmologist this year call for appointment.  Check your feet daily for redness, sores, or openings. Do not self treat. If no improvement in two days call your primary care physician for an appointment.  Low blood sugar (hypoglycemia) is a blood sugar below 70mg/dl. Check your blood sugar if you feel signs/symptoms of hypoglycemia. If your blood sugar is below 70 take 15 grams of carbohydrates (ex 4 oz of apple juice, 3-4 glucose tablets, or 4-6 oz of regular soda) wait 15 minutes and repeat blood sugar to make sure it comes up above 70.  If your blood sugar is above 70 and you are due for a meal, have a meal.  If you are not due for a meal have a snack.  This snack helps keeps your blood sugar at a safe range.  - Recommend routine outpatient ophthalmology, podiatry and endocrinology f/u  -FOLLOW UP with endocrinology   Endocrine faculty at [address] [place] [address]  Phone Number: [phone].  Continue to take your Jardiance and Glipizide/Metformin as prescribed    Diagnosis: HTN (hypertension)  Assessment and Plan of Treatment: Low salt diet  Activity as tolerated.  Take all medication as prescribed.  Follow up with your medical doctor for routine blood pressure monitoring at your next visit.  Notify your doctor if you have any of the following symptoms:   Dizziness, Lightheadedness, Blurry vision, Headache, Chest pain, Shortness of breath

## 2023-08-01 NOTE — DISCHARGE NOTE PROVIDER - PROVIDER TOKENS
PROVIDER:[TOKEN:[[id number]]],PROVIDER:[TOKEN:[[id number]]],PROVIDER:[TOKEN:[[id number]]]

## 2023-08-01 NOTE — PROGRESS NOTE ADULT - SUBJECTIVE AND OBJECTIVE BOX
Patient seen and examined at bedside.    Overnight Events:   - MOSES  - On tele SR     REVIEW OF SYSTEMS:  General: no fatigue/malaise, weight loss/gain.  Skin: no rashes.  Ophthalmologic: no blurred vision, no loss of vision. 	  ENT: no sore throat, rhinorrhea, sinus congestion.  Respiratory: no SOB, cough or wheeze.  CV: No chest pain. No palpitations.   Gastrointestinal:  no N/V/D, no melena/hematemesis/hematochezia.  Genitourinary: no dysuria/hesitancy or hematuria.  Musculoskeletal: no myalgias or arthralgias.  Neurological: no changes in vision or hearing, no lightheadedness/dizziness, no syncope/near syncope	  Psychiatric: no unusual stress/anxiety.   Hematology/Lymphatics: no unusual bleeding, bruising and no lymphadenopathy.  Endocrine: no unusual thirst.           Current Meds:  acetaminophen     Tablet .. 650 milliGRAM(s) Oral every 6 hours PRN  albuterol    90 MICROgram(s) HFA Inhaler 2 Puff(s) Inhalation every 6 hours PRN  aluminum hydroxide/magnesium hydroxide/simethicone Suspension 30 milliLiter(s) Oral every 4 hours PRN  atorvastatin 40 milliGRAM(s) Oral at bedtime  budesonide 160 MICROgram(s)/formoterol 4.5 MICROgram(s) Inhaler 2 Puff(s) Inhalation two times a day  carvedilol 25 milliGRAM(s) Oral every 12 hours  chlorhexidine 2% Cloths 1 Application(s) Topical <User Schedule>  dextrose 5%. 1000 milliLiter(s) IV Continuous <Continuous>  dextrose 5%. 1000 milliLiter(s) IV Continuous <Continuous>  dextrose 50% Injectable 25 Gram(s) IV Push once  dextrose 50% Injectable 12.5 Gram(s) IV Push once  dextrose Oral Gel 15 Gram(s) Oral once PRN  enoxaparin Injectable 40 milliGRAM(s) SubCutaneous every 12 hours  furosemide    Tablet 80 milliGRAM(s) Oral daily  glucagon  Injectable 1 milliGRAM(s) IntraMuscular once  hydrALAZINE 25 milliGRAM(s) Oral every 8 hours  insulin glargine Injectable (LANTUS) 40 Unit(s) SubCutaneous at bedtime  insulin lispro (ADMELOG) corrective regimen sliding scale   SubCutaneous at bedtime  insulin lispro (ADMELOG) corrective regimen sliding scale   SubCutaneous three times a day before meals  insulin lispro Injectable (ADMELOG) 14 Unit(s) SubCutaneous three times a day before meals  isosorbide   mononitrate ER Tablet (IMDUR) 60 milliGRAM(s) Oral daily  melatonin 3 milliGRAM(s) Oral at bedtime PRN  ondansetron Injectable 4 milliGRAM(s) IV Push every 8 hours PRN  sacubitril 24 mG/valsartan 26 mG 1 Tablet(s) Oral two times a day  senna 1 Tablet(s) Oral at bedtime  spironolactone 25 milliGRAM(s) Oral daily      Vitals:  T(F): 97.8 (08-01), Max: 97.9 (07-31)  HR: 87 (08-01) (84 - 98)  BP: 127/87 (08-01) (111/75 - 127/88)  RR: 18 (08-01)  SpO2: 100% (08-01)  I&O's Summary    31 Jul 2023 07:01  -  01 Aug 2023 07:00  --------------------------------------------------------  IN: 720 mL / OUT: 0 mL / NET: 720 mL        Physical Exam:  GENERAL: No acute distress, well-developed  HEAD:  Atraumatic, Normocephalic  ENT: EOMI, PERRLA, conjunctiva and sclera clear, Neck supple, No JVD, moist mucosa  CHEST/LUNG: CTA bilaterally  BACK: No spinal tenderness  HEART: Regular rate and rhythm; No murmurs, rubs, or gallops  ABDOMEN: Soft, Nontender, Nondistended; Bowel sounds present  EXTREMITIES:  Trace edema                          18.2   11.27 )-----------( 230      ( 30 Jul 2023 11:53 )             53.9     07-30    140  |  97  |  22  ----------------------------<  297<H>  3.8   |  26  |  1.20    Ca    9.8      30 Jul 2023 11:53  Mg     2.4     07-30        CARDIAC MARKERS ( 28 Jul 2023 21:40 )  40 ng/L / x     / x     / x     / x     / x      CARDIAC MARKERS ( 28 Jul 2023 20:34 )  39 ng/L / x     / x     / x     / x     / x                  New ECG(s): Personally reviewed    Echo:      TRANSTHORACIC ECHOCARDIOGRAM REPORT  ________________________________________________________________________________                                      _______       Pt. Name:       JOSE ARMANDO SANTIAGO Study Date:    7/31/2023  MRN:            [id number]        YOB: 1980  Accession #:    [id number]         Age:           43 years  Account#:       [id number]      Gender:        M  Heart Rate:     87 bpm            Height:        70.00 in (177.80 cm)  Rhythm:    Weight:        255.00 lb (115.67 kg)  Blood Pressure: 130/93 mmHg       BSA/BMI:       2.31 m² / 36.59 kg/m²  ________________________________________________________________________________________  Referring Physician:    [id number] Juan Manuel Ward  Interpreting Physician: Twin Chua M.D.  Primary Sonographer:    Cl Gooden [facility]    CPT:                ECHO TTE WITH CON COMP W DOPP - .m;DEFINITY ECHO                      CONTRAST PER ML - .m;DEFINITY ECHO CONTRAST PER ML              WASTED - .m  Indication(s):      Dyspnea, unspecified - R06.00  Procedure:          Transthoracic echocardiogram with 2-D, M-mode and complete                      spectral and color flow Doppler.  Ordering Location:  [facility]  AdmissionStatus:   Inpatient  Contrast Injection: Verbal consent was obtained for injection of Ultrasonic                      Enhancing Agent following a discussion of risks and                      benefits.                      Endocardial visualization enhanced with 2 ml of Definity                      Ultrasound enhancing agent (Lot#:[id number]` Exp.Date:1AUG24                      Discarded Dose:8ml).  UEA Reaction:       Patient had no adverse reaction after injection of                      Ultrasound Enhancing Agent.  Study Information:  Image quality for this study is fair.    _______________________________________________________________________________________  CONCLUSIONS:      1. Normal left ventricular cavity size. The left ventricular wallthickness is normal. The left ventricular systolic function is severely decreased with an ejection fraction of 17 % by Cote's method of disks. There are no regional wall motion abnormalities seen. No evidence of a thrombus in the left ventricle.  2. There is moderate (grade 2) left ventricular diastolic dysfunction.   3. Normal atria.   4. Mildly enlarged right ventricular cavity size, normal right ventricular wall thickness and reduced systolic right ventricular function. The tricuspid annular plane systolic excursion (TAPSE) is 1.5 cm (normal >=1.7 cm).   5. No significant valvular disease.   6. No pericardial effusion seen.   7. Compared to the transthoracic echocardiogram performed on 9/4/2022 there have been no significant interval changes.    ________________________________________________________________________________________

## 2023-08-01 NOTE — PROGRESS NOTE ADULT - ATTENDING COMMENTS
42 yo man with h/o NICM - ?compliance; poorly controlled DM - hbgA1c is 13  - Would re-start home meds  - results of the echo noted  -Continue coreg  -Continue hydralazine/IMDUR   -Continue entresto 24/26mg for now  -Continue spirinolactone  -Continue empaglifolozin when discharge  - would make sure the pt is closely followed in HF clinic 42 yo man with h/o NICM - ?compliance; poorly controlled DM - hbgA1c is 13  - appearrs to be more euvolemic on physical exam - would consider a RHC to assess his filling pressures objectively and then consider a cardiomems implant  - Would re-start home meds  - results of the echo noted  -Continue coreg  -Continue hydralazine/IMDUR   -Continue entresto 24/26mg for now  -Continue spirinolactone  -Continue empaglifolozin when discharge  - would make sure the pt is closely followed in HF clinic

## 2023-08-01 NOTE — PROGRESS NOTE ADULT - SUBJECTIVE AND OBJECTIVE BOX
[facility] Division of Hospital Medicine  Edson Cheng DO  Reachable on [employer]    Patient is a 43y old  Male who presents with a chief complaint of chest pain x several days (01 Aug 2023 05:45)      SUBJECTIVE / OVERNIGHT EVENTS:  ADDITIONAL REVIEW OF SYSTEMS:    MEDICATIONS  (STANDING):  atorvastatin 40 milliGRAM(s) Oral at bedtime  budesonide 160 MICROgram(s)/formoterol 4.5 MICROgram(s) Inhaler 2 Puff(s) Inhalation two times a day  carvedilol 25 milliGRAM(s) Oral every 12 hours  chlorhexidine 2% Cloths 1 Application(s) Topical <User Schedule>  dextrose 5%. 1000 milliLiter(s) (50 mL/Hr) IV Continuous <Continuous>  dextrose 5%. 1000 milliLiter(s) (100 mL/Hr) IV Continuous <Continuous>  dextrose 50% Injectable 25 Gram(s) IV Push once  dextrose 50% Injectable 12.5 Gram(s) IV Push once  enoxaparin Injectable 40 milliGRAM(s) SubCutaneous every 12 hours  furosemide    Tablet 80 milliGRAM(s) Oral daily  glucagon  Injectable 1 milliGRAM(s) IntraMuscular once  hydrALAZINE 25 milliGRAM(s) Oral every 8 hours  insulin glargine Injectable (LANTUS) 40 Unit(s) SubCutaneous at bedtime  insulin lispro (ADMELOG) corrective regimen sliding scale   SubCutaneous three times a day before meals  insulin lispro (ADMELOG) corrective regimen sliding scale   SubCutaneous at bedtime  insulin lispro Injectable (ADMELOG) 14 Unit(s) SubCutaneous three times a day before meals  isosorbide   mononitrate ER Tablet (IMDUR) 60 milliGRAM(s) Oral daily  sacubitril 24 mG/valsartan 26 mG 1 Tablet(s) Oral two times a day  senna 1 Tablet(s) Oral at bedtime  spironolactone 25 milliGRAM(s) Oral daily    MEDICATIONS  (PRN):  acetaminophen     Tablet .. 650 milliGRAM(s) Oral every 6 hours PRN Temp greater or equal to 38C (100.4F), Mild Pain (1 - 3)  albuterol    90 MICROgram(s) HFA Inhaler 2 Puff(s) Inhalation every 6 hours PRN Shortness of Breath and/or Wheezing  aluminum hydroxide/magnesium hydroxide/simethicone Suspension 30 milliLiter(s) Oral every 4 hours PRN Dyspepsia  dextrose Oral Gel 15 Gram(s) Oral once PRN Blood Glucose LESS THAN 70 milliGRAM(s)/deciliter  melatonin 3 milliGRAM(s) Oral at bedtime PRN Insomnia  ondansetron Injectable 4 milliGRAM(s) IV Push every 8 hours PRN Nausea and/or Vomiting      CAPILLARY BLOOD GLUCOSE      POCT Blood Glucose.: 308 mg/dL (01 Aug 2023 07:32)  POCT Blood Glucose.: 345 mg/dL (31 Jul 2023 21:06)  POCT Blood Glucose.: 166 mg/dL (31 Jul 2023 17:19)  POCT Blood Glucose.: 165 mg/dL (31 Jul 2023 11:31)    I&O's Summary    31 Jul 2023 07:01  -  01 Aug 2023 07:00  --------------------------------------------------------  IN: 720 mL / OUT: 0 mL / NET: 720 mL        PHYSICAL EXAM:  Vital Signs Last 24 Hrs  T(C): 36.6 (01 Aug 2023 04:34), Max: 36.6 (31 Jul 2023 20:20)  T(F): 97.8 (01 Aug 2023 04:34), Max: 97.9 (31 Jul 2023 20:20)  HR: 87 (01 Aug 2023 04:34) (84 - 98)  BP: 127/87 (01 Aug 2023 04:34) (111/75 - 127/88)  BP(mean): --  RR: 18 (01 Aug 2023 04:34) (18 - 18)  SpO2: 100% (01 Aug 2023 04:34) (97% - 100%)    Parameters below as of 01 Aug 2023 04:34  Patient On (Oxygen Delivery Method): room air      CONSTITUTIONAL: NAD, well-developed, well-groomed  EYES: PERRLA; conjunctiva and sclera clear  ENMT: Moist oral mucosa, no pharyngeal injection or exudates; normal dentition  NECK: Supple, no palpable masses; no thyromegaly  RESPIRATORY: Normal respiratory effort; lungs are clear to auscultation bilaterally  CARDIOVASCULAR: Regular rate and rhythm, normal S1 and S2, no murmur/rub/gallop; No lower extremity edema; Peripheral pulses are 2+ bilaterally  ABDOMEN: Nontender to palpation, normoactive bowel sounds, no rebound/guarding; No hepatosplenomegaly  MUSCULOSKELETAL:  Normal gait; no clubbing or cyanosis of digits; no joint swelling or tenderness to palpation  PSYCH: A+O to person, place, and time; affect appropriate  NEUROLOGY: CN 2-12 are intact and symmetric; no gross sensory deficits   SKIN: No rashes; no palpable lesions    LABS:                        18.2   11.27 )-----------( 230      ( 30 Jul 2023 11:53 )             53.9     07-30    140  |  97  |  22  ----------------------------<  297<H>  3.8   |  26  |  1.20    Ca    9.8      30 Jul 2023 11:53  Mg     2.4     07-30            Urinalysis Basic - ( 30 Jul 2023 11:53 )    Color: x / Appearance: x / SG: x / pH: x  Gluc: 297 mg/dL / Ketone: x  / Bili: x / Urobili: x   Blood: x / Protein: x / Nitrite: x   Leuk Esterase: x / RBC: x / WBC x   Sq Epi: x / Non Sq Epi: x / Bacteria: x          RADIOLOGY & ADDITIONAL TESTS:  Results Reviewed:   Imaging Personally Reviewed:  Electrocardiogram Personally Reviewed:    COORDINATION OF CARE:  Care Discussed with Consultants/Other Providers [Y/N]:  Prior or Outpatient Records Reviewed [Y/N]:   [facility] Division of Hospital Medicine  Edson Cheng DO  Reachable on [employer] Teams    Patient is a 43y old  Male who presents with a chief complaint of chest pain x several days (01 Aug 2023 05:45)    SUBJECTIVE / OVERNIGHT EVENTS: No acute events overnight. Patient seen and examined at bedside this morning, endorses mild SOB, positional chest pain (worse with laying down on left side), no palpitations.    REVIEW OF SYSTEMS:    CONSTITUTIONAL: No weakness, fevers or chills  EYES/ENT: No visual changes;  No vertigo or throat pain   NECK: No pain or stiffness  RESPIRATORY: No cough, wheezing, hemoptysis; Endorses dyspnea  CARDIOVASCULAR: Endorses chest pain, no palpitations  GASTROINTESTINAL: No abdominal or epigastric pain. No nausea, vomiting, or hematemesis; No diarrhea or constipation. No melena or hematochezia.  GENITOURINARY: No dysuria, frequency or hematuria  NEUROLOGICAL: No numbness or weakness  SKIN: No itching, burning, rashes, or lesions  MSK: No joint pain, no back pain  HEME: No easy bleeding, no easy bruising  All other review of systems is negative unless indicated above.    MEDICATIONS  (STANDING):  atorvastatin 40 milliGRAM(s) Oral at bedtime  budesonide 160 MICROgram(s)/formoterol 4.5 MICROgram(s) Inhaler 2 Puff(s) Inhalation two times a day  carvedilol 25 milliGRAM(s) Oral every 12 hours  chlorhexidine 2% Cloths 1 Application(s) Topical <User Schedule>  dextrose 5%. 1000 milliLiter(s) (50 mL/Hr) IV Continuous <Continuous>  dextrose 5%. 1000 milliLiter(s) (100 mL/Hr) IV Continuous <Continuous>  dextrose 50% Injectable 25 Gram(s) IV Push once  dextrose 50% Injectable 12.5 Gram(s) IV Push once  enoxaparin Injectable 40 milliGRAM(s) SubCutaneous every 12 hours  furosemide    Tablet 80 milliGRAM(s) Oral daily  glucagon  Injectable 1 milliGRAM(s) IntraMuscular once  hydrALAZINE 25 milliGRAM(s) Oral every 8 hours  insulin glargine Injectable (LANTUS) 40 Unit(s) SubCutaneous at bedtime  insulin lispro (ADMELOG) corrective regimen sliding scale   SubCutaneous three times a day before meals  insulin lispro (ADMELOG) corrective regimen sliding scale   SubCutaneous at bedtime  insulin lispro Injectable (ADMELOG) 14 Unit(s) SubCutaneous three times a day before meals  isosorbide   mononitrate ER Tablet (IMDUR) 60 milliGRAM(s) Oral daily  sacubitril 24 mG/valsartan 26 mG 1 Tablet(s) Oral two times a day  senna 1 Tablet(s) Oral at bedtime  spironolactone 25 milliGRAM(s) Oral daily    MEDICATIONS  (PRN):  acetaminophen     Tablet .. 650 milliGRAM(s) Oral every 6 hours PRN Temp greater or equal to 38C (100.4F), Mild Pain (1 - 3)  albuterol    90 MICROgram(s) HFA Inhaler 2 Puff(s) Inhalation every 6 hours PRN Shortness of Breath and/or Wheezing  aluminum hydroxide/magnesium hydroxide/simethicone Suspension 30 milliLiter(s) Oral every 4 hours PRN Dyspepsia  dextrose Oral Gel 15 Gram(s) Oral once PRN Blood Glucose LESS THAN 70 milliGRAM(s)/deciliter  melatonin 3 milliGRAM(s) Oral at bedtime PRN Insomnia  ondansetron Injectable 4 milliGRAM(s) IV Push every 8 hours PRN Nausea and/or Vomiting      CAPILLARY BLOOD GLUCOSE      POCT Blood Glucose.: 308 mg/dL (01 Aug 2023 07:32)  POCT Blood Glucose.: 345 mg/dL (31 Jul 2023 21:06)  POCT Blood Glucose.: 166 mg/dL (31 Jul 2023 17:19)  POCT Blood Glucose.: 165 mg/dL (31 Jul 2023 11:31)    I&O's Summary    31 Jul 2023 07:01  -  01 Aug 2023 07:00  --------------------------------------------------------  IN: 720 mL / OUT: 0 mL / NET: 720 mL        PHYSICAL EXAM:  Vital Signs Last 24 Hrs  T(C): 36.6 (01 Aug 2023 04:34), Max: 36.6 (31 Jul 2023 20:20)  T(F): 97.8 (01 Aug 2023 04:34), Max: 97.9 (31 Jul 2023 20:20)  HR: 87 (01 Aug 2023 04:34) (84 - 98)  BP: 127/87 (01 Aug 2023 04:34) (111/75 - 127/88)  BP(mean): --  RR: 18 (01 Aug 2023 04:34) (18 - 18)  SpO2: 100% (01 Aug 2023 04:34) (97% - 100%)    Parameters below as of 01 Aug 2023 04:34  Patient On (Oxygen Delivery Method): room air    CONSTITUTIONAL: NAD, well-developed, well-groomed  EYES: EOMI; conjunctiva and sclera clear  ENMT: Moist oral mucosa, no pharyngeal injection or exudates  RESPIRATORY: Normal respiratory effort; lungs are clear to auscultation bilaterally  CARDIOVASCULAR: Regular rate and rhythm, normal S1 and S2, no murmur/rub/gallop; Tender to palpation around sternum  ABDOMEN: Nontender to palpation, soft, nondistended  MUSCULOSKELETAL: No clubbing or cyanosis of digits; no joint swelling or tenderness to palpation  PSYCH: A+O to person, place, and time; affect appropriate  NEUROLOGY: CN 2-12 are intact and symmetric; no gross sensory deficits   SKIN: No rashes; no palpable lesions    LABS:                        18.2   11.27 )-----------( 230      ( 30 Jul 2023 11:53 )             53.9     07-30    140  |  97  |  22  ----------------------------<  297<H>  3.8   |  26  |  1.20    Ca    9.8      30 Jul 2023 11:53  Mg     2.4     07-30            Urinalysis Basic - ( 30 Jul 2023 11:53 )    Color: x / Appearance: x / SG: x / pH: x  Gluc: 297 mg/dL / Ketone: x  / Bili: x / Urobili: x   Blood: x / Protein: x / Nitrite: x   Leuk Esterase: x / RBC: x / WBC x   Sq Epi: x / Non Sq Epi: x / Bacteria: x

## 2023-08-01 NOTE — PROGRESS NOTE ADULT - ASSESSMENT
44 y/o male with PMHx of HTN, HLD, HFref EF 20% , DMII ,presents for chest pain over the past several days admitted for chf exacerbation and hyperglycemia 2/2 medication noncompliance 44 y/o male with PMHx of HTN, HLD, HFref EF 20% , DMII ,presents for chest pain over the past several days admitted for chf exacerbation and hyperglycemia 2/2 medication noncompliance.

## 2023-08-01 NOTE — PROGRESS NOTE ADULT - TIME BILLING
NICM  med mgt
NICM  med mgt
Review of tests, imaging, labs, documents, medical management, coordination of care and counseling.
Review of tests, imaging, labs, documents, medical management, coordination of care and counseling.

## 2023-08-01 NOTE — PROGRESS NOTE ADULT - ASSESSMENT
Note not final until signed by attending       Patricia Burch MD  Cardiology Fellow PGY-6  Phone: [phone]    For all New Consults  [email or web address]   Login: [employer]ace Yourself 44 y/o male with PMHx of HTN, HLD, HFref EF 20% , DMII ,presents for chest pain and shortness of breath over the past several days.     1. NICM/HFrEF - LVEF 17%  2. HTN  3. T2DM    RECOMMENDATIONS:  - Would start Lasix 80mg PO daily  - HF consulted to discuss role for RHC and CardioMems   - Continue Carvedilol 25mg Q12H, Hydralazine 25mg Q8H, Imdur 60mg daily, Spironolactone 25mg daily, Entresto 24/26mg Q12H  - Would benefit from SGLT2i       Note not final until signed by attending       Patricia Burch MD  Cardiology Fellow PGY-6  Phone: [phone]    For all New Consults  [email or web address]   Login: [email or web address]

## 2023-08-01 NOTE — PROGRESS NOTE ADULT - SUBJECTIVE AND OBJECTIVE BOX
Seen earlier today     Chief Complaint: Diabetes Mellitus follow up    INTERVAL HX: Tolerating POs. complains about foods provided in hospital. Advised to fill out menu for the next day. BGs still above goal in 100s- 300s,       Review of Systems:  General: As above  GI: No nausea, vomiting  Endocrine: no  S&Sx of hypoglycemia    Allergies    shellfish (Swelling)  No Known Drug Allergies    Intolerances      MEDICATIONS  (STANDING):  atorvastatin 40 milliGRAM(s) Oral at bedtime  carvedilol 25 milliGRAM(s) Oral every 12 hours  dextrose 5%. 1000 milliLiter(s) (50 mL/Hr) IV Continuous <Continuous>  dextrose 5%. 1000 milliLiter(s) (100 mL/Hr) IV Continuous <Continuous>  dextrose 50% Injectable 25 Gram(s) IV Push once  dextrose 50% Injectable 12.5 Gram(s) IV Push once  glucagon  Injectable 1 milliGRAM(s) IntraMuscular once  hydrALAZINE 25 milliGRAM(s) Oral every 8 hours  insulin glargine Injectable (LANTUS) 48 Unit(s) SubCutaneous at bedtime  insulin lispro (ADMELOG) corrective regimen sliding scale   SubCutaneous at bedtime  insulin lispro (ADMELOG) corrective regimen sliding scale   SubCutaneous three times a day before meals  insulin lispro Injectable (ADMELOG) 17 Unit(s) SubCutaneous before dinner  isosorbide   mononitrate ER Tablet (IMDUR) 60 milliGRAM(s) Oral daily  sacubitril 49 mG/valsartan 51 mG 1 Tablet(s) Oral every 12 hours  senna 1 Tablet(s) Oral at bedtime  spironolactone 25 milliGRAM(s) Oral daily      atorvastatin   40 milliGRAM(s) Oral (07-31-23 @ 21:47)    insulin glargine Injectable (LANTUS)   40 Unit(s) SubCutaneous (07-31-23 @ 21:46)    insulin lispro (ADMELOG) corrective regimen sliding scale   4 Unit(s) SubCutaneous (07-31-23 @ 21:46)    insulin lispro (ADMELOG) corrective regimen sliding scale   4  SubCutaneous (08-01-23 @ 12:30)   8 Unit(s) SubCutaneous (08-01-23 @ 08:21)    insulin lispro Injectable (ADMELOG)   14 Unit(s) SubCutaneous (08-01-23 @ 12:30)   14 Unit(s) SubCutaneous (08-01-23 @ 08:21)        PHYSICAL EXAM:  VITALS: T(C): 36.6 (08-01-23 @ 11:17)  T(F): 97.9 (08-01-23 @ 11:17), Max: 97.9 (07-31-23 @ 20:20)  HR: 87 (08-01-23 @ 11:17) (84 - 87)  BP: 117/74 (08-01-23 @ 11:17) (112/66 - 127/87)  RR:  (18 - 18)  SpO2:  (97% - 100%)  Wt(kg): --  GENERAL: in NAD  Respiratory: Respirations unlabored   Extremities: Warm, no edema  NEURO: Alert , appropriate     LABS:  POCT Blood Glucose.: 243 mg/dL (08-01-23 @ 11:31)  POCT Blood Glucose.: 308 mg/dL (08-01-23 @ 07:32)  POCT Blood Glucose.: 345 mg/dL (07-31-23 @ 21:06)  POCT Blood Glucose.: 166 mg/dL (07-31-23 @ 17:19)  POCT Blood Glucose.: 165 mg/dL (07-31-23 @ 11:31)  POCT Blood Glucose.: 344 mg/dL (07-31-23 @ 07:36)  POCT Blood Glucose.: 309 mg/dL (07-30-23 @ 21:00)  POCT Blood Glucose.: 261 mg/dL (07-30-23 @ 17:49)  POCT Blood Glucose.: 296 mg/dL (07-30-23 @ 11:32)  POCT Blood Glucose.: 328 mg/dL (07-30-23 @ 07:50)  POCT Blood Glucose.: 343 mg/dL (07-29-23 @ 22:29)                          18.2   11.27 )-----------( 230      ( 30 Jul 2023 11:53 )             53.9               Thyroid Function Tests:  07-28 @ 20:34 TSH 1.40 FreeT4 1.2 T3 128 Anti TPO -- Anti Thyroglobulin Ab -- TSI --      A1C with Estimated Average Glucose Result: 13.6 % (07-30-23 @ 11:53)  A1C with Estimated Average Glucose Result: 13.5 % (07-29-23 @ 16:37)    Estimated Average Glucose: 344 mg/dL (07-30-23 @ 11:53)  Estimated Average Glucose: 341 mg/dL (07-29-23 @ 16:37)        Diet, Regular:   Consistent Carbohydrate Evening Snack (CSTCHOSN)  DASH/TLC Sodium & Cholesterol Restricted (DASH) (07-29-23 @ 00:52) [Active]

## 2023-08-01 NOTE — CONSULT NOTE ADULT - PROBLEM SELECTOR PROBLEM 1
Poorly controlled type 2 diabetes mellitus
Acute on chronic HFrEF (heart failure with reduced ejection fraction)

## 2023-08-01 NOTE — CONSULT NOTE ADULT - ATTENDING COMMENTS
Briefly, 44 y/o M w/ h/o HTN, HLD, NICM/HFrEF (EF 20%, LVEDD 5.8 cm) w/o ICD, poorly controlled T2DM (A1c 13.9) who presented on 7/28 with chest pain and orthopnea for several weeks in setting of not being on medications for approx 1month. Was given IV lasix and resumed on medications with improvement in symptoms. Still reports some chest congestion. Reports follows with Dr. Finch ([place]) but hadn't received refills. States DM was controlled on oral therapy but doesn't recall A1c. Denies tobacco/illicits/EtOH. Doesn't know if he snores. On exam, overweight, JVP 6-8 cm w/o HJR, RRR, no m/r/g, CTAB, nontender abdomen, no pedal edema. Labs reviewed - Hb 18, BUN/Cr 22/1.2, Briefly, 44 y/o M w/ h/o HTN, HLD, NICM/HFrEF (EF 20%, LVEDD 5.8 cm) w/o ICD, poorly controlled T2DM (A1c 13.9) who presented on 7/28 with chest pain and orthopnea for several weeks in setting of not being on medications for approx 1month. Was given IV lasix and resumed on medications with improvement in symptoms. Still reports some chest congestion. Reports follows with Dr. Leong ([place]) but hadn't received refills. States DM was controlled on oral therapy but doesn't recall A1c. Denies tobacco/illicits/EtOH. Doesn't know if he snores. On exam, overweight, JVP 6-8 cm w/o HJR, RRR, no m/r/g, CTAB, nontender abdomen, no pedal edema. Labs reviewed - Hb 18, BUN/Cr 22/1.2, , A1c 13.9, trop neg, lactate negative. TTE reviewed - severe LV dysfunction, LVEDD 5.8 cm, nl RV function, LVOT VTI 10 cm. LHC 12/12/19 normal coronaries; RHC: RA 12, RV 38/17, PA 43/37/36 (?), PCWP 18, CO/CI 4.44/1.72 (notably Hb 16). Overall stage C HF, NYHA class III now appears euvolemic but hypertensive.  - increase Entresto to 49/51; if tolerates, will escalate further to 97/103  - c/w hydral 25mg q8h for now  - d/c lasix  - c/w ritchie  - c/w coreg 25 mg q12  - c/w isordil  - will attempt to simplify regimen to ensure adherence  - discussed importance of glycemic control  - will need sleep study as outpatient as likely has TAISHA driving polycythemia  - reviewed disease process

## 2023-08-01 NOTE — DISCHARGE NOTE PROVIDER - NSFOLLOWUPCLINICS_GEN_ALL_ED_FT
[facility] Endocrinology  Endocrinology  [address] [place] [address]  Phone: [phone]  Fax:

## 2023-08-01 NOTE — DISCHARGE NOTE PROVIDER - NSDCMRMEDTOKEN_GEN_ALL_CORE_FT
albuterol 90 mcg/inh inhalation aerosol: 2 puff(s) inhaled every 6 hours, As needed, Shortness of Breath and/or Wheezing    NOTE: patient ran out of meds / not on home meds since ~May 2022. current list based on Feb 2022 Outpatient List as originally found on sunrise.  budesonide-formoterol 160 mcg-4.5 mcg/inh inhalation aerosol: 2 puff(s) inhaled 2 times a day    NOTE: patient ran out of meds / not on home meds since ~May 2022. current list based on Feb 2022 Outpatient List as originally found on sunrise.  carvedilol 25 mg oral tablet: 1 orally 2 times a day  empagliflozin 10 mg oral tablet: 1 tab(s) orally once a day (in the morning)  furosemide 80 mg oral tablet: 1 tab(s) orally every 12 hours  glipizide-metformin 5 mg-500 mg oral tablet: 1 orally 2 times a day  hydrALAZINE 25 mg oral tablet: 1 tab(s) orally every 8 hours  Imdur 60 mg oral tablet, extended release: 1 orally once a day  sacubitril-valsartan 24 mg-26 mg oral tablet: 1 tab(s) orally every 12 hours  senna leaf extract oral tablet: 1 tab(s) orally once a day (at bedtime)  spironolactone 25 mg oral tablet: 1 tab(s) orally once a day   albuterol 90 mcg/inh inhalation aerosol: 2 puff(s) inhaled every 6 hours, As needed, Shortness of Breath and/or Wheezing    NOTE: patient ran out of meds / not on home meds since ~May 2022. current list based on Feb 2022 Outpatient List as originally found on sunrise.  alcohol swabs: Apply topically to affected area 4 times a day  atorvastatin 40 mg oral tablet: 1 tab(s) orally once a day (at bedtime)  budesonide-formoterol 160 mcg-4.5 mcg/inh inhalation aerosol: 2 puff(s) inhaled 2 times a day    NOTE: patient ran out of meds / not on home meds since ~May 2022. current list based on Feb 2022 Outpatient List as originally found on sunrise.  bumetanide 1 mg oral tablet: 1 tab(s) orally once a day  Coreg 25 mg oral tablet: 1 tab(s) orally 2 times a day  Coreg 25 mg oral tablet: 1 tab(s) orally 2 times a day  Freestyle Jacques 2 [place]: Dispense 1 [place]  Freestyle Jacques 2 sensors: Apply 1 sensor every 14 days  Freestyle Precision Sajan Strips: Test blood sugar four times a day when you see check blood glucose symbol or when symptoms don&#x27;t match Jacques reading.  glipizide-metformin 5 mg-500 mg oral tablet: 2 tab(s) orally 2 times a day  glucometer (per patient&#x27;s insurance): Test blood sugars four times a day. Dispense #1 glucometer.  hydrALAZINE 25 mg oral tablet: 1 tab(s) orally every 8 hours  isosorbide mononitrate 60 mg oral tablet, extended release: 1 tab(s) orally once a day  Jardiance 25 mg oral tablet: 1 tab(s) orally once a day  Jardiance 25 mg oral tablet: 1 tab(s) orally once a day  lancets: 1 application subcutaneously 4 times a day  senna leaf extract oral tablet: 1 tab(s) orally once a day (at bedtime)  spironolactone 25 mg oral tablet: 1 tab(s) orally once a day  test strips (per patient&#x27;s insurance): 1 application subcutaneously 4 times a day. ** Compatible with patient&#x27;s glucometer **   albuterol 90 mcg/inh inhalation aerosol: 2 puff(s) inhaled every 6 hours, As needed, Shortness of Breath and/or Wheezing    NOTE: patient ran out of meds / not on home meds since ~May 2022. current list based on Feb 2022 Outpatient List as originally found on sunrise.  alcohol swabs: Apply topically to affected area 4 times a day  atorvastatin 40 mg oral tablet: 1 tab(s) orally once a day (at bedtime)  budesonide-formoterol 160 mcg-4.5 mcg/inh inhalation aerosol: 2 puff(s) inhaled 2 times a day    NOTE: patient ran out of meds / not on home meds since ~May 2022. current list based on Feb 2022 Outpatient List as originally found on sunrise.  bumetanide 1 mg oral tablet: 1 tab(s) orally once a day  Coreg 25 mg oral tablet: 1 tab(s) orally 2 times a day  Freestyle Jacques 2 [place]: Dispense 1 [place]  Freestyle Jacques 2 sensors: Apply 1 sensor every 14 days  Freestyle Precision Sajan Strips: Test blood sugar four times a day when you see check blood glucose symbol or when symptoms don&#x27;t match Jacques reading.  glipizide-metformin 5 mg-500 mg oral tablet: 2 tab(s) orally 2 times a day  glucometer (per patient&#x27;s insurance): Test blood sugars four times a day. Dispense #1 glucometer.  hydrALAZINE 25 mg oral tablet: 1 tab(s) orally every 8 hours  isosorbide mononitrate 60 mg oral tablet, extended release: 1 tab(s) orally once a day  Jardiance 25 mg oral tablet: 1 tab(s) orally once a day  lancets: 1 application subcutaneously 4 times a day  sacubitril-valsartan 49 mg-51 mg oral tablet: 1 tab(s) orally every 12 hours  senna leaf extract oral tablet: 1 tab(s) orally once a day (at bedtime)  spironolactone 25 mg oral tablet: 1 tab(s) orally once a day  test strips (per patient&#x27;s insurance): 1 application subcutaneously 4 times a day. ** Compatible with patient&#x27;s glucometer **

## 2023-08-01 NOTE — DISCHARGE NOTE PROVIDER - CARE PROVIDER_API CALL
Levi Mckinney  Adv Heart Fail Trnsplnt Cardio  [address] [place] [address]  Phone: [phone]  Fax: [phone]  Follow Up Time:     Tank Garzon  Endocrinology/Metab/Diabetes  [address] [place] [address]  Phone: [phone]  Fax: [phone]  Follow Up Time:     Laurie Dumont  Cardiovascular Disease  [address], [address] [place] [id number]  Phone: [phone]  Fax: [phone]  Follow Up Time:

## 2023-08-01 NOTE — PROGRESS NOTE ADULT - PROBLEM SELECTOR PLAN 1
no acute ischemic changes , troponin 39 and 40. Reports similar chest pain with prior chf exacerbation  -TTE without WMA but does have EF of 17%  -monitor on telemetry  -cards recs, will need to be plugged in with CHF team prior to DC no acute ischemic changes , troponin 39 and 40. Reports similar chest pain with prior chf exacerbation  -TTE without WMA but does have EF of 17%  -monitor on telemetry  -cards recs  -CHF team consulted, follow up recs

## 2023-08-01 NOTE — CONSULT NOTE ADULT - ASSESSMENT
42 y/o male with PMHx of HTN, HLD, NICM with EF 20%, DMII who presented with chest pain and orthopnea for several weeks admitted for acute decompensated heart failure in the setting of missed medications.     Cardiac imaging  TTE 7/31/23 EF 17%, LVIDd 5.6cm, grade II diastolic dysfunction, mildly reduced RV function  [facility] 2019: Normal Coronaries

## 2023-08-02 ENCOUNTER — TRANSCRIPTION ENCOUNTER (OUTPATIENT)
Age: 43
End: 2023-08-02

## 2023-08-02 VITALS
HEART RATE: 90 BPM | TEMPERATURE: 98 F | SYSTOLIC BLOOD PRESSURE: 112 MMHG | DIASTOLIC BLOOD PRESSURE: 75 MMHG | OXYGEN SATURATION: 98 % | RESPIRATION RATE: 18 BRPM

## 2023-08-02 LAB
GLUCOSE BLDC GLUCOMTR-MCNC: 241 MG/DL — HIGH (ref 70–99)
GLUCOSE BLDC GLUCOMTR-MCNC: 263 MG/DL — HIGH (ref 70–99)

## 2023-08-02 PROCEDURE — 76770 US EXAM ABDO BACK WALL COMP: CPT

## 2023-08-02 PROCEDURE — 84439 ASSAY OF FREE THYROXINE: CPT

## 2023-08-02 PROCEDURE — 83735 ASSAY OF MAGNESIUM: CPT

## 2023-08-02 PROCEDURE — 80053 COMPREHEN METABOLIC PANEL: CPT

## 2023-08-02 PROCEDURE — 99239 HOSP IP/OBS DSCHRG MGMT >30: CPT

## 2023-08-02 PROCEDURE — 94640 AIRWAY INHALATION TREATMENT: CPT

## 2023-08-02 PROCEDURE — 82803 BLOOD GASES ANY COMBINATION: CPT

## 2023-08-02 PROCEDURE — 84484 ASSAY OF TROPONIN QUANT: CPT

## 2023-08-02 PROCEDURE — C8929: CPT

## 2023-08-02 PROCEDURE — 0225U NFCT DS DNA&RNA 21 SARSCOV2: CPT

## 2023-08-02 PROCEDURE — 84436 ASSAY OF TOTAL THYROXINE: CPT

## 2023-08-02 PROCEDURE — 36415 COLL VENOUS BLD VENIPUNCTURE: CPT

## 2023-08-02 PROCEDURE — 85027 COMPLETE CBC AUTOMATED: CPT

## 2023-08-02 PROCEDURE — 85025 COMPLETE CBC W/AUTO DIFF WBC: CPT

## 2023-08-02 PROCEDURE — 80048 BASIC METABOLIC PNL TOTAL CA: CPT

## 2023-08-02 PROCEDURE — 82947 ASSAY GLUCOSE BLOOD QUANT: CPT

## 2023-08-02 PROCEDURE — 84132 ASSAY OF SERUM POTASSIUM: CPT

## 2023-08-02 PROCEDURE — 85018 HEMOGLOBIN: CPT

## 2023-08-02 PROCEDURE — 83036 HEMOGLOBIN GLYCOSYLATED A1C: CPT

## 2023-08-02 PROCEDURE — 83605 ASSAY OF LACTIC ACID: CPT

## 2023-08-02 PROCEDURE — 83690 ASSAY OF LIPASE: CPT

## 2023-08-02 PROCEDURE — 99285 EMERGENCY DEPT VISIT HI MDM: CPT | Mod: 25

## 2023-08-02 PROCEDURE — 82962 GLUCOSE BLOOD TEST: CPT

## 2023-08-02 PROCEDURE — 99232 SBSQ HOSP IP/OBS MODERATE 35: CPT

## 2023-08-02 PROCEDURE — 84480 ASSAY TRIIODOTHYRONINE (T3): CPT

## 2023-08-02 PROCEDURE — 84295 ASSAY OF SERUM SODIUM: CPT

## 2023-08-02 PROCEDURE — 85014 HEMATOCRIT: CPT

## 2023-08-02 PROCEDURE — 82330 ASSAY OF CALCIUM: CPT

## 2023-08-02 PROCEDURE — 99233 SBSQ HOSP IP/OBS HIGH 50: CPT | Mod: GC

## 2023-08-02 PROCEDURE — 84443 ASSAY THYROID STIM HORMONE: CPT

## 2023-08-02 PROCEDURE — 82435 ASSAY OF BLOOD CHLORIDE: CPT

## 2023-08-02 PROCEDURE — 82010 KETONE BODYS QUAN: CPT

## 2023-08-02 PROCEDURE — 71045 X-RAY EXAM CHEST 1 VIEW: CPT

## 2023-08-02 PROCEDURE — 96374 THER/PROPH/DIAG INJ IV PUSH: CPT

## 2023-08-02 PROCEDURE — 83880 ASSAY OF NATRIURETIC PEPTIDE: CPT

## 2023-08-02 PROCEDURE — 81003 URINALYSIS AUTO W/O SCOPE: CPT

## 2023-08-02 RX ORDER — ISOPROPYL ALCOHOL, BENZOCAINE .7; .06 ML/ML; ML/ML
0 SWAB TOPICAL
Qty: 100 | Refills: 1
Start: 2023-08-02

## 2023-08-02 RX ORDER — SPIRONOLACTONE 25 MG/1
1 TABLET, FILM COATED ORAL
Qty: 30 | Refills: 0
Start: 2023-08-02 | End: 2023-08-31

## 2023-08-02 RX ORDER — INSULIN LISPRO 100/ML
18 VIAL (ML) SUBCUTANEOUS
Refills: 0 | Status: DISCONTINUED | OUTPATIENT
Start: 2023-08-02 | End: 2023-08-02

## 2023-08-02 RX ORDER — CARVEDILOL PHOSPHATE 80 MG/1
1 CAPSULE, EXTENDED RELEASE ORAL
Qty: 60 | Refills: 0
Start: 2023-08-02 | End: 2023-08-31

## 2023-08-02 RX ORDER — EMPAGLIFLOZIN 10 MG/1
1 TABLET, FILM COATED ORAL
Qty: 30 | Refills: 0
Start: 2023-08-02 | End: 2023-08-31

## 2023-08-02 RX ORDER — ISOSORBIDE MONONITRATE 60 MG/1
1 TABLET, EXTENDED RELEASE ORAL
Refills: 0 | DISCHARGE

## 2023-08-02 RX ORDER — CARVEDILOL PHOSPHATE 80 MG/1
1 CAPSULE, EXTENDED RELEASE ORAL
Refills: 0 | DISCHARGE

## 2023-08-02 RX ORDER — INSULIN GLARGINE 100 [IU]/ML
50 INJECTION, SOLUTION SUBCUTANEOUS AT BEDTIME
Refills: 0 | Status: DISCONTINUED | OUTPATIENT
Start: 2023-08-02 | End: 2023-08-02

## 2023-08-02 RX ORDER — HYDRALAZINE HCL 50 MG
1 TABLET ORAL
Qty: 90 | Refills: 0
Start: 2023-08-02 | End: 2023-08-31

## 2023-08-02 RX ORDER — SACUBITRIL AND VALSARTAN 24; 26 MG/1; MG/1
1 TABLET, FILM COATED ORAL
Qty: 60 | Refills: 0
Start: 2023-08-02 | End: 2023-08-31

## 2023-08-02 RX ORDER — GLIPIZIDE/METFORMIN HCL 2.5-500 MG
2 TABLET ORAL
Qty: 120 | Refills: 0
Start: 2023-08-02 | End: 2023-08-31

## 2023-08-02 RX ORDER — ATORVASTATIN CALCIUM 80 MG/1
1 TABLET, FILM COATED ORAL
Qty: 30 | Refills: 0
Start: 2023-08-02 | End: 2023-08-31

## 2023-08-02 RX ORDER — GLIPIZIDE/METFORMIN HCL 2.5-500 MG
1 TABLET ORAL
Refills: 0 | DISCHARGE

## 2023-08-02 RX ORDER — BUMETANIDE 0.25 MG/ML
1 INJECTION INTRAMUSCULAR; INTRAVENOUS
Qty: 30 | Refills: 0
Start: 2023-08-02 | End: 2023-08-31

## 2023-08-02 RX ORDER — INSULIN LISPRO 100/ML
18 VIAL (ML) SUBCUTANEOUS
Refills: 0 | Status: DISCONTINUED | OUTPATIENT
Start: 2023-08-03 | End: 2023-08-02

## 2023-08-02 RX ORDER — ISOSORBIDE MONONITRATE 60 MG/1
1 TABLET, EXTENDED RELEASE ORAL
Qty: 30 | Refills: 0
Start: 2023-08-02 | End: 2023-08-31

## 2023-08-02 RX ADMIN — Medication 25 MILLIGRAM(S): at 13:56

## 2023-08-02 RX ADMIN — Medication 650 MILLIGRAM(S): at 12:57

## 2023-08-02 RX ADMIN — Medication 25 MILLIGRAM(S): at 06:18

## 2023-08-02 RX ADMIN — ISOSORBIDE MONONITRATE 60 MILLIGRAM(S): 60 TABLET, EXTENDED RELEASE ORAL at 12:14

## 2023-08-02 RX ADMIN — Medication 16 UNIT(S): at 08:31

## 2023-08-02 RX ADMIN — CARVEDILOL PHOSPHATE 25 MILLIGRAM(S): 80 CAPSULE, EXTENDED RELEASE ORAL at 06:16

## 2023-08-02 RX ADMIN — CHLORHEXIDINE GLUCONATE 1 APPLICATION(S): 213 SOLUTION TOPICAL at 06:17

## 2023-08-02 RX ADMIN — Medication 16 UNIT(S): at 12:15

## 2023-08-02 RX ADMIN — SACUBITRIL AND VALSARTAN 1 TABLET(S): 24; 26 TABLET, FILM COATED ORAL at 06:16

## 2023-08-02 RX ADMIN — SPIRONOLACTONE 25 MILLIGRAM(S): 25 TABLET, FILM COATED ORAL at 06:16

## 2023-08-02 RX ADMIN — Medication 6: at 08:30

## 2023-08-02 RX ADMIN — Medication 4: at 12:14

## 2023-08-02 RX ADMIN — Medication 650 MILLIGRAM(S): at 11:57

## 2023-08-02 NOTE — PROGRESS NOTE ADULT - PROBLEM SELECTOR PLAN 3
reports stopped diabetes medications for two weeks due to miscommunication with medical provider. A1c >13  Hyperglycemia 2/2 missed home meds  - Hold oral hypoglycemics  - Endocrinology consulted-follow up insulin recs  - patient unwilling to be dc on insulin, preferring orals  - consistent carb diet
- Can check urine microalbumin outpatient  - Outpatient goal BP <130/80. Management per primary team.      Assessed pt/labs/meds and discussed plan of care with RN /pt  Insulin adjustment   Discharge plan  Follow up care    Contact via Microsoft Teams during business hours  To reach covering provider access AMION via sunrise tools  For Urgent matters/after-hours/weekends/holidays please page endocrine fellow on call   For nonurgent matters please email [email or web address]    Please note that this patient may be followed by different provider tomorrow.  Notify endocrine 24 hours prior to discharge for final recommendations
- appreciate endocrine input
reports stopped diabetes medications for two weeks due to miscommunication with medical provider. A1c >13  Hyperglycemia 2/2 missed home meds  - Hold oral hypoglycemics  - Endocrinology consulted-follow up insulin recs  - patient unwilling to be dc on insulin, preferring orals  - consistent carb diet
- Can check urine microalbumin outpatient  - Outpatient goal BP <130/80. Management per primary team.
reports stopped diabetes medications for two weeks due to miscommunication with medical provider. A1c >13  Hyperglycemia 2/2 missed home meds  - Hold oral hypoglycemics  - Endocrinology consulted-follow up insulin recs  - patient unwilling to be dc on insulin, preferring orals  - consistent carb diet
reports stopped diabetes medications for two weeks due to miscommunication with medical provider. A1c >13  Hyperglycemia 2/2 missed home meds  - Hold oral hypoglycemics  - insulin correction scale  - check fsg qac/qhs  - Endocrinology consulted- will need to increase basal/bolus insulin  - patient unwilling to be dc on insulin, preferring orals  - consistent carb diet

## 2023-08-02 NOTE — PROGRESS NOTE ADULT - PROVIDER SPECIALTY LIST ADULT
Cardiology
Heart Failure
Endocrinology
Internal Medicine
Hospitalist

## 2023-08-02 NOTE — PROGRESS NOTE ADULT - PROBLEM SELECTOR PLAN 4
abdomen benign on exam , UA negative for infection , deferred scrotal exam. Renal US negative for stones and no hydronephrosis

## 2023-08-02 NOTE — PROGRESS NOTE ADULT - PROBLEM SELECTOR PROBLEM 2
Acute on chronic HFrEF (heart failure with reduced ejection fraction)
HLD (hyperlipidemia)
Acute on chronic HFrEF (heart failure with reduced ejection fraction)
HTN (hypertension)
Acute on chronic HFrEF (heart failure with reduced ejection fraction)
Acute on chronic HFrEF (heart failure with reduced ejection fraction)

## 2023-08-02 NOTE — PROGRESS NOTE ADULT - PROBLEM SELECTOR PLAN 1
- Test BGs ACTID and at HS  - Increase Lantus to 50 units qhs   - Adjust premeal Admelog to 18 units with meals. HOLD IF NPO.  - C/w moderate Admelog correction scale ACTID and moderate scale at HS   - RD consult for education on consistent carbohydrate diet   - Will continue to adjust insulin as needed  Discharge plan:  - Discharge medications: Recommending Basal and bolus regimen PLUS Jardiance daily but pt now declining any insulin upon discharge.   Discharge on Jardiance 25mg plus Glipizide-metformin 10mg-1000mg bid. Pt requesting DM meds he was taking before> agrees with dose adjustments.  Pt to contact PCP if BGs remain >200s at home. Pt aware that oral meds will likely not be effective due to large amount of insulin requirements at the present time> pt still wants to go home off insulin therapy.   Please send a prescription for freestyle janel 2 sensor and reader as well as back up glucometer, test strips, lancets.   - Patient to call doctor with persistent high or low BG at home  - Recommend routine outpatient ophthalmology, podiatry   - Should follow up with endocrinology (patient states he wants to f/u with PCP for now).  -Can provide info about follow up with Endocrine faculty at [address] [place] [address], [place] [address]  Phone Number: [phone]

## 2023-08-02 NOTE — DISCHARGE NOTE NURSING/CASE MANAGEMENT/SOCIAL WORK - PATIENT PORTAL LINK FT
You can access the FollowMyHealth Patient Portal offered by [facility] by registering at the following website: [email or web address]. By joining [facility]’s FollowMyHealth portal, you will also be able to view your health information using other applications (apps) compatible with our system.

## 2023-08-02 NOTE — PROGRESS NOTE ADULT - ASSESSMENT
44 y/o male with PMHx of HTN, HLD, NICM with EF 20%, DMII who presented with chest pain and orthopnea for several weeks admitted for acute decompensated heart failure in the setting of missed medications.     Cardiac imaging  TTE 7/31/23 EF 17%, LVIDd 5.6cm, grade II diastolic dysfunction, mildly reduced RV function  [facility] 2019: Normal Coronaries

## 2023-08-02 NOTE — PROGRESS NOTE ADULT - PROBLEM SELECTOR PLAN 1
no acute ischemic changes , troponin 39 and 40. Reports similar chest pain with prior chf exacerbation  -TTE without WMA but does have EF of 17%  -monitor on telemetry  -cards recs  -CHF team consulted, follow up recs

## 2023-08-02 NOTE — PROGRESS NOTE ADULT - SUBJECTIVE AND OBJECTIVE BOX
DIABETES FOLLOW UP NOTE: Saw pt earlier today    Chief Complaint: Endocrine consult requested for management of T2DM    INTERVAL HX: Pt stable, reports tolerating POs with BG levels still above goal (200s) while on present insulin doses. No hypoglycemia. Pt states he is eating hospital food but also having food from outside> had a hamburger yesterday but can't recall the time.   Per primary team, pt going home today. Per team and staff pt refusing to continue with insulin therapy at home. Pt states he wants to restart oral DM meds he was on before they were stopped by mistake back in May.     Review of Systems:  General: As above  Cardiovascular: No chest pain, palpitations  Respiratory: No SOB, no cough  GI: No nausea, vomiting, abdominal pain  Endocrine: No polyuria, polydipsia or S&Sx of hypoglycemia    Allergies    shellfish (Swelling)  No Known Drug Allergies    Intolerances      MEDICATIONS:  atorvastatin 40 milliGRAM(s) Oral at bedtime  insulin glargine Injectable (LANTUS) 48 Unit(s) SubCutaneous at bedtime  insulin lispro (ADMELOG) corrective regimen sliding scale   SubCutaneous three times a day before meals  insulin lispro (ADMELOG) corrective regimen sliding scale   SubCutaneous at bedtime  insulin lispro Injectable (ADMELOG) 16 Unit(s) SubCutaneous before breakfast  insulin lispro Injectable (ADMELOG) 17 Unit(s) SubCutaneous before dinner  insulin lispro Injectable (ADMELOG) 16 Unit(s) SubCutaneous before lunch    PHYSICAL EXAM:  VITALS: T(C): 36.6 (08-02-23 @ 11:25)  T(F): 97.8 (08-02-23 @ 11:25), Max: 98.1 (08-02-23 @ 04:00)  HR: 84 (08-02-23 @ 11:25) (82 - 93)  BP: 128/90 (08-02-23 @ 11:25) (111/62 - 146/90)  RR:  (17 - 18)  SpO2:  (94% - 97%)  Wt(kg): --  GENERAL: Male sitting at edge of bed in NAD  Abdomen: Soft, nontender, non distended  Extremities: Warm, no edema in all 4 exts  NEURO: A&O X3    LABS:  POCT Blood Glucose.: 241 mg/dL (08-02-23 @ 11:52)  POCT Blood Glucose.: 263 mg/dL (08-02-23 @ 07:54)  POCT Blood Glucose.: 283 mg/dL (08-01-23 @ 21:04)  POCT Blood Glucose.: 259 mg/dL (08-01-23 @ 18:17)  POCT Blood Glucose.: 243 mg/dL (08-01-23 @ 11:31)  POCT Blood Glucose.: 308 mg/dL (08-01-23 @ 07:32)  POCT Blood Glucose.: 345 mg/dL (07-31-23 @ 21:06)  POCT Blood Glucose.: 166 mg/dL (07-31-23 @ 17:19)  POCT Blood Glucose.: 165 mg/dL (07-31-23 @ 11:31)  POCT Blood Glucose.: 344 mg/dL (07-31-23 @ 07:36)  POCT Blood Glucose.: 309 mg/dL (07-30-23 @ 21:00)  POCT Blood Glucose.: 261 mg/dL (07-30-23 @ 17:49)      Thyroid Function Tests:  07-28 @ 20:34 TSH 1.40 FreeT4 1.2 T3 128 Anti TPO -- Anti Thyroglobulin Ab -- TSI --      A1C with Estimated Average Glucose Result: 13.6 % (07-30-23 @ 11:53)  A1C with Estimated Average Glucose Result: 13.5 % (07-29-23 @ 16:37)      Estimated Average Glucose: 344 mg/dL (07-30-23 @ 11:53)  Estimated Average Glucose: 341 mg/dL (07-29-23 @ 16:37)

## 2023-08-02 NOTE — PROGRESS NOTE ADULT - ASSESSMENT
44 y/o M w/h/o uncontrolled T2DM (A1C13.5%) on Jardiance. Metformin and glipizide > stopped meds in May> per pt, there was a misunderstanding between PCP and him about his meds. No known DM complications. Also h/o HTN, HLD, HFref EF 20%. Here with chest pain over the past several days. Found to have HF after missing meds since May as well. Endocrinology consulted for management of T2DM. Tolerating POs with BG levels above goal requiring over 100 units of insulin in the last 24 hours with BG still > 200s. No hypoglycemia. Will continue to adjust insulin while in hospital to BG Goal 100-180mg/dl.     Pt is very reluctant to go home on insulin even though he was instructed of the need of at least basal insulin therapy upon discharge. Pt stated he will only go on tables and became upset when trying to give rational for the need of insulin at this time. Pt was also made aware that his BGs might not improve while on oral meds and that he needs to contact his PCP if BGs remain >200s at home. Pt verbalized understanding but states he doesn't have a glucose meter at home > spoke to team to order one.     Met with patient and reviewed the following:  -A1c LEVEL: Present and goal  -Blood glucose goals: 100s to 150s as out pt per age and comorbidities  -Glucose monitoring frequency: ac and hs. Needs a glucose meter  -Healthy eating and portion control. Pt not receptive to speak about diet at time of visit  -Insulin(s) action, time of administration and side effects> declines insulin use upon discharge. Explained oral meds and the need to contact PCP if BG levels remain elevated while on oral meds.   -Importance of follow up care. Wants to f/u with PCP at this time.     Pt listens and verbalizes understanding about all the above.  Pt able to verbalize that he will contact PCP if BG levels do not improve while on oral DM meds and is aware of the potential micro/macrovascular complications he might have if DM remains uncontrolled.     Home Regimen: Jardiance 10mg, glipizide-metformin 5-500mg twice daily> not taking meds since May/23

## 2023-08-02 NOTE — PROGRESS NOTE ADULT - PROBLEM SELECTOR PROBLEM 1
Poorly controlled type 2 diabetes mellitus
Chest pain
Acute on chronic HFrEF (heart failure with reduced ejection fraction)
Poorly controlled type 2 diabetes mellitus
Poorly controlled type 2 diabetes mellitus
Chest pain

## 2023-08-02 NOTE — PROGRESS NOTE ADULT - SUBJECTIVE AND OBJECTIVE BOX
[facility] Division of Hospital Medicine  Edson Cheng DO  Reachable on [employer] Teams    Patient is a 43y old  Male who presents with a chief complaint of chest pain x several days (02 Aug 2023 15:15)    SUBJECTIVE / OVERNIGHT EVENTS: No acute events overnight. Patient seen and examined at bedside this morning, still endorses some positional chest pain and mild dyspnea, endorses palpitations when being woken up from sleep.     REVIEW OF SYSTEMS:    CONSTITUTIONAL: No weakness, fevers or chills  EYES/ENT: No visual changes;  No vertigo or throat pain   NECK: No pain or stiffness  RESPIRATORY: No cough, wheezing, hemoptysis; Endorses dyspnea  CARDIOVASCULAR: Endorses chest pain and palpitations  GASTROINTESTINAL: No abdominal or epigastric pain. No nausea, vomiting, or hematemesis; No diarrhea or constipation. No melena or hematochezia.  GENITOURINARY: No dysuria, frequency or hematuria  NEUROLOGICAL: No numbness or weakness  SKIN: No itching, burning, rashes, or lesions  MSK: No joint pain, no back pain  HEME: No easy bleeding, no easy bruising  All other review of systems is negative unless indicated above.    MEDICATIONS  (STANDING):  atorvastatin 40 milliGRAM(s) Oral at bedtime  budesonide 160 MICROgram(s)/formoterol 4.5 MICROgram(s) Inhaler 2 Puff(s) Inhalation two times a day  carvedilol 25 milliGRAM(s) Oral every 12 hours  chlorhexidine 2% Cloths 1 Application(s) Topical <User Schedule>  dextrose 5%. 1000 milliLiter(s) (50 mL/Hr) IV Continuous <Continuous>  dextrose 5%. 1000 milliLiter(s) (100 mL/Hr) IV Continuous <Continuous>  dextrose 50% Injectable 25 Gram(s) IV Push once  dextrose 50% Injectable 12.5 Gram(s) IV Push once  enoxaparin Injectable 40 milliGRAM(s) SubCutaneous every 12 hours  glucagon  Injectable 1 milliGRAM(s) IntraMuscular once  hydrALAZINE 25 milliGRAM(s) Oral every 8 hours  insulin glargine Injectable (LANTUS) 50 Unit(s) SubCutaneous at bedtime  insulin lispro (ADMELOG) corrective regimen sliding scale   SubCutaneous at bedtime  insulin lispro (ADMELOG) corrective regimen sliding scale   SubCutaneous three times a day before meals  insulin lispro Injectable (ADMELOG) 18 Unit(s) SubCutaneous before dinner  insulin lispro Injectable (ADMELOG) 18 Unit(s) SubCutaneous before breakfast  isosorbide   mononitrate ER Tablet (IMDUR) 60 milliGRAM(s) Oral daily  sacubitril 49 mG/valsartan 51 mG 1 Tablet(s) Oral every 12 hours  senna 1 Tablet(s) Oral at bedtime  spironolactone 25 milliGRAM(s) Oral daily    MEDICATIONS  (PRN):  acetaminophen     Tablet .. 650 milliGRAM(s) Oral every 6 hours PRN Temp greater or equal to 38C (100.4F), Mild Pain (1 - 3)  albuterol    90 MICROgram(s) HFA Inhaler 2 Puff(s) Inhalation every 6 hours PRN Shortness of Breath and/or Wheezing  aluminum hydroxide/magnesium hydroxide/simethicone Suspension 30 milliLiter(s) Oral every 4 hours PRN Dyspepsia  dextrose Oral Gel 15 Gram(s) Oral once PRN Blood Glucose LESS THAN 70 milliGRAM(s)/deciliter  melatonin 3 milliGRAM(s) Oral at bedtime PRN Insomnia  ondansetron Injectable 4 milliGRAM(s) IV Push every 8 hours PRN Nausea and/or Vomiting      CAPILLARY BLOOD GLUCOSE      POCT Blood Glucose.: 241 mg/dL (02 Aug 2023 11:52)  POCT Blood Glucose.: 263 mg/dL (02 Aug 2023 07:54)  POCT Blood Glucose.: 283 mg/dL (01 Aug 2023 21:04)  POCT Blood Glucose.: 259 mg/dL (01 Aug 2023 18:17)    I&O's Summary    01 Aug 2023 07:01  -  02 Aug 2023 07:00  --------------------------------------------------------  IN: 600 mL / OUT: 0 mL / NET: 600 mL    02 Aug 2023 07:01  -  02 Aug 2023 15:43  --------------------------------------------------------  IN: 480 mL / OUT: 0 mL / NET: 480 mL        PHYSICAL EXAM:  Vital Signs Last 24 Hrs  T(C): 36.6 (02 Aug 2023 11:25), Max: 36.7 (02 Aug 2023 04:00)  T(F): 97.8 (02 Aug 2023 11:25), Max: 98.1 (02 Aug 2023 04:00)  HR: 84 (02 Aug 2023 11:25) (82 - 93)  BP: 128/90 (02 Aug 2023 11:25) (111/62 - 146/90)  BP(mean): --  RR: 18 (02 Aug 2023 11:25) (17 - 18)  SpO2: 97% (02 Aug 2023 11:25) (94% - 97%)    Parameters below as of 02 Aug 2023 11:25  Patient On (Oxygen Delivery Method): room air    CONSTITUTIONAL: NAD, well-developed, well-groomed  EYES: EOMI; conjunctiva and sclera clear  ENMT: Moist oral mucosa, no pharyngeal injection or exudates  RESPIRATORY: Normal respiratory effort; lungs are clear to auscultation bilaterally  CARDIOVASCULAR: Regular rate and rhythm, normal S1 and S2, no murmur/rub/gallop  ABDOMEN: Nontender to palpation, soft, nondistended  MUSCULOSKELETAL: No clubbing or cyanosis of digits; no joint swelling or tenderness to palpation  PSYCH: A+O to person, place, and time; affect appropriate  NEUROLOGY: CN 2-12 are intact and symmetric; no gross sensory deficits   SKIN: No rashes; no palpable lesions

## 2023-08-02 NOTE — PROGRESS NOTE ADULT - PROBLEM SELECTOR PROBLEM 3
HTN (hypertension)
HTN (hypertension)
Poorly controlled type 2 diabetes mellitus
Diabetes mellitus with hyperglycemia
HTN (hypertension)
Diabetes mellitus with hyperglycemia

## 2023-08-02 NOTE — DISCHARGE NOTE NURSING/CASE MANAGEMENT/SOCIAL WORK - NSDCPEFALRISK_GEN_ALL_CORE
For information on Fall & Injury Prevention, visit: [email or web address] OR  [email or web address] OR  https://www.cdc.gov/steadi/patient.html

## 2023-08-02 NOTE — PROGRESS NOTE ADULT - NUTRITIONAL ASSESSMENT
Diet, Regular:   Consistent Carbohydrate {Evening Snack} (CSTCHOSN)  DASH/TLC {Sodium & Cholesterol Restricted} (DASH) (07-29-23 @ 00:52) [Active]
Diet, Regular:   Consistent Carbohydrate {Evening Snack} (CSTCHOSN)  DASH/TLC {Sodium & Cholesterol Restricted} (DASH) (07-29-23 @ 00:52) [Active]      Needs RD consult

## 2023-08-02 NOTE — PROGRESS NOTE ADULT - NSPROGADDITIONALINFOA_GEN_ALL_CORE
-Plan discussed with pt/team.  Contact info: [phone] (24/7). pager [phone]  Amion on [place]  Teams on M-T-W-F. Unavailable Thu/Weekends/Holidays  Provided face to face education as well as assessed  pt/labs/meds and discussed plan with pt and primary team  Adjusting insulin  Discharge plan  Follow up care
The necessity of the time spent during the encounter on this date of service was due to:   - Ordering, reviewing, and interpreting labs, testing, and imaging.  - Independently obtaining a review of systems and performing a physical exam  - Reviewing prior hospitalization and where necessary, outpatient records.  - Counselling and educating patient and family regarding interpretation of aforementioned items and plan of care.    Time Spent 35 minutes    Maxine Ramirez  Division of Hospital Medicine  Available on Microsoft Teams
Patient medically ready for discharge with endocrine and heart failure team follow up. Discussed with patient and 6 [facility] ACP. Total time spent discharge planning 45 minutes.
[facility] and cardiology fellow.
Discussed with patient, [facility],

## 2023-08-02 NOTE — PROGRESS NOTE ADULT - REASON FOR ADMISSION
chest pain x several days

## 2023-08-02 NOTE — PROGRESS NOTE ADULT - PROBLEM SELECTOR PLAN 5
- continue hydralazine   - continue carvedilol   - continue Imdur  - on Entresto

## 2023-08-02 NOTE — PROGRESS NOTE ADULT - PROBLEM SELECTOR PLAN 2
worsened orthopnea , PND and ALAS , BNP not markedly elevated and minimal edema ,will continue diuresis and- monitor  mag and K, replete as needed  -cardiology recs appreciatd  -Resume Lasx 80mg daily  - monitor on telemetry  - strict ins and outs  - daily weight  - continue carvedilol   - continue Entresto  - continue spironolactone and Imdur   - holding Jardiance    - dash consistent carb diet worsened orthopnea , PND and ALAS , BNP not markedly elevated and minimal edema ,will continue diuresis and- monitor  mag and K, replete as needed  -cardiology recs appreciatd  -Switch Lasix to Bumex per CHF team recs  - monitor on telemetry  - strict ins and outs  - daily weight  - continue carvedilol   - continue Entresto  - continue spironolactone and Imdur   - holding Jardiance    - dash consistent carb diet

## 2023-08-02 NOTE — PROGRESS NOTE ADULT - PROBLEM SELECTOR PLAN 1
- Entresto to 49/51mg BID  - Start Bumex 1mg daily  - Continue coreg 25mg Q12  - Isodil 60mg daily  - hydralazine 25mg TID  - Aldactone 25mg daily  - ICD: None  - SGLT2i: Ideally can start this when sugars are better controlled  - Follow up in HF clinic on 8/14/23 at 8:30AM

## 2023-08-02 NOTE — PROGRESS NOTE ADULT - ATTENDING COMMENTS
Briefly, 44 y/o M w/ h/o HTN, HLD, NICM/HFrEF (EF 20%, LVEDD 5.8 cm) w/o ICD, poorly controlled T2DM (A1c 13.9) who presented on 7/28 with chest pain and orthopnea for several weeks in setting of not being on medications for approx 1month. Was given IV lasix and resumed on medications with improvement in symptoms. Would like to go home. Resistant to staying or labs (unable to collect d/t difficult stick). On exam, overweight, JVP 6-8 cm w/o HJR, RRR, no m/r/g, CTAB, nontender abdomen, no pedal edema. Labs reviewed - Hb 18, BUN/Cr 22/1.2, , A1c 13.9, trop neg, lactate negative. TTE reviewed - severe LV dysfunction, LVEDD 5.8 cm, nl RV function, LVOT VTI 10 cm. LHC 12/12/19 normal coronaries; RHC: RA 12, RV 38/17, PA 43/37/36 (?), PCWP 18, CO/CI 4.44/1.72 (notably Hb 16). Overall stage C HF, NYHA class III now appears euvolemic but hypertensive.  - c/w Entresto 49/51; if tolerates, will escalate further to 97/103 as outpatient  - c/w hydral 25mg q8h for now  - d/c lasix;d/c on bumex 1 mg po daily; discussed keeping log of BP/weight   - c/w ritchie  - c/w coreg 25 mg q12  - c/w isordil  - will attempt to simplify regimen to ensure adherence  - discussed importance of glycemic control  - will need sleep study as outpatient as likely has TAISHA driving polycythemia  - reviewed disease process  - f/u appt arranged

## 2023-08-02 NOTE — PROGRESS NOTE ADULT - ASSESSMENT
42 y/o male with PMHx of HTN, HLD, HFref EF 20% , DMII ,presents for chest pain over the past several days admitted for chf exacerbation and hyperglycemia 2/2 medication noncompliance.

## 2023-08-02 NOTE — PROGRESS NOTE ADULT - SUBJECTIVE AND OBJECTIVE BOX
Subjective    Patient seen and examined at bedside.  No chest pain, shortness of breath, or palpitations            Telemetry review:     Current Meds:  acetaminophen     Tablet .. 650 milliGRAM(s) Oral every 6 hours PRN  albuterol    90 MICROgram(s) HFA Inhaler 2 Puff(s) Inhalation every 6 hours PRN  aluminum hydroxide/magnesium hydroxide/simethicone Suspension 30 milliLiter(s) Oral every 4 hours PRN  atorvastatin 40 milliGRAM(s) Oral at bedtime  budesonide 160 MICROgram(s)/formoterol 4.5 MICROgram(s) Inhaler 2 Puff(s) Inhalation two times a day  carvedilol 25 milliGRAM(s) Oral every 12 hours  chlorhexidine 2% Cloths 1 Application(s) Topical <User Schedule>  dextrose 5%. 1000 milliLiter(s) IV Continuous <Continuous>  dextrose 5%. 1000 milliLiter(s) IV Continuous <Continuous>  dextrose 50% Injectable 25 Gram(s) IV Push once  dextrose 50% Injectable 12.5 Gram(s) IV Push once  dextrose Oral Gel 15 Gram(s) Oral once PRN  enoxaparin Injectable 40 milliGRAM(s) SubCutaneous every 12 hours  glucagon  Injectable 1 milliGRAM(s) IntraMuscular once  hydrALAZINE 25 milliGRAM(s) Oral every 8 hours  insulin glargine Injectable (LANTUS) 50 Unit(s) SubCutaneous at bedtime  insulin lispro (ADMELOG) corrective regimen sliding scale   SubCutaneous three times a day before meals  insulin lispro (ADMELOG) corrective regimen sliding scale   SubCutaneous at bedtime  insulin lispro Injectable (ADMELOG) 18 Unit(s) SubCutaneous before breakfast  insulin lispro Injectable (ADMELOG) 18 Unit(s) SubCutaneous before dinner  isosorbide   mononitrate ER Tablet (IMDUR) 60 milliGRAM(s) Oral daily  melatonin 3 milliGRAM(s) Oral at bedtime PRN  ondansetron Injectable 4 milliGRAM(s) IV Push every 8 hours PRN  sacubitril 49 mG/valsartan 51 mG 1 Tablet(s) Oral every 12 hours  senna 1 Tablet(s) Oral at bedtime  spironolactone 25 milliGRAM(s) Oral daily      Vitals:  T(F): 97.8 (08-02), Max: 98.1 (08-02)  HR: 84 (08-02) (82 - 93)  BP: 128/90 (08-02) (111/62 - 146/90)  RR: 18 (08-02)  SpO2: 97% (08-02)  I&O's Summary    01 Aug 2023 07:01  -  02 Aug 2023 07:00  --------------------------------------------------------  IN: 600 mL / OUT: 0 mL / NET: 600 mL    02 Aug 2023 07:01  -  02 Aug 2023 15:15  --------------------------------------------------------  IN: 480 mL / OUT: 0 mL / NET: 480 mL        Physical Exam:  General: No acute distress; well appearing  Eyes: PERRL, EOMI, pink conjunctiva  HEENT: Normal oral mucosa  Cardiovascular: RRR, S1, S2, no murmurs, rubs, or gallops; no edema; no JVD  Respiratory: Clear to auscultation bilaterally, speaking full sentences  Gastrointestinal: soft, non-tender, non-distended with normal bowel sounds  Extremities: 2+ pulses, no clubbing; no joint deformity, or edema  Neurologic: AAOx3,  Non-focal  Skin: No rashes, ecchymoses, or cyanosis              CARDIAC MARKERS ( 28 Jul 2023 21:40 )  40 ng/L / x     / x     / x     / x     / x      CARDIAC MARKERS ( 28 Jul 2023 20:34 )  39 ng/L / x     / x     / x     / x     / x                   Subjective    Patient seen and examined at bedside. Patient wishes to go home today and declined lab work to evaluate renal function. Weight increased slightly since yesterday.          Telemetry review: Sinus rhythm    Current Meds:  acetaminophen     Tablet .. 650 milliGRAM(s) Oral every 6 hours PRN  albuterol    90 MICROgram(s) HFA Inhaler 2 Puff(s) Inhalation every 6 hours PRN  aluminum hydroxide/magnesium hydroxide/simethicone Suspension 30 milliLiter(s) Oral every 4 hours PRN  atorvastatin 40 milliGRAM(s) Oral at bedtime  budesonide 160 MICROgram(s)/formoterol 4.5 MICROgram(s) Inhaler 2 Puff(s) Inhalation two times a day  carvedilol 25 milliGRAM(s) Oral every 12 hours  chlorhexidine 2% Cloths 1 Application(s) Topical <User Schedule>  dextrose 5%. 1000 milliLiter(s) IV Continuous <Continuous>  dextrose 5%. 1000 milliLiter(s) IV Continuous <Continuous>  dextrose 50% Injectable 25 Gram(s) IV Push once  dextrose 50% Injectable 12.5 Gram(s) IV Push once  dextrose Oral Gel 15 Gram(s) Oral once PRN  enoxaparin Injectable 40 milliGRAM(s) SubCutaneous every 12 hours  glucagon  Injectable 1 milliGRAM(s) IntraMuscular once  hydrALAZINE 25 milliGRAM(s) Oral every 8 hours  insulin glargine Injectable (LANTUS) 50 Unit(s) SubCutaneous at bedtime  insulin lispro (ADMELOG) corrective regimen sliding scale   SubCutaneous three times a day before meals  insulin lispro (ADMELOG) corrective regimen sliding scale   SubCutaneous at bedtime  insulin lispro Injectable (ADMELOG) 18 Unit(s) SubCutaneous before breakfast  insulin lispro Injectable (ADMELOG) 18 Unit(s) SubCutaneous before dinner  isosorbide   mononitrate ER Tablet (IMDUR) 60 milliGRAM(s) Oral daily  melatonin 3 milliGRAM(s) Oral at bedtime PRN  ondansetron Injectable 4 milliGRAM(s) IV Push every 8 hours PRN  sacubitril 49 mG/valsartan 51 mG 1 Tablet(s) Oral every 12 hours  senna 1 Tablet(s) Oral at bedtime  spironolactone 25 milliGRAM(s) Oral daily      Vitals:  T(F): 97.8 (08-02), Max: 98.1 (08-02)  HR: 84 (08-02) (82 - 93)  BP: 128/90 (08-02) (111/62 - 146/90)  RR: 18 (08-02)  SpO2: 97% (08-02)  I&O's Summary    01 Aug 2023 07:01  -  02 Aug 2023 07:00  --------------------------------------------------------  IN: 600 mL / OUT: 0 mL / NET: 600 mL    02 Aug 2023 07:01  -  02 Aug 2023 15:15  --------------------------------------------------------  IN: 480 mL / OUT: 0 mL / NET: 480 mL      Physical Exam:  General: No acute distress; well appearing  Eyes: PERRL, EOMI, pink conjunctiva  HEENT: Normal oral mucosa  Cardiovascular: RRR, S1, S2, no murmurs, rubs, or gallops; no edema; no JVD  Respiratory: Clear to auscultation bilaterally, speaking full sentences  Gastrointestinal: soft, non-tender, non-distended with normal bowel sounds  Extremities: 2+ pulses, no clubbing; no joint deformity, or edema  Neurologic: AAOx3,  Non-focal  Skin: No rashes, ecchymoses, or cyanosis      CARDIAC MARKERS ( 28 Jul 2023 21:40 )  40 ng/L / x     / x     / x     / x     / x      CARDIAC MARKERS ( 28 Jul 2023 20:34 )  39 ng/L / x     / x     / x     / x     / x

## 2023-08-02 NOTE — CHART NOTE - NSCHARTNOTEFT_GEN_A_CORE
Pt medically cleared for discharge home. Pt will no permit lab draws today and is adamant he will not take home insulin. Discussed with CHF team and endocrine team. Pt to be discharged home on oral diabetic medications, Reconciled DM meds with Rachana BLUNT- glipizide/metformin and jardiance for home. Continue current medications, add bumex 1mg daily per Dr. Mckinney, CHF. Expressed importance of medical adherence.  Pt agrees and plans to conform to the best of his ability. Discharge home. Discussed with Dr. Cheng who agrees with plan.

## 2023-08-14 ENCOUNTER — APPOINTMENT (OUTPATIENT)
Dept: HEART FAILURE | Facility: CLINIC | Age: 43
End: 2023-08-14

## 2023-08-14 NOTE — DISCUSSION/SUMMARY
[FreeTextEntry1] : HFrEF -NICM -GDMT: Coreg 25mg BID, Entresto 49-51mg BID, Spironolactone 25mg QD, Hydral 25mg TID, Isordil  -C/w Bumex 1mg QD -Recommend sleep study due to possible TAISHA  DM -F/u with endocrinology

## 2023-08-14 NOTE — HISTORY OF PRESENT ILLNESS
[FreeTextEntry1] : Mr. Lorenzo Pickens is a 42 y/o m with a pmhx of NICM, HFrEF (LVEF 17%, LVIDd 5.6cm) without ICD, HTN, and DM2 (A1C 13.9%) who is presenting today for a hospital follow up visit.  Most recently, patient was hospitalized on 7/28-8/2/23 c/o chest pain on exertion and orthopnea. Patient was given IV diuretics with improvement of his symptoms. ACS ruled out with 2 negative troponin. Repeat echo showed EF of 17%, LVIDd 5.6cm. Patient was discharged at a weight of 255.5lbs.

## 2023-08-14 NOTE — CARDIOLOGY SUMMARY
[de-identified] : 7/31/23 TTE: LVEF 17%, LVIDd 5.6cm, mod (grade II) LV diastolic dysfunction, mild RVE with reduced RVSF, TAPSE 1.5cm, trace MR, mild TR  12/5/19 TTE: LVEF 30%, LIVDd 6.5cm, normal RV size with reduced RVSF, mild MR, mild TR, RVSP 29mmHg [de-identified] : 12/11/19 RHC/LHC: LM normal, midLAD with mild atherosclerosis with no flow limiting lesions, CX normal, RCA normal, RAP 20/18/12, CO/CI 4.44/1.72

## 2023-08-16 NOTE — PROGRESS NOTE ADULT - PROBLEM SELECTOR PLAN 4
-Per surscripts patient was prescribed metformin 1000 mg and Jardiance 10 mg. Patient unable to recall name of medications and dosages.  -Hemoglobin A1c of 11.3 and serum glucose of 278.  -endo input appreciated, start basal bolus regimen. Pt does not want to be discharged on insulin. Also does not want change in his home regimen. Plan is to dc on metformin and jardiance  -Consistent carbohydrate diet ordered.  -Monitor fingersticks.
-Per surscripts patient was prescribed metformin 1000 mg and Jardiance 10 mg. Patient unable to recall name of medications and dosages.  -Hemoglobin A1c of 11.3 and serum glucose of 278.  -endo input appreciated, start basal bolus regimen  -Consistent carbohydrate diet ordered.  -Monitor fingersticks.
-Per surscripts patient was prescribed metformin 1000 mg and Jardiance 10 mg. Patient unable to recall name of medications and dosages.  -Hemoglobin A1c of 11.3 and serum glucose of 278.  -endo input appreciated, start basal bolus regimen. Pt does not want to be discharged on insulin. Also does not want change in his home regimen. Plan is to dc on metformin and jardiance  -Consistent carbohydrate diet ordered.  -Monitor fingersticks.
I will START or STAY ON the medications listed below when I get home from the hospital:  None
[phone]
I will START or STAY ON the medications listed below when I get home from the hospital:    ibuprofen 600 mg oral tablet  -- 1 tab(s) by mouth every 6 hours, As Needed  -- Indication: For pain    acetaminophen 325 mg oral tablet  -- 3 tab(s) by mouth every 6 hours, As Needed  -- Indication: For pain    Prenatal Multivitamins with Folic Acid 1 mg oral tablet  -- 1 tab(s) by mouth once a day  -- Indication: For Vitamin

## 2023-09-15 NOTE — ED ADULT TRIAGE NOTE - BP NONINVASIVE DIASTOLIC (MM HG)
What Type Of Note Output Would You Prefer (Optional)?: Standard Output How Severe Is Your Rash?: mild Is This A New Presentation, Or A Follow-Up?: Rash 106

## 2023-11-03 ENCOUNTER — EMERGENCY (EMERGENCY)
Facility: HOSPITAL | Age: 43
LOS: 1 days | Discharge: HOME | End: 2023-11-03
Attending: EMERGENCY MEDICINE
Payer: MEDICAID

## 2023-11-03 VITALS — HEART RATE: 103 BPM | SYSTOLIC BLOOD PRESSURE: 152 MMHG | DIASTOLIC BLOOD PRESSURE: 102 MMHG

## 2023-11-03 VITALS
DIASTOLIC BLOOD PRESSURE: 108 MMHG | RESPIRATION RATE: 22 BRPM | SYSTOLIC BLOOD PRESSURE: 147 MMHG | HEIGHT: 71 IN | TEMPERATURE: 98 F | WEIGHT: 240.08 LBS | OXYGEN SATURATION: 96 % | HEART RATE: 114 BPM

## 2023-11-03 LAB
A1C WITH ESTIMATED AVERAGE GLUCOSE RESULT: 8.1 % — HIGH (ref 4–5.6)
A1C WITH ESTIMATED AVERAGE GLUCOSE RESULT: 8.1 % — HIGH (ref 4–5.6)
ALBUMIN SERPL ELPH-MCNC: 4.3 G/DL — SIGNIFICANT CHANGE UP (ref 3.3–5)
ALBUMIN SERPL ELPH-MCNC: 4.3 G/DL — SIGNIFICANT CHANGE UP (ref 3.3–5)
ALP SERPL-CCNC: 61 U/L — SIGNIFICANT CHANGE UP (ref 40–120)
ALP SERPL-CCNC: 61 U/L — SIGNIFICANT CHANGE UP (ref 40–120)
ALT FLD-CCNC: 14 U/L — SIGNIFICANT CHANGE UP (ref 10–45)
ALT FLD-CCNC: 14 U/L — SIGNIFICANT CHANGE UP (ref 10–45)
ANION GAP SERPL CALC-SCNC: 18 MMOL/L — HIGH (ref 5–17)
ANION GAP SERPL CALC-SCNC: 18 MMOL/L — HIGH (ref 5–17)
AST SERPL-CCNC: 18 U/L — SIGNIFICANT CHANGE UP (ref 10–40)
AST SERPL-CCNC: 18 U/L — SIGNIFICANT CHANGE UP (ref 10–40)
BASE EXCESS BLDV CALC-SCNC: 2.5 MMOL/L — SIGNIFICANT CHANGE UP (ref -2–3)
BASE EXCESS BLDV CALC-SCNC: 2.5 MMOL/L — SIGNIFICANT CHANGE UP (ref -2–3)
BASOPHILS # BLD AUTO: 0.04 K/UL — SIGNIFICANT CHANGE UP (ref 0–0.2)
BASOPHILS # BLD AUTO: 0.04 K/UL — SIGNIFICANT CHANGE UP (ref 0–0.2)
BASOPHILS NFR BLD AUTO: 0.4 % — SIGNIFICANT CHANGE UP (ref 0–2)
BASOPHILS NFR BLD AUTO: 0.4 % — SIGNIFICANT CHANGE UP (ref 0–2)
BILIRUB SERPL-MCNC: 0.8 MG/DL — SIGNIFICANT CHANGE UP (ref 0.2–1.2)
BILIRUB SERPL-MCNC: 0.8 MG/DL — SIGNIFICANT CHANGE UP (ref 0.2–1.2)
BUN SERPL-MCNC: 22 MG/DL — SIGNIFICANT CHANGE UP (ref 7–23)
BUN SERPL-MCNC: 22 MG/DL — SIGNIFICANT CHANGE UP (ref 7–23)
CA-I SERPL-SCNC: 1.22 MMOL/L — SIGNIFICANT CHANGE UP (ref 1.15–1.33)
CA-I SERPL-SCNC: 1.22 MMOL/L — SIGNIFICANT CHANGE UP (ref 1.15–1.33)
CALCIUM SERPL-MCNC: 9.3 MG/DL — SIGNIFICANT CHANGE UP (ref 8.4–10.5)
CALCIUM SERPL-MCNC: 9.3 MG/DL — SIGNIFICANT CHANGE UP (ref 8.4–10.5)
CHLORIDE BLDV-SCNC: 98 MMOL/L — SIGNIFICANT CHANGE UP (ref 96–108)
CHLORIDE BLDV-SCNC: 98 MMOL/L — SIGNIFICANT CHANGE UP (ref 96–108)
CHLORIDE SERPL-SCNC: 97 MMOL/L — SIGNIFICANT CHANGE UP (ref 96–108)
CHLORIDE SERPL-SCNC: 97 MMOL/L — SIGNIFICANT CHANGE UP (ref 96–108)
CO2 BLDV-SCNC: 32 MMOL/L — HIGH (ref 22–26)
CO2 BLDV-SCNC: 32 MMOL/L — HIGH (ref 22–26)
CO2 SERPL-SCNC: 21 MMOL/L — LOW (ref 22–31)
CO2 SERPL-SCNC: 21 MMOL/L — LOW (ref 22–31)
CREAT SERPL-MCNC: 1.19 MG/DL — SIGNIFICANT CHANGE UP (ref 0.5–1.3)
CREAT SERPL-MCNC: 1.19 MG/DL — SIGNIFICANT CHANGE UP (ref 0.5–1.3)
EGFR: 77 ML/MIN/1.73M2 — SIGNIFICANT CHANGE UP
EGFR: 77 ML/MIN/1.73M2 — SIGNIFICANT CHANGE UP
EOSINOPHIL # BLD AUTO: 0.1 K/UL — SIGNIFICANT CHANGE UP (ref 0–0.5)
EOSINOPHIL # BLD AUTO: 0.1 K/UL — SIGNIFICANT CHANGE UP (ref 0–0.5)
EOSINOPHIL NFR BLD AUTO: 0.9 % — SIGNIFICANT CHANGE UP (ref 0–6)
EOSINOPHIL NFR BLD AUTO: 0.9 % — SIGNIFICANT CHANGE UP (ref 0–6)
ESTIMATED AVERAGE GLUCOSE: 186 MG/DL — HIGH (ref 68–114)
ESTIMATED AVERAGE GLUCOSE: 186 MG/DL — HIGH (ref 68–114)
GAS PNL BLDV: 131 MMOL/L — LOW (ref 136–145)
GAS PNL BLDV: 131 MMOL/L — LOW (ref 136–145)
GAS PNL BLDV: SIGNIFICANT CHANGE UP
GAS PNL BLDV: SIGNIFICANT CHANGE UP
GLUCOSE BLDV-MCNC: 142 MG/DL — HIGH (ref 70–99)
GLUCOSE BLDV-MCNC: 142 MG/DL — HIGH (ref 70–99)
GLUCOSE SERPL-MCNC: 132 MG/DL — HIGH (ref 70–99)
GLUCOSE SERPL-MCNC: 132 MG/DL — HIGH (ref 70–99)
HCO3 BLDV-SCNC: 30 MMOL/L — HIGH (ref 22–29)
HCO3 BLDV-SCNC: 30 MMOL/L — HIGH (ref 22–29)
HCT VFR BLD CALC: 52.3 % — HIGH (ref 39–50)
HCT VFR BLD CALC: 52.3 % — HIGH (ref 39–50)
HCT VFR BLDA CALC: 53 % — HIGH (ref 39–51)
HCT VFR BLDA CALC: 53 % — HIGH (ref 39–51)
HGB BLD CALC-MCNC: 17.7 G/DL — HIGH (ref 12.6–17.4)
HGB BLD CALC-MCNC: 17.7 G/DL — HIGH (ref 12.6–17.4)
HGB BLD-MCNC: 17.5 G/DL — HIGH (ref 13–17)
HGB BLD-MCNC: 17.5 G/DL — HIGH (ref 13–17)
IMM GRANULOCYTES NFR BLD AUTO: 0.7 % — SIGNIFICANT CHANGE UP (ref 0–0.9)
IMM GRANULOCYTES NFR BLD AUTO: 0.7 % — SIGNIFICANT CHANGE UP (ref 0–0.9)
LACTATE BLDV-MCNC: 2 MMOL/L — SIGNIFICANT CHANGE UP (ref 0.5–2)
LACTATE BLDV-MCNC: 2 MMOL/L — SIGNIFICANT CHANGE UP (ref 0.5–2)
LIDOCAIN IGE QN: 23 U/L — SIGNIFICANT CHANGE UP (ref 7–60)
LIDOCAIN IGE QN: 23 U/L — SIGNIFICANT CHANGE UP (ref 7–60)
LYMPHOCYTES # BLD AUTO: 2.7 K/UL — SIGNIFICANT CHANGE UP (ref 1–3.3)
LYMPHOCYTES # BLD AUTO: 2.7 K/UL — SIGNIFICANT CHANGE UP (ref 1–3.3)
LYMPHOCYTES # BLD AUTO: 25.4 % — SIGNIFICANT CHANGE UP (ref 13–44)
LYMPHOCYTES # BLD AUTO: 25.4 % — SIGNIFICANT CHANGE UP (ref 13–44)
MCHC RBC-ENTMCNC: 29.7 PG — SIGNIFICANT CHANGE UP (ref 27–34)
MCHC RBC-ENTMCNC: 29.7 PG — SIGNIFICANT CHANGE UP (ref 27–34)
MCHC RBC-ENTMCNC: 33.5 GM/DL — SIGNIFICANT CHANGE UP (ref 32–36)
MCHC RBC-ENTMCNC: 33.5 GM/DL — SIGNIFICANT CHANGE UP (ref 32–36)
MCV RBC AUTO: 88.8 FL — SIGNIFICANT CHANGE UP (ref 80–100)
MCV RBC AUTO: 88.8 FL — SIGNIFICANT CHANGE UP (ref 80–100)
MONOCYTES # BLD AUTO: 0.79 K/UL — SIGNIFICANT CHANGE UP (ref 0–0.9)
MONOCYTES # BLD AUTO: 0.79 K/UL — SIGNIFICANT CHANGE UP (ref 0–0.9)
MONOCYTES NFR BLD AUTO: 7.4 % — SIGNIFICANT CHANGE UP (ref 2–14)
MONOCYTES NFR BLD AUTO: 7.4 % — SIGNIFICANT CHANGE UP (ref 2–14)
NEUTROPHILS # BLD AUTO: 6.94 K/UL — SIGNIFICANT CHANGE UP (ref 1.8–7.4)
NEUTROPHILS # BLD AUTO: 6.94 K/UL — SIGNIFICANT CHANGE UP (ref 1.8–7.4)
NEUTROPHILS NFR BLD AUTO: 65.2 % — SIGNIFICANT CHANGE UP (ref 43–77)
NEUTROPHILS NFR BLD AUTO: 65.2 % — SIGNIFICANT CHANGE UP (ref 43–77)
NRBC # BLD: 0 /100 WBCS — SIGNIFICANT CHANGE UP (ref 0–0)
NRBC # BLD: 0 /100 WBCS — SIGNIFICANT CHANGE UP (ref 0–0)
NT-PROBNP SERPL-SCNC: 121 PG/ML — SIGNIFICANT CHANGE UP (ref 0–300)
NT-PROBNP SERPL-SCNC: 121 PG/ML — SIGNIFICANT CHANGE UP (ref 0–300)
PCO2 BLDV: 54 MMHG — SIGNIFICANT CHANGE UP (ref 42–55)
PCO2 BLDV: 54 MMHG — SIGNIFICANT CHANGE UP (ref 42–55)
PH BLDV: 7.35 — SIGNIFICANT CHANGE UP (ref 7.32–7.43)
PH BLDV: 7.35 — SIGNIFICANT CHANGE UP (ref 7.32–7.43)
PLATELET # BLD AUTO: 227 K/UL — SIGNIFICANT CHANGE UP (ref 150–400)
PLATELET # BLD AUTO: 227 K/UL — SIGNIFICANT CHANGE UP (ref 150–400)
PO2 BLDV: 26 MMHG — SIGNIFICANT CHANGE UP (ref 25–45)
PO2 BLDV: 26 MMHG — SIGNIFICANT CHANGE UP (ref 25–45)
POTASSIUM BLDV-SCNC: 4.3 MMOL/L — SIGNIFICANT CHANGE UP (ref 3.5–5.1)
POTASSIUM BLDV-SCNC: 4.3 MMOL/L — SIGNIFICANT CHANGE UP (ref 3.5–5.1)
POTASSIUM SERPL-MCNC: 4.4 MMOL/L — SIGNIFICANT CHANGE UP (ref 3.5–5.3)
POTASSIUM SERPL-MCNC: 4.4 MMOL/L — SIGNIFICANT CHANGE UP (ref 3.5–5.3)
POTASSIUM SERPL-SCNC: 4.4 MMOL/L — SIGNIFICANT CHANGE UP (ref 3.5–5.3)
POTASSIUM SERPL-SCNC: 4.4 MMOL/L — SIGNIFICANT CHANGE UP (ref 3.5–5.3)
PROT SERPL-MCNC: 7.4 G/DL — SIGNIFICANT CHANGE UP (ref 6–8.3)
PROT SERPL-MCNC: 7.4 G/DL — SIGNIFICANT CHANGE UP (ref 6–8.3)
RBC # BLD: 5.89 M/UL — HIGH (ref 4.2–5.8)
RBC # BLD: 5.89 M/UL — HIGH (ref 4.2–5.8)
RBC # FLD: 13.7 % — SIGNIFICANT CHANGE UP (ref 10.3–14.5)
RBC # FLD: 13.7 % — SIGNIFICANT CHANGE UP (ref 10.3–14.5)
SAO2 % BLDV: 36.8 % — LOW (ref 67–88)
SAO2 % BLDV: 36.8 % — LOW (ref 67–88)
SODIUM SERPL-SCNC: 136 MMOL/L — SIGNIFICANT CHANGE UP (ref 135–145)
SODIUM SERPL-SCNC: 136 MMOL/L — SIGNIFICANT CHANGE UP (ref 135–145)
TROPONIN T, HIGH SENSITIVITY RESULT: 41 NG/L — SIGNIFICANT CHANGE UP (ref 0–51)
TROPONIN T, HIGH SENSITIVITY RESULT: 41 NG/L — SIGNIFICANT CHANGE UP (ref 0–51)
TROPONIN T, HIGH SENSITIVITY RESULT: 45 NG/L — SIGNIFICANT CHANGE UP (ref 0–51)
TROPONIN T, HIGH SENSITIVITY RESULT: 45 NG/L — SIGNIFICANT CHANGE UP (ref 0–51)
WBC # BLD: 10.64 K/UL — HIGH (ref 3.8–10.5)
WBC # BLD: 10.64 K/UL — HIGH (ref 3.8–10.5)
WBC # FLD AUTO: 10.64 K/UL — HIGH (ref 3.8–10.5)
WBC # FLD AUTO: 10.64 K/UL — HIGH (ref 3.8–10.5)

## 2023-11-03 PROCEDURE — 82330 ASSAY OF CALCIUM: CPT

## 2023-11-03 PROCEDURE — 96365 THER/PROPH/DIAG IV INF INIT: CPT | Mod: XU

## 2023-11-03 PROCEDURE — 96376 TX/PRO/DX INJ SAME DRUG ADON: CPT

## 2023-11-03 PROCEDURE — 71046 X-RAY EXAM CHEST 2 VIEWS: CPT

## 2023-11-03 PROCEDURE — 84484 ASSAY OF TROPONIN QUANT: CPT

## 2023-11-03 PROCEDURE — 83605 ASSAY OF LACTIC ACID: CPT

## 2023-11-03 PROCEDURE — 71046 X-RAY EXAM CHEST 2 VIEWS: CPT | Mod: 26

## 2023-11-03 PROCEDURE — 84132 ASSAY OF SERUM POTASSIUM: CPT

## 2023-11-03 PROCEDURE — 85018 HEMOGLOBIN: CPT

## 2023-11-03 PROCEDURE — 96375 TX/PRO/DX INJ NEW DRUG ADDON: CPT

## 2023-11-03 PROCEDURE — 82947 ASSAY GLUCOSE BLOOD QUANT: CPT

## 2023-11-03 PROCEDURE — 85025 COMPLETE CBC W/AUTO DIFF WBC: CPT

## 2023-11-03 PROCEDURE — 84295 ASSAY OF SERUM SODIUM: CPT

## 2023-11-03 PROCEDURE — 83036 HEMOGLOBIN GLYCOSYLATED A1C: CPT

## 2023-11-03 PROCEDURE — 80053 COMPREHEN METABOLIC PANEL: CPT

## 2023-11-03 PROCEDURE — 99285 EMERGENCY DEPT VISIT HI MDM: CPT | Mod: 25

## 2023-11-03 PROCEDURE — 74177 CT ABD & PELVIS W/CONTRAST: CPT | Mod: 26,MA

## 2023-11-03 PROCEDURE — 82435 ASSAY OF BLOOD CHLORIDE: CPT

## 2023-11-03 PROCEDURE — 93005 ELECTROCARDIOGRAM TRACING: CPT

## 2023-11-03 PROCEDURE — 82803 BLOOD GASES ANY COMBINATION: CPT

## 2023-11-03 PROCEDURE — 83880 ASSAY OF NATRIURETIC PEPTIDE: CPT

## 2023-11-03 PROCEDURE — 74177 CT ABD & PELVIS W/CONTRAST: CPT | Mod: MA

## 2023-11-03 PROCEDURE — 85014 HEMATOCRIT: CPT

## 2023-11-03 PROCEDURE — 83690 ASSAY OF LIPASE: CPT

## 2023-11-03 PROCEDURE — 99285 EMERGENCY DEPT VISIT HI MDM: CPT

## 2023-11-03 PROCEDURE — 96361 HYDRATE IV INFUSION ADD-ON: CPT

## 2023-11-03 PROCEDURE — 36415 COLL VENOUS BLD VENIPUNCTURE: CPT

## 2023-11-03 RX ORDER — SODIUM CHLORIDE 9 MG/ML
500 INJECTION INTRAMUSCULAR; INTRAVENOUS; SUBCUTANEOUS ONCE
Refills: 0 | Status: COMPLETED | OUTPATIENT
Start: 2023-11-03 | End: 2023-11-03

## 2023-11-03 RX ORDER — KETOROLAC TROMETHAMINE 30 MG/ML
15 SYRINGE (ML) INJECTION ONCE
Refills: 0 | Status: DISCONTINUED | OUTPATIENT
Start: 2023-11-03 | End: 2023-11-03

## 2023-11-03 RX ORDER — ONDANSETRON 8 MG/1
4 TABLET, FILM COATED ORAL ONCE
Refills: 0 | Status: COMPLETED | OUTPATIENT
Start: 2023-11-03 | End: 2023-11-03

## 2023-11-03 RX ORDER — FAMOTIDINE 10 MG/ML
20 INJECTION INTRAVENOUS ONCE
Refills: 0 | Status: COMPLETED | OUTPATIENT
Start: 2023-11-03 | End: 2023-11-03

## 2023-11-03 RX ORDER — ACETAMINOPHEN 500 MG
1000 TABLET ORAL ONCE
Refills: 0 | Status: COMPLETED | OUTPATIENT
Start: 2023-11-03 | End: 2023-11-03

## 2023-11-03 RX ORDER — PANTOPRAZOLE SODIUM 20 MG/1
1 TABLET, DELAYED RELEASE ORAL
Qty: 7 | Refills: 0
Start: 2023-11-03 | End: 2023-11-09

## 2023-11-03 RX ADMIN — ONDANSETRON 4 MILLIGRAM(S): 8 TABLET, FILM COATED ORAL at 10:54

## 2023-11-03 RX ADMIN — Medication 400 MILLIGRAM(S): at 10:54

## 2023-11-03 RX ADMIN — Medication 30 MILLILITER(S): at 16:47

## 2023-11-03 RX ADMIN — Medication 15 MILLIGRAM(S): at 15:53

## 2023-11-03 RX ADMIN — SODIUM CHLORIDE 500 MILLILITER(S): 9 INJECTION INTRAMUSCULAR; INTRAVENOUS; SUBCUTANEOUS at 12:00

## 2023-11-03 RX ADMIN — FAMOTIDINE 20 MILLIGRAM(S): 10 INJECTION INTRAVENOUS at 10:54

## 2023-11-03 RX ADMIN — Medication 1000 MILLIGRAM(S): at 11:16

## 2023-11-03 RX ADMIN — SODIUM CHLORIDE 500 MILLILITER(S): 9 INJECTION INTRAMUSCULAR; INTRAVENOUS; SUBCUTANEOUS at 10:54

## 2023-11-03 RX ADMIN — FAMOTIDINE 20 MILLIGRAM(S): 10 INJECTION INTRAVENOUS at 15:53

## 2023-11-03 RX ADMIN — Medication 15 MILLIGRAM(S): at 16:51

## 2023-11-03 NOTE — ED PROVIDER NOTE - PATIENT PORTAL LINK FT
You can access the FollowMyHealth Patient Portal offered by [facility] by registering at the following website: [email or web address]. By joining [employer]’s FollowMyHealth portal, you will also be able to view your health information using other applications (apps) compatible with our system.

## 2023-11-03 NOTE — ED PROVIDER NOTE - OBJECTIVE STATEMENT
43 M with PMH HTN, HLD, DM 2, CHF HFrEF (last TTE August EF 20%, no ICD) presenting to the ED from home with abdominal pain and chest pain for the last 7 days. Patient recently admitted for CHF exacerbation and diabetes medication noncompliance in August 2023.  Patient states that about 2 weeks ago he started Metamucil and ever since then his bowel movements have been harder and slower. Over the last 5 days he has had no bowel movement at all. Patient has been able to pass some gas but increasingly less over the last 2 days.  Patient has been unable to tolerate p.o. for the last 2 days, because when he eats he feels like he needs to throw it back up.  Patient has had no episodes of vomiting. Patient also endorsing increased exertional SOB and exertional chest pain.  Chest pain feels like a 'plunger to his chest'. Patient denies chest pain radiating to the extremities.  Pt denies any weight gain or increased swelling/edema. Patient denies severe headache, near syncope, syncope.  Patient states that he has been having hot flashes but no measured fevers at home, no chills.  No recent travel, no cough, congestion, sore throat.  Patient denies any difficulty with urination, reduced urination, or dysuria.

## 2023-11-03 NOTE — ED PROVIDER NOTE - ATTENDING CONTRIBUTION TO CARE
43 M with PMH HTN, HLD, DM 2, CHF HFrEF (last TTE August EF 20%, no ICD) presenting to the ED from home with abdominal pain and chest pain for the last 7 days Epigastric pain radiating up to the back of his throat with a bad taste in his mouth nausea with decreased p.o. intake is also been constipated has not had a bowel movement for 5 days slightly distended.  No intra-abdominal surgeries CT abdomen pelvis ordered but otherwise soft abdomen.  Cardiac work-up was initiated and not in overt heart failure but has a low EF chest x-ray is clear cocktail reassess.

## 2023-11-03 NOTE — ED PROVIDER NOTE - PHYSICAL EXAMINATION
Gen: NAD, AAOx3, non-toxic appearing  HEENT: NCAT, normal conjunctiva, oral mucosa moist  Lung: sitting up in bed speaking in full sentences, reduced chest wall expansion possibly due to abdominal discomfort, lungs CTA b/l, no crackles, rhonchi or wheezes  CV: regular rate and rhythm. cap refill <2x. peripheral pulses 2+bilaterally.  Skin: warm, intact, no pedal edema  Abd: obese. distractible guarding. no rebound. diffuse ttp, especially over lower quadrants  MSK: no visible deformities  Neuro: No focal deficits  Psych: normal affect

## 2023-11-03 NOTE — ED PROVIDER NOTE - PROGRESS NOTE DETAILS
reassessed and pt stated he felt much better after the GI cocktail but now is having midsternal pain and abdominal pain again, will give more medication for relief. spoke about negative CT scan and possibility of constipation due to recent medication changes as well as GERD. Pt on board with plan to PO challenge after maalox/pepcid/ketorolac and go home with outpatient PPI and expedited GI follow up

## 2023-11-03 NOTE — ED ADULT NURSE NOTE - OBJECTIVE STATEMENT
Male 43 years old with past medical history of HTN, DM and Sleep Apnea, came in for chest pain. Pt states 2 weeks ago he took Metamucil for constipation which help him for few days. States for the past 5 days he didn't move his bowel. Reports abdominal pain associated with nausea and also stabbing like chest pain associated with shortness of breathe when going upstairs. States he has no appetite to eat for 5 days, denies fever, cough chills, or urinary symptoms. Safety and comfort maintained. Call bell within easy reach. Will continue to monitor.

## 2023-11-03 NOTE — ED PROVIDER NOTE - NSFOLLOWUPINSTRUCTIONS_ED_ALL_ED_FT
1. You presented to the emergency department for: abdominal pain. This is likely a combination of constipation and GERD (gastroesophageal reflux disease), which is a buildup of acid in the stomach, which can cause GI upset, chest pain.     2. Your evaluation in the emergency department included a physician evaluation. Your work-up did not reveal any findings indicating the need for admission to the hospital or any emergent interventions at this time.     3. It is recommended that you follow-up with GI (gastroenterology) as arranged by the discharge center for a repeat evaluation, and potentially further testing and treatment.     If needed, to arrange an appointment with a primary care provider please call: [phone]    4. Please continue taking your regular medications as prescribed.     For pain you may take 400-600 mg IBUPROFEN or 500-1000mg ACETAMINOPHEN every 6-8 hours - as needed.  This is an over-the-counter medication - please read the instructions for use and warnings on the label. If you have any questions regarding its use, you may refer them to your local pharmacist.    5. PLEASE RETURN TO THE EMERGENCY DEPARTMENT IMMEDIATELY IF you develop any fevers not responding to over the counter medications, uncontrollable nausea and vomiting, an inability to tolerate eating and drinking, difficulty breathing, chest pain, a severe increase in your symptoms or pain, or any other new symptoms that concern you.

## 2023-11-03 NOTE — ED ADULT NURSE NOTE - CHPI ED NUR SYMPTOMS POS
Was the patient seen in the last year in this department? Yes    Does patient have an active prescription for medications requested? No     Received Request Via: Pharmacy     abdominal pain, constipation/CHEST PAIN/NAUSEA/SHORTNESS OF BREATH

## 2023-11-03 NOTE — ED ADULT NURSE NOTE - NSFALLUNIVINTERV_ED_ALL_ED
Bed/Stretcher in lowest position, wheels locked, appropriate side rails in place/Call bell, personal items and telephone in reach/Instruct patient to call for assistance before getting out of bed/chair/stretcher/Non-slip footwear applied when patient is off stretcher/[place] to call system/Physically safe environment - no spills, clutter or unnecessary equipment/Purposeful proactive rounding/Room/bathroom lighting operational, light cord in reach

## 2023-11-03 NOTE — ED PROVIDER NOTE - SPECIALTY CARE
Pt is 55 yo M with PMH of HTN, HLD, DM, CABG, s/p stroke 6 days ago at [facility] in [place] at which time he left AMA due to having to deal with rent issues, presents to ED due to worsening weakness. Pt states that at prior hospital admission, he had presented with left sided upper and lower extremity numbness. Pt denies cp, sob, abd pain, fever, chills. Pt admits to nausea and vomiting. Per patient's brother, at baseline patient able to ambulate but noted that patient has been unable to walk unassisted since coming home yesterday night and speech is slurred.
Gastroenterology

## 2023-11-03 NOTE — ED PROVIDER NOTE - CLINICAL SUMMARY MEDICAL DECISION MAKING FREE TEXT BOX
43 Y DM2 with chronic HLD, CAD, CHF presents with chest pain and abdominal pain, he is most concerned with his pain and inability to stool.  Very likely constipation, but will rule out SBO. Will also assess cardiac function with CBC, trop, BNP,

## 2023-11-14 NOTE — ED PROVIDER NOTE - PHYSICAL EXAMINATION
The patient's goals for the shift include      The clinical goals for the shift include stable vitals, safety, no pain    .    Problem: Pain - Adult  Goal: Verbalizes/displays adequate comfort level or baseline comfort level  Outcome: Progressing     Problem: Safety - Adult  Goal: Free from fall injury  Outcome: Progressing     Problem: Chronic Conditions and Co-morbidities  Goal: Patient's chronic conditions and co-morbidity symptoms are monitored and maintained or improved  Outcome: Progressing     Problem: Skin  Goal: Participates in plan/prevention/treatment measures  Outcome: Progressing  Goal: Prevent/manage excess moisture  Outcome: Progressing  Goal: Prevent/minimize sheer/friction injuries  Outcome: Progressing  Goal: Promote/optimize nutrition  Outcome: Progressing  Goal: Promote skin healing  Outcome: Progressing     Problem: Fall/Injury  Goal: Not fall by end of shift  Outcome: Progressing  Goal: Be free from injury by end of the shift  Outcome: Progressing  Goal: Verbalize understanding of personal risk factors for fall in the hospital  Outcome: Progressing  Goal: Verbalize understanding of risk factor reduction measures to prevent injury from fall in the home  Outcome: Progressing  Goal: Use assistive devices by end of the shift  Outcome: Progressing  Goal: Pace activities to prevent fatigue by end of the shift  Outcome: Progressing      Gen: AAOx3, non-toxic  Head: NCAT  HEENT: EOMI, oral mucosa moist, normal conjunctiva  Lung: CTAB, no respiratory distress, no wheezes/rhonchi/rales B/L, speaking in full sentences  CV: RRR, no murmurs, rubs or gallops. No leg edema.   Abd: soft, NTND, no guarding, no CVA tenderness  MSK: no visible deformities  Neuro: No focal sensory or motor deficits, normal CN exam   Skin: Warm, well perfused, no rash  Psych: normal affect.

## 2024-04-08 ENCOUNTER — INPATIENT (INPATIENT)
Facility: HOSPITAL | Age: 44
LOS: 0 days | Discharge: LEFT BEFORE DISCHARGE | DRG: 291 | End: 2024-04-09
Attending: INTERNAL MEDICINE | Admitting: HOSPITALIST
Payer: MEDICAID

## 2024-04-08 VITALS
OXYGEN SATURATION: 96 % | HEIGHT: 71 IN | TEMPERATURE: 98 F | RESPIRATION RATE: 20 BRPM | SYSTOLIC BLOOD PRESSURE: 133 MMHG | WEIGHT: 229.94 LBS | DIASTOLIC BLOOD PRESSURE: 94 MMHG | HEART RATE: 109 BPM

## 2024-04-08 LAB
APTT BLD: 29.6 SEC — SIGNIFICANT CHANGE UP (ref 24.5–35.6)
BASE EXCESS BLDV CALC-SCNC: 1.9 MMOL/L — SIGNIFICANT CHANGE UP (ref -2–3)
BASOPHILS # BLD AUTO: 0.03 K/UL — SIGNIFICANT CHANGE UP (ref 0–0.2)
BASOPHILS NFR BLD AUTO: 0.3 % — SIGNIFICANT CHANGE UP (ref 0–2)
CA-I SERPL-SCNC: 1.15 MMOL/L — SIGNIFICANT CHANGE UP (ref 1.15–1.33)
CHLORIDE BLDV-SCNC: 101 MMOL/L — SIGNIFICANT CHANGE UP (ref 96–108)
CO2 BLDV-SCNC: 29 MMOL/L — HIGH (ref 22–26)
EOSINOPHIL # BLD AUTO: 0.12 K/UL — SIGNIFICANT CHANGE UP (ref 0–0.5)
EOSINOPHIL NFR BLD AUTO: 1.2 % — SIGNIFICANT CHANGE UP (ref 0–6)
GAS PNL BLDV: 131 MMOL/L — LOW (ref 136–145)
GAS PNL BLDV: SIGNIFICANT CHANGE UP
GAS PNL BLDV: SIGNIFICANT CHANGE UP
GLUCOSE BLDV-MCNC: 326 MG/DL — HIGH (ref 70–99)
HCO3 BLDV-SCNC: 28 MMOL/L — SIGNIFICANT CHANGE UP (ref 22–29)
HCT VFR BLD CALC: 43.6 % — SIGNIFICANT CHANGE UP (ref 39–50)
HCT VFR BLDA CALC: 44 % — SIGNIFICANT CHANGE UP (ref 39–51)
HGB BLD CALC-MCNC: 14.7 G/DL — SIGNIFICANT CHANGE UP (ref 12.6–17.4)
HGB BLD-MCNC: 14.4 G/DL — SIGNIFICANT CHANGE UP (ref 13–17)
IMM GRANULOCYTES NFR BLD AUTO: 0.3 % — SIGNIFICANT CHANGE UP (ref 0–0.9)
INR BLD: 1.16 RATIO — SIGNIFICANT CHANGE UP (ref 0.85–1.18)
LACTATE BLDV-MCNC: 2.9 MMOL/L — HIGH (ref 0.5–2)
LYMPHOCYTES # BLD AUTO: 1.85 K/UL — SIGNIFICANT CHANGE UP (ref 1–3.3)
LYMPHOCYTES # BLD AUTO: 17.8 % — SIGNIFICANT CHANGE UP (ref 13–44)
MCHC RBC-ENTMCNC: 29.3 PG — SIGNIFICANT CHANGE UP (ref 27–34)
MCHC RBC-ENTMCNC: 33 GM/DL — SIGNIFICANT CHANGE UP (ref 32–36)
MCV RBC AUTO: 88.8 FL — SIGNIFICANT CHANGE UP (ref 80–100)
MONOCYTES # BLD AUTO: 0.73 K/UL — SIGNIFICANT CHANGE UP (ref 0–0.9)
MONOCYTES NFR BLD AUTO: 7 % — SIGNIFICANT CHANGE UP (ref 2–14)
NEUTROPHILS # BLD AUTO: 7.66 K/UL — HIGH (ref 1.8–7.4)
NEUTROPHILS NFR BLD AUTO: 73.4 % — SIGNIFICANT CHANGE UP (ref 43–77)
NRBC # BLD: 0 /100 WBCS — SIGNIFICANT CHANGE UP (ref 0–0)
PCO2 BLDV: 47 MMHG — SIGNIFICANT CHANGE UP (ref 42–55)
PH BLDV: 7.38 — SIGNIFICANT CHANGE UP (ref 7.32–7.43)
PLATELET # BLD AUTO: 210 K/UL — SIGNIFICANT CHANGE UP (ref 150–400)
PO2 BLDV: 37 MMHG — SIGNIFICANT CHANGE UP (ref 25–45)
POTASSIUM BLDV-SCNC: 5 MMOL/L — SIGNIFICANT CHANGE UP (ref 3.5–5.1)
PROTHROM AB SERPL-ACNC: 12.1 SEC — SIGNIFICANT CHANGE UP (ref 9.5–13)
RBC # BLD: 4.91 M/UL — SIGNIFICANT CHANGE UP (ref 4.2–5.8)
RBC # FLD: 14.2 % — SIGNIFICANT CHANGE UP (ref 10.3–14.5)
SAO2 % BLDV: 61.9 % — LOW (ref 67–88)
WBC # BLD: 10.42 K/UL — SIGNIFICANT CHANGE UP (ref 3.8–10.5)
WBC # FLD AUTO: 10.42 K/UL — SIGNIFICANT CHANGE UP (ref 3.8–10.5)

## 2024-04-08 PROCEDURE — 71045 X-RAY EXAM CHEST 1 VIEW: CPT | Mod: 26

## 2024-04-08 PROCEDURE — 71275 CT ANGIOGRAPHY CHEST: CPT | Mod: 26,MC

## 2024-04-08 PROCEDURE — 99285 EMERGENCY DEPT VISIT HI MDM: CPT

## 2024-04-08 RX ORDER — SODIUM CHLORIDE 9 MG/ML
1000 INJECTION INTRAMUSCULAR; INTRAVENOUS; SUBCUTANEOUS ONCE
Refills: 0 | Status: COMPLETED | OUTPATIENT
Start: 2024-04-08 | End: 2024-04-08

## 2024-04-08 RX ADMIN — SODIUM CHLORIDE 1000 MILLILITER(S): 9 INJECTION INTRAMUSCULAR; INTRAVENOUS; SUBCUTANEOUS at 22:35

## 2024-04-08 NOTE — ED PROVIDER NOTE - OBJECTIVE STATEMENT
43Y M PMH CHF, sarcoidosis, DM2, HTN, HLD presenting with chest pain and shortness of breath x5 days. Patient reports worsening symptoms over the last few days, discomfort is worse with laying flat, not particularly exertional in nature. No recent fevers, cough, n/v/d, leg swelling. No recent travel or preceding illnesses.

## 2024-04-08 NOTE — ED PROVIDER NOTE - PHYSICAL EXAMINATION
GENERAL: Awake, alert, appears uncomfortable but nontoxic  HEAD: NC/AT, neck supple, moist mucous membranes  EYES: PERRL, EOM grossly intact, sclera anicteric  LUNGS: normal WOB on RA, CTAB, no wheezes or crackles   CARDIAC: tachycardic, no m/r/g  ABDOMEN: Soft, non tender, non distended, no rebound, no guarding  BACK: No midline spinal tenderness, no CVA tenderness  EXT: No edema, no calf tenderness, no deformities.  NEURO: A&Ox3. Moving all extremities.  SKIN: Warm and dry. No rash.  PSYCH: Normal affect.

## 2024-04-08 NOTE — ED PROVIDER NOTE - CLINICAL SUMMARY MEDICAL DECISION MAKING FREE TEXT BOX
43Y M hx CHF, sarcoidosis, DM, HTN, HLD presenting with chest pain and SOB x5 days. Appears mildly uncomfortable on exam, no significant increased WOB, sinus tach on monitor. Heart and lungs clear. Plan ACS work up with troponin and BNP, CXR, EKG, and CTA to rule out PE. anticipate admission for further cardiac work up pending results of labs/CTA.

## 2024-04-08 NOTE — ED PROVIDER NOTE - PROGRESS NOTE DETAILS
Carmelina Lehman MD PGY-2: CTA negative for PE, noted trop of 49, 2 hr repeat 62. spoke with cards fellow by phone given patient high risk cardiac hx. will add on CK/CKMB, ESR, CRP to labs. Carmelina Lehman MD PGY-2: patient TBA tele under [facility] DR Funk for ongoing management

## 2024-04-08 NOTE — ED ADULT NURSE NOTE - NSFALLUNIVINTERV_ED_ALL_ED
Bed/Stretcher in lowest position, wheels locked, appropriate side rails in place/Call bell, personal items and telephone in reach/Instruct patient to call for assistance before getting out of bed/chair/stretcher/Non-slip footwear applied when patient is off stretcher/[place] to call system/Physically safe environment - no spills, clutter or unnecessary equipment/Purposeful proactive rounding/Room/bathroom lighting operational, light cord in reach

## 2024-04-08 NOTE — ED ADULT NURSE NOTE - OBJECTIVE STATEMENT
44 y/o male came to the ED with complaints of midsternal chest pains x 4-5 days 42 y/o male came to the ED with complaints of midsternal chest pains x 4-5 days with SOB and mild headache. History of heart failure and sarcoidosis. Denies numbness, tingling, fevers, chills, N, V.

## 2024-04-08 NOTE — ED PROVIDER NOTE - ATTENDING CONTRIBUTION TO CARE
Patient is a 44 yo M with a history of asthma, HTN, hyperlipidemia, type 2 DM, CHF with EF of 22%, sarcoidosis here for evaluation of worsening shortness of breath for 5 days. He reports worsening symptoms with laying flat. He denies any fevers, chills, nausea, vomiting. No recent travel. No calf pain. No history of DVT/ PE.     VS noted  Gen. no acute distress, Non toxic   HEENT: EOMI, mmm,   Lungs: CTAB/L no C/ W /R   CVS: tachycardic  Abd; Soft non tender, non distended   Ext: no edema, no calf tenderness bilaterally  Skin: no rash  Neuro AAOx3 non focal clear speech  a/p: shortness of breath - plan for labs, CTA Chest to evaluate for PE/effusion/ occult pneumonia. Will check labs and reassess.   - Rgean ARIAS Patient is a 42 yo M with a history of asthma, HTN, hyperlipidemia, type 2 DM, CHF with EF of 22%, sarcoidosis here for evaluation of worsening shortness of breath for 5 days. He reports worsening symptoms with laying flat. He denies any fevers, chills, nausea, vomiting. No recent travel. No calf pain. No history of DVT/ PE.     VS noted  Gen. no acute distress, Non toxic   HEENT: EOMI, mmm,   Lungs: CTAB/L no C/ W /R   CVS: tachycardic  Abd; Soft non tender, non distended   Ext: no edema, no calf tenderness bilaterally  Skin: no rash  Neuro AAOx3 non focal clear speech  a/p: shortness of breath - plan for labs, Concern for CHF exacerbation, PE, less likely pneumonia. CTA Chest to evaluate for PE/effusion/ occult pneumonia. Will check labs and reassess.   - Regan ARIAS

## 2024-04-09 ENCOUNTER — RESULT REVIEW (OUTPATIENT)
Age: 44
End: 2024-04-09

## 2024-04-09 VITALS
SYSTOLIC BLOOD PRESSURE: 137 MMHG | RESPIRATION RATE: 18 BRPM | DIASTOLIC BLOOD PRESSURE: 101 MMHG | HEART RATE: 102 BPM | TEMPERATURE: 98 F | OXYGEN SATURATION: 97 %

## 2024-04-09 DIAGNOSIS — D86.9 SARCOIDOSIS, UNSPECIFIED: ICD-10-CM

## 2024-04-09 DIAGNOSIS — I50.23 ACUTE ON CHRONIC SYSTOLIC (CONGESTIVE) HEART FAILURE: ICD-10-CM

## 2024-04-09 DIAGNOSIS — Z29.9 ENCOUNTER FOR PROPHYLACTIC MEASURES, UNSPECIFIED: ICD-10-CM

## 2024-04-09 DIAGNOSIS — E11.9 TYPE 2 DIABETES MELLITUS WITHOUT COMPLICATIONS: ICD-10-CM

## 2024-04-09 DIAGNOSIS — I10 ESSENTIAL (PRIMARY) HYPERTENSION: ICD-10-CM

## 2024-04-09 DIAGNOSIS — R07.9 CHEST PAIN, UNSPECIFIED: ICD-10-CM

## 2024-04-09 LAB
ALBUMIN SERPL ELPH-MCNC: 3.6 G/DL — SIGNIFICANT CHANGE UP (ref 3.3–5)
ALP SERPL-CCNC: 75 U/L — SIGNIFICANT CHANGE UP (ref 40–120)
ALT FLD-CCNC: 19 U/L — SIGNIFICANT CHANGE UP (ref 10–45)
ANION GAP SERPL CALC-SCNC: 13 MMOL/L — SIGNIFICANT CHANGE UP (ref 5–17)
ANION GAP SERPL CALC-SCNC: 14 MMOL/L — SIGNIFICANT CHANGE UP (ref 5–17)
APPEARANCE UR: CLEAR — SIGNIFICANT CHANGE UP
AST SERPL-CCNC: 22 U/L — SIGNIFICANT CHANGE UP (ref 10–40)
BASE EXCESS BLDV CALC-SCNC: 0.1 MMOL/L — SIGNIFICANT CHANGE UP (ref -2–3)
BILIRUB SERPL-MCNC: 0.5 MG/DL — SIGNIFICANT CHANGE UP (ref 0.2–1.2)
BILIRUB UR-MCNC: NEGATIVE — SIGNIFICANT CHANGE UP
BUN SERPL-MCNC: 12 MG/DL — SIGNIFICANT CHANGE UP (ref 7–23)
BUN SERPL-MCNC: 12 MG/DL — SIGNIFICANT CHANGE UP (ref 7–23)
CA-I SERPL-SCNC: 1.15 MMOL/L — SIGNIFICANT CHANGE UP (ref 1.15–1.33)
CALCIUM SERPL-MCNC: 8.4 MG/DL — SIGNIFICANT CHANGE UP (ref 8.4–10.5)
CALCIUM SERPL-MCNC: 8.5 MG/DL — SIGNIFICANT CHANGE UP (ref 8.4–10.5)
CHLORIDE BLDV-SCNC: 102 MMOL/L — SIGNIFICANT CHANGE UP (ref 96–108)
CHLORIDE SERPL-SCNC: 102 MMOL/L — SIGNIFICANT CHANGE UP (ref 96–108)
CHLORIDE SERPL-SCNC: 103 MMOL/L — SIGNIFICANT CHANGE UP (ref 96–108)
CHOLEST SERPL-MCNC: 114 MG/DL — SIGNIFICANT CHANGE UP
CK MB BLD-MCNC: 1.9 % — SIGNIFICANT CHANGE UP (ref 0–3.5)
CK MB CFR SERPL CALC: 5.2 NG/ML — SIGNIFICANT CHANGE UP (ref 0–6.7)
CK SERPL-CCNC: 279 U/L — HIGH (ref 30–200)
CO2 BLDV-SCNC: 27 MMOL/L — HIGH (ref 22–26)
CO2 SERPL-SCNC: 20 MMOL/L — LOW (ref 22–31)
CO2 SERPL-SCNC: 22 MMOL/L — SIGNIFICANT CHANGE UP (ref 22–31)
COLOR SPEC: YELLOW — SIGNIFICANT CHANGE UP
CREAT SERPL-MCNC: 0.91 MG/DL — SIGNIFICANT CHANGE UP (ref 0.5–1.3)
CREAT SERPL-MCNC: 0.94 MG/DL — SIGNIFICANT CHANGE UP (ref 0.5–1.3)
CRP SERPL-MCNC: 4 MG/L — SIGNIFICANT CHANGE UP (ref 0–4)
DIFF PNL FLD: NEGATIVE — SIGNIFICANT CHANGE UP
EGFR: 103 ML/MIN/1.73M2 — SIGNIFICANT CHANGE UP
EGFR: 107 ML/MIN/1.73M2 — SIGNIFICANT CHANGE UP
ERYTHROCYTE [SEDIMENTATION RATE] IN BLOOD: 10 MM/HR — SIGNIFICANT CHANGE UP (ref 0–15)
FLUAV AG NPH QL: SIGNIFICANT CHANGE UP
FLUBV AG NPH QL: SIGNIFICANT CHANGE UP
GAS PNL BLDV: 133 MMOL/L — LOW (ref 136–145)
GAS PNL BLDV: SIGNIFICANT CHANGE UP
GLUCOSE BLDC GLUCOMTR-MCNC: 214 MG/DL — HIGH (ref 70–99)
GLUCOSE BLDV-MCNC: 291 MG/DL — HIGH (ref 70–99)
GLUCOSE SERPL-MCNC: 303 MG/DL — HIGH (ref 70–99)
GLUCOSE SERPL-MCNC: 329 MG/DL — HIGH (ref 70–99)
GLUCOSE UR QL: >=1000 MG/DL
HCO3 BLDV-SCNC: 26 MMOL/L — SIGNIFICANT CHANGE UP (ref 22–29)
HCT VFR BLDA CALC: 42 % — SIGNIFICANT CHANGE UP (ref 39–51)
HDLC SERPL-MCNC: 35 MG/DL — LOW
HGB BLD CALC-MCNC: 14.1 G/DL — SIGNIFICANT CHANGE UP (ref 12.6–17.4)
KETONES UR-MCNC: NEGATIVE MG/DL — SIGNIFICANT CHANGE UP
LACTATE BLDV-MCNC: 0.9 MMOL/L — SIGNIFICANT CHANGE UP (ref 0.5–2)
LEUKOCYTE ESTERASE UR-ACNC: NEGATIVE — SIGNIFICANT CHANGE UP
LIPID PNL WITH DIRECT LDL SERPL: 59 MG/DL — SIGNIFICANT CHANGE UP
MAGNESIUM SERPL-MCNC: 2 MG/DL — SIGNIFICANT CHANGE UP (ref 1.6–2.6)
NITRITE UR-MCNC: NEGATIVE — SIGNIFICANT CHANGE UP
NON HDL CHOLESTEROL: 79 MG/DL — SIGNIFICANT CHANGE UP
NT-PROBNP SERPL-SCNC: 647 PG/ML — HIGH (ref 0–300)
PCO2 BLDV: 46 MMHG — SIGNIFICANT CHANGE UP (ref 42–55)
PH BLDV: 7.36 — SIGNIFICANT CHANGE UP (ref 7.32–7.43)
PH UR: 6 — SIGNIFICANT CHANGE UP (ref 5–8)
PO2 BLDV: 45 MMHG — SIGNIFICANT CHANGE UP (ref 25–45)
POTASSIUM BLDV-SCNC: 4.1 MMOL/L — SIGNIFICANT CHANGE UP (ref 3.5–5.1)
POTASSIUM SERPL-MCNC: 4.3 MMOL/L — SIGNIFICANT CHANGE UP (ref 3.5–5.3)
POTASSIUM SERPL-MCNC: 4.4 MMOL/L — SIGNIFICANT CHANGE UP (ref 3.5–5.3)
POTASSIUM SERPL-SCNC: 4.3 MMOL/L — SIGNIFICANT CHANGE UP (ref 3.5–5.3)
POTASSIUM SERPL-SCNC: 4.4 MMOL/L — SIGNIFICANT CHANGE UP (ref 3.5–5.3)
PROT SERPL-MCNC: 6.3 G/DL — SIGNIFICANT CHANGE UP (ref 6–8.3)
PROT UR-MCNC: NEGATIVE MG/DL — SIGNIFICANT CHANGE UP
RSV RNA NPH QL NAA+NON-PROBE: SIGNIFICANT CHANGE UP
SAO2 % BLDV: 75.5 % — SIGNIFICANT CHANGE UP (ref 67–88)
SARS-COV-2 RNA SPEC QL NAA+PROBE: SIGNIFICANT CHANGE UP
SODIUM SERPL-SCNC: 137 MMOL/L — SIGNIFICANT CHANGE UP (ref 135–145)
SODIUM SERPL-SCNC: 137 MMOL/L — SIGNIFICANT CHANGE UP (ref 135–145)
SP GR SPEC: >1.03 — HIGH (ref 1–1.03)
TRIGL SERPL-MCNC: 102 MG/DL — SIGNIFICANT CHANGE UP
TROPONIN T, HIGH SENSITIVITY RESULT: 47 NG/L — SIGNIFICANT CHANGE UP (ref 0–51)
TROPONIN T, HIGH SENSITIVITY RESULT: 49 NG/L — SIGNIFICANT CHANGE UP
TROPONIN T, HIGH SENSITIVITY RESULT: 62 NG/L — CRITICAL HIGH
UROBILINOGEN FLD QL: 4 MG/DL (ref 0.2–1)

## 2024-04-09 PROCEDURE — 82962 GLUCOSE BLOOD TEST: CPT

## 2024-04-09 PROCEDURE — 80048 BASIC METABOLIC PNL TOTAL CA: CPT

## 2024-04-09 PROCEDURE — 93356 MYOCRD STRAIN IMG SPCKL TRCK: CPT

## 2024-04-09 PROCEDURE — 93306 TTE W/DOPPLER COMPLETE: CPT | Mod: 26

## 2024-04-09 PROCEDURE — 83735 ASSAY OF MAGNESIUM: CPT

## 2024-04-09 PROCEDURE — 84484 ASSAY OF TROPONIN QUANT: CPT

## 2024-04-09 PROCEDURE — 93005 ELECTROCARDIOGRAM TRACING: CPT

## 2024-04-09 PROCEDURE — 86140 C-REACTIVE PROTEIN: CPT

## 2024-04-09 PROCEDURE — 99223 1ST HOSP IP/OBS HIGH 75: CPT

## 2024-04-09 PROCEDURE — 82330 ASSAY OF CALCIUM: CPT

## 2024-04-09 PROCEDURE — 71275 CT ANGIOGRAPHY CHEST: CPT | Mod: MC

## 2024-04-09 PROCEDURE — 99285 EMERGENCY DEPT VISIT HI MDM: CPT | Mod: 25

## 2024-04-09 PROCEDURE — 84295 ASSAY OF SERUM SODIUM: CPT

## 2024-04-09 PROCEDURE — 83880 ASSAY OF NATRIURETIC PEPTIDE: CPT

## 2024-04-09 PROCEDURE — 80061 LIPID PANEL: CPT

## 2024-04-09 PROCEDURE — 71045 X-RAY EXAM CHEST 1 VIEW: CPT

## 2024-04-09 PROCEDURE — 82435 ASSAY OF BLOOD CHLORIDE: CPT

## 2024-04-09 PROCEDURE — 82803 BLOOD GASES ANY COMBINATION: CPT

## 2024-04-09 PROCEDURE — 83605 ASSAY OF LACTIC ACID: CPT

## 2024-04-09 PROCEDURE — 85730 THROMBOPLASTIN TIME PARTIAL: CPT

## 2024-04-09 PROCEDURE — 87637 SARSCOV2&INF A&B&RSV AMP PRB: CPT

## 2024-04-09 PROCEDURE — 82550 ASSAY OF CK (CPK): CPT

## 2024-04-09 PROCEDURE — 85014 HEMATOCRIT: CPT

## 2024-04-09 PROCEDURE — 85610 PROTHROMBIN TIME: CPT

## 2024-04-09 PROCEDURE — 80053 COMPREHEN METABOLIC PANEL: CPT

## 2024-04-09 PROCEDURE — 84132 ASSAY OF SERUM POTASSIUM: CPT

## 2024-04-09 PROCEDURE — 85018 HEMOGLOBIN: CPT

## 2024-04-09 PROCEDURE — 93306 TTE W/DOPPLER COMPLETE: CPT

## 2024-04-09 PROCEDURE — 82947 ASSAY GLUCOSE BLOOD QUANT: CPT

## 2024-04-09 PROCEDURE — 85652 RBC SED RATE AUTOMATED: CPT

## 2024-04-09 PROCEDURE — 81003 URINALYSIS AUTO W/O SCOPE: CPT

## 2024-04-09 PROCEDURE — 96374 THER/PROPH/DIAG INJ IV PUSH: CPT

## 2024-04-09 PROCEDURE — 85025 COMPLETE CBC W/AUTO DIFF WBC: CPT

## 2024-04-09 PROCEDURE — 82553 CREATINE MB FRACTION: CPT

## 2024-04-09 RX ORDER — INSULIN LISPRO 100/ML
VIAL (ML) SUBCUTANEOUS
Refills: 0 | Status: DISCONTINUED | OUTPATIENT
Start: 2024-04-09 | End: 2024-04-09

## 2024-04-09 RX ORDER — DEXTROSE 10 % IN WATER 10 %
125 INTRAVENOUS SOLUTION INTRAVENOUS ONCE
Refills: 0 | Status: DISCONTINUED | OUTPATIENT
Start: 2024-04-09 | End: 2024-04-09

## 2024-04-09 RX ORDER — DEXTROSE 50 % IN WATER 50 %
15 SYRINGE (ML) INTRAVENOUS ONCE
Refills: 0 | Status: DISCONTINUED | OUTPATIENT
Start: 2024-04-09 | End: 2024-04-09

## 2024-04-09 RX ORDER — GLUCAGON INJECTION, SOLUTION 0.5 MG/.1ML
1 INJECTION, SOLUTION SUBCUTANEOUS ONCE
Refills: 0 | Status: DISCONTINUED | OUTPATIENT
Start: 2024-04-09 | End: 2024-04-09

## 2024-04-09 RX ORDER — HYDRALAZINE HCL 50 MG
25 TABLET ORAL EVERY 8 HOURS
Refills: 0 | Status: DISCONTINUED | OUTPATIENT
Start: 2024-04-09 | End: 2024-04-09

## 2024-04-09 RX ORDER — SODIUM CHLORIDE 9 MG/ML
1000 INJECTION, SOLUTION INTRAVENOUS
Refills: 0 | Status: DISCONTINUED | OUTPATIENT
Start: 2024-04-09 | End: 2024-04-09

## 2024-04-09 RX ORDER — DEXTROSE 50 % IN WATER 50 %
12.5 SYRINGE (ML) INTRAVENOUS ONCE
Refills: 0 | Status: DISCONTINUED | OUTPATIENT
Start: 2024-04-09 | End: 2024-04-09

## 2024-04-09 RX ORDER — BUMETANIDE 0.25 MG/ML
2 INJECTION INTRAMUSCULAR; INTRAVENOUS ONCE
Refills: 0 | Status: COMPLETED | OUTPATIENT
Start: 2024-04-09 | End: 2024-04-09

## 2024-04-09 RX ORDER — ACETAMINOPHEN 500 MG
650 TABLET ORAL EVERY 6 HOURS
Refills: 0 | Status: DISCONTINUED | OUTPATIENT
Start: 2024-04-09 | End: 2024-04-09

## 2024-04-09 RX ORDER — BUDESONIDE AND FORMOTEROL FUMARATE DIHYDRATE 160; 4.5 UG/1; UG/1
2 AEROSOL RESPIRATORY (INHALATION)
Refills: 0 | Status: DISCONTINUED | OUTPATIENT
Start: 2024-04-09 | End: 2024-04-09

## 2024-04-09 RX ORDER — ISOSORBIDE MONONITRATE 60 MG/1
60 TABLET, EXTENDED RELEASE ORAL DAILY
Refills: 0 | Status: DISCONTINUED | OUTPATIENT
Start: 2024-04-09 | End: 2024-04-09

## 2024-04-09 RX ORDER — ONDANSETRON 8 MG/1
4 TABLET, FILM COATED ORAL EVERY 8 HOURS
Refills: 0 | Status: DISCONTINUED | OUTPATIENT
Start: 2024-04-09 | End: 2024-04-09

## 2024-04-09 RX ORDER — SACUBITRIL AND VALSARTAN 24; 26 MG/1; MG/1
1 TABLET, FILM COATED ORAL
Refills: 0 | Status: DISCONTINUED | OUTPATIENT
Start: 2024-04-09 | End: 2024-04-09

## 2024-04-09 RX ORDER — CARVEDILOL PHOSPHATE 80 MG/1
25 CAPSULE, EXTENDED RELEASE ORAL EVERY 12 HOURS
Refills: 0 | Status: DISCONTINUED | OUTPATIENT
Start: 2024-04-09 | End: 2024-04-09

## 2024-04-09 RX ORDER — DEXTROSE 50 % IN WATER 50 %
25 SYRINGE (ML) INTRAVENOUS ONCE
Refills: 0 | Status: DISCONTINUED | OUTPATIENT
Start: 2024-04-09 | End: 2024-04-09

## 2024-04-09 RX ORDER — LANOLIN ALCOHOL/MO/W.PET/CERES
3 CREAM (GRAM) TOPICAL AT BEDTIME
Refills: 0 | Status: DISCONTINUED | OUTPATIENT
Start: 2024-04-09 | End: 2024-04-09

## 2024-04-09 RX ORDER — INSULIN LISPRO 100/ML
VIAL (ML) SUBCUTANEOUS AT BEDTIME
Refills: 0 | Status: DISCONTINUED | OUTPATIENT
Start: 2024-04-09 | End: 2024-04-09

## 2024-04-09 RX ORDER — SPIRONOLACTONE 25 MG/1
25 TABLET, FILM COATED ORAL DAILY
Refills: 0 | Status: DISCONTINUED | OUTPATIENT
Start: 2024-04-09 | End: 2024-04-09

## 2024-04-09 RX ORDER — ATORVASTATIN CALCIUM 80 MG/1
40 TABLET, FILM COATED ORAL AT BEDTIME
Refills: 0 | Status: DISCONTINUED | OUTPATIENT
Start: 2024-04-09 | End: 2024-04-09

## 2024-04-09 RX ORDER — BUMETANIDE 0.25 MG/ML
1 INJECTION INTRAMUSCULAR; INTRAVENOUS DAILY
Refills: 0 | Status: DISCONTINUED | OUTPATIENT
Start: 2024-04-09 | End: 2024-04-09

## 2024-04-09 RX ADMIN — BUMETANIDE 2 MILLIGRAM(S): 0.25 INJECTION INTRAMUSCULAR; INTRAVENOUS at 04:26

## 2024-04-09 RX ADMIN — Medication 25 MILLIGRAM(S): at 11:26

## 2024-04-09 RX ADMIN — ISOSORBIDE MONONITRATE 60 MILLIGRAM(S): 60 TABLET, EXTENDED RELEASE ORAL at 11:26

## 2024-04-09 RX ADMIN — Medication 2: at 11:26

## 2024-04-09 NOTE — H&P ADULT - NSHPLABSRESULTS_GEN_ALL_CORE
14.4   10.42 )-----------( 210      ( 08 Apr 2024 22:13 )             43.6       04-08    137  |  102  |  12  ----------------------------<  303<H>  4.3   |  22  |  0.94    Ca    8.4      08 Apr 2024 22:13    TPro  6.3  /  Alb  3.6  /  TBili  0.5  /  DBili  x   /  AST  22  /  ALT  19  /  AlkPhos  75  04-08      Troponin T, High Sensitivity Result: 49: ng/L (04.08.24 @ 22:13)    Troponin T, High Sensitivity Result: 62:  ng/L (04.09.24 @ 01:21)    Pro-Brain Natriuretic Peptide: 647 pg/mL (04.08.24 @ 22:13)    C-Reactive Protein, Serum: 4 mg/L (04.09.24 @ 03:17)    Flu With COVID-19 By SILAS (04.08.24 @ 23:29)    SARS-CoV-2 Result: [employer]    Influenza A Result: [employer]    Influenza B Result: [employer]    Resp Syn Virus Result: NotDetec    - - - - - - - - - - - - - - - - - - - - - - - - - - - - - - - - - - - - - - - - - - - - - - - - - - - -       EKG PERSONALLY REVIEWED:  sinus tach 101     < from: TTE W or WO Ultrasound Enhancing Agent (07.31.23 @ 12:46) >  CONCLUSIONS:    1. Normal left ventricular cavity size. The left ventricular wall thickness is normal. The left ventricular systolic function is severely decreased with an ejection fraction of 17 % by Cote's method of disks. There are no regional wall motion abnormalities seen. No evidence of a thrombus in the left ventricle.  2. There is moderate (grade 2) left ventricular diastolic dysfunction.   3. Normal atria.   4. Mildly enlarged right ventricular cavity size, normal right ventricular wall thickness and reduced systolic right ventricular function. The tricuspid annular plane systolic excursion (TAPSE) is 1.5 cm (normal >=1.7 cm).   5. No significant valvular disease.   6. No pericardial effusion seen.   7. Compared to the transthoracic echocardiogram performed on 9/4/2022 there have been no significant interval changes.      IMAGES PERSONALLY REVIEWED:     < from: Xray Chest 1 View- PORTABLE-Urgent (04.08.24 @ 22:48) >  IMPRESSION:  No evidence of acute pulmonary disease.    < from: CT Angio Chest PE Protocol w/ IV Cont (04.08.24 @ 23:14) >  IMPRESSION:  No pulmonary embolism.  Bilateral hilar adenopathy, which may be related to known sarcoidosis.  Emphysema and bilateral upper lobe predominant peripheral reticular   opacities.

## 2024-04-09 NOTE — ED ADULT NURSE REASSESSMENT NOTE - NS ED NURSE REASSESS COMMENT FT1
Patient reminded a urine sample is needed. Patient verbalized understanding.
Pt refusing ot be moved to holding. ED RN spoke to pt that he will be moved ot holding area because he is admitted. Pt interrupted nurse and said "nah Im not doing that. I am going to go home". IV removed by RN. Pt calling for ride. Pt does not wish to speak to doctor.
Report received from ZURI Farrell . Pt a & o x 4, able to follow commands. Breathing spontaneous & nonlabored. Abdomen soft & nondistended. IV site patent, no signs of phlebitis, flushing without difficulty. Call bell within reach, bed in lowest position. Pt denies complaints of chest pain at this time, ambulating to bathroom without complaints of SOB
heart failure team at bedside, pt still opting to leave ED. IV Removed,
Patient a/ox4, breathing spontaneously and unlabored. Patient endorses decreased presence of chest pain 2/10. Patient denies dyspnea, nausea, vomiting and palpitations. Patient on CM, Sinus Tachycardia. Safety and comfort provided.

## 2024-04-09 NOTE — H&P ADULT - PROBLEM SELECTOR PLAN 2
P/w CP, SOB i/s/o running out of meds  High risk pt hx/o CHFrEF, sarcoid   - Trop: 49 > 62     BNP: 647  - EKG:  sinus tach  - CRP 4,  ESR pending  - Sx likely iso CHF exacerbation, management as above   - trend troponin to peak  - tele monitor  - Echo

## 2024-04-09 NOTE — CHART NOTE - NSCHARTNOTEFT_GEN_A_CORE
Called by the nurse that patient eloped from the ER. Patient alert and oriented x 3, heplock was removed prior to walking out the ER. Dr Bo made aware.
Patient with no complaints.  Discussed with CHF team, cleared for discharge  However, patient ELOPED prior to being discharged    discussed with chf team and ACP    Yakelin Bo D.O.  Division of Hospital Medicine  Available on MS Teams

## 2024-04-09 NOTE — H&P ADULT - HISTORY OF PRESENT ILLNESS
43Y M PMH CHF, sarcoidosis, DM2, HTN, HLD p/w CP & SOB, orthopnea and ALAS after running out of his prescriptions 3 days ago. Also experienced swelling of his feet B/L today. No recent fevers, cough, n/v/d, leg swelling. No recent travel or preceding illnesses      ROS: Denies palpitation, N/V/D, fever, cough, chills, dizziness, abm pain, recent travel, sick contact, change in bowel and urinary habits     A 10-system ROS was performed and is negative except as noted above and/or in the HPI.   43Y M PMH CHF, sarcoidosis, DM2, HTN, HLD p/w 4-5 days of  central CP w/radiation to left & SOB, orthopnea and ALAS after running out of his prescriptions 3 days ago. Also experienced swelling of his feet B/L today. No recent fevers, cough, n/v/d, leg swelling. No recent travel or preceding illnesses    ROS: Denies palpitation, N/V/D, fever, cough, chills, dizziness, abm pain, recent travel, sick contact, change in bowel and urinary habits     A 10-system ROS was performed and is negative except as noted above and/or in the HPI.

## 2024-04-09 NOTE — CONSULT NOTE ADULT - ASSESSMENT
EKG: Personally Reviewed    Echo:    7/2023  1. Normal left ventricular cavity size. The left ventricular wallthickness is normal. The left ventricular systolic function is severely decreased with an ejection fraction of 17 % by Cote's method of disks. There are no regional wall motion abnormalities seen. No evidence of a thrombus in the left ventricle.  2. There is moderate (grade 2) left ventricular diastolic dysfunction.   3. Normal atria.   4. Mildly enlarged right ventricular cavity size, normal right ventricular wall thickness and reduced systolic right ventricular function. The tricuspid annular plane systolic excursion (TAPSE) is 1.5 cm (normal >=1.7 cm).   5. No significant valvular disease.   6. No pericardial effusion seen.   7. Compared to the transthoracic echocardiogram performed on 9/4/2022 there have been no significant interval changes.    Stress Test:   2022  * Myocardial Perfusion SPECT results are abnormal.  * Review of raw data shows: The study is of good technical  quality.  * The left ventricle was enlarged. There is a small,  moderate defect in the distal inferior wall that is  predominantly reversible, suggestive of ischemia.  * The images with attenuation correctionshow no  significant changes.  * Post-stress gated wall motion analysis was performed  (LVEF = 22 %;LVEDV = 281 ml.), revealing diffuse  hypokinesis. The RV function appears reduced.  * NOTE, the degree of LV dysfunction is out of proportion  to extent of ischemia, suggesting NICM    Cath Report: Personally Reviewed  2019  The coronary circulation is left dominant.  LM:   --  LM: Normal.  LAD:   --  Mid LAD: Angiography showed mild atherosclerosis with no flow limiting lesions.  CX:   --  Circumflex: Normal.  RCA:   --  RCA: Normal.  RHC:  RA 12, RV 38/17, PA 43/37/36 (?), PCWP 18, CO/CI 4.44/1.72 (notably Hb 16)    Imaging: Personally Reviewed    Interpretation of Telemetry: sinus 90s    43M PMH niCMP (17%), DM2, HTN, HLD, Sarcoidosis presenting with ~4-5 days of chest pain, ALAS, orthopnea, and foot swelling in setting of missed medications due to prescription running out. Appeared grossly euvolemic on exam. Labs notable for mild hsTrop elevation 49->62, proBNP 647 which is consistent wtih some prior exacerbations but is decreased compared to others. EKG w/o ischemic changes, slight CO elevations in aVR. CTPA w/o any evidence of pulmonary edema. Overall unclear cause of chest pain, story would suggest CHF exacerbation in setting of medication nonadherence but grossly      EKG: Personally Reviewed    Echo:    7/2023  1. Normal left ventricular cavity size. The left ventricular wallthickness is normal. The left ventricular systolic function is severely decreased with an ejection fraction of 17 % by Cote's method of disks. There are no regional wall motion abnormalities seen. No evidence of a thrombus in the left ventricle.  2. There is moderate (grade 2) left ventricular diastolic dysfunction.   3. Normal atria.   4. Mildly enlarged right ventricular cavity size, normal right ventricular wall thickness and reduced systolic right ventricular function. The tricuspid annular plane systolic excursion (TAPSE) is 1.5 cm (normal >=1.7 cm).   5. No significant valvular disease.   6. No pericardial effusion seen.   7. Compared to the transthoracic echocardiogram performed on 9/4/2022 there have been no significant interval changes.    Stress Test:   2022  * Myocardial Perfusion SPECT results are abnormal.  * Review of raw data shows: The study is of good technical  quality.  * The left ventricle was enlarged. There is a small,  moderate defect in the distal inferior wall that is  predominantly reversible, suggestive of ischemia.  * The images with attenuation correctionshow no  significant changes.  * Post-stress gated wall motion analysis was performed  (LVEF = 22 %;LVEDV = 281 ml.), revealing diffuse  hypokinesis. The RV function appears reduced.  * NOTE, the degree of LV dysfunction is out of proportion  to extent of ischemia, suggesting NICM    Cath Report: Personally Reviewed  2019  The coronary circulation is left dominant.  LM:   --  LM: Normal.  LAD:   --  Mid LAD: Angiography showed mild atherosclerosis with no flow limiting lesions.  CX:   --  Circumflex: Normal.  RCA:   --  RCA: Normal.  RHC:  RA 12, RV 38/17, PA 43/37/36 (?), PCWP 18, CO/CI 4.44/1.72 (notably Hb 16)    Imaging: Personally Reviewed    Interpretation of Telemetry: sinus 90s    43M PMH niCMP (17%), DM2, HTN, HLD, Sarcoidosis presenting with ~4-5 days of chest pain, ALAS, orthopnea, and foot swelling in setting of missed medications due to prescription running out. Appeared grossly euvolemic on exam. Labs notable for mild hsTrop elevation 49->62, proBNP 647 which is consistent wtih some prior exacerbations but is decreased compared to others. EKG w/o ischemic changes, slight OK elevations in aVR. CTPA w/o any evidence of pulmonary edema. Overall unclear cause of chest pain, story would suggest CHF exacerbation in setting of medication nonadherence but grossly euvolemic on exam w/o pulmonary edema on CTPA. Ddx also includes NSTEMI although less likely given previously nl LHC vs pericarditis given EKG findings.

## 2024-04-09 NOTE — CONSULT NOTE ADULT - SUBJECTIVE AND OBJECTIVE BOX
CARDIOLOGY FELLOW CONSULT NOTE    HPI:  43M PMH niCMP (17%), DM2, HTN, HLD presenting with 4-5 days of central sharp chest pain radiating to the left associated with orthopnea and ALAS after running out of his prescriptions 3 days ago, reports chest pain worse with exertion. Additionally reports noting bl swelling of his feet today. Denies cough, fevers/chills, recent sick contacts.       PMHx:   No pertinent past medical history    HTN (hypertension)    Asthma    Congestive heart failure (CHF)    Type 2 diabetes mellitus    Hyperlipidemia        PSHx:   None      Allergies:  No Known Drug Allergies  shellfish (Swelling)      Home Meds:  albuterol 90 mcg/inh inhalation aerosol: 2 puff(s) inhaled every 6 hours, As needed, Shortness of Breath and/or Wheezing  atorvastatin 40 mg oral tablet: 1 tab(s) orally once a day (at bedtime)  budesonide-formoterol 160 mcg-4.5 mcg/inh inhalation aerosol: 2 puff(s) inhaled 2 times a day  bumetanide 1 mg oral tablet: 1 tab(s) orally once a day  Coreg 25 mg oral tablet: 1 tab(s) orally 2 times a day  glipizide-metformin 5 mg-500 mg oral tablet: 2 tab(s) orally 2 times a day  hydrALAZINE 25 mg oral tablet: 1 tab(s) orally every 8 hours  isosorbide mononitrate 60 mg oral tablet, extended release: 1 tab(s) orally once a day  Jardiance 25 mg oral tablet: 1 tab(s) orally once a day  sacubitril-valsartan 49 mg-51 mg oral tablet: 1 tab(s) orally every 12 hours  senna leaf extract oral tablet: 1 tab(s) orally once a day (at bedtime)  spironolactone 25 mg oral tablet: 1 tab(s) orally once a day    Current Medications:       FAMILY HISTORY:  FH: HTN (hypertension)        Social History:  Smoking History: reports occasional cigarette use  Alcohol Use: denies  Drug Use: denies    REVIEW OF SYSTEMS:  CONSTITUTIONAL: No weakness, fevers or chills  EYES/ENT: No visual changes;  No dysphagia  NECK: No pain or stiffness  RESPIRATORY: as per HPI  CARDIOVASCULAR: as per HPI  GASTROINTESTINAL: No abdominal or epigastric pain. No nausea, vomiting, or hematemesis; No diarrhea or constipation. No melena or hematochezia.  BACK: No back pain  GENITOURINARY: No dysuria, frequency or hematuria  NEUROLOGICAL: No numbness or weakness  SKIN: No itching, burning, rashes, or lesions   All other review of systems is negative unless indicated above.    Physical Exam:  T(F): 98 (04-09), Max: 98.7 (04-08)  HR: 97 (04-09) (97 - 109)  BP: 142/97 (04-09) (133/94 - 149/100)  RR: 12 (04-09)  SpO2: 97% (04-09)  GEN: comfortable appearing, lying in bed in NAD  HEENT: NCAT, MMM  CV: Regular S1, S2, no m/r/g, patient unwilling to lay flat, no JVP while sitting at ~75 degrees  RESP: CTAB  ABD: Soft, NTND, +BS  EXT: No LE edema, WWP, pulses palpable throughout  NEURO: No focal deficits, AOx3  SKIN:  No rashes    Labs: Personally reviewed                        14.4   10.42 )-----------( 210      ( 08 Apr 2024 22:13 )             43.6     04-08    137  |  102  |  12  ----------------------------<  303<H>  4.3   |  22  |  0.94    Ca    8.4      08 Apr 2024 22:13    TPro  6.3  /  Alb  3.6  /  TBili  0.5  /  DBili  x   /  AST  22  /  ALT  19  /  AlkPhos  75  04-08    PT/INR - ( 08 Apr 2024 22:35 )   PT: 12.1 sec;   INR: 1.16 ratio         PTT - ( 08 Apr 2024 22:35 )  PTT:29.6 sec    CARDIAC MARKERS ( 09 Apr 2024 03:17 )  x     / x     / x     / 279 U/L / x     / x      CARDIAC MARKERS ( 09 Apr 2024 01:21 )  62 ng/L / x     / x     / x     / x     / x      CARDIAC MARKERS ( 08 Apr 2024 22:13 )  49 ng/L / x     / x     / x     / x     / x

## 2024-04-09 NOTE — CONSULT NOTE ADULT - PROBLEM SELECTOR RECOMMENDATION 2
- diurese with bumex 2mg IV x1  -- BID Lytes  -- goal K > 4, Mg > 2  -- STRICT I/Os  -- goal neg 1-2L  - start home medications:  - Entresto 49/51mg BID  - coreg 25mg Q12  - atorvastatin 40  - If BP tolerates can also initiate home Imdur 60mg daily, hydralazine 25mg TID, spironolactone 25mg daily  - ICD: None  - TTE in the AM

## 2024-04-09 NOTE — H&P ADULT - PROBLEM SELECTOR PLAN 3
- Hold home DM meds while inpatient: Jardiance, Metformin-Glipizide   - ISS ACHS  - DASH/CC diet  - Check A1c   - Consider endo consult in AM

## 2024-04-09 NOTE — H&P ADULT - NSHPPHYSICALEXAM_GEN_ALL_CORE
T(C): 37.1 (04-09-24 @ 06:08), Max: 37.1 (04-08-24 @ 21:55)  HR: 101 (04-09-24 @ 07:09) (97 - 109)  BP: 125/85 (04-09-24 @ 07:09) (105/76 - 149/100)  RR: 18 (04-09-24 @ 07:09) (12 - 20)  SpO2: 99% (04-09-24 @ 07:09) (96% - 100%)    CONSTITUTIONAL: Well groomed, no apparent distress  EYES: PERRLA , EOMI  ENMT: MMM. Normal dentition  RESP: No respiratory distress, no use of accessory muscles; CTA b/l  CV: +S1S2, tachycardiac, no MRG; no peripheral edema  GI: Soft, NTND, no RGR  MSK: Normal gait; No digital clubbing or cyanosis; normal pain free ROM x4 extremities   SKIN: No rashes or ulcers noted  NEURO: CN II-XII grossly intact; normal reflexes, sensation intact throughout   PSYCH: A+O x 3, mood and affect appropriate

## 2024-04-09 NOTE — H&P ADULT - ASSESSMENT
43Y M PMH CHF, sarcoidosis, DM2, HTN, HLD p/w CP & SOB orthopnea and ALAS afet running out of meds a/f acute on chronic CHFrEF exacerbation  43Y M PMH CHF, sarcoidosis, DM2, HTN, HLD p/w 4-5 days of  central CP w/radiation to left  & SOB orthopnea and ALAS afet running out of meds a/f acute on chronic CHFrEF exacerbation

## 2024-06-20 ENCOUNTER — INPATIENT (INPATIENT)
Facility: HOSPITAL | Age: 44
LOS: 3 days | Discharge: LEFT BEFORE DISCHARGE | DRG: 291 | End: 2024-06-24
Attending: STUDENT IN AN ORGANIZED HEALTH CARE EDUCATION/TRAINING PROGRAM | Admitting: HOSPITALIST
Payer: MEDICAID

## 2024-06-20 VITALS
SYSTOLIC BLOOD PRESSURE: 146 MMHG | OXYGEN SATURATION: 95 % | DIASTOLIC BLOOD PRESSURE: 93 MMHG | TEMPERATURE: 98 F | WEIGHT: 244.93 LBS | HEART RATE: 111 BPM | HEIGHT: 71 IN | RESPIRATION RATE: 19 BRPM

## 2024-06-20 DIAGNOSIS — E11.9 TYPE 2 DIABETES MELLITUS WITHOUT COMPLICATIONS: ICD-10-CM

## 2024-06-20 DIAGNOSIS — I10 ESSENTIAL (PRIMARY) HYPERTENSION: ICD-10-CM

## 2024-06-20 DIAGNOSIS — R10.9 UNSPECIFIED ABDOMINAL PAIN: ICD-10-CM

## 2024-06-20 DIAGNOSIS — I50.43 ACUTE ON CHRONIC COMBINED SYSTOLIC (CONGESTIVE) AND DIASTOLIC (CONGESTIVE) HEART FAILURE: ICD-10-CM

## 2024-06-20 DIAGNOSIS — Z29.9 ENCOUNTER FOR PROPHYLACTIC MEASURES, UNSPECIFIED: ICD-10-CM

## 2024-06-20 DIAGNOSIS — I50.9 HEART FAILURE, UNSPECIFIED: ICD-10-CM

## 2024-06-20 LAB
ALBUMIN SERPL ELPH-MCNC: 3.6 G/DL — SIGNIFICANT CHANGE UP (ref 3.3–5)
ALP SERPL-CCNC: 89 U/L — SIGNIFICANT CHANGE UP (ref 40–120)
ALT FLD-CCNC: 43 U/L — SIGNIFICANT CHANGE UP (ref 10–45)
ANION GAP SERPL CALC-SCNC: 11 MMOL/L — SIGNIFICANT CHANGE UP (ref 5–17)
AST SERPL-CCNC: 32 U/L — SIGNIFICANT CHANGE UP (ref 10–40)
BASOPHILS # BLD AUTO: 0.03 K/UL — SIGNIFICANT CHANGE UP (ref 0–0.2)
BASOPHILS NFR BLD AUTO: 0.3 % — SIGNIFICANT CHANGE UP (ref 0–2)
BILIRUB SERPL-MCNC: 0.5 MG/DL — SIGNIFICANT CHANGE UP (ref 0.2–1.2)
BUN SERPL-MCNC: 14 MG/DL — SIGNIFICANT CHANGE UP (ref 7–23)
CALCIUM SERPL-MCNC: 9.2 MG/DL — SIGNIFICANT CHANGE UP (ref 8.4–10.5)
CHLORIDE SERPL-SCNC: 103 MMOL/L — SIGNIFICANT CHANGE UP (ref 96–108)
CO2 SERPL-SCNC: 23 MMOL/L — SIGNIFICANT CHANGE UP (ref 22–31)
CREAT SERPL-MCNC: 0.88 MG/DL — SIGNIFICANT CHANGE UP (ref 0.5–1.3)
EGFR: 108 ML/MIN/1.73M2 — SIGNIFICANT CHANGE UP
EOSINOPHIL # BLD AUTO: 0.12 K/UL — SIGNIFICANT CHANGE UP (ref 0–0.5)
EOSINOPHIL NFR BLD AUTO: 1.2 % — SIGNIFICANT CHANGE UP (ref 0–6)
GLUCOSE BLDC GLUCOMTR-MCNC: 198 MG/DL — HIGH (ref 70–99)
GLUCOSE BLDC GLUCOMTR-MCNC: 239 MG/DL — HIGH (ref 70–99)
GLUCOSE SERPL-MCNC: 331 MG/DL — HIGH (ref 70–99)
HCT VFR BLD CALC: 45.2 % — SIGNIFICANT CHANGE UP (ref 39–50)
HGB BLD-MCNC: 14.7 G/DL — SIGNIFICANT CHANGE UP (ref 13–17)
IMM GRANULOCYTES NFR BLD AUTO: 0.7 % — SIGNIFICANT CHANGE UP (ref 0–0.9)
LYMPHOCYTES # BLD AUTO: 1.57 K/UL — SIGNIFICANT CHANGE UP (ref 1–3.3)
LYMPHOCYTES # BLD AUTO: 15.6 % — SIGNIFICANT CHANGE UP (ref 13–44)
MCHC RBC-ENTMCNC: 29.4 PG — SIGNIFICANT CHANGE UP (ref 27–34)
MCHC RBC-ENTMCNC: 32.5 GM/DL — SIGNIFICANT CHANGE UP (ref 32–36)
MCV RBC AUTO: 90.4 FL — SIGNIFICANT CHANGE UP (ref 80–100)
MONOCYTES # BLD AUTO: 0.6 K/UL — SIGNIFICANT CHANGE UP (ref 0–0.9)
MONOCYTES NFR BLD AUTO: 5.9 % — SIGNIFICANT CHANGE UP (ref 2–14)
NEUTROPHILS # BLD AUTO: 7.7 K/UL — HIGH (ref 1.8–7.4)
NEUTROPHILS NFR BLD AUTO: 76.3 % — SIGNIFICANT CHANGE UP (ref 43–77)
NRBC # BLD: 0 /100 WBCS — SIGNIFICANT CHANGE UP (ref 0–0)
NT-PROBNP SERPL-SCNC: 1685 PG/ML — HIGH (ref 0–300)
PLATELET # BLD AUTO: 230 K/UL — SIGNIFICANT CHANGE UP (ref 150–400)
POTASSIUM SERPL-MCNC: 4.5 MMOL/L — SIGNIFICANT CHANGE UP (ref 3.5–5.3)
POTASSIUM SERPL-SCNC: 4.5 MMOL/L — SIGNIFICANT CHANGE UP (ref 3.5–5.3)
PROT SERPL-MCNC: 6.7 G/DL — SIGNIFICANT CHANGE UP (ref 6–8.3)
RBC # BLD: 5 M/UL — SIGNIFICANT CHANGE UP (ref 4.2–5.8)
RBC # FLD: 14.5 % — SIGNIFICANT CHANGE UP (ref 10.3–14.5)
SODIUM SERPL-SCNC: 137 MMOL/L — SIGNIFICANT CHANGE UP (ref 135–145)
TROPONIN T, HIGH SENSITIVITY RESULT: 40 NG/L — SIGNIFICANT CHANGE UP (ref 0–51)
TROPONIN T, HIGH SENSITIVITY RESULT: 40 NG/L — SIGNIFICANT CHANGE UP (ref 0–51)
WBC # BLD: 10.09 K/UL — SIGNIFICANT CHANGE UP (ref 3.8–10.5)
WBC # FLD AUTO: 10.09 K/UL — SIGNIFICANT CHANGE UP (ref 3.8–10.5)

## 2024-06-20 PROCEDURE — 99285 EMERGENCY DEPT VISIT HI MDM: CPT

## 2024-06-20 PROCEDURE — 71046 X-RAY EXAM CHEST 2 VIEWS: CPT | Mod: 26

## 2024-06-20 RX ORDER — ALBUTEROL 90 MCG
2 AEROSOL REFILL (GRAM) INHALATION EVERY 6 HOURS
Refills: 0 | Status: DISCONTINUED | OUTPATIENT
Start: 2024-06-20 | End: 2024-06-24

## 2024-06-20 RX ORDER — GLUCAGON HYDROCHLORIDE 1 MG/ML
1 INJECTION, POWDER, FOR SOLUTION INTRAMUSCULAR; INTRAVENOUS; SUBCUTANEOUS ONCE
Refills: 0 | Status: DISCONTINUED | OUTPATIENT
Start: 2024-06-20 | End: 2024-06-24

## 2024-06-20 RX ORDER — INSULIN GLARGINE 100 [IU]/ML
12 INJECTION, SOLUTION SUBCUTANEOUS AT BEDTIME
Refills: 0 | Status: DISCONTINUED | OUTPATIENT
Start: 2024-06-20 | End: 2024-06-22

## 2024-06-20 RX ORDER — ISOSORBIDE MONONITRATE 30 MG/1
60 TABLET, EXTENDED RELEASE ORAL DAILY
Refills: 0 | Status: DISCONTINUED | OUTPATIENT
Start: 2024-06-20 | End: 2024-06-24

## 2024-06-20 RX ORDER — HYDRALAZINE HYDROCHLORIDE 50 MG/1
25 TABLET ORAL EVERY 8 HOURS
Refills: 0 | Status: DISCONTINUED | OUTPATIENT
Start: 2024-06-20 | End: 2024-06-24

## 2024-06-20 RX ORDER — ACETAMINOPHEN 325 MG
1000 TABLET ORAL ONCE
Refills: 0 | Status: COMPLETED | OUTPATIENT
Start: 2024-06-20 | End: 2024-06-20

## 2024-06-20 RX ORDER — INSULIN LISPRO 100 [IU]/ML
INJECTION, SOLUTION SUBCUTANEOUS
Refills: 0 | Status: DISCONTINUED | OUTPATIENT
Start: 2024-06-20 | End: 2024-06-23

## 2024-06-20 RX ORDER — FUROSEMIDE 10 MG/ML
20 INJECTION, SOLUTION INTRAMUSCULAR; INTRAVENOUS ONCE
Refills: 0 | Status: COMPLETED | OUTPATIENT
Start: 2024-06-20 | End: 2024-06-20

## 2024-06-20 RX ORDER — BUMETANIDE 0.25 MG/ML
1 INJECTION INTRAMUSCULAR; INTRAVENOUS
Refills: 0 | Status: DISCONTINUED | OUTPATIENT
Start: 2024-06-20 | End: 2024-06-21

## 2024-06-20 RX ORDER — ATORVASTATIN CALCIUM 20 MG/1
40 TABLET, FILM COATED ORAL AT BEDTIME
Refills: 0 | Status: DISCONTINUED | OUTPATIENT
Start: 2024-06-20 | End: 2024-06-24

## 2024-06-20 RX ORDER — BUDESONIDE/FORMOTEROL FUMARATE 160-4.5MCG
2 HFA AEROSOL WITH ADAPTER (GRAM) INHALATION
Refills: 0 | Status: DISCONTINUED | OUTPATIENT
Start: 2024-06-20 | End: 2024-06-24

## 2024-06-20 RX ORDER — SPIRONOLACTONE 25 MG/1
25 TABLET ORAL DAILY
Refills: 0 | Status: DISCONTINUED | OUTPATIENT
Start: 2024-06-20 | End: 2024-06-24

## 2024-06-20 RX ORDER — CARVEDILOL PHOSPHATE 80 MG/1
25 CAPSULE, EXTENDED RELEASE ORAL EVERY 12 HOURS
Refills: 0 | Status: DISCONTINUED | OUTPATIENT
Start: 2024-06-20 | End: 2024-06-24

## 2024-06-20 RX ORDER — INSULIN LISPRO 100 [IU]/ML
INJECTION, SOLUTION SUBCUTANEOUS AT BEDTIME
Refills: 0 | Status: DISCONTINUED | OUTPATIENT
Start: 2024-06-20 | End: 2024-06-24

## 2024-06-20 RX ORDER — SACUBITRIL AND VALSARTAN 97; 103 MG/1; MG/1
1 TABLET, FILM COATED ORAL
Refills: 0 | Status: DISCONTINUED | OUTPATIENT
Start: 2024-06-20 | End: 2024-06-21

## 2024-06-20 RX ORDER — DEXTROSE 30 % IN WATER 30 %
25 VIAL (ML) INTRAVENOUS ONCE
Refills: 0 | Status: DISCONTINUED | OUTPATIENT
Start: 2024-06-20 | End: 2024-06-24

## 2024-06-20 RX ORDER — APIXABAN 5 MG/1
5 TABLET, FILM COATED ORAL EVERY 12 HOURS
Refills: 0 | Status: DISCONTINUED | OUTPATIENT
Start: 2024-06-20 | End: 2024-06-20

## 2024-06-20 RX ORDER — NYSTATIN 100000/G
1 POWDER (GRAM) TOPICAL
Refills: 0 | Status: DISCONTINUED | OUTPATIENT
Start: 2024-06-20 | End: 2024-06-24

## 2024-06-20 RX ADMIN — Medication 400 MILLIGRAM(S): at 12:47

## 2024-06-20 RX ADMIN — BUMETANIDE 1 MILLIGRAM(S): 0.25 INJECTION INTRAMUSCULAR; INTRAVENOUS at 21:56

## 2024-06-20 RX ADMIN — INSULIN LISPRO 1: 100 INJECTION, SOLUTION SUBCUTANEOUS at 18:05

## 2024-06-20 RX ADMIN — ISOSORBIDE MONONITRATE 60 MILLIGRAM(S): 30 TABLET, EXTENDED RELEASE ORAL at 22:41

## 2024-06-20 RX ADMIN — ATORVASTATIN CALCIUM 40 MILLIGRAM(S): 20 TABLET, FILM COATED ORAL at 21:57

## 2024-06-20 RX ADMIN — CARVEDILOL PHOSPHATE 25 MILLIGRAM(S): 80 CAPSULE, EXTENDED RELEASE ORAL at 18:07

## 2024-06-20 RX ADMIN — INSULIN GLARGINE 12 UNIT(S): 100 INJECTION, SOLUTION SUBCUTANEOUS at 21:56

## 2024-06-20 RX ADMIN — Medication 1 APPLICATION(S): at 21:55

## 2024-06-20 RX ADMIN — SACUBITRIL AND VALSARTAN 1 TABLET(S): 97; 103 TABLET, FILM COATED ORAL at 18:04

## 2024-06-20 RX ADMIN — HYDRALAZINE HYDROCHLORIDE 25 MILLIGRAM(S): 50 TABLET ORAL at 22:42

## 2024-06-20 RX ADMIN — FUROSEMIDE 20 MILLIGRAM(S): 10 INJECTION, SOLUTION INTRAMUSCULAR; INTRAVENOUS at 12:45

## 2024-06-21 ENCOUNTER — RESULT REVIEW (OUTPATIENT)
Age: 44
End: 2024-06-21

## 2024-06-21 LAB
A1C WITH ESTIMATED AVERAGE GLUCOSE RESULT: 9 % — HIGH (ref 4–5.6)
ANION GAP SERPL CALC-SCNC: 10 MMOL/L — SIGNIFICANT CHANGE UP (ref 5–17)
BUN SERPL-MCNC: 12 MG/DL — SIGNIFICANT CHANGE UP (ref 7–23)
CALCIUM SERPL-MCNC: 8.9 MG/DL — SIGNIFICANT CHANGE UP (ref 8.4–10.5)
CHLORIDE SERPL-SCNC: 102 MMOL/L — SIGNIFICANT CHANGE UP (ref 96–108)
CHOLEST SERPL-MCNC: 114 MG/DL — SIGNIFICANT CHANGE UP
CO2 SERPL-SCNC: 26 MMOL/L — SIGNIFICANT CHANGE UP (ref 22–31)
CREAT SERPL-MCNC: 0.9 MG/DL — SIGNIFICANT CHANGE UP (ref 0.5–1.3)
EGFR: 107 ML/MIN/1.73M2 — SIGNIFICANT CHANGE UP
ESTIMATED AVERAGE GLUCOSE: 212 MG/DL — HIGH (ref 68–114)
GLUCOSE BLDC GLUCOMTR-MCNC: 228 MG/DL — HIGH (ref 70–99)
GLUCOSE BLDC GLUCOMTR-MCNC: 229 MG/DL — HIGH (ref 70–99)
GLUCOSE BLDC GLUCOMTR-MCNC: 240 MG/DL — HIGH (ref 70–99)
GLUCOSE BLDC GLUCOMTR-MCNC: 278 MG/DL — HIGH (ref 70–99)
GLUCOSE BLDC GLUCOMTR-MCNC: 289 MG/DL — HIGH (ref 70–99)
GLUCOSE SERPL-MCNC: 282 MG/DL — HIGH (ref 70–99)
HCT VFR BLD CALC: 44.7 % — SIGNIFICANT CHANGE UP (ref 39–50)
HDLC SERPL-MCNC: 36 MG/DL — LOW
HGB BLD-MCNC: 15.1 G/DL — SIGNIFICANT CHANGE UP (ref 13–17)
LIPID PNL WITH DIRECT LDL SERPL: 63 MG/DL — SIGNIFICANT CHANGE UP
MAGNESIUM SERPL-MCNC: 1.9 MG/DL — SIGNIFICANT CHANGE UP (ref 1.6–2.6)
MCHC RBC-ENTMCNC: 29.4 PG — SIGNIFICANT CHANGE UP (ref 27–34)
MCHC RBC-ENTMCNC: 33.8 GM/DL — SIGNIFICANT CHANGE UP (ref 32–36)
MCV RBC AUTO: 87 FL — SIGNIFICANT CHANGE UP (ref 80–100)
NON HDL CHOLESTEROL: 78 MG/DL — SIGNIFICANT CHANGE UP
NRBC # BLD: 0 /100 WBCS — SIGNIFICANT CHANGE UP (ref 0–0)
PLATELET # BLD AUTO: 209 K/UL — SIGNIFICANT CHANGE UP (ref 150–400)
POTASSIUM SERPL-MCNC: 4.2 MMOL/L — SIGNIFICANT CHANGE UP (ref 3.5–5.3)
POTASSIUM SERPL-SCNC: 4.2 MMOL/L — SIGNIFICANT CHANGE UP (ref 3.5–5.3)
RBC # BLD: 5.14 M/UL — SIGNIFICANT CHANGE UP (ref 4.2–5.8)
RBC # FLD: 14.4 % — SIGNIFICANT CHANGE UP (ref 10.3–14.5)
SODIUM SERPL-SCNC: 138 MMOL/L — SIGNIFICANT CHANGE UP (ref 135–145)
TRIGL SERPL-MCNC: 74 MG/DL — SIGNIFICANT CHANGE UP
TSH SERPL-MCNC: 1.04 UIU/ML — SIGNIFICANT CHANGE UP (ref 0.27–4.2)
WBC # BLD: 9.49 K/UL — SIGNIFICANT CHANGE UP (ref 3.8–10.5)
WBC # FLD AUTO: 9.49 K/UL — SIGNIFICANT CHANGE UP (ref 3.8–10.5)

## 2024-06-21 PROCEDURE — 99255 IP/OBS CONSLTJ NEW/EST HI 80: CPT

## 2024-06-21 PROCEDURE — 99232 SBSQ HOSP IP/OBS MODERATE 35: CPT

## 2024-06-21 RX ORDER — SACUBITRIL AND VALSARTAN 97; 103 MG/1; MG/1
1 TABLET, FILM COATED ORAL
Refills: 0 | Status: DISCONTINUED | OUTPATIENT
Start: 2024-06-21 | End: 2024-06-24

## 2024-06-21 RX ORDER — ACETAMINOPHEN 325 MG
650 TABLET ORAL ONCE
Refills: 0 | Status: COMPLETED | OUTPATIENT
Start: 2024-06-21 | End: 2024-06-21

## 2024-06-21 RX ORDER — BUMETANIDE 0.25 MG/ML
2 INJECTION INTRAMUSCULAR; INTRAVENOUS
Refills: 0 | Status: DISCONTINUED | OUTPATIENT
Start: 2024-06-21 | End: 2024-06-24

## 2024-06-21 RX ORDER — MAGNESIUM SULFATE 100 %
1 POWDER (GRAM) MISCELLANEOUS ONCE
Refills: 0 | Status: COMPLETED | OUTPATIENT
Start: 2024-06-21 | End: 2024-06-22

## 2024-06-21 RX ADMIN — BUMETANIDE 1 MILLIGRAM(S): 0.25 INJECTION INTRAMUSCULAR; INTRAVENOUS at 14:15

## 2024-06-21 RX ADMIN — ISOSORBIDE MONONITRATE 60 MILLIGRAM(S): 30 TABLET, EXTENDED RELEASE ORAL at 12:37

## 2024-06-21 RX ADMIN — HYDRALAZINE HYDROCHLORIDE 25 MILLIGRAM(S): 50 TABLET ORAL at 21:44

## 2024-06-21 RX ADMIN — INSULIN LISPRO 3: 100 INJECTION, SOLUTION SUBCUTANEOUS at 08:03

## 2024-06-21 RX ADMIN — Medication 650 MILLIGRAM(S): at 07:10

## 2024-06-21 RX ADMIN — INSULIN LISPRO 3: 100 INJECTION, SOLUTION SUBCUTANEOUS at 12:35

## 2024-06-21 RX ADMIN — BUMETANIDE 2 MILLIGRAM(S): 0.25 INJECTION INTRAMUSCULAR; INTRAVENOUS at 21:44

## 2024-06-21 RX ADMIN — CARVEDILOL PHOSPHATE 25 MILLIGRAM(S): 80 CAPSULE, EXTENDED RELEASE ORAL at 05:35

## 2024-06-21 RX ADMIN — CARVEDILOL PHOSPHATE 25 MILLIGRAM(S): 80 CAPSULE, EXTENDED RELEASE ORAL at 17:41

## 2024-06-21 RX ADMIN — Medication 1 APPLICATION(S): at 12:35

## 2024-06-21 RX ADMIN — SPIRONOLACTONE 25 MILLIGRAM(S): 25 TABLET ORAL at 05:36

## 2024-06-21 RX ADMIN — Medication 650 MILLIGRAM(S): at 06:45

## 2024-06-21 RX ADMIN — HYDRALAZINE HYDROCHLORIDE 25 MILLIGRAM(S): 50 TABLET ORAL at 14:15

## 2024-06-21 RX ADMIN — INSULIN GLARGINE 12 UNIT(S): 100 INJECTION, SOLUTION SUBCUTANEOUS at 21:44

## 2024-06-21 RX ADMIN — BUMETANIDE 1 MILLIGRAM(S): 0.25 INJECTION INTRAMUSCULAR; INTRAVENOUS at 05:35

## 2024-06-21 RX ADMIN — SACUBITRIL AND VALSARTAN 1 TABLET(S): 97; 103 TABLET, FILM COATED ORAL at 05:35

## 2024-06-21 RX ADMIN — SACUBITRIL AND VALSARTAN 1 TABLET(S): 97; 103 TABLET, FILM COATED ORAL at 21:44

## 2024-06-21 RX ADMIN — HYDRALAZINE HYDROCHLORIDE 25 MILLIGRAM(S): 50 TABLET ORAL at 05:35

## 2024-06-21 RX ADMIN — ATORVASTATIN CALCIUM 40 MILLIGRAM(S): 20 TABLET, FILM COATED ORAL at 21:44

## 2024-06-21 RX ADMIN — INSULIN LISPRO 2: 100 INJECTION, SOLUTION SUBCUTANEOUS at 17:39

## 2024-06-21 RX ADMIN — Medication 1 APPLICATION(S): at 05:39

## 2024-06-22 LAB
ANION GAP SERPL CALC-SCNC: 10 MMOL/L — SIGNIFICANT CHANGE UP (ref 5–17)
BUN SERPL-MCNC: 15 MG/DL — SIGNIFICANT CHANGE UP (ref 7–23)
CALCIUM SERPL-MCNC: 9.6 MG/DL — SIGNIFICANT CHANGE UP (ref 8.4–10.5)
CHLORIDE SERPL-SCNC: 96 MMOL/L — SIGNIFICANT CHANGE UP (ref 96–108)
CO2 SERPL-SCNC: 30 MMOL/L — SIGNIFICANT CHANGE UP (ref 22–31)
CREAT SERPL-MCNC: 1.1 MG/DL — SIGNIFICANT CHANGE UP (ref 0.5–1.3)
EGFR: 84 ML/MIN/1.73M2 — SIGNIFICANT CHANGE UP
GLUCOSE BLDC GLUCOMTR-MCNC: 282 MG/DL — HIGH (ref 70–99)
GLUCOSE BLDC GLUCOMTR-MCNC: 293 MG/DL — HIGH (ref 70–99)
GLUCOSE BLDC GLUCOMTR-MCNC: 296 MG/DL — HIGH (ref 70–99)
GLUCOSE BLDC GLUCOMTR-MCNC: 399 MG/DL — HIGH (ref 70–99)
GLUCOSE SERPL-MCNC: 325 MG/DL — HIGH (ref 70–99)
MAGNESIUM SERPL-MCNC: 2.3 MG/DL — SIGNIFICANT CHANGE UP (ref 1.6–2.6)
MRSA PCR RESULT.: SIGNIFICANT CHANGE UP
PHOSPHATE SERPL-MCNC: 3.5 MG/DL — SIGNIFICANT CHANGE UP (ref 2.5–4.5)
POTASSIUM SERPL-MCNC: 4.5 MMOL/L — SIGNIFICANT CHANGE UP (ref 3.5–5.3)
POTASSIUM SERPL-SCNC: 4.5 MMOL/L — SIGNIFICANT CHANGE UP (ref 3.5–5.3)
S AUREUS DNA NOSE QL NAA+PROBE: SIGNIFICANT CHANGE UP
SODIUM SERPL-SCNC: 136 MMOL/L — SIGNIFICANT CHANGE UP (ref 135–145)

## 2024-06-22 PROCEDURE — 99232 SBSQ HOSP IP/OBS MODERATE 35: CPT

## 2024-06-22 RX ORDER — INSULIN LISPRO 100 [IU]/ML
4 INJECTION, SOLUTION SUBCUTANEOUS
Refills: 0 | Status: DISCONTINUED | OUTPATIENT
Start: 2024-06-22 | End: 2024-06-23

## 2024-06-22 RX ORDER — INSULIN GLARGINE 100 [IU]/ML
15 INJECTION, SOLUTION SUBCUTANEOUS AT BEDTIME
Refills: 0 | Status: DISCONTINUED | OUTPATIENT
Start: 2024-06-22 | End: 2024-06-23

## 2024-06-22 RX ADMIN — CARVEDILOL PHOSPHATE 25 MILLIGRAM(S): 80 CAPSULE, EXTENDED RELEASE ORAL at 05:23

## 2024-06-22 RX ADMIN — INSULIN LISPRO 4 UNIT(S): 100 INJECTION, SOLUTION SUBCUTANEOUS at 12:07

## 2024-06-22 RX ADMIN — SPIRONOLACTONE 25 MILLIGRAM(S): 25 TABLET ORAL at 05:24

## 2024-06-22 RX ADMIN — Medication 1 APPLICATION(S): at 12:07

## 2024-06-22 RX ADMIN — ISOSORBIDE MONONITRATE 60 MILLIGRAM(S): 30 TABLET, EXTENDED RELEASE ORAL at 12:08

## 2024-06-22 RX ADMIN — INSULIN LISPRO 4 UNIT(S): 100 INJECTION, SOLUTION SUBCUTANEOUS at 18:00

## 2024-06-22 RX ADMIN — SACUBITRIL AND VALSARTAN 1 TABLET(S): 97; 103 TABLET, FILM COATED ORAL at 05:23

## 2024-06-22 RX ADMIN — Medication 100 GRAM(S): at 04:26

## 2024-06-22 RX ADMIN — INSULIN LISPRO 3: 100 INJECTION, SOLUTION SUBCUTANEOUS at 18:00

## 2024-06-22 RX ADMIN — BUMETANIDE 2 MILLIGRAM(S): 0.25 INJECTION INTRAMUSCULAR; INTRAVENOUS at 05:24

## 2024-06-22 RX ADMIN — HYDRALAZINE HYDROCHLORIDE 25 MILLIGRAM(S): 50 TABLET ORAL at 22:05

## 2024-06-22 RX ADMIN — INSULIN LISPRO 5: 100 INJECTION, SOLUTION SUBCUTANEOUS at 08:12

## 2024-06-22 RX ADMIN — INSULIN LISPRO 2: 100 INJECTION, SOLUTION SUBCUTANEOUS at 22:06

## 2024-06-22 RX ADMIN — HYDRALAZINE HYDROCHLORIDE 25 MILLIGRAM(S): 50 TABLET ORAL at 14:44

## 2024-06-22 RX ADMIN — SACUBITRIL AND VALSARTAN 1 TABLET(S): 97; 103 TABLET, FILM COATED ORAL at 18:01

## 2024-06-22 RX ADMIN — HYDRALAZINE HYDROCHLORIDE 25 MILLIGRAM(S): 50 TABLET ORAL at 05:24

## 2024-06-22 RX ADMIN — INSULIN GLARGINE 15 UNIT(S): 100 INJECTION, SOLUTION SUBCUTANEOUS at 22:06

## 2024-06-22 RX ADMIN — INSULIN LISPRO 4 UNIT(S): 100 INJECTION, SOLUTION SUBCUTANEOUS at 08:12

## 2024-06-22 RX ADMIN — CARVEDILOL PHOSPHATE 25 MILLIGRAM(S): 80 CAPSULE, EXTENDED RELEASE ORAL at 18:01

## 2024-06-22 RX ADMIN — INSULIN LISPRO 3: 100 INJECTION, SOLUTION SUBCUTANEOUS at 12:07

## 2024-06-22 RX ADMIN — ATORVASTATIN CALCIUM 40 MILLIGRAM(S): 20 TABLET, FILM COATED ORAL at 22:05

## 2024-06-22 RX ADMIN — BUMETANIDE 2 MILLIGRAM(S): 0.25 INJECTION INTRAMUSCULAR; INTRAVENOUS at 14:44

## 2024-06-23 DIAGNOSIS — E78.5 HYPERLIPIDEMIA, UNSPECIFIED: ICD-10-CM

## 2024-06-23 LAB
GLUCOSE BLDC GLUCOMTR-MCNC: 221 MG/DL — HIGH (ref 70–99)
GLUCOSE BLDC GLUCOMTR-MCNC: 258 MG/DL — HIGH (ref 70–99)
GLUCOSE BLDC GLUCOMTR-MCNC: 291 MG/DL — HIGH (ref 70–99)
GLUCOSE BLDC GLUCOMTR-MCNC: 391 MG/DL — HIGH (ref 70–99)

## 2024-06-23 PROCEDURE — 99255 IP/OBS CONSLTJ NEW/EST HI 80: CPT

## 2024-06-23 PROCEDURE — 99233 SBSQ HOSP IP/OBS HIGH 50: CPT

## 2024-06-23 RX ORDER — INSULIN LISPRO 100 [IU]/ML
INJECTION, SOLUTION SUBCUTANEOUS
Refills: 0 | Status: DISCONTINUED | OUTPATIENT
Start: 2024-06-23 | End: 2024-06-24

## 2024-06-23 RX ORDER — ACETAMINOPHEN 325 MG
650 TABLET ORAL EVERY 6 HOURS
Refills: 0 | Status: DISCONTINUED | OUTPATIENT
Start: 2024-06-23 | End: 2024-06-23

## 2024-06-23 RX ORDER — INSULIN LISPRO 100 [IU]/ML
10 INJECTION, SOLUTION SUBCUTANEOUS
Refills: 0 | Status: DISCONTINUED | OUTPATIENT
Start: 2024-06-23 | End: 2024-06-24

## 2024-06-23 RX ORDER — GLUCAGON HYDROCHLORIDE 1 MG/ML
1 INJECTION, POWDER, FOR SOLUTION INTRAMUSCULAR; INTRAVENOUS; SUBCUTANEOUS ONCE
Refills: 0 | Status: DISCONTINUED | OUTPATIENT
Start: 2024-06-23 | End: 2024-06-24

## 2024-06-23 RX ORDER — ACETAMINOPHEN 325 MG
975 TABLET ORAL ONCE
Refills: 0 | Status: COMPLETED | OUTPATIENT
Start: 2024-06-23 | End: 2024-06-23

## 2024-06-23 RX ORDER — DEXTROSE MONOHYDRATE 100 MG/ML
125 INJECTION, SOLUTION INTRAVENOUS ONCE
Refills: 0 | Status: DISCONTINUED | OUTPATIENT
Start: 2024-06-23 | End: 2024-06-24

## 2024-06-23 RX ORDER — INSULIN GLARGINE 100 [IU]/ML
30 INJECTION, SOLUTION SUBCUTANEOUS AT BEDTIME
Refills: 0 | Status: DISCONTINUED | OUTPATIENT
Start: 2024-06-23 | End: 2024-06-24

## 2024-06-23 RX ORDER — DEXTROSE MONOHYDRATE AND SODIUM CHLORIDE 5; .3 G/100ML; G/100ML
1000 INJECTION, SOLUTION INTRAVENOUS
Refills: 0 | Status: DISCONTINUED | OUTPATIENT
Start: 2024-06-23 | End: 2024-06-24

## 2024-06-23 RX ADMIN — HYDRALAZINE HYDROCHLORIDE 25 MILLIGRAM(S): 50 TABLET ORAL at 22:05

## 2024-06-23 RX ADMIN — INSULIN LISPRO 4 UNIT(S): 100 INJECTION, SOLUTION SUBCUTANEOUS at 08:09

## 2024-06-23 RX ADMIN — Medication 2 PUFF(S): at 05:20

## 2024-06-23 RX ADMIN — Medication 1 APPLICATION(S): at 12:12

## 2024-06-23 RX ADMIN — INSULIN LISPRO 3: 100 INJECTION, SOLUTION SUBCUTANEOUS at 08:04

## 2024-06-23 RX ADMIN — SPIRONOLACTONE 25 MILLIGRAM(S): 25 TABLET ORAL at 05:21

## 2024-06-23 RX ADMIN — INSULIN LISPRO 10 UNIT(S): 100 INJECTION, SOLUTION SUBCUTANEOUS at 17:56

## 2024-06-23 RX ADMIN — Medication 975 MILLIGRAM(S): at 10:54

## 2024-06-23 RX ADMIN — INSULIN LISPRO 6: 100 INJECTION, SOLUTION SUBCUTANEOUS at 21:48

## 2024-06-23 RX ADMIN — HYDRALAZINE HYDROCHLORIDE 25 MILLIGRAM(S): 50 TABLET ORAL at 05:20

## 2024-06-23 RX ADMIN — INSULIN GLARGINE 30 UNIT(S): 100 INJECTION, SOLUTION SUBCUTANEOUS at 21:48

## 2024-06-23 RX ADMIN — Medication 2 PUFF(S): at 05:33

## 2024-06-23 RX ADMIN — INSULIN LISPRO 6: 100 INJECTION, SOLUTION SUBCUTANEOUS at 17:56

## 2024-06-23 RX ADMIN — ISOSORBIDE MONONITRATE 60 MILLIGRAM(S): 30 TABLET, EXTENDED RELEASE ORAL at 12:09

## 2024-06-23 RX ADMIN — CARVEDILOL PHOSPHATE 25 MILLIGRAM(S): 80 CAPSULE, EXTENDED RELEASE ORAL at 05:20

## 2024-06-23 RX ADMIN — SACUBITRIL AND VALSARTAN 1 TABLET(S): 97; 103 TABLET, FILM COATED ORAL at 05:20

## 2024-06-23 RX ADMIN — CARVEDILOL PHOSPHATE 25 MILLIGRAM(S): 80 CAPSULE, EXTENDED RELEASE ORAL at 18:23

## 2024-06-23 RX ADMIN — HYDRALAZINE HYDROCHLORIDE 25 MILLIGRAM(S): 50 TABLET ORAL at 16:52

## 2024-06-23 RX ADMIN — INSULIN LISPRO 2: 100 INJECTION, SOLUTION SUBCUTANEOUS at 12:10

## 2024-06-23 RX ADMIN — ATORVASTATIN CALCIUM 40 MILLIGRAM(S): 20 TABLET, FILM COATED ORAL at 21:49

## 2024-06-23 RX ADMIN — BUMETANIDE 2 MILLIGRAM(S): 0.25 INJECTION INTRAMUSCULAR; INTRAVENOUS at 05:21

## 2024-06-23 RX ADMIN — Medication 975 MILLIGRAM(S): at 11:54

## 2024-06-23 RX ADMIN — INSULIN LISPRO 4 UNIT(S): 100 INJECTION, SOLUTION SUBCUTANEOUS at 12:10

## 2024-06-23 RX ADMIN — SACUBITRIL AND VALSARTAN 1 TABLET(S): 97; 103 TABLET, FILM COATED ORAL at 19:08

## 2024-06-24 ENCOUNTER — TRANSCRIPTION ENCOUNTER (OUTPATIENT)
Age: 44
End: 2024-06-24

## 2024-06-24 VITALS — WEIGHT: 251.77 LBS

## 2024-06-24 LAB
ANION GAP SERPL CALC-SCNC: 12 MMOL/L — SIGNIFICANT CHANGE UP (ref 5–17)
BUN SERPL-MCNC: 18 MG/DL — SIGNIFICANT CHANGE UP (ref 7–23)
CALCIUM SERPL-MCNC: 9.1 MG/DL — SIGNIFICANT CHANGE UP (ref 8.4–10.5)
CHLORIDE SERPL-SCNC: 99 MMOL/L — SIGNIFICANT CHANGE UP (ref 96–108)
CO2 SERPL-SCNC: 28 MMOL/L — SIGNIFICANT CHANGE UP (ref 22–31)
CREAT SERPL-MCNC: 0.98 MG/DL — SIGNIFICANT CHANGE UP (ref 0.5–1.3)
EGFR: 97 ML/MIN/1.73M2 — SIGNIFICANT CHANGE UP
GLUCOSE BLDC GLUCOMTR-MCNC: 293 MG/DL — HIGH (ref 70–99)
GLUCOSE SERPL-MCNC: 270 MG/DL — HIGH (ref 70–99)
POTASSIUM SERPL-MCNC: 4.3 MMOL/L — SIGNIFICANT CHANGE UP (ref 3.5–5.3)
POTASSIUM SERPL-SCNC: 4.3 MMOL/L — SIGNIFICANT CHANGE UP (ref 3.5–5.3)
SODIUM SERPL-SCNC: 139 MMOL/L — SIGNIFICANT CHANGE UP (ref 135–145)

## 2024-06-24 PROCEDURE — 83036 HEMOGLOBIN GLYCOSYLATED A1C: CPT

## 2024-06-24 PROCEDURE — 80053 COMPREHEN METABOLIC PANEL: CPT

## 2024-06-24 PROCEDURE — 96374 THER/PROPH/DIAG INJ IV PUSH: CPT

## 2024-06-24 PROCEDURE — 36415 COLL VENOUS BLD VENIPUNCTURE: CPT

## 2024-06-24 PROCEDURE — 99239 HOSP IP/OBS DSCHRG MGMT >30: CPT

## 2024-06-24 PROCEDURE — 82962 GLUCOSE BLOOD TEST: CPT

## 2024-06-24 PROCEDURE — 84484 ASSAY OF TROPONIN QUANT: CPT

## 2024-06-24 PROCEDURE — 94640 AIRWAY INHALATION TREATMENT: CPT

## 2024-06-24 PROCEDURE — 87640 STAPH A DNA AMP PROBE: CPT

## 2024-06-24 PROCEDURE — 85025 COMPLETE CBC W/AUTO DIFF WBC: CPT

## 2024-06-24 PROCEDURE — 99285 EMERGENCY DEPT VISIT HI MDM: CPT

## 2024-06-24 PROCEDURE — 71046 X-RAY EXAM CHEST 2 VIEWS: CPT

## 2024-06-24 PROCEDURE — 87641 MR-STAPH DNA AMP PROBE: CPT

## 2024-06-24 PROCEDURE — 83880 ASSAY OF NATRIURETIC PEPTIDE: CPT

## 2024-06-24 PROCEDURE — 80048 BASIC METABOLIC PNL TOTAL CA: CPT

## 2024-06-24 PROCEDURE — 85027 COMPLETE CBC AUTOMATED: CPT

## 2024-06-24 PROCEDURE — 96375 TX/PRO/DX INJ NEW DRUG ADDON: CPT

## 2024-06-24 PROCEDURE — 84100 ASSAY OF PHOSPHORUS: CPT

## 2024-06-24 PROCEDURE — 83735 ASSAY OF MAGNESIUM: CPT

## 2024-06-24 PROCEDURE — 80061 LIPID PANEL: CPT

## 2024-06-24 PROCEDURE — 84443 ASSAY THYROID STIM HORMONE: CPT

## 2024-06-24 RX ORDER — BUMETANIDE 0.25 MG/ML
1 INJECTION INTRAMUSCULAR; INTRAVENOUS
Qty: 60 | Refills: 0
Start: 2024-06-24 | End: 2024-07-23

## 2024-06-24 RX ORDER — SACUBITRIL AND VALSARTAN 97; 103 MG/1; MG/1
1 TABLET, FILM COATED ORAL
Qty: 60 | Refills: 0
Start: 2024-06-24 | End: 2024-07-23

## 2024-06-24 RX ORDER — EMPAGLIFLOZIN 25 MG/1
1 TABLET, FILM COATED ORAL
Qty: 30 | Refills: 0
Start: 2024-06-24 | End: 2024-07-23

## 2024-06-24 RX ORDER — ALBUTEROL 90 MCG
2 AEROSOL REFILL (GRAM) INHALATION
Qty: 30 | Refills: 0
Start: 2024-06-24 | End: 2024-07-23

## 2024-06-24 RX ORDER — BUDESONIDE/FORMOTEROL FUMARATE 160-4.5MCG
2 HFA AEROSOL WITH ADAPTER (GRAM) INHALATION
Qty: 1 | Refills: 0
Start: 2024-06-24 | End: 2024-07-23

## 2024-06-24 RX ORDER — ISOSORBIDE MONONITRATE 30 MG/1
1 TABLET, EXTENDED RELEASE ORAL
Qty: 30 | Refills: 0
Start: 2024-06-24 | End: 2024-07-23

## 2024-06-24 RX ORDER — GLIPIZIDE/METFORMIN HCL 2.5-250 MG
2 TABLET ORAL
Qty: 120 | Refills: 0
Start: 2024-06-24 | End: 2024-07-23

## 2024-06-24 RX ORDER — BUDESONIDE/FORMOTEROL FUMARATE 160-4.5MCG
2 HFA AEROSOL WITH ADAPTER (GRAM) INHALATION
Qty: 30 | Refills: 0
Start: 2024-06-24 | End: 2024-07-23

## 2024-06-24 RX ORDER — METFORMIN HYDROCHLORIDE 850 MG/1
1 TABLET, FILM COATED ORAL
Qty: 60 | Refills: 0
Start: 2024-06-24 | End: 2024-07-23

## 2024-06-24 RX ORDER — SEMAGLUTIDE 1.34 MG/ML
1 INJECTION, SOLUTION SUBCUTANEOUS
Qty: 30 | Refills: 0
Start: 2024-06-24 | End: 2024-07-23

## 2024-06-24 RX ORDER — ATORVASTATIN CALCIUM 20 MG/1
1 TABLET, FILM COATED ORAL
Qty: 30 | Refills: 0
Start: 2024-06-24 | End: 2024-07-23

## 2024-06-24 RX ORDER — SPIRONOLACTONE 25 MG/1
1 TABLET ORAL
Qty: 30 | Refills: 0
Start: 2024-06-24 | End: 2024-07-23

## 2024-06-24 RX ORDER — HYDRALAZINE HYDROCHLORIDE 50 MG/1
1 TABLET ORAL
Qty: 90 | Refills: 0
Start: 2024-06-24 | End: 2024-07-23

## 2024-06-24 RX ORDER — CARVEDILOL PHOSPHATE 80 MG/1
1 CAPSULE, EXTENDED RELEASE ORAL
Qty: 60 | Refills: 0
Start: 2024-06-24 | End: 2024-07-23

## 2024-06-24 RX ADMIN — Medication 2 PUFF(S): at 06:03

## 2024-06-24 RX ADMIN — BUMETANIDE 2 MILLIGRAM(S): 0.25 INJECTION INTRAMUSCULAR; INTRAVENOUS at 06:04

## 2024-06-24 RX ADMIN — INSULIN LISPRO 6: 100 INJECTION, SOLUTION SUBCUTANEOUS at 07:48

## 2024-06-24 RX ADMIN — INSULIN LISPRO 10 UNIT(S): 100 INJECTION, SOLUTION SUBCUTANEOUS at 07:48

## 2024-06-24 RX ADMIN — HYDRALAZINE HYDROCHLORIDE 25 MILLIGRAM(S): 50 TABLET ORAL at 06:04

## 2024-06-24 RX ADMIN — CARVEDILOL PHOSPHATE 25 MILLIGRAM(S): 80 CAPSULE, EXTENDED RELEASE ORAL at 06:04

## 2024-06-24 RX ADMIN — SPIRONOLACTONE 25 MILLIGRAM(S): 25 TABLET ORAL at 06:04

## 2024-06-24 RX ADMIN — SACUBITRIL AND VALSARTAN 1 TABLET(S): 97; 103 TABLET, FILM COATED ORAL at 06:04

## 2024-07-16 ENCOUNTER — APPOINTMENT (OUTPATIENT)
Dept: HEART FAILURE | Facility: CLINIC | Age: 44
End: 2024-07-16

## 2024-08-20 DIAGNOSIS — I50.43 ACUTE ON CHRONIC COMBINED SYSTOLIC (CONGESTIVE) AND DIASTOLIC (CONGESTIVE) HEART FAILURE: ICD-10-CM

## 2024-08-20 RX ORDER — ISOSORBIDE MONONITRATE 60 MG/1
60 TABLET, EXTENDED RELEASE ORAL DAILY
Refills: 0 | Status: ACTIVE | COMMUNITY
Start: 2024-08-20

## 2024-08-20 RX ORDER — ATORVASTATIN CALCIUM 40 MG/1
40 TABLET, FILM COATED ORAL
Qty: 90 | Refills: 3 | Status: ACTIVE | COMMUNITY
Start: 2024-08-20

## 2024-08-20 RX ORDER — EMPAGLIFLOZIN 25 MG/1
25 TABLET, FILM COATED ORAL
Qty: 90 | Refills: 1 | Status: ACTIVE | COMMUNITY
Start: 2024-08-20

## 2024-09-03 ENCOUNTER — APPOINTMENT (OUTPATIENT)
Dept: HEART FAILURE | Facility: CLINIC | Age: 44
End: 2024-09-03

## 2024-09-03 NOTE — PROVIDER CONTACT NOTE (OTHER) - NAME OF MD/NP/PA/DO NOTIFIED:
[facility]   Internal Medicine     9/3/2024  Daren Green : 1967 Sex: male  Age:56 y.o.    Chief Complaint   Patient presents with    Follow-up     Routine 6 month f/u         HPI:   Patient presents to office for evaluation of the following medical concerns.     ortho walk in () -arthritis of the right shoulder previous x-rays negative during urgent care intra-articular cortisone injection with lidocaine and Kenalog home exercise program. States did not help. States self referred to ortho. Had MRI. States had right rotator cuff repair (2023). States in physical therapy. op () right shoulder arthroscopy with revision of subacromial decompression, distal clavicle resection, rotator cuff repair. States improved, still having decreased strength. Has had follow up (2024).      anesth notif () -signs of sleep apnea BMI snoring consider sleep study. Declines evaluation 9/3/2024    - Cardiac monitor (3/22) -this rhythm, super ventricular ectopy, ventricular premature beats rare. On metoprolol     - States has chronic allergies. States taking allavert as needed. Off singulair, Has seen allergy in the past. still has nasal congestion and rhinorrhea. No ear pain. No facial pressure. No fever of chills. No chest pain, SOB. States cough. Has ProAir inhaler if needed, uses sparingly.    - States erectile issues are stable. States having difficulty maintaining erection. Stable.    - States has Diabetes. Follows with endocrinology. On Basaglar and glipizide and metformin tid and faxiga and ozempic. States some hypoglycemia. States follows with optho and podiatry exam. States sugar running 120-140. All < 200. States walking alot.  last visit with endocrinology per reviewed note () - A1c 8.6, Lantus 50 units, Farxiga, metformin, glipizide, Ozempic.  Has been off Ozempic due to supply changes recently restarted if sugar still increased with restarting Ozempic increase Lantus to 55 units. 
Rachana Ivan ACP
Luci Sandy

## 2024-10-02 NOTE — PROGRESS NOTE ADULT - REASON FOR ADMISSION
Chest pain, Rt hand pain
No

## 2024-10-03 ENCOUNTER — INPATIENT (INPATIENT)
Facility: HOSPITAL | Age: 44
LOS: 1 days | Discharge: LEFT BEFORE DISCHARGE | DRG: 293 | End: 2024-10-05
Attending: INTERNAL MEDICINE | Admitting: STUDENT IN AN ORGANIZED HEALTH CARE EDUCATION/TRAINING PROGRAM
Payer: MEDICAID

## 2024-10-03 VITALS
RESPIRATION RATE: 18 BRPM | TEMPERATURE: 98 F | SYSTOLIC BLOOD PRESSURE: 176 MMHG | HEART RATE: 115 BPM | HEIGHT: 71 IN | WEIGHT: 240.08 LBS | OXYGEN SATURATION: 95 % | DIASTOLIC BLOOD PRESSURE: 106 MMHG

## 2024-10-03 DIAGNOSIS — I50.9 HEART FAILURE, UNSPECIFIED: ICD-10-CM

## 2024-10-03 LAB
ADD ON TEST-SPECIMEN IN LAB: SIGNIFICANT CHANGE UP
ALBUMIN SERPL ELPH-MCNC: 3.8 G/DL — SIGNIFICANT CHANGE UP (ref 3.3–5)
ALP SERPL-CCNC: 88 U/L — SIGNIFICANT CHANGE UP (ref 40–120)
ALT FLD-CCNC: 31 U/L — SIGNIFICANT CHANGE UP (ref 10–45)
ANION GAP SERPL CALC-SCNC: 12 MMOL/L — SIGNIFICANT CHANGE UP (ref 5–17)
ANION GAP SERPL CALC-SCNC: 12 MMOL/L — SIGNIFICANT CHANGE UP (ref 5–17)
APTT BLD: 29.8 SEC — SIGNIFICANT CHANGE UP (ref 24.5–35.6)
AST SERPL-CCNC: 18 U/L — SIGNIFICANT CHANGE UP (ref 10–40)
BASOPHILS # BLD AUTO: 0.04 K/UL — SIGNIFICANT CHANGE UP (ref 0–0.2)
BASOPHILS NFR BLD AUTO: 0.4 % — SIGNIFICANT CHANGE UP (ref 0–2)
BILIRUB SERPL-MCNC: 0.5 MG/DL — SIGNIFICANT CHANGE UP (ref 0.2–1.2)
BUN SERPL-MCNC: 14 MG/DL — SIGNIFICANT CHANGE UP (ref 7–23)
BUN SERPL-MCNC: 15 MG/DL — SIGNIFICANT CHANGE UP (ref 7–23)
CALCIUM SERPL-MCNC: 9 MG/DL — SIGNIFICANT CHANGE UP (ref 8.4–10.5)
CALCIUM SERPL-MCNC: 9.2 MG/DL — SIGNIFICANT CHANGE UP (ref 8.4–10.5)
CHLORIDE SERPL-SCNC: 100 MMOL/L — SIGNIFICANT CHANGE UP (ref 96–108)
CHLORIDE SERPL-SCNC: 101 MMOL/L — SIGNIFICANT CHANGE UP (ref 96–108)
CO2 SERPL-SCNC: 24 MMOL/L — SIGNIFICANT CHANGE UP (ref 22–31)
CO2 SERPL-SCNC: 24 MMOL/L — SIGNIFICANT CHANGE UP (ref 22–31)
CREAT SERPL-MCNC: 0.83 MG/DL — SIGNIFICANT CHANGE UP (ref 0.5–1.3)
CREAT SERPL-MCNC: 0.88 MG/DL — SIGNIFICANT CHANGE UP (ref 0.5–1.3)
EGFR: 108 ML/MIN/1.73M2 — SIGNIFICANT CHANGE UP
EGFR: 108 ML/MIN/1.73M2 — SIGNIFICANT CHANGE UP
EGFR: 110 ML/MIN/1.73M2 — SIGNIFICANT CHANGE UP
EGFR: 110 ML/MIN/1.73M2 — SIGNIFICANT CHANGE UP
EOSINOPHIL # BLD AUTO: 0.1 K/UL — SIGNIFICANT CHANGE UP (ref 0–0.5)
EOSINOPHIL NFR BLD AUTO: 1 % — SIGNIFICANT CHANGE UP (ref 0–6)
GAS PNL BLDV: SIGNIFICANT CHANGE UP
GLUCOSE BLDC GLUCOMTR-MCNC: 271 MG/DL — HIGH (ref 70–99)
GLUCOSE SERPL-MCNC: 325 MG/DL — HIGH (ref 70–99)
GLUCOSE SERPL-MCNC: 407 MG/DL — HIGH (ref 70–99)
HCT VFR BLD CALC: 47.3 % — SIGNIFICANT CHANGE UP (ref 39–50)
HGB BLD-MCNC: 15.6 G/DL — SIGNIFICANT CHANGE UP (ref 13–17)
IMM GRANULOCYTES NFR BLD AUTO: 0.4 % — SIGNIFICANT CHANGE UP (ref 0–0.9)
LYMPHOCYTES # BLD AUTO: 1.91 K/UL — SIGNIFICANT CHANGE UP (ref 1–3.3)
LYMPHOCYTES # BLD AUTO: 19.9 % — SIGNIFICANT CHANGE UP (ref 13–44)
MAGNESIUM SERPL-MCNC: 1.7 MG/DL — SIGNIFICANT CHANGE UP (ref 1.6–2.6)
MCHC RBC-ENTMCNC: 29.5 PG — SIGNIFICANT CHANGE UP (ref 27–34)
MCHC RBC-ENTMCNC: 33 GM/DL — SIGNIFICANT CHANGE UP (ref 32–36)
MCV RBC AUTO: 89.6 FL — SIGNIFICANT CHANGE UP (ref 80–100)
MONOCYTES # BLD AUTO: 0.59 K/UL — SIGNIFICANT CHANGE UP (ref 0–0.9)
MONOCYTES NFR BLD AUTO: 6.1 % — SIGNIFICANT CHANGE UP (ref 2–14)
NEUTROPHILS # BLD AUTO: 6.94 K/UL — SIGNIFICANT CHANGE UP (ref 1.8–7.4)
NEUTROPHILS NFR BLD AUTO: 72.2 % — SIGNIFICANT CHANGE UP (ref 43–77)
NRBC # BLD: 0 /100 WBCS — SIGNIFICANT CHANGE UP (ref 0–0)
NRBC BLD-RTO: 0 /100 WBCS — SIGNIFICANT CHANGE UP (ref 0–0)
NT-PROBNP SERPL-SCNC: 1778 PG/ML — HIGH (ref 0–300)
PHOSPHATE SERPL-MCNC: 2.8 MG/DL — SIGNIFICANT CHANGE UP (ref 2.5–4.5)
PLATELET # BLD AUTO: 196 K/UL — SIGNIFICANT CHANGE UP (ref 150–400)
POTASSIUM SERPL-MCNC: 4.4 MMOL/L — SIGNIFICANT CHANGE UP (ref 3.5–5.3)
POTASSIUM SERPL-MCNC: 4.5 MMOL/L — SIGNIFICANT CHANGE UP (ref 3.5–5.3)
POTASSIUM SERPL-SCNC: 4.4 MMOL/L — SIGNIFICANT CHANGE UP (ref 3.5–5.3)
POTASSIUM SERPL-SCNC: 4.5 MMOL/L — SIGNIFICANT CHANGE UP (ref 3.5–5.3)
PROT SERPL-MCNC: 7.2 G/DL — SIGNIFICANT CHANGE UP (ref 6–8.3)
RBC # BLD: 5.28 M/UL — SIGNIFICANT CHANGE UP (ref 4.2–5.8)
RBC # FLD: 13.9 % — SIGNIFICANT CHANGE UP (ref 10.3–14.5)
SODIUM SERPL-SCNC: 136 MMOL/L — SIGNIFICANT CHANGE UP (ref 135–145)
SODIUM SERPL-SCNC: 137 MMOL/L — SIGNIFICANT CHANGE UP (ref 135–145)
TROPONIN T, HIGH SENSITIVITY RESULT: 169 NG/L — HIGH (ref 0–51)
TROPONIN T, HIGH SENSITIVITY RESULT: 177 NG/L — HIGH (ref 0–51)
WBC # BLD: 9.62 K/UL — SIGNIFICANT CHANGE UP (ref 3.8–10.5)
WBC # FLD AUTO: 9.62 K/UL — SIGNIFICANT CHANGE UP (ref 3.8–10.5)

## 2024-10-03 PROCEDURE — 74019 RADEX ABDOMEN 2 VIEWS: CPT | Mod: 26

## 2024-10-03 PROCEDURE — 71045 X-RAY EXAM CHEST 1 VIEW: CPT | Mod: 26

## 2024-10-03 PROCEDURE — 99285 EMERGENCY DEPT VISIT HI MDM: CPT

## 2024-10-03 PROCEDURE — 99223 1ST HOSP IP/OBS HIGH 75: CPT

## 2024-10-03 PROCEDURE — 99223 1ST HOSP IP/OBS HIGH 75: CPT | Mod: GC

## 2024-10-03 RX ORDER — SODIUM CHLORIDE 9 G/1000ML
1000 INJECTION, SOLUTION INTRAVENOUS
Refills: 0 | Status: DISCONTINUED | OUTPATIENT
Start: 2024-10-03 | End: 2024-10-05

## 2024-10-03 RX ORDER — DEXTROSE 50 % IN WATER 50 %
12.5 SYRINGE (ML) INTRAVENOUS ONCE
Refills: 0 | Status: DISCONTINUED | OUTPATIENT
Start: 2024-10-03 | End: 2024-10-03

## 2024-10-03 RX ORDER — HEPARIN SODIUM 1000 [USP'U]/ML
5000 INJECTION INTRAVENOUS; SUBCUTANEOUS ONCE
Refills: 0 | Status: DISCONTINUED | OUTPATIENT
Start: 2024-10-03 | End: 2024-10-03

## 2024-10-03 RX ORDER — DEXTROSE 50 % IN WATER 50 %
25 SYRINGE (ML) INTRAVENOUS ONCE
Refills: 0 | Status: DISCONTINUED | OUTPATIENT
Start: 2024-10-03 | End: 2024-10-05

## 2024-10-03 RX ORDER — FUROSEMIDE 10 MG/ML
80 INJECTION INTRAMUSCULAR; INTRAVENOUS ONCE
Refills: 0 | Status: DISCONTINUED | OUTPATIENT
Start: 2024-10-03 | End: 2024-10-03

## 2024-10-03 RX ORDER — INSULIN LISPRO 100 U/ML
INJECTION, SOLUTION INTRAVENOUS; SUBCUTANEOUS AT BEDTIME
Refills: 0 | Status: DISCONTINUED | OUTPATIENT
Start: 2024-10-03 | End: 2024-10-05

## 2024-10-03 RX ORDER — INSULIN GLARGINE-YFGN 100 [IU]/ML
30 INJECTION, SOLUTION SUBCUTANEOUS ONCE
Refills: 0 | Status: COMPLETED | OUTPATIENT
Start: 2024-10-03 | End: 2024-10-03

## 2024-10-03 RX ORDER — HEPARIN SODIUM 1000 [USP'U]/ML
INJECTION INTRAVENOUS; SUBCUTANEOUS
Qty: 25000 | Refills: 0 | Status: DISCONTINUED | OUTPATIENT
Start: 2024-10-03 | End: 2024-10-03

## 2024-10-03 RX ORDER — SODIUM CHLORIDE 9 G/1000ML
1000 INJECTION, SOLUTION INTRAVENOUS
Refills: 0 | Status: DISCONTINUED | OUTPATIENT
Start: 2024-10-03 | End: 2024-10-03

## 2024-10-03 RX ORDER — BUMETANIDE 1 MG/1
1 TABLET ORAL ONCE
Refills: 0 | Status: COMPLETED | OUTPATIENT
Start: 2024-10-03 | End: 2024-10-03

## 2024-10-03 RX ORDER — HEPARIN SODIUM 1000 [USP'U]/ML
6000 INJECTION INTRAVENOUS; SUBCUTANEOUS EVERY 6 HOURS
Refills: 0 | Status: DISCONTINUED | OUTPATIENT
Start: 2024-10-03 | End: 2024-10-03

## 2024-10-03 RX ORDER — INSULIN LISPRO 100 U/ML
INJECTION, SOLUTION INTRAVENOUS; SUBCUTANEOUS
Refills: 0 | Status: DISCONTINUED | OUTPATIENT
Start: 2024-10-03 | End: 2024-10-03

## 2024-10-03 RX ORDER — DEXTROSE 50 % IN WATER 50 %
15 SYRINGE (ML) INTRAVENOUS ONCE
Refills: 0 | Status: DISCONTINUED | OUTPATIENT
Start: 2024-10-03 | End: 2024-10-03

## 2024-10-03 RX ORDER — ACETAMINOPHEN 500 MG/5ML
650 LIQUID (ML) ORAL EVERY 6 HOURS
Refills: 0 | Status: DISCONTINUED | OUTPATIENT
Start: 2024-10-03 | End: 2024-10-05

## 2024-10-03 RX ORDER — GLUCAGON 3 MG/1
1 POWDER NASAL ONCE
Refills: 0 | Status: DISCONTINUED | OUTPATIENT
Start: 2024-10-03 | End: 2024-10-03

## 2024-10-03 RX ORDER — DEXTROSE 50 % IN WATER 50 %
25 SYRINGE (ML) INTRAVENOUS ONCE
Refills: 0 | Status: DISCONTINUED | OUTPATIENT
Start: 2024-10-03 | End: 2024-10-03

## 2024-10-03 RX ORDER — DEXTROSE 50 % IN WATER 50 %
15 SYRINGE (ML) INTRAVENOUS ONCE
Refills: 0 | Status: DISCONTINUED | OUTPATIENT
Start: 2024-10-03 | End: 2024-10-05

## 2024-10-03 RX ORDER — ASPIRIN 325 MG
324 TABLET ORAL ONCE
Refills: 0 | Status: COMPLETED | OUTPATIENT
Start: 2024-10-03 | End: 2024-10-03

## 2024-10-03 RX ORDER — INFLUENZA A VIRUS A/IDAHO/07/2018 (H1N1) ANTIGEN (MDCK CELL DERIVED, PROPIOLACTONE INACTIVATED, INFLUENZA A VIRUS A/INDIANA/08/2018 (H3N2) ANTIGEN (MDCK CELL DERIVED, PROPIOLACTONE INACTIVATED), INFLUENZA B VIRUS B/SINGAPORE/INFTT-16-0610/2016 ANTIGEN (MDCK CELL DERIVED, PROPIOLACTONE INACTIVATED), INFLUENZA B VIRUS B/IOWA/06/2017 ANTIGEN (MDCK CELL DERIVED, PROPIOLACTONE INACTIVATED) 15; 15; 15; 15 UG/.5ML; UG/.5ML; UG/.5ML; UG/.5ML
0.5 INJECTION, SUSPENSION INTRAMUSCULAR ONCE
Refills: 0 | Status: DISCONTINUED | OUTPATIENT
Start: 2024-10-03 | End: 2024-10-05

## 2024-10-03 RX ORDER — INSULIN GLARGINE-YFGN 100 [IU]/ML
30 INJECTION, SOLUTION SUBCUTANEOUS AT BEDTIME
Refills: 0 | Status: DISCONTINUED | OUTPATIENT
Start: 2024-10-04 | End: 2024-10-05

## 2024-10-03 RX ORDER — POLYETHYLENE GLYCOL 3350 17 G/17G
17 POWDER, FOR SOLUTION ORAL ONCE
Refills: 0 | Status: COMPLETED | OUTPATIENT
Start: 2024-10-03 | End: 2024-10-03

## 2024-10-03 RX ORDER — SENNA 187 MG
2 TABLET ORAL ONCE
Refills: 0 | Status: COMPLETED | OUTPATIENT
Start: 2024-10-03 | End: 2024-10-03

## 2024-10-03 RX ORDER — CLOPIDOGREL BISULFATE 75 MG/1
300 TABLET, FILM COATED ORAL ONCE
Refills: 0 | Status: DISCONTINUED | OUTPATIENT
Start: 2024-10-03 | End: 2024-10-03

## 2024-10-03 RX ORDER — INSULIN LISPRO 100 U/ML
10 INJECTION, SOLUTION INTRAVENOUS; SUBCUTANEOUS
Refills: 0 | Status: DISCONTINUED | OUTPATIENT
Start: 2024-10-03 | End: 2024-10-05

## 2024-10-03 RX ORDER — INSULIN LISPRO 100 U/ML
INJECTION, SOLUTION INTRAVENOUS; SUBCUTANEOUS
Refills: 0 | Status: DISCONTINUED | OUTPATIENT
Start: 2024-10-03 | End: 2024-10-05

## 2024-10-03 RX ORDER — POLYETHYLENE GLYCOL 3350 17 G/17G
17 POWDER, FOR SOLUTION ORAL DAILY
Refills: 0 | Status: DISCONTINUED | OUTPATIENT
Start: 2024-10-03 | End: 2024-10-05

## 2024-10-03 RX ORDER — DEXTROSE 50 % IN WATER 50 %
12.5 SYRINGE (ML) INTRAVENOUS ONCE
Refills: 0 | Status: DISCONTINUED | OUTPATIENT
Start: 2024-10-03 | End: 2024-10-05

## 2024-10-03 RX ORDER — ACETAMINOPHEN 500 MG/5ML
1000 LIQUID (ML) ORAL ONCE
Refills: 0 | Status: COMPLETED | OUTPATIENT
Start: 2024-10-03 | End: 2024-10-03

## 2024-10-03 RX ORDER — SENNA 187 MG
2 TABLET ORAL AT BEDTIME
Refills: 0 | Status: DISCONTINUED | OUTPATIENT
Start: 2024-10-03 | End: 2024-10-05

## 2024-10-03 RX ORDER — INSULIN LISPRO 100 U/ML
INJECTION, SOLUTION INTRAVENOUS; SUBCUTANEOUS AT BEDTIME
Refills: 0 | Status: DISCONTINUED | OUTPATIENT
Start: 2024-10-03 | End: 2024-10-03

## 2024-10-03 RX ORDER — GLUCAGON 3 MG/1
1 POWDER NASAL ONCE
Refills: 0 | Status: DISCONTINUED | OUTPATIENT
Start: 2024-10-03 | End: 2024-10-05

## 2024-10-03 RX ADMIN — Medication 1000 MILLIGRAM(S): at 23:04

## 2024-10-03 RX ADMIN — POLYETHYLENE GLYCOL 3350 17 GRAM(S): 17 POWDER, FOR SOLUTION ORAL at 15:12

## 2024-10-03 RX ADMIN — Medication 400 MILLIGRAM(S): at 22:04

## 2024-10-03 RX ADMIN — BUMETANIDE 1 MILLIGRAM(S): 1 TABLET ORAL at 16:47

## 2024-10-03 RX ADMIN — Medication 2 TABLET(S): at 15:12

## 2024-10-03 RX ADMIN — INSULIN LISPRO 1: 100 INJECTION, SOLUTION INTRAVENOUS; SUBCUTANEOUS at 22:46

## 2024-10-03 RX ADMIN — Medication 324 MILLIGRAM(S): at 16:47

## 2024-10-04 DIAGNOSIS — I10 ESSENTIAL (PRIMARY) HYPERTENSION: ICD-10-CM

## 2024-10-04 DIAGNOSIS — K59.00 CONSTIPATION, UNSPECIFIED: ICD-10-CM

## 2024-10-04 DIAGNOSIS — E78.5 HYPERLIPIDEMIA, UNSPECIFIED: ICD-10-CM

## 2024-10-04 DIAGNOSIS — E11.9 TYPE 2 DIABETES MELLITUS WITHOUT COMPLICATIONS: ICD-10-CM

## 2024-10-04 DIAGNOSIS — I50.23 ACUTE ON CHRONIC SYSTOLIC (CONGESTIVE) HEART FAILURE: ICD-10-CM

## 2024-10-04 DIAGNOSIS — I25.10 ATHEROSCLEROTIC HEART DISEASE OF NATIVE CORONARY ARTERY WITHOUT ANGINA PECTORIS: ICD-10-CM

## 2024-10-04 DIAGNOSIS — Z79.899 OTHER LONG TERM (CURRENT) DRUG THERAPY: ICD-10-CM

## 2024-10-04 DIAGNOSIS — Z29.9 ENCOUNTER FOR PROPHYLACTIC MEASURES, UNSPECIFIED: ICD-10-CM

## 2024-10-04 LAB
A1C WITH ESTIMATED AVERAGE GLUCOSE RESULT: 11 % — HIGH (ref 4–5.6)
ANION GAP SERPL CALC-SCNC: 12 MMOL/L — SIGNIFICANT CHANGE UP (ref 5–17)
BUN SERPL-MCNC: 17 MG/DL — SIGNIFICANT CHANGE UP (ref 7–23)
CALCIUM SERPL-MCNC: 8.9 MG/DL — SIGNIFICANT CHANGE UP (ref 8.4–10.5)
CHLORIDE SERPL-SCNC: 99 MMOL/L — SIGNIFICANT CHANGE UP (ref 96–108)
CHOLEST SERPL-MCNC: 149 MG/DL — SIGNIFICANT CHANGE UP
CO2 SERPL-SCNC: 24 MMOL/L — SIGNIFICANT CHANGE UP (ref 22–31)
CREAT SERPL-MCNC: 0.87 MG/DL — SIGNIFICANT CHANGE UP (ref 0.5–1.3)
EGFR: 108 ML/MIN/1.73M2 — SIGNIFICANT CHANGE UP
EGFR: 108 ML/MIN/1.73M2 — SIGNIFICANT CHANGE UP
ESTIMATED AVERAGE GLUCOSE: 269 MG/DL — HIGH (ref 68–114)
GLUCOSE BLDC GLUCOMTR-MCNC: 186 MG/DL — HIGH (ref 70–99)
GLUCOSE BLDC GLUCOMTR-MCNC: 212 MG/DL — HIGH (ref 70–99)
GLUCOSE BLDC GLUCOMTR-MCNC: 286 MG/DL — HIGH (ref 70–99)
GLUCOSE BLDC GLUCOMTR-MCNC: 407 MG/DL — HIGH (ref 70–99)
GLUCOSE SERPL-MCNC: 375 MG/DL — HIGH (ref 70–99)
HCT VFR BLD CALC: 48 % — SIGNIFICANT CHANGE UP (ref 39–50)
HDLC SERPL-MCNC: 33 MG/DL — LOW
HGB BLD-MCNC: 15.7 G/DL — SIGNIFICANT CHANGE UP (ref 13–17)
LDLC SERPL-MCNC: 82 MG/DL — SIGNIFICANT CHANGE UP
LIPID PNL WITH DIRECT LDL SERPL: 82 MG/DL — SIGNIFICANT CHANGE UP
MAGNESIUM SERPL-MCNC: 1.8 MG/DL — SIGNIFICANT CHANGE UP (ref 1.6–2.6)
MCHC RBC-ENTMCNC: 29.5 PG — SIGNIFICANT CHANGE UP (ref 27–34)
MCHC RBC-ENTMCNC: 32.7 GM/DL — SIGNIFICANT CHANGE UP (ref 32–36)
MCV RBC AUTO: 90.2 FL — SIGNIFICANT CHANGE UP (ref 80–100)
NONHDLC SERPL-MCNC: 116 MG/DL — SIGNIFICANT CHANGE UP
NRBC # BLD: 0 /100 WBCS — SIGNIFICANT CHANGE UP (ref 0–0)
NRBC BLD-RTO: 0 /100 WBCS — SIGNIFICANT CHANGE UP (ref 0–0)
PHOSPHATE SERPL-MCNC: 3.3 MG/DL — SIGNIFICANT CHANGE UP (ref 2.5–4.5)
PLATELET # BLD AUTO: 198 K/UL — SIGNIFICANT CHANGE UP (ref 150–400)
POTASSIUM SERPL-MCNC: 4.3 MMOL/L — SIGNIFICANT CHANGE UP (ref 3.5–5.3)
POTASSIUM SERPL-SCNC: 4.3 MMOL/L — SIGNIFICANT CHANGE UP (ref 3.5–5.3)
RBC # BLD: 5.32 M/UL — SIGNIFICANT CHANGE UP (ref 4.2–5.8)
RBC # FLD: 13.9 % — SIGNIFICANT CHANGE UP (ref 10.3–14.5)
SODIUM SERPL-SCNC: 135 MMOL/L — SIGNIFICANT CHANGE UP (ref 135–145)
TRIGL SERPL-MCNC: 200 MG/DL — HIGH
WBC # BLD: 9.18 K/UL — SIGNIFICANT CHANGE UP (ref 3.8–10.5)
WBC # FLD AUTO: 9.18 K/UL — SIGNIFICANT CHANGE UP (ref 3.8–10.5)

## 2024-10-04 PROCEDURE — 99233 SBSQ HOSP IP/OBS HIGH 50: CPT | Mod: GC

## 2024-10-04 PROCEDURE — 99233 SBSQ HOSP IP/OBS HIGH 50: CPT

## 2024-10-04 RX ORDER — CARVEDILOL 3.12 MG/1
25 TABLET, FILM COATED ORAL EVERY 12 HOURS
Refills: 0 | Status: DISCONTINUED | OUTPATIENT
Start: 2024-10-04 | End: 2024-10-05

## 2024-10-04 RX ORDER — ATORVASTATIN CALCIUM 80 MG/1
40 TABLET, FILM COATED ORAL AT BEDTIME
Refills: 0 | Status: DISCONTINUED | OUTPATIENT
Start: 2024-10-04 | End: 2024-10-05

## 2024-10-04 RX ORDER — ORAL SEMAGLUTIDE 14 MG/1
0 TABLET ORAL
Refills: 0 | DISCHARGE

## 2024-10-04 RX ORDER — BUMETANIDE 1 MG/1
1 TABLET ORAL
Refills: 0 | Status: DISCONTINUED | OUTPATIENT
Start: 2024-10-04 | End: 2024-10-05

## 2024-10-04 RX ORDER — SACUBITRIL AND VALSARTAN 6; 6 MG/1; MG/1
1 PELLET ORAL
Refills: 0 | Status: DISCONTINUED | OUTPATIENT
Start: 2024-10-04 | End: 2024-10-05

## 2024-10-04 RX ORDER — ENOXAPARIN SODIUM 100 MG/ML
40 INJECTION SUBCUTANEOUS EVERY 24 HOURS
Refills: 0 | Status: DISCONTINUED | OUTPATIENT
Start: 2024-10-04 | End: 2024-10-05

## 2024-10-04 RX ORDER — SPIRONOLACTONE 25 MG
25 TABLET ORAL DAILY
Refills: 0 | Status: DISCONTINUED | OUTPATIENT
Start: 2024-10-04 | End: 2024-10-05

## 2024-10-04 RX ADMIN — INSULIN LISPRO 2: 100 INJECTION, SOLUTION INTRAVENOUS; SUBCUTANEOUS at 21:45

## 2024-10-04 RX ADMIN — BUMETANIDE 1 MILLIGRAM(S): 1 TABLET ORAL at 05:39

## 2024-10-04 RX ADMIN — Medication 25 MILLIGRAM(S): at 13:55

## 2024-10-04 RX ADMIN — SACUBITRIL AND VALSARTAN 1 TABLET(S): 6; 6 PELLET ORAL at 17:29

## 2024-10-04 RX ADMIN — INSULIN LISPRO 10 UNIT(S): 100 INJECTION, SOLUTION INTRAVENOUS; SUBCUTANEOUS at 12:15

## 2024-10-04 RX ADMIN — CARVEDILOL 25 MILLIGRAM(S): 3.12 TABLET, FILM COATED ORAL at 05:39

## 2024-10-04 RX ADMIN — INSULIN LISPRO 10 UNIT(S): 100 INJECTION, SOLUTION INTRAVENOUS; SUBCUTANEOUS at 17:32

## 2024-10-04 RX ADMIN — Medication 25 MILLIGRAM(S): at 21:44

## 2024-10-04 RX ADMIN — INSULIN LISPRO 10 UNIT(S): 100 INJECTION, SOLUTION INTRAVENOUS; SUBCUTANEOUS at 07:58

## 2024-10-04 RX ADMIN — Medication 25 MILLIGRAM(S): at 13:46

## 2024-10-04 RX ADMIN — Medication 25 MILLIGRAM(S): at 05:39

## 2024-10-04 RX ADMIN — SACUBITRIL AND VALSARTAN 1 TABLET(S): 6; 6 PELLET ORAL at 05:39

## 2024-10-04 RX ADMIN — CARVEDILOL 25 MILLIGRAM(S): 3.12 TABLET, FILM COATED ORAL at 17:31

## 2024-10-04 RX ADMIN — BUMETANIDE 1 MILLIGRAM(S): 1 TABLET ORAL at 13:47

## 2024-10-04 RX ADMIN — INSULIN LISPRO 4: 100 INJECTION, SOLUTION INTRAVENOUS; SUBCUTANEOUS at 12:14

## 2024-10-04 RX ADMIN — ATORVASTATIN CALCIUM 40 MILLIGRAM(S): 80 TABLET, FILM COATED ORAL at 21:44

## 2024-10-04 RX ADMIN — INSULIN LISPRO 2: 100 INJECTION, SOLUTION INTRAVENOUS; SUBCUTANEOUS at 17:32

## 2024-10-04 RX ADMIN — INSULIN LISPRO 12: 100 INJECTION, SOLUTION INTRAVENOUS; SUBCUTANEOUS at 07:57

## 2024-10-04 RX ADMIN — Medication 650 MILLIGRAM(S): at 12:45

## 2024-10-04 RX ADMIN — Medication 2 TABLET(S): at 21:44

## 2024-10-04 RX ADMIN — INSULIN GLARGINE-YFGN 30 UNIT(S): 100 INJECTION, SOLUTION SUBCUTANEOUS at 21:45

## 2024-10-04 RX ADMIN — Medication 650 MILLIGRAM(S): at 12:12

## 2024-10-05 ENCOUNTER — TRANSCRIPTION ENCOUNTER (OUTPATIENT)
Age: 44
End: 2024-10-05

## 2024-10-05 ENCOUNTER — NON-APPOINTMENT (OUTPATIENT)
Age: 44
End: 2024-10-05

## 2024-10-05 VITALS
SYSTOLIC BLOOD PRESSURE: 131 MMHG | OXYGEN SATURATION: 97 % | RESPIRATION RATE: 18 BRPM | DIASTOLIC BLOOD PRESSURE: 72 MMHG | HEART RATE: 66 BPM

## 2024-10-05 LAB
ANION GAP SERPL CALC-SCNC: 11 MMOL/L — SIGNIFICANT CHANGE UP (ref 5–17)
BUN SERPL-MCNC: 22 MG/DL — SIGNIFICANT CHANGE UP (ref 7–23)
CALCIUM SERPL-MCNC: 9.2 MG/DL — SIGNIFICANT CHANGE UP (ref 8.4–10.5)
CHLORIDE SERPL-SCNC: 96 MMOL/L — SIGNIFICANT CHANGE UP (ref 96–108)
CO2 SERPL-SCNC: 26 MMOL/L — SIGNIFICANT CHANGE UP (ref 22–31)
CREAT SERPL-MCNC: 1.16 MG/DL — SIGNIFICANT CHANGE UP (ref 0.5–1.3)
EGFR: 79 ML/MIN/1.73M2 — SIGNIFICANT CHANGE UP
EGFR: 79 ML/MIN/1.73M2 — SIGNIFICANT CHANGE UP
GLUCOSE BLDC GLUCOMTR-MCNC: 359 MG/DL — HIGH (ref 70–99)
GLUCOSE BLDC GLUCOMTR-MCNC: 386 MG/DL — HIGH (ref 70–99)
GLUCOSE SERPL-MCNC: 345 MG/DL — HIGH (ref 70–99)
HCT VFR BLD CALC: 53.4 % — HIGH (ref 39–50)
HGB BLD-MCNC: 17.4 G/DL — HIGH (ref 13–17)
MCHC RBC-ENTMCNC: 29.1 PG — SIGNIFICANT CHANGE UP (ref 27–34)
MCHC RBC-ENTMCNC: 32.6 GM/DL — SIGNIFICANT CHANGE UP (ref 32–36)
MCV RBC AUTO: 89.3 FL — SIGNIFICANT CHANGE UP (ref 80–100)
NRBC # BLD: 0 /100 WBCS — SIGNIFICANT CHANGE UP (ref 0–0)
NRBC BLD-RTO: 0 /100 WBCS — SIGNIFICANT CHANGE UP (ref 0–0)
PLATELET # BLD AUTO: 208 K/UL — SIGNIFICANT CHANGE UP (ref 150–400)
POTASSIUM SERPL-MCNC: 4.4 MMOL/L — SIGNIFICANT CHANGE UP (ref 3.5–5.3)
POTASSIUM SERPL-SCNC: 4.4 MMOL/L — SIGNIFICANT CHANGE UP (ref 3.5–5.3)
RBC # BLD: 5.98 M/UL — HIGH (ref 4.2–5.8)
RBC # FLD: 14 % — SIGNIFICANT CHANGE UP (ref 10.3–14.5)
SODIUM SERPL-SCNC: 133 MMOL/L — LOW (ref 135–145)
WBC # BLD: 10.63 K/UL — HIGH (ref 3.8–10.5)
WBC # FLD AUTO: 10.63 K/UL — HIGH (ref 3.8–10.5)

## 2024-10-05 PROCEDURE — 99233 SBSQ HOSP IP/OBS HIGH 50: CPT

## 2024-10-05 PROCEDURE — 36415 COLL VENOUS BLD VENIPUNCTURE: CPT

## 2024-10-05 PROCEDURE — 85018 HEMOGLOBIN: CPT

## 2024-10-05 PROCEDURE — 85014 HEMATOCRIT: CPT

## 2024-10-05 PROCEDURE — 99285 EMERGENCY DEPT VISIT HI MDM: CPT | Mod: 25

## 2024-10-05 PROCEDURE — 80048 BASIC METABOLIC PNL TOTAL CA: CPT

## 2024-10-05 PROCEDURE — 82435 ASSAY OF BLOOD CHLORIDE: CPT

## 2024-10-05 PROCEDURE — 85730 THROMBOPLASTIN TIME PARTIAL: CPT

## 2024-10-05 PROCEDURE — 84295 ASSAY OF SERUM SODIUM: CPT

## 2024-10-05 PROCEDURE — 83880 ASSAY OF NATRIURETIC PEPTIDE: CPT

## 2024-10-05 PROCEDURE — 84100 ASSAY OF PHOSPHORUS: CPT

## 2024-10-05 PROCEDURE — 82330 ASSAY OF CALCIUM: CPT

## 2024-10-05 PROCEDURE — 80053 COMPREHEN METABOLIC PANEL: CPT

## 2024-10-05 PROCEDURE — 99232 SBSQ HOSP IP/OBS MODERATE 35: CPT | Mod: GC

## 2024-10-05 PROCEDURE — 82962 GLUCOSE BLOOD TEST: CPT

## 2024-10-05 PROCEDURE — 71045 X-RAY EXAM CHEST 1 VIEW: CPT

## 2024-10-05 PROCEDURE — 85027 COMPLETE CBC AUTOMATED: CPT

## 2024-10-05 PROCEDURE — 83605 ASSAY OF LACTIC ACID: CPT

## 2024-10-05 PROCEDURE — 85379 FIBRIN DEGRADATION QUANT: CPT

## 2024-10-05 PROCEDURE — 83036 HEMOGLOBIN GLYCOSYLATED A1C: CPT

## 2024-10-05 PROCEDURE — 84484 ASSAY OF TROPONIN QUANT: CPT

## 2024-10-05 PROCEDURE — 85025 COMPLETE CBC W/AUTO DIFF WBC: CPT

## 2024-10-05 PROCEDURE — 80061 LIPID PANEL: CPT

## 2024-10-05 PROCEDURE — 82947 ASSAY GLUCOSE BLOOD QUANT: CPT

## 2024-10-05 PROCEDURE — 96374 THER/PROPH/DIAG INJ IV PUSH: CPT

## 2024-10-05 PROCEDURE — 82803 BLOOD GASES ANY COMBINATION: CPT

## 2024-10-05 PROCEDURE — 83735 ASSAY OF MAGNESIUM: CPT

## 2024-10-05 PROCEDURE — 74019 RADEX ABDOMEN 2 VIEWS: CPT

## 2024-10-05 PROCEDURE — 84132 ASSAY OF SERUM POTASSIUM: CPT

## 2024-10-05 RX ORDER — MAGNESIUM CITRATE
296 SOLUTION, ORAL ORAL ONCE
Refills: 0 | Status: COMPLETED | OUTPATIENT
Start: 2024-10-05 | End: 2024-10-05

## 2024-10-05 RX ORDER — ALBUTEROL SULFATE 2.5 MG/3ML
2 VIAL, NEBULIZER (ML) INHALATION
Qty: 1 | Refills: 0
Start: 2024-10-05 | End: 2024-11-03

## 2024-10-05 RX ORDER — CARVEDILOL 3.12 MG/1
1 TABLET, FILM COATED ORAL
Qty: 60 | Refills: 0
Start: 2024-10-05 | End: 2024-11-03

## 2024-10-05 RX ORDER — BUMETANIDE 1 MG/1
1 TABLET ORAL
Qty: 5 | Refills: 0
Start: 2024-10-05 | End: 2024-10-09

## 2024-10-05 RX ORDER — POLYETHYLENE GLYCOL 3350 17 G/17G
17 POWDER, FOR SOLUTION ORAL
Qty: 0 | Refills: 0 | DISCHARGE
Start: 2024-10-05

## 2024-10-05 RX ORDER — ATORVASTATIN CALCIUM 80 MG/1
1 TABLET, FILM COATED ORAL
Qty: 30 | Refills: 0
Start: 2024-10-05 | End: 2024-11-03

## 2024-10-05 RX ORDER — BISACODYL 5 MG
5 TABLET, DELAYED RELEASE (ENTERIC COATED) ORAL EVERY 12 HOURS
Refills: 0 | Status: DISCONTINUED | OUTPATIENT
Start: 2024-10-05 | End: 2024-10-05

## 2024-10-05 RX ORDER — SPIRONOLACTONE 25 MG
1 TABLET ORAL
Qty: 30 | Refills: 0
Start: 2024-10-05 | End: 2024-11-03

## 2024-10-05 RX ORDER — SACUBITRIL AND VALSARTAN 6; 6 MG/1; MG/1
1 PELLET ORAL
Qty: 60 | Refills: 0
Start: 2024-10-05 | End: 2024-11-03

## 2024-10-05 RX ORDER — BISACODYL 5 MG
1 TABLET, DELAYED RELEASE (ENTERIC COATED) ORAL
Qty: 0 | Refills: 0 | DISCHARGE
Start: 2024-10-05

## 2024-10-05 RX ORDER — SENNA 187 MG
2 TABLET ORAL
Qty: 0 | Refills: 0 | DISCHARGE
Start: 2024-10-05

## 2024-10-05 RX ADMIN — Medication 296 MILLILITER(S): at 11:46

## 2024-10-05 RX ADMIN — SACUBITRIL AND VALSARTAN 1 TABLET(S): 6; 6 PELLET ORAL at 05:04

## 2024-10-05 RX ADMIN — Medication 25 MILLIGRAM(S): at 05:05

## 2024-10-05 RX ADMIN — BUMETANIDE 1 MILLIGRAM(S): 1 TABLET ORAL at 05:04

## 2024-10-05 RX ADMIN — Medication 25 MILLIGRAM(S): at 13:53

## 2024-10-05 RX ADMIN — INSULIN LISPRO 10 UNIT(S): 100 INJECTION, SOLUTION INTRAVENOUS; SUBCUTANEOUS at 08:01

## 2024-10-05 RX ADMIN — INSULIN LISPRO 10: 100 INJECTION, SOLUTION INTRAVENOUS; SUBCUTANEOUS at 07:59

## 2024-10-05 RX ADMIN — Medication 25 MILLIGRAM(S): at 05:04

## 2024-10-05 RX ADMIN — BUMETANIDE 1 MILLIGRAM(S): 1 TABLET ORAL at 13:53

## 2024-10-05 RX ADMIN — INSULIN LISPRO 10: 100 INJECTION, SOLUTION INTRAVENOUS; SUBCUTANEOUS at 11:47

## 2024-10-05 RX ADMIN — Medication 650 MILLIGRAM(S): at 11:49

## 2024-10-05 RX ADMIN — CARVEDILOL 25 MILLIGRAM(S): 3.12 TABLET, FILM COATED ORAL at 05:04

## 2024-10-05 RX ADMIN — INSULIN LISPRO 10 UNIT(S): 100 INJECTION, SOLUTION INTRAVENOUS; SUBCUTANEOUS at 11:48

## 2024-10-14 ENCOUNTER — NON-APPOINTMENT (OUTPATIENT)
Age: 44
End: 2024-10-14

## 2024-10-21 ENCOUNTER — INPATIENT (INPATIENT)
Facility: HOSPITAL | Age: 44
LOS: 4 days | Discharge: HOME HEALTH | End: 2024-10-26
Attending: HOSPITALIST | Admitting: HOSPITALIST
Payer: MEDICAID

## 2024-10-21 VITALS
DIASTOLIC BLOOD PRESSURE: 129 MMHG | TEMPERATURE: 98 F | HEIGHT: 71 IN | RESPIRATION RATE: 18 BRPM | OXYGEN SATURATION: 95 % | HEART RATE: 101 BPM | SYSTOLIC BLOOD PRESSURE: 163 MMHG | WEIGHT: 240.08 LBS

## 2024-10-21 LAB
ADD ON TEST-SPECIMEN IN LAB: SIGNIFICANT CHANGE UP
ALBUMIN SERPL ELPH-MCNC: 3.5 G/DL — SIGNIFICANT CHANGE UP (ref 3.3–5)
ALP SERPL-CCNC: 81 U/L — SIGNIFICANT CHANGE UP (ref 40–120)
ALT FLD-CCNC: 86 U/L — HIGH (ref 4–41)
ANION GAP SERPL CALC-SCNC: 11 MMOL/L — SIGNIFICANT CHANGE UP (ref 7–14)
ANION GAP SERPL CALC-SCNC: 9 MMOL/L — SIGNIFICANT CHANGE UP (ref 7–14)
AST SERPL-CCNC: 74 U/L — HIGH (ref 4–40)
BASOPHILS # BLD AUTO: 0.03 K/UL — SIGNIFICANT CHANGE UP (ref 0–0.2)
BASOPHILS NFR BLD AUTO: 0.3 % — SIGNIFICANT CHANGE UP (ref 0–2)
BILIRUB SERPL-MCNC: 0.6 MG/DL — SIGNIFICANT CHANGE UP (ref 0.2–1.2)
BUN SERPL-MCNC: 13 MG/DL — SIGNIFICANT CHANGE UP (ref 7–23)
BUN SERPL-MCNC: 13 MG/DL — SIGNIFICANT CHANGE UP (ref 7–23)
CALCIUM SERPL-MCNC: 9 MG/DL — SIGNIFICANT CHANGE UP (ref 8.4–10.5)
CALCIUM SERPL-MCNC: 9.1 MG/DL — SIGNIFICANT CHANGE UP (ref 8.4–10.5)
CHLORIDE SERPL-SCNC: 100 MMOL/L — SIGNIFICANT CHANGE UP (ref 98–107)
CHLORIDE SERPL-SCNC: 100 MMOL/L — SIGNIFICANT CHANGE UP (ref 98–107)
CO2 SERPL-SCNC: 24 MMOL/L — SIGNIFICANT CHANGE UP (ref 22–31)
CO2 SERPL-SCNC: 25 MMOL/L — SIGNIFICANT CHANGE UP (ref 22–31)
CREAT SERPL-MCNC: 0.9 MG/DL — SIGNIFICANT CHANGE UP (ref 0.5–1.3)
CREAT SERPL-MCNC: 0.94 MG/DL — SIGNIFICANT CHANGE UP (ref 0.5–1.3)
EGFR: 102 ML/MIN/1.73M2 — SIGNIFICANT CHANGE UP
EGFR: 102 ML/MIN/1.73M2 — SIGNIFICANT CHANGE UP
EGFR: 107 ML/MIN/1.73M2 — SIGNIFICANT CHANGE UP
EGFR: 107 ML/MIN/1.73M2 — SIGNIFICANT CHANGE UP
EOSINOPHIL # BLD AUTO: 0.15 K/UL — SIGNIFICANT CHANGE UP (ref 0–0.5)
EOSINOPHIL NFR BLD AUTO: 1.7 % — SIGNIFICANT CHANGE UP (ref 0–6)
GLUCOSE SERPL-MCNC: 298 MG/DL — HIGH (ref 70–99)
GLUCOSE SERPL-MCNC: 345 MG/DL — HIGH (ref 70–99)
HCT VFR BLD CALC: 45.1 % — SIGNIFICANT CHANGE UP (ref 39–50)
HGB BLD-MCNC: 14.8 G/DL — SIGNIFICANT CHANGE UP (ref 13–17)
IANC: 6.28 K/UL — SIGNIFICANT CHANGE UP (ref 1.8–7.4)
IMM GRANULOCYTES NFR BLD AUTO: 0.6 % — SIGNIFICANT CHANGE UP (ref 0–0.9)
LYMPHOCYTES # BLD AUTO: 1.99 K/UL — SIGNIFICANT CHANGE UP (ref 1–3.3)
LYMPHOCYTES # BLD AUTO: 21.9 % — SIGNIFICANT CHANGE UP (ref 13–44)
MAGNESIUM SERPL-MCNC: 1.6 MG/DL — SIGNIFICANT CHANGE UP (ref 1.6–2.6)
MCHC RBC-ENTMCNC: 29.1 PG — SIGNIFICANT CHANGE UP (ref 27–34)
MCHC RBC-ENTMCNC: 32.8 GM/DL — SIGNIFICANT CHANGE UP (ref 32–36)
MCV RBC AUTO: 88.6 FL — SIGNIFICANT CHANGE UP (ref 80–100)
MONOCYTES # BLD AUTO: 0.58 K/UL — SIGNIFICANT CHANGE UP (ref 0–0.9)
MONOCYTES NFR BLD AUTO: 6.4 % — SIGNIFICANT CHANGE UP (ref 2–14)
NEUTROPHILS # BLD AUTO: 6.28 K/UL — SIGNIFICANT CHANGE UP (ref 1.8–7.4)
NEUTROPHILS NFR BLD AUTO: 69.1 % — SIGNIFICANT CHANGE UP (ref 43–77)
NRBC # BLD AUTO: 0 K/UL — SIGNIFICANT CHANGE UP (ref 0–0)
NRBC # BLD: 0 /100 WBCS — SIGNIFICANT CHANGE UP (ref 0–0)
NRBC # FLD: 0 K/UL — SIGNIFICANT CHANGE UP (ref 0–0)
NRBC BLD-RTO: 0 /100 WBCS — SIGNIFICANT CHANGE UP (ref 0–0)
NT-PROBNP SERPL-SCNC: 2497 PG/ML — HIGH
PLATELET # BLD AUTO: 162 K/UL — SIGNIFICANT CHANGE UP (ref 150–400)
POTASSIUM SERPL-MCNC: 4.5 MMOL/L — SIGNIFICANT CHANGE UP (ref 3.5–5.3)
POTASSIUM SERPL-MCNC: 6 MMOL/L — HIGH (ref 3.5–5.3)
POTASSIUM SERPL-SCNC: 4.5 MMOL/L — SIGNIFICANT CHANGE UP (ref 3.5–5.3)
POTASSIUM SERPL-SCNC: 6 MMOL/L — HIGH (ref 3.5–5.3)
PROT SERPL-MCNC: 6.6 G/DL — SIGNIFICANT CHANGE UP (ref 6–8.3)
RBC # BLD: 5.09 M/UL — SIGNIFICANT CHANGE UP (ref 4.2–5.8)
RBC # FLD: 13.8 % — SIGNIFICANT CHANGE UP (ref 10.3–14.5)
SODIUM SERPL-SCNC: 133 MMOL/L — LOW (ref 135–145)
SODIUM SERPL-SCNC: 136 MMOL/L — SIGNIFICANT CHANGE UP (ref 135–145)
TROPONIN T, HIGH SENSITIVITY RESULT: 51 NG/L — HIGH
TROPONIN T, HIGH SENSITIVITY RESULT: 61 NG/L — CRITICAL HIGH
TROPONIN T, HIGH SENSITIVITY RESULT: 62 NG/L — CRITICAL HIGH
WBC # BLD: 9.08 K/UL — SIGNIFICANT CHANGE UP (ref 3.8–10.5)
WBC # FLD AUTO: 9.08 K/UL — SIGNIFICANT CHANGE UP (ref 3.8–10.5)

## 2024-10-21 PROCEDURE — 74019 RADEX ABDOMEN 2 VIEWS: CPT | Mod: 26

## 2024-10-21 PROCEDURE — 71045 X-RAY EXAM CHEST 1 VIEW: CPT | Mod: 26

## 2024-10-21 PROCEDURE — 99291 CRITICAL CARE FIRST HOUR: CPT

## 2024-10-21 RX ORDER — POLYETHYLENE GLYCOL 3350 17 G/17G
17 POWDER, FOR SOLUTION ORAL ONCE
Refills: 0 | Status: COMPLETED | OUTPATIENT
Start: 2024-10-21 | End: 2024-10-21

## 2024-10-21 RX ORDER — ASPIRIN 325 MG
324 TABLET ORAL ONCE
Refills: 0 | Status: DISCONTINUED | OUTPATIENT
Start: 2024-10-21 | End: 2024-10-21

## 2024-10-21 RX ORDER — BUMETANIDE 1 MG/1
1 TABLET ORAL ONCE
Refills: 0 | Status: COMPLETED | OUTPATIENT
Start: 2024-10-21 | End: 2024-10-21

## 2024-10-21 RX ORDER — MAGNESIUM, ALUMINUM HYDROXIDE 200-200 MG
30 TABLET,CHEWABLE ORAL ONCE
Refills: 0 | Status: COMPLETED | OUTPATIENT
Start: 2024-10-21 | End: 2024-10-21

## 2024-10-21 RX ORDER — ASPIRIN 325 MG
162 TABLET ORAL ONCE
Refills: 0 | Status: COMPLETED | OUTPATIENT
Start: 2024-10-21 | End: 2024-10-21

## 2024-10-21 RX ADMIN — Medication 30 MILLILITER(S): at 16:42

## 2024-10-21 RX ADMIN — BUMETANIDE 1 MILLIGRAM(S): 1 TABLET ORAL at 16:43

## 2024-10-21 RX ADMIN — POLYETHYLENE GLYCOL 3350 17 GRAM(S): 17 POWDER, FOR SOLUTION ORAL at 12:29

## 2024-10-21 RX ADMIN — Medication 162 MILLIGRAM(S): at 12:28

## 2024-10-22 ENCOUNTER — RESULT REVIEW (OUTPATIENT)
Age: 44
End: 2024-10-22

## 2024-10-22 DIAGNOSIS — Z79.899 OTHER LONG TERM (CURRENT) DRUG THERAPY: ICD-10-CM

## 2024-10-22 DIAGNOSIS — I47.10 SUPRAVENTRICULAR TACHYCARDIA, UNSPECIFIED: ICD-10-CM

## 2024-10-22 DIAGNOSIS — E11.9 TYPE 2 DIABETES MELLITUS WITHOUT COMPLICATIONS: ICD-10-CM

## 2024-10-22 DIAGNOSIS — K59.00 CONSTIPATION, UNSPECIFIED: ICD-10-CM

## 2024-10-22 DIAGNOSIS — Z29.9 ENCOUNTER FOR PROPHYLACTIC MEASURES, UNSPECIFIED: ICD-10-CM

## 2024-10-22 DIAGNOSIS — I50.21 ACUTE SYSTOLIC (CONGESTIVE) HEART FAILURE: ICD-10-CM

## 2024-10-22 DIAGNOSIS — I10 ESSENTIAL (PRIMARY) HYPERTENSION: ICD-10-CM

## 2024-10-22 DIAGNOSIS — R79.89 OTHER SPECIFIED ABNORMAL FINDINGS OF BLOOD CHEMISTRY: ICD-10-CM

## 2024-10-22 LAB
ADD ON TEST-SPECIMEN IN LAB: SIGNIFICANT CHANGE UP
ALBUMIN SERPL ELPH-MCNC: 3.5 G/DL — SIGNIFICANT CHANGE UP (ref 3.3–5)
ALP SERPL-CCNC: 89 U/L — SIGNIFICANT CHANGE UP (ref 40–120)
ALT FLD-CCNC: 66 U/L — HIGH (ref 4–41)
ANION GAP SERPL CALC-SCNC: 11 MMOL/L — SIGNIFICANT CHANGE UP (ref 7–14)
AST SERPL-CCNC: 29 U/L — SIGNIFICANT CHANGE UP (ref 4–40)
BILIRUB SERPL-MCNC: 0.5 MG/DL — SIGNIFICANT CHANGE UP (ref 0.2–1.2)
BUN SERPL-MCNC: 16 MG/DL — SIGNIFICANT CHANGE UP (ref 7–23)
CALCIUM SERPL-MCNC: 8.8 MG/DL — SIGNIFICANT CHANGE UP (ref 8.4–10.5)
CHLORIDE SERPL-SCNC: 99 MMOL/L — SIGNIFICANT CHANGE UP (ref 98–107)
CK MB BLD-MCNC: 0.5 % — SIGNIFICANT CHANGE UP (ref 0–2.5)
CK MB CFR SERPL CALC: 9 NG/ML — HIGH
CK SERPL-CCNC: 1638 U/L — HIGH (ref 30–200)
CO2 SERPL-SCNC: 28 MMOL/L — SIGNIFICANT CHANGE UP (ref 22–31)
CREAT SERPL-MCNC: 1.12 MG/DL — SIGNIFICANT CHANGE UP (ref 0.5–1.3)
EGFR: 83 ML/MIN/1.73M2 — SIGNIFICANT CHANGE UP
EGFR: 83 ML/MIN/1.73M2 — SIGNIFICANT CHANGE UP
GLUCOSE BLDC GLUCOMTR-MCNC: 145 MG/DL — HIGH (ref 70–99)
GLUCOSE BLDC GLUCOMTR-MCNC: 323 MG/DL — HIGH (ref 70–99)
GLUCOSE BLDC GLUCOMTR-MCNC: 327 MG/DL — HIGH (ref 70–99)
GLUCOSE BLDC GLUCOMTR-MCNC: 352 MG/DL — HIGH (ref 70–99)
GLUCOSE BLDC GLUCOMTR-MCNC: 353 MG/DL — HIGH (ref 70–99)
GLUCOSE BLDC GLUCOMTR-MCNC: 419 MG/DL — HIGH (ref 70–99)
GLUCOSE SERPL-MCNC: 367 MG/DL — HIGH (ref 70–99)
MAGNESIUM SERPL-MCNC: 1.7 MG/DL — SIGNIFICANT CHANGE UP (ref 1.6–2.6)
PHOSPHATE SERPL-MCNC: 3.6 MG/DL — SIGNIFICANT CHANGE UP (ref 2.5–4.5)
POTASSIUM SERPL-MCNC: 4.5 MMOL/L — SIGNIFICANT CHANGE UP (ref 3.5–5.3)
POTASSIUM SERPL-SCNC: 4.5 MMOL/L — SIGNIFICANT CHANGE UP (ref 3.5–5.3)
PROT SERPL-MCNC: 6.4 G/DL — SIGNIFICANT CHANGE UP (ref 6–8.3)
SODIUM SERPL-SCNC: 138 MMOL/L — SIGNIFICANT CHANGE UP (ref 135–145)

## 2024-10-22 PROCEDURE — 99223 1ST HOSP IP/OBS HIGH 75: CPT

## 2024-10-22 PROCEDURE — 93306 TTE W/DOPPLER COMPLETE: CPT | Mod: 26

## 2024-10-22 RX ORDER — DEXTROSE 50 % IN WATER 50 %
25 SYRINGE (ML) INTRAVENOUS ONCE
Refills: 0 | Status: DISCONTINUED | OUTPATIENT
Start: 2024-10-22 | End: 2024-10-22

## 2024-10-22 RX ORDER — MAGNESIUM, ALUMINUM HYDROXIDE 200-200 MG
30 TABLET,CHEWABLE ORAL EVERY 4 HOURS
Refills: 0 | Status: DISCONTINUED | OUTPATIENT
Start: 2024-10-22 | End: 2024-10-22

## 2024-10-22 RX ORDER — POLYETHYLENE GLYCOL 3350 17 G/17G
17 POWDER, FOR SOLUTION ORAL DAILY
Refills: 0 | Status: DISCONTINUED | OUTPATIENT
Start: 2024-10-22 | End: 2024-10-26

## 2024-10-22 RX ORDER — LABETALOL HYDROCHLORIDE 200 MG/1
10 TABLET, FILM COATED ORAL ONCE
Refills: 0 | Status: COMPLETED | OUTPATIENT
Start: 2024-10-22 | End: 2024-10-22

## 2024-10-22 RX ORDER — ENOXAPARIN SODIUM 100 MG/ML
40 INJECTION SUBCUTANEOUS EVERY 24 HOURS
Refills: 0 | Status: DISCONTINUED | OUTPATIENT
Start: 2024-10-22 | End: 2024-10-22

## 2024-10-22 RX ORDER — INSULIN GLARGINE-YFGN 100 [IU]/ML
5 INJECTION, SOLUTION SUBCUTANEOUS AT BEDTIME
Refills: 0 | Status: DISCONTINUED | OUTPATIENT
Start: 2024-10-22 | End: 2024-10-22

## 2024-10-22 RX ORDER — INSULIN LISPRO 100 U/ML
INJECTION, SOLUTION INTRAVENOUS; SUBCUTANEOUS AT BEDTIME
Refills: 0 | Status: DISCONTINUED | OUTPATIENT
Start: 2024-10-22 | End: 2024-10-22

## 2024-10-22 RX ORDER — GLUCAGON 3 MG/1
1 POWDER NASAL ONCE
Refills: 0 | Status: DISCONTINUED | OUTPATIENT
Start: 2024-10-22 | End: 2024-10-22

## 2024-10-22 RX ORDER — INSULIN GLARGINE-YFGN 100 [IU]/ML
15 INJECTION, SOLUTION SUBCUTANEOUS
Refills: 0 | Status: DISCONTINUED | OUTPATIENT
Start: 2024-10-22 | End: 2024-10-22

## 2024-10-22 RX ORDER — ONDANSETRON HCL/PF 4 MG/2 ML
4 VIAL (ML) INJECTION EVERY 8 HOURS
Refills: 0 | Status: DISCONTINUED | OUTPATIENT
Start: 2024-10-22 | End: 2024-10-26

## 2024-10-22 RX ORDER — ACETAMINOPHEN 500 MG/5ML
650 LIQUID (ML) ORAL EVERY 6 HOURS
Refills: 0 | Status: DISCONTINUED | OUTPATIENT
Start: 2024-10-22 | End: 2024-10-26

## 2024-10-22 RX ORDER — SENNA 187 MG
2 TABLET ORAL AT BEDTIME
Refills: 0 | Status: DISCONTINUED | OUTPATIENT
Start: 2024-10-22 | End: 2024-10-26

## 2024-10-22 RX ORDER — INSULIN LISPRO 100 U/ML
10 INJECTION, SOLUTION INTRAVENOUS; SUBCUTANEOUS
Refills: 0 | Status: DISCONTINUED | OUTPATIENT
Start: 2024-10-22 | End: 2024-10-23

## 2024-10-22 RX ORDER — INSULIN LISPRO 100 U/ML
INJECTION, SOLUTION INTRAVENOUS; SUBCUTANEOUS
Refills: 0 | Status: DISCONTINUED | OUTPATIENT
Start: 2024-10-22 | End: 2024-10-22

## 2024-10-22 RX ORDER — SODIUM CHLORIDE 9 G/1000ML
1000 INJECTION, SOLUTION INTRAVENOUS
Refills: 0 | Status: DISCONTINUED | OUTPATIENT
Start: 2024-10-22 | End: 2024-10-22

## 2024-10-22 RX ORDER — CARVEDILOL 3.12 MG/1
25 TABLET, FILM COATED ORAL EVERY 12 HOURS
Refills: 0 | Status: DISCONTINUED | OUTPATIENT
Start: 2024-10-22 | End: 2024-10-26

## 2024-10-22 RX ORDER — ALBUTEROL SULFATE 2.5 MG/3ML
2 VIAL, NEBULIZER (ML) INHALATION EVERY 6 HOURS
Refills: 0 | Status: DISCONTINUED | OUTPATIENT
Start: 2024-10-22 | End: 2024-10-22

## 2024-10-22 RX ORDER — DEXTROSE 50 % IN WATER 50 %
15 SYRINGE (ML) INTRAVENOUS ONCE
Refills: 0 | Status: DISCONTINUED | OUTPATIENT
Start: 2024-10-22 | End: 2024-10-26

## 2024-10-22 RX ORDER — INSULIN LISPRO 100 U/ML
5 INJECTION, SOLUTION INTRAVENOUS; SUBCUTANEOUS
Refills: 0 | Status: DISCONTINUED | OUTPATIENT
Start: 2024-10-22 | End: 2024-10-22

## 2024-10-22 RX ORDER — HEPARIN SODIUM 1000 [USP'U]/ML
5000 INJECTION INTRAVENOUS; SUBCUTANEOUS EVERY 12 HOURS
Refills: 0 | Status: DISCONTINUED | OUTPATIENT
Start: 2024-10-22 | End: 2024-10-22

## 2024-10-22 RX ORDER — BUMETANIDE 1 MG/1
2 TABLET ORAL
Refills: 0 | Status: DISCONTINUED | OUTPATIENT
Start: 2024-10-22 | End: 2024-10-26

## 2024-10-22 RX ORDER — EPLERENONE 25 MG/1
25 TABLET ORAL DAILY
Refills: 0 | Status: DISCONTINUED | OUTPATIENT
Start: 2024-10-22 | End: 2024-10-26

## 2024-10-22 RX ORDER — DEXTROSE 50 % IN WATER 50 %
25 SYRINGE (ML) INTRAVENOUS ONCE
Refills: 0 | Status: DISCONTINUED | OUTPATIENT
Start: 2024-10-22 | End: 2024-10-26

## 2024-10-22 RX ORDER — DEXTROSE 50 % IN WATER 50 %
12.5 SYRINGE (ML) INTRAVENOUS ONCE
Refills: 0 | Status: DISCONTINUED | OUTPATIENT
Start: 2024-10-22 | End: 2024-10-22

## 2024-10-22 RX ORDER — INFLUENZA A VIRUS A/IDAHO/07/2018 (H1N1) ANTIGEN (MDCK CELL DERIVED, PROPIOLACTONE INACTIVATED, INFLUENZA A VIRUS A/INDIANA/08/2018 (H3N2) ANTIGEN (MDCK CELL DERIVED, PROPIOLACTONE INACTIVATED), INFLUENZA B VIRUS B/SINGAPORE/INFTT-16-0610/2016 ANTIGEN (MDCK CELL DERIVED, PROPIOLACTONE INACTIVATED), INFLUENZA B VIRUS B/IOWA/06/2017 ANTIGEN (MDCK CELL DERIVED, PROPIOLACTONE INACTIVATED) 15; 15; 15; 15 UG/.5ML; UG/.5ML; UG/.5ML; UG/.5ML
0.5 INJECTION, SUSPENSION INTRAMUSCULAR ONCE
Refills: 0 | Status: DISCONTINUED | OUTPATIENT
Start: 2024-10-22 | End: 2024-10-26

## 2024-10-22 RX ORDER — ATORVASTATIN CALCIUM 80 MG/1
40 TABLET, FILM COATED ORAL AT BEDTIME
Refills: 0 | Status: DISCONTINUED | OUTPATIENT
Start: 2024-10-22 | End: 2024-10-26

## 2024-10-22 RX ORDER — INSULIN LISPRO 100 U/ML
INJECTION, SOLUTION INTRAVENOUS; SUBCUTANEOUS
Refills: 0 | Status: DISCONTINUED | OUTPATIENT
Start: 2024-10-22 | End: 2024-10-24

## 2024-10-22 RX ORDER — LACTULOSE 10 G/15ML
20 SOLUTION ORAL DAILY
Refills: 0 | Status: DISCONTINUED | OUTPATIENT
Start: 2024-10-22 | End: 2024-10-26

## 2024-10-22 RX ORDER — SPIRONOLACTONE 25 MG
25 TABLET ORAL DAILY
Refills: 0 | Status: DISCONTINUED | OUTPATIENT
Start: 2024-10-22 | End: 2024-10-22

## 2024-10-22 RX ORDER — GLUCAGON 3 MG/1
1 POWDER NASAL ONCE
Refills: 0 | Status: DISCONTINUED | OUTPATIENT
Start: 2024-10-22 | End: 2024-10-26

## 2024-10-22 RX ORDER — INSULIN GLARGINE-YFGN 100 [IU]/ML
5 INJECTION, SOLUTION SUBCUTANEOUS ONCE
Refills: 0 | Status: COMPLETED | OUTPATIENT
Start: 2024-10-22 | End: 2024-10-22

## 2024-10-22 RX ORDER — DEXTROSE 50 % IN WATER 50 %
15 SYRINGE (ML) INTRAVENOUS ONCE
Refills: 0 | Status: DISCONTINUED | OUTPATIENT
Start: 2024-10-22 | End: 2024-10-22

## 2024-10-22 RX ORDER — MELATONIN 5 MG
3 TABLET ORAL AT BEDTIME
Refills: 0 | Status: DISCONTINUED | OUTPATIENT
Start: 2024-10-22 | End: 2024-10-26

## 2024-10-22 RX ORDER — INSULIN GLARGINE-YFGN 100 [IU]/ML
30 INJECTION, SOLUTION SUBCUTANEOUS
Refills: 0 | Status: DISCONTINUED | OUTPATIENT
Start: 2024-10-23 | End: 2024-10-24

## 2024-10-22 RX ORDER — INSULIN LISPRO 100 U/ML
INJECTION, SOLUTION INTRAVENOUS; SUBCUTANEOUS AT BEDTIME
Refills: 0 | Status: DISCONTINUED | OUTPATIENT
Start: 2024-10-22 | End: 2024-10-26

## 2024-10-22 RX ORDER — DEXTROSE 50 % IN WATER 50 %
12.5 SYRINGE (ML) INTRAVENOUS ONCE
Refills: 0 | Status: DISCONTINUED | OUTPATIENT
Start: 2024-10-22 | End: 2024-10-26

## 2024-10-22 RX ORDER — SACUBITRIL AND VALSARTAN 6; 6 MG/1; MG/1
1 PELLET ORAL
Refills: 0 | Status: DISCONTINUED | OUTPATIENT
Start: 2024-10-22 | End: 2024-10-26

## 2024-10-22 RX ADMIN — CARVEDILOL 25 MILLIGRAM(S): 3.12 TABLET, FILM COATED ORAL at 17:35

## 2024-10-22 RX ADMIN — Medication 25 MILLIGRAM(S): at 05:52

## 2024-10-22 RX ADMIN — INSULIN LISPRO 4: 100 INJECTION, SOLUTION INTRAVENOUS; SUBCUTANEOUS at 07:56

## 2024-10-22 RX ADMIN — Medication 25 MILLIGRAM(S): at 14:18

## 2024-10-22 RX ADMIN — BUMETANIDE 2 MILLIGRAM(S): 1 TABLET ORAL at 05:53

## 2024-10-22 RX ADMIN — INSULIN GLARGINE-YFGN 15 UNIT(S): 100 INJECTION, SOLUTION SUBCUTANEOUS at 11:59

## 2024-10-22 RX ADMIN — Medication 1 DOSE(S): at 21:36

## 2024-10-22 RX ADMIN — LABETALOL HYDROCHLORIDE 10 MILLIGRAM(S): 200 TABLET, FILM COATED ORAL at 01:38

## 2024-10-22 RX ADMIN — POLYETHYLENE GLYCOL 3350 17 GRAM(S): 17 POWDER, FOR SOLUTION ORAL at 11:59

## 2024-10-22 RX ADMIN — INSULIN LISPRO 5 UNIT(S): 100 INJECTION, SOLUTION INTRAVENOUS; SUBCUTANEOUS at 12:02

## 2024-10-22 RX ADMIN — SACUBITRIL AND VALSARTAN 1 TABLET(S): 6; 6 PELLET ORAL at 17:35

## 2024-10-22 RX ADMIN — ATORVASTATIN CALCIUM 40 MILLIGRAM(S): 80 TABLET, FILM COATED ORAL at 21:37

## 2024-10-22 RX ADMIN — CARVEDILOL 25 MILLIGRAM(S): 3.12 TABLET, FILM COATED ORAL at 05:52

## 2024-10-22 RX ADMIN — BUMETANIDE 2 MILLIGRAM(S): 1 TABLET ORAL at 14:17

## 2024-10-22 RX ADMIN — INSULIN LISPRO 10 UNIT(S): 100 INJECTION, SOLUTION INTRAVENOUS; SUBCUTANEOUS at 17:34

## 2024-10-22 RX ADMIN — INSULIN LISPRO 5: 100 INJECTION, SOLUTION INTRAVENOUS; SUBCUTANEOUS at 12:01

## 2024-10-22 RX ADMIN — Medication 650 MILLIGRAM(S): at 14:26

## 2024-10-22 RX ADMIN — LACTULOSE 20 GRAM(S): 10 SOLUTION ORAL at 11:59

## 2024-10-22 RX ADMIN — Medication 650 MILLIGRAM(S): at 15:26

## 2024-10-22 RX ADMIN — INSULIN LISPRO 6: 100 INJECTION, SOLUTION INTRAVENOUS; SUBCUTANEOUS at 17:34

## 2024-10-22 RX ADMIN — Medication 1 DOSE(S): at 09:56

## 2024-10-22 RX ADMIN — SACUBITRIL AND VALSARTAN 1 TABLET(S): 6; 6 PELLET ORAL at 05:53

## 2024-10-23 LAB
ANION GAP SERPL CALC-SCNC: 11 MMOL/L — SIGNIFICANT CHANGE UP (ref 7–14)
BUN SERPL-MCNC: 16 MG/DL — SIGNIFICANT CHANGE UP (ref 7–23)
CALCIUM SERPL-MCNC: 9.5 MG/DL — SIGNIFICANT CHANGE UP (ref 8.4–10.5)
CHLORIDE SERPL-SCNC: 97 MMOL/L — LOW (ref 98–107)
CO2 SERPL-SCNC: 28 MMOL/L — SIGNIFICANT CHANGE UP (ref 22–31)
CREAT SERPL-MCNC: 1.11 MG/DL — SIGNIFICANT CHANGE UP (ref 0.5–1.3)
EGFR: 83 ML/MIN/1.73M2 — SIGNIFICANT CHANGE UP
EGFR: 83 ML/MIN/1.73M2 — SIGNIFICANT CHANGE UP
GLUCOSE BLDC GLUCOMTR-MCNC: 244 MG/DL — HIGH (ref 70–99)
GLUCOSE BLDC GLUCOMTR-MCNC: 318 MG/DL — HIGH (ref 70–99)
GLUCOSE BLDC GLUCOMTR-MCNC: 402 MG/DL — HIGH (ref 70–99)
GLUCOSE BLDC GLUCOMTR-MCNC: 415 MG/DL — HIGH (ref 70–99)
GLUCOSE BLDC GLUCOMTR-MCNC: 469 MG/DL — CRITICAL HIGH (ref 70–99)
GLUCOSE SERPL-MCNC: 368 MG/DL — HIGH (ref 70–99)
MAGNESIUM SERPL-MCNC: 1.8 MG/DL — SIGNIFICANT CHANGE UP (ref 1.6–2.6)
PHOSPHATE SERPL-MCNC: 4.3 MG/DL — SIGNIFICANT CHANGE UP (ref 2.5–4.5)
POTASSIUM SERPL-MCNC: 4.5 MMOL/L — SIGNIFICANT CHANGE UP (ref 3.5–5.3)
POTASSIUM SERPL-SCNC: 4.5 MMOL/L — SIGNIFICANT CHANGE UP (ref 3.5–5.3)
SODIUM SERPL-SCNC: 136 MMOL/L — SIGNIFICANT CHANGE UP (ref 135–145)

## 2024-10-23 PROCEDURE — 99497 ADVNCD CARE PLAN 30 MIN: CPT | Mod: 25

## 2024-10-23 PROCEDURE — 99233 SBSQ HOSP IP/OBS HIGH 50: CPT

## 2024-10-23 RX ORDER — MAGNESIUM SULFATE 500 MG/ML
1 SYRINGE (ML) INJECTION ONCE
Refills: 0 | Status: COMPLETED | OUTPATIENT
Start: 2024-10-23 | End: 2024-10-23

## 2024-10-23 RX ORDER — INSULIN LISPRO 100 U/ML
14 INJECTION, SOLUTION INTRAVENOUS; SUBCUTANEOUS
Refills: 0 | Status: DISCONTINUED | OUTPATIENT
Start: 2024-10-23 | End: 2024-10-24

## 2024-10-23 RX ADMIN — INSULIN LISPRO 6: 100 INJECTION, SOLUTION INTRAVENOUS; SUBCUTANEOUS at 07:49

## 2024-10-23 RX ADMIN — INSULIN LISPRO 14 UNIT(S): 100 INJECTION, SOLUTION INTRAVENOUS; SUBCUTANEOUS at 22:26

## 2024-10-23 RX ADMIN — SACUBITRIL AND VALSARTAN 1 TABLET(S): 6; 6 PELLET ORAL at 05:41

## 2024-10-23 RX ADMIN — Medication 100 GRAM(S): at 08:43

## 2024-10-23 RX ADMIN — INSULIN LISPRO 6: 100 INJECTION, SOLUTION INTRAVENOUS; SUBCUTANEOUS at 11:06

## 2024-10-23 RX ADMIN — CARVEDILOL 25 MILLIGRAM(S): 3.12 TABLET, FILM COATED ORAL at 05:42

## 2024-10-23 RX ADMIN — Medication 2 TABLET(S): at 21:33

## 2024-10-23 RX ADMIN — EPLERENONE 25 MILLIGRAM(S): 25 TABLET ORAL at 05:41

## 2024-10-23 RX ADMIN — ATORVASTATIN CALCIUM 40 MILLIGRAM(S): 80 TABLET, FILM COATED ORAL at 21:34

## 2024-10-23 RX ADMIN — Medication 20 MILLIEQUIVALENT(S): at 08:41

## 2024-10-23 RX ADMIN — INSULIN LISPRO 10 UNIT(S): 100 INJECTION, SOLUTION INTRAVENOUS; SUBCUTANEOUS at 18:13

## 2024-10-23 RX ADMIN — SACUBITRIL AND VALSARTAN 1 TABLET(S): 6; 6 PELLET ORAL at 18:15

## 2024-10-23 RX ADMIN — INSULIN LISPRO 10 UNIT(S): 100 INJECTION, SOLUTION INTRAVENOUS; SUBCUTANEOUS at 07:50

## 2024-10-23 RX ADMIN — BUMETANIDE 2 MILLIGRAM(S): 1 TABLET ORAL at 13:32

## 2024-10-23 RX ADMIN — INSULIN LISPRO 10 UNIT(S): 100 INJECTION, SOLUTION INTRAVENOUS; SUBCUTANEOUS at 11:06

## 2024-10-23 RX ADMIN — Medication 1 DOSE(S): at 21:33

## 2024-10-23 RX ADMIN — BUMETANIDE 2 MILLIGRAM(S): 1 TABLET ORAL at 05:42

## 2024-10-23 RX ADMIN — INSULIN GLARGINE-YFGN 30 UNIT(S): 100 INJECTION, SOLUTION SUBCUTANEOUS at 11:07

## 2024-10-23 RX ADMIN — CARVEDILOL 25 MILLIGRAM(S): 3.12 TABLET, FILM COATED ORAL at 18:15

## 2024-10-23 RX ADMIN — INSULIN LISPRO 2: 100 INJECTION, SOLUTION INTRAVENOUS; SUBCUTANEOUS at 22:25

## 2024-10-23 RX ADMIN — INSULIN LISPRO 2: 100 INJECTION, SOLUTION INTRAVENOUS; SUBCUTANEOUS at 18:12

## 2024-10-23 NOTE — ED ADULT NURSE NOTE - SUICIDE SCREENING DEPRESSION
Do not drive or operate machinery/Do not make important decisions/No heavy lifting/straining/Follow Instructions Provided by your Surgical Team
Negative

## 2024-10-24 ENCOUNTER — TRANSCRIPTION ENCOUNTER (OUTPATIENT)
Age: 44
End: 2024-10-24

## 2024-10-24 DIAGNOSIS — I10 ESSENTIAL (PRIMARY) HYPERTENSION: ICD-10-CM

## 2024-10-24 DIAGNOSIS — E78.5 HYPERLIPIDEMIA, UNSPECIFIED: ICD-10-CM

## 2024-10-24 LAB
ANION GAP SERPL CALC-SCNC: 9 MMOL/L — SIGNIFICANT CHANGE UP (ref 7–14)
BUN SERPL-MCNC: 20 MG/DL — SIGNIFICANT CHANGE UP (ref 7–23)
CALCIUM SERPL-MCNC: 8.9 MG/DL — SIGNIFICANT CHANGE UP (ref 8.4–10.5)
CHLORIDE SERPL-SCNC: 97 MMOL/L — LOW (ref 98–107)
CO2 SERPL-SCNC: 29 MMOL/L — SIGNIFICANT CHANGE UP (ref 22–31)
CREAT SERPL-MCNC: 1.07 MG/DL — SIGNIFICANT CHANGE UP (ref 0.5–1.3)
EGFR: 87 ML/MIN/1.73M2 — SIGNIFICANT CHANGE UP
EGFR: 87 ML/MIN/1.73M2 — SIGNIFICANT CHANGE UP
GLUCOSE BLDC GLUCOMTR-MCNC: 111 MG/DL — HIGH (ref 70–99)
GLUCOSE BLDC GLUCOMTR-MCNC: 282 MG/DL — HIGH (ref 70–99)
GLUCOSE BLDC GLUCOMTR-MCNC: 314 MG/DL — HIGH (ref 70–99)
GLUCOSE BLDC GLUCOMTR-MCNC: 325 MG/DL — HIGH (ref 70–99)
GLUCOSE BLDC GLUCOMTR-MCNC: 352 MG/DL — HIGH (ref 70–99)
GLUCOSE SERPL-MCNC: 248 MG/DL — HIGH (ref 70–99)
HCT VFR BLD CALC: 51.1 % — HIGH (ref 39–50)
HGB BLD-MCNC: 16.7 G/DL — SIGNIFICANT CHANGE UP (ref 13–17)
MAGNESIUM SERPL-MCNC: 1.8 MG/DL — SIGNIFICANT CHANGE UP (ref 1.6–2.6)
MCHC RBC-ENTMCNC: 29.3 PG — SIGNIFICANT CHANGE UP (ref 27–34)
MCHC RBC-ENTMCNC: 32.7 GM/DL — SIGNIFICANT CHANGE UP (ref 32–36)
MCV RBC AUTO: 89.6 FL — SIGNIFICANT CHANGE UP (ref 80–100)
NRBC # BLD AUTO: 0 K/UL — SIGNIFICANT CHANGE UP (ref 0–0)
NRBC # BLD: 0 /100 WBCS — SIGNIFICANT CHANGE UP (ref 0–0)
NRBC # FLD: 0 K/UL — SIGNIFICANT CHANGE UP (ref 0–0)
NRBC BLD-RTO: 0 /100 WBCS — SIGNIFICANT CHANGE UP (ref 0–0)
PHOSPHATE SERPL-MCNC: 3.6 MG/DL — SIGNIFICANT CHANGE UP (ref 2.5–4.5)
PLATELET # BLD AUTO: 215 K/UL — SIGNIFICANT CHANGE UP (ref 150–400)
POTASSIUM SERPL-MCNC: 4.2 MMOL/L — SIGNIFICANT CHANGE UP (ref 3.5–5.3)
POTASSIUM SERPL-SCNC: 4.2 MMOL/L — SIGNIFICANT CHANGE UP (ref 3.5–5.3)
RBC # BLD: 5.7 M/UL — SIGNIFICANT CHANGE UP (ref 4.2–5.8)
RBC # FLD: 13.9 % — SIGNIFICANT CHANGE UP (ref 10.3–14.5)
SODIUM SERPL-SCNC: 135 MMOL/L — SIGNIFICANT CHANGE UP (ref 135–145)
WBC # BLD: 11.19 K/UL — HIGH (ref 3.8–10.5)
WBC # FLD AUTO: 11.19 K/UL — HIGH (ref 3.8–10.5)

## 2024-10-24 PROCEDURE — 99223 1ST HOSP IP/OBS HIGH 75: CPT

## 2024-10-24 PROCEDURE — 99233 SBSQ HOSP IP/OBS HIGH 50: CPT

## 2024-10-24 RX ORDER — INSULIN LISPRO 100 U/ML
INJECTION, SOLUTION INTRAVENOUS; SUBCUTANEOUS
Refills: 0 | Status: DISCONTINUED | OUTPATIENT
Start: 2024-10-24 | End: 2024-10-26

## 2024-10-24 RX ORDER — BISACODYL 5 MG
5 TABLET, DELAYED RELEASE (ENTERIC COATED) ORAL ONCE
Refills: 0 | Status: COMPLETED | OUTPATIENT
Start: 2024-10-24 | End: 2024-10-24

## 2024-10-24 RX ORDER — BISACODYL 5 MG
5 TABLET, DELAYED RELEASE (ENTERIC COATED) ORAL EVERY 12 HOURS
Refills: 0 | Status: DISCONTINUED | OUTPATIENT
Start: 2024-10-24 | End: 2024-10-26

## 2024-10-24 RX ORDER — INSULIN GLARGINE-YFGN 100 [IU]/ML
36 INJECTION, SOLUTION SUBCUTANEOUS
Refills: 0 | Status: DISCONTINUED | OUTPATIENT
Start: 2024-10-24 | End: 2024-10-25

## 2024-10-24 RX ORDER — INSULIN LISPRO 100 U/ML
18 INJECTION, SOLUTION INTRAVENOUS; SUBCUTANEOUS
Refills: 0 | Status: DISCONTINUED | OUTPATIENT
Start: 2024-10-24 | End: 2024-10-25

## 2024-10-24 RX ADMIN — SACUBITRIL AND VALSARTAN 1 TABLET(S): 6; 6 PELLET ORAL at 17:12

## 2024-10-24 RX ADMIN — INSULIN LISPRO 18 UNIT(S): 100 INJECTION, SOLUTION INTRAVENOUS; SUBCUTANEOUS at 16:56

## 2024-10-24 RX ADMIN — ATORVASTATIN CALCIUM 40 MILLIGRAM(S): 80 TABLET, FILM COATED ORAL at 21:51

## 2024-10-24 RX ADMIN — INSULIN LISPRO 4: 100 INJECTION, SOLUTION INTRAVENOUS; SUBCUTANEOUS at 11:53

## 2024-10-24 RX ADMIN — SACUBITRIL AND VALSARTAN 1 TABLET(S): 6; 6 PELLET ORAL at 05:43

## 2024-10-24 RX ADMIN — Medication 5 MILLIGRAM(S): at 16:14

## 2024-10-24 RX ADMIN — Medication 1 DOSE(S): at 21:50

## 2024-10-24 RX ADMIN — INSULIN LISPRO 14 UNIT(S): 100 INJECTION, SOLUTION INTRAVENOUS; SUBCUTANEOUS at 11:54

## 2024-10-24 RX ADMIN — CARVEDILOL 25 MILLIGRAM(S): 3.12 TABLET, FILM COATED ORAL at 05:43

## 2024-10-24 RX ADMIN — INSULIN LISPRO 3: 100 INJECTION, SOLUTION INTRAVENOUS; SUBCUTANEOUS at 07:44

## 2024-10-24 RX ADMIN — INSULIN GLARGINE-YFGN 36 UNIT(S): 100 INJECTION, SOLUTION SUBCUTANEOUS at 10:23

## 2024-10-24 RX ADMIN — POLYETHYLENE GLYCOL 3350 17 GRAM(S): 17 POWDER, FOR SOLUTION ORAL at 12:26

## 2024-10-24 RX ADMIN — EPLERENONE 25 MILLIGRAM(S): 25 TABLET ORAL at 05:43

## 2024-10-24 RX ADMIN — INSULIN LISPRO 14 UNIT(S): 100 INJECTION, SOLUTION INTRAVENOUS; SUBCUTANEOUS at 07:44

## 2024-10-24 RX ADMIN — BUMETANIDE 2 MILLIGRAM(S): 1 TABLET ORAL at 05:40

## 2024-10-24 RX ADMIN — INSULIN LISPRO 2: 100 INJECTION, SOLUTION INTRAVENOUS; SUBCUTANEOUS at 22:54

## 2024-10-24 RX ADMIN — LACTULOSE 20 GRAM(S): 10 SOLUTION ORAL at 12:26

## 2024-10-24 RX ADMIN — Medication 1 DOSE(S): at 09:31

## 2024-10-24 RX ADMIN — CARVEDILOL 25 MILLIGRAM(S): 3.12 TABLET, FILM COATED ORAL at 17:12

## 2024-10-25 DIAGNOSIS — D75.1 SECONDARY POLYCYTHEMIA: ICD-10-CM

## 2024-10-25 LAB
ADD ON TEST-SPECIMEN IN LAB: SIGNIFICANT CHANGE UP
ANION GAP SERPL CALC-SCNC: 13 MMOL/L — SIGNIFICANT CHANGE UP (ref 7–14)
BUN SERPL-MCNC: 20 MG/DL — SIGNIFICANT CHANGE UP (ref 7–23)
CALCIUM SERPL-MCNC: 8.8 MG/DL — SIGNIFICANT CHANGE UP (ref 8.4–10.5)
CHLORIDE SERPL-SCNC: 99 MMOL/L — SIGNIFICANT CHANGE UP (ref 98–107)
CO2 SERPL-SCNC: 24 MMOL/L — SIGNIFICANT CHANGE UP (ref 22–31)
CREAT SERPL-MCNC: 1 MG/DL — SIGNIFICANT CHANGE UP (ref 0.5–1.3)
EGFR: 95 ML/MIN/1.73M2 — SIGNIFICANT CHANGE UP
EGFR: 95 ML/MIN/1.73M2 — SIGNIFICANT CHANGE UP
FERRITIN SERPL-MCNC: 184 NG/ML — SIGNIFICANT CHANGE UP (ref 30–400)
GLUCOSE BLDC GLUCOMTR-MCNC: 290 MG/DL — HIGH (ref 70–99)
GLUCOSE BLDC GLUCOMTR-MCNC: 306 MG/DL — HIGH (ref 70–99)
GLUCOSE BLDC GLUCOMTR-MCNC: 351 MG/DL — HIGH (ref 70–99)
GLUCOSE BLDC GLUCOMTR-MCNC: 363 MG/DL — HIGH (ref 70–99)
GLUCOSE BLDC GLUCOMTR-MCNC: 386 MG/DL — HIGH (ref 70–99)
GLUCOSE SERPL-MCNC: 352 MG/DL — HIGH (ref 70–99)
HCT VFR BLD CALC: 51 % — HIGH (ref 39–50)
HGB BLD-MCNC: 17.4 G/DL — HIGH (ref 13–17)
IRON SATN MFR SERPL: 23 % — SIGNIFICANT CHANGE UP (ref 14–50)
IRON SATN MFR SERPL: 58 UG/DL — SIGNIFICANT CHANGE UP (ref 45–165)
MAGNESIUM SERPL-MCNC: 1.8 MG/DL — SIGNIFICANT CHANGE UP (ref 1.6–2.6)
MCHC RBC-ENTMCNC: 29.8 PG — SIGNIFICANT CHANGE UP (ref 27–34)
MCHC RBC-ENTMCNC: 34.1 GM/DL — SIGNIFICANT CHANGE UP (ref 32–36)
MCV RBC AUTO: 87.3 FL — SIGNIFICANT CHANGE UP (ref 80–100)
NRBC # BLD AUTO: 0 K/UL — SIGNIFICANT CHANGE UP (ref 0–0)
NRBC # BLD: 0 /100 WBCS — SIGNIFICANT CHANGE UP (ref 0–0)
NRBC # FLD: 0 K/UL — SIGNIFICANT CHANGE UP (ref 0–0)
NRBC BLD-RTO: 0 /100 WBCS — SIGNIFICANT CHANGE UP (ref 0–0)
PHOSPHATE SERPL-MCNC: 3.4 MG/DL — SIGNIFICANT CHANGE UP (ref 2.5–4.5)
PLATELET # BLD AUTO: 223 K/UL — SIGNIFICANT CHANGE UP (ref 150–400)
POTASSIUM SERPL-MCNC: 4.5 MMOL/L — SIGNIFICANT CHANGE UP (ref 3.5–5.3)
POTASSIUM SERPL-SCNC: 4.5 MMOL/L — SIGNIFICANT CHANGE UP (ref 3.5–5.3)
RBC # BLD: 5.84 M/UL — HIGH (ref 4.2–5.8)
RBC # FLD: 13.4 % — SIGNIFICANT CHANGE UP (ref 10.3–14.5)
SODIUM SERPL-SCNC: 136 MMOL/L — SIGNIFICANT CHANGE UP (ref 135–145)
TIBC SERPL-MCNC: 255 UG/DL — SIGNIFICANT CHANGE UP (ref 220–430)
UIBC SERPL-MCNC: 197 UG/DL — SIGNIFICANT CHANGE UP (ref 110–370)
WBC # BLD: 9.89 K/UL — SIGNIFICANT CHANGE UP (ref 3.8–10.5)
WBC # FLD AUTO: 9.89 K/UL — SIGNIFICANT CHANGE UP (ref 3.8–10.5)

## 2024-10-25 PROCEDURE — 99233 SBSQ HOSP IP/OBS HIGH 50: CPT

## 2024-10-25 PROCEDURE — 99255 IP/OBS CONSLTJ NEW/EST HI 80: CPT

## 2024-10-25 RX ORDER — ORAL SEMAGLUTIDE 14 MG/1
1 TABLET ORAL
Qty: 30 | Refills: 0
Start: 2024-10-25 | End: 2024-11-23

## 2024-10-25 RX ORDER — ISOPROPYL ALCOHOL, BENZOCAINE .7; .06 ML/ML; ML/ML
0 SWAB TOPICAL
Qty: 100 | Refills: 1
Start: 2024-10-25

## 2024-10-25 RX ORDER — EMPAGLIFLOZIN 25 MG/1
1 TABLET, FILM COATED ORAL
Qty: 30 | Refills: 0
Start: 2024-10-25 | End: 2024-11-23

## 2024-10-25 RX ORDER — MAGNESIUM SULFATE 500 MG/ML
2 SYRINGE (ML) INJECTION ONCE
Refills: 0 | Status: COMPLETED | OUTPATIENT
Start: 2024-10-25 | End: 2024-10-25

## 2024-10-25 RX ORDER — INSULIN GLARGINE-YFGN 100 [IU]/ML
42 INJECTION, SOLUTION SUBCUTANEOUS
Refills: 0 | Status: DISCONTINUED | OUTPATIENT
Start: 2024-10-26 | End: 2024-10-26

## 2024-10-25 RX ORDER — GLIPIZIDE/METFORMIN HCL 2.5-250 MG
1 TABLET ORAL
Qty: 60 | Refills: 0
Start: 2024-10-25 | End: 2024-11-23

## 2024-10-25 RX ORDER — INSULIN LISPRO 100 U/ML
24 INJECTION, SOLUTION INTRAVENOUS; SUBCUTANEOUS
Refills: 0 | Status: DISCONTINUED | OUTPATIENT
Start: 2024-10-25 | End: 2024-10-26

## 2024-10-25 RX ADMIN — Medication 1 DOSE(S): at 10:36

## 2024-10-25 RX ADMIN — INSULIN LISPRO 18 UNIT(S): 100 INJECTION, SOLUTION INTRAVENOUS; SUBCUTANEOUS at 08:03

## 2024-10-25 RX ADMIN — Medication 25 GRAM(S): at 11:50

## 2024-10-25 RX ADMIN — INSULIN LISPRO 6: 100 INJECTION, SOLUTION INTRAVENOUS; SUBCUTANEOUS at 17:09

## 2024-10-25 RX ADMIN — BUMETANIDE 2 MILLIGRAM(S): 1 TABLET ORAL at 06:48

## 2024-10-25 RX ADMIN — EPLERENONE 25 MILLIGRAM(S): 25 TABLET ORAL at 06:48

## 2024-10-25 RX ADMIN — LACTULOSE 20 GRAM(S): 10 SOLUTION ORAL at 11:53

## 2024-10-25 RX ADMIN — INSULIN LISPRO 10: 100 INJECTION, SOLUTION INTRAVENOUS; SUBCUTANEOUS at 11:51

## 2024-10-25 RX ADMIN — INSULIN LISPRO 2: 100 INJECTION, SOLUTION INTRAVENOUS; SUBCUTANEOUS at 22:47

## 2024-10-25 RX ADMIN — INSULIN LISPRO 10: 100 INJECTION, SOLUTION INTRAVENOUS; SUBCUTANEOUS at 08:03

## 2024-10-25 RX ADMIN — INSULIN GLARGINE-YFGN 36 UNIT(S): 100 INJECTION, SOLUTION SUBCUTANEOUS at 10:35

## 2024-10-25 RX ADMIN — CARVEDILOL 25 MILLIGRAM(S): 3.12 TABLET, FILM COATED ORAL at 17:07

## 2024-10-25 RX ADMIN — BUMETANIDE 2 MILLIGRAM(S): 1 TABLET ORAL at 15:02

## 2024-10-25 RX ADMIN — SACUBITRIL AND VALSARTAN 1 TABLET(S): 6; 6 PELLET ORAL at 06:48

## 2024-10-25 RX ADMIN — SACUBITRIL AND VALSARTAN 1 TABLET(S): 6; 6 PELLET ORAL at 17:07

## 2024-10-25 RX ADMIN — INSULIN LISPRO 18 UNIT(S): 100 INJECTION, SOLUTION INTRAVENOUS; SUBCUTANEOUS at 11:52

## 2024-10-25 RX ADMIN — INSULIN LISPRO 24 UNIT(S): 100 INJECTION, SOLUTION INTRAVENOUS; SUBCUTANEOUS at 17:09

## 2024-10-25 RX ADMIN — CARVEDILOL 25 MILLIGRAM(S): 3.12 TABLET, FILM COATED ORAL at 06:48

## 2024-10-25 RX ADMIN — POLYETHYLENE GLYCOL 3350 17 GRAM(S): 17 POWDER, FOR SOLUTION ORAL at 11:53

## 2024-10-25 RX ADMIN — ATORVASTATIN CALCIUM 40 MILLIGRAM(S): 80 TABLET, FILM COATED ORAL at 22:46

## 2024-10-26 ENCOUNTER — TRANSCRIPTION ENCOUNTER (OUTPATIENT)
Age: 44
End: 2024-10-26

## 2024-10-26 VITALS
SYSTOLIC BLOOD PRESSURE: 117 MMHG | HEART RATE: 70 BPM | OXYGEN SATURATION: 98 % | RESPIRATION RATE: 18 BRPM | DIASTOLIC BLOOD PRESSURE: 80 MMHG | TEMPERATURE: 98 F

## 2024-10-26 LAB
ANION GAP SERPL CALC-SCNC: 13 MMOL/L — SIGNIFICANT CHANGE UP (ref 7–14)
BUN SERPL-MCNC: 24 MG/DL — HIGH (ref 7–23)
C PEPTIDE SERPL-MCNC: 5.3 NG/ML — HIGH (ref 1.1–4.4)
CALCIUM SERPL-MCNC: 8.9 MG/DL — SIGNIFICANT CHANGE UP (ref 8.4–10.5)
CHLORIDE SERPL-SCNC: 95 MMOL/L — LOW (ref 98–107)
CO2 SERPL-SCNC: 25 MMOL/L — SIGNIFICANT CHANGE UP (ref 22–31)
CREAT SERPL-MCNC: 1.12 MG/DL — SIGNIFICANT CHANGE UP (ref 0.5–1.3)
EGFR: 83 ML/MIN/1.73M2 — SIGNIFICANT CHANGE UP
EGFR: 83 ML/MIN/1.73M2 — SIGNIFICANT CHANGE UP
GLUCOSE BLDC GLUCOMTR-MCNC: 346 MG/DL — HIGH (ref 70–99)
GLUCOSE SERPL-MCNC: 339 MG/DL — HIGH (ref 70–99)
HCT VFR BLD CALC: 50.5 % — HIGH (ref 39–50)
HGB BLD-MCNC: 17.1 G/DL — HIGH (ref 13–17)
MAGNESIUM SERPL-MCNC: 1.7 MG/DL — SIGNIFICANT CHANGE UP (ref 1.6–2.6)
MCHC RBC-ENTMCNC: 29.6 PG — SIGNIFICANT CHANGE UP (ref 27–34)
MCHC RBC-ENTMCNC: 33.9 GM/DL — SIGNIFICANT CHANGE UP (ref 32–36)
MCV RBC AUTO: 87.5 FL — SIGNIFICANT CHANGE UP (ref 80–100)
NRBC # BLD AUTO: 0 K/UL — SIGNIFICANT CHANGE UP (ref 0–0)
NRBC # BLD: 0 /100 WBCS — SIGNIFICANT CHANGE UP (ref 0–0)
NRBC # FLD: 0 K/UL — SIGNIFICANT CHANGE UP (ref 0–0)
NRBC BLD-RTO: 0 /100 WBCS — SIGNIFICANT CHANGE UP (ref 0–0)
PHOSPHATE SERPL-MCNC: 3.5 MG/DL — SIGNIFICANT CHANGE UP (ref 2.5–4.5)
PLATELET # BLD AUTO: 226 K/UL — SIGNIFICANT CHANGE UP (ref 150–400)
POTASSIUM SERPL-MCNC: 4.4 MMOL/L — SIGNIFICANT CHANGE UP (ref 3.5–5.3)
POTASSIUM SERPL-SCNC: 4.4 MMOL/L — SIGNIFICANT CHANGE UP (ref 3.5–5.3)
RBC # BLD: 5.77 M/UL — SIGNIFICANT CHANGE UP (ref 4.2–5.8)
RBC # FLD: 13.7 % — SIGNIFICANT CHANGE UP (ref 10.3–14.5)
SODIUM SERPL-SCNC: 133 MMOL/L — LOW (ref 135–145)
WBC # BLD: 9.68 K/UL — SIGNIFICANT CHANGE UP (ref 3.8–10.5)
WBC # FLD AUTO: 9.68 K/UL — SIGNIFICANT CHANGE UP (ref 3.8–10.5)

## 2024-10-26 PROCEDURE — 99232 SBSQ HOSP IP/OBS MODERATE 35: CPT

## 2024-10-26 PROCEDURE — 99239 HOSP IP/OBS DSCHRG MGMT >30: CPT | Mod: GC

## 2024-10-26 RX ORDER — ATORVASTATIN CALCIUM 80 MG/1
1 TABLET, FILM COATED ORAL
Qty: 30 | Refills: 0
Start: 2024-10-26 | End: 2024-11-24

## 2024-10-26 RX ORDER — BUMETANIDE 1 MG/1
2 TABLET ORAL
Qty: 0 | Refills: 0 | DISCHARGE
Start: 2024-10-26

## 2024-10-26 RX ORDER — INSULIN GLARGINE-YFGN 100 [IU]/ML
47 INJECTION, SOLUTION SUBCUTANEOUS
Refills: 0 | Status: DISCONTINUED | OUTPATIENT
Start: 2024-10-26 | End: 2024-10-26

## 2024-10-26 RX ORDER — CARVEDILOL 3.12 MG/1
1 TABLET, FILM COATED ORAL
Qty: 60 | Refills: 0
Start: 2024-10-26 | End: 2024-11-24

## 2024-10-26 RX ORDER — CARVEDILOL 6.25 MG
1 TABLET ORAL
Qty: 60 | Refills: 0 | DISCHARGE
Start: 2024-10-26 | End: 2024-11-24

## 2024-10-26 RX ORDER — GLIPIZIDE/METFORMIN HCL 2.5-250 MG
1 TABLET ORAL
Qty: 60 | Refills: 0
Start: 2024-10-26 | End: 2024-11-24

## 2024-10-26 RX ORDER — SACUBITRIL AND VALSARTAN 49; 51 MG/1; MG/1
1 TABLET, FILM COATED ORAL
Qty: 60 | Refills: 0 | DISCHARGE
Start: 2024-10-26 | End: 2024-11-24

## 2024-10-26 RX ORDER — EMPAGLIFLOZIN 25 MG/1
1 TABLET, FILM COATED ORAL
Refills: 0 | DISCHARGE

## 2024-10-26 RX ORDER — ALBUTEROL SULFATE 2.5 MG/3ML
2 VIAL, NEBULIZER (ML) INHALATION
Refills: 0 | DISCHARGE

## 2024-10-26 RX ORDER — DEXTROSE 50 % IN WATER 50 %
15 SYRINGE (ML) INTRAVENOUS
Qty: 4 | Refills: 0
Start: 2024-10-26 | End: 2024-11-24

## 2024-10-26 RX ORDER — EMPAGLIFLOZIN 25 MG/1
1 TABLET, FILM COATED ORAL
Qty: 30 | Refills: 0
Start: 2024-10-26 | End: 2024-11-24

## 2024-10-26 RX ORDER — SEMAGLUTIDE 0.25 MG/.5ML
1 INJECTION, SOLUTION SUBCUTANEOUS
Qty: 30 | Refills: 0 | DISCHARGE
Start: 2024-10-26 | End: 2024-11-24

## 2024-10-26 RX ORDER — GLIPIZIDE/METFORMIN HCL 2.5-250 MG
2 TABLET ORAL
Refills: 0 | DISCHARGE

## 2024-10-26 RX ORDER — ATORVASTATIN CALCIUM 80 MG/1
1 TABLET, FILM COATED ORAL
Refills: 0 | DISCHARGE

## 2024-10-26 RX ORDER — POLYETHYLENE GLYCOL 3350 17 G/17G
17 POWDER, FOR SOLUTION ORAL
Qty: 510 | Refills: 0
Start: 2024-10-26 | End: 2024-11-24

## 2024-10-26 RX ORDER — ASPIRIN 325 MG
81 TABLET ORAL DAILY
Refills: 0 | Status: DISCONTINUED | OUTPATIENT
Start: 2024-10-26 | End: 2024-10-26

## 2024-10-26 RX ORDER — ALBUTEROL SULFATE 2.5 MG/3ML
2 VIAL, NEBULIZER (ML) INHALATION
Qty: 1 | Refills: 0
Start: 2024-10-26 | End: 2024-11-24

## 2024-10-26 RX ORDER — BUDESONIDE AND FORMOTEROL FUMARATE DIHYDRATE 80; 4.5 UG/1; UG/1
2 AEROSOL RESPIRATORY (INHALATION)
Qty: 1 | Refills: 0
Start: 2024-10-26 | End: 2024-11-24

## 2024-10-26 RX ORDER — EMPAGLIFLOZIN 10 MG/1
1 TABLET, FILM COATED ORAL
Qty: 30 | Refills: 0 | DISCHARGE
Start: 2024-10-26 | End: 2024-11-24

## 2024-10-26 RX ORDER — SACUBITRIL AND VALSARTAN 6; 6 MG/1; MG/1
1 PELLET ORAL
Qty: 60 | Refills: 0
Start: 2024-10-26 | End: 2024-11-24

## 2024-10-26 RX ORDER — ATORVASTATIN CALCIUM 80 MG/1
1 TABLET, FILM COATED ORAL
Qty: 30 | Refills: 0 | DISCHARGE
Start: 2024-10-26 | End: 2024-11-24

## 2024-10-26 RX ORDER — ASPIRIN 325 MG
1 TABLET ORAL
Qty: 30 | Refills: 0
Start: 2024-10-26 | End: 2024-11-24

## 2024-10-26 RX ORDER — ISOSORBIDE MONONITRATE 60 MG/1
1 TABLET, EXTENDED RELEASE ORAL
Refills: 0 | DISCHARGE

## 2024-10-26 RX ORDER — BUMETANIDE 1 MG/1
1 TABLET ORAL
Refills: 0 | DISCHARGE

## 2024-10-26 RX ORDER — ORAL SEMAGLUTIDE 14 MG/1
1 TABLET ORAL
Qty: 30 | Refills: 0
Start: 2024-10-26 | End: 2024-11-24

## 2024-10-26 RX ORDER — GLIPIZIDE/METFORMIN HCL 5 MG-500MG
1 TABLET ORAL
Qty: 60 | Refills: 0 | DISCHARGE
Start: 2024-10-26 | End: 2024-11-24

## 2024-10-26 RX ORDER — EPLERENONE 25 MG/1
1 TABLET ORAL
Qty: 30 | Refills: 0
Start: 2024-10-26 | End: 2024-11-24

## 2024-10-26 RX ORDER — SENNA 187 MG
2 TABLET ORAL
Qty: 60 | Refills: 0
Start: 2024-10-26 | End: 2024-11-24

## 2024-10-26 RX ADMIN — INSULIN LISPRO 24 UNIT(S): 100 INJECTION, SOLUTION INTRAVENOUS; SUBCUTANEOUS at 07:38

## 2024-10-26 RX ADMIN — INSULIN LISPRO 8: 100 INJECTION, SOLUTION INTRAVENOUS; SUBCUTANEOUS at 07:38

## 2024-10-26 RX ADMIN — Medication 650 MILLIGRAM(S): at 06:38

## 2024-10-26 RX ADMIN — SACUBITRIL AND VALSARTAN 1 TABLET(S): 6; 6 PELLET ORAL at 06:04

## 2024-10-26 RX ADMIN — EPLERENONE 25 MILLIGRAM(S): 25 TABLET ORAL at 06:05

## 2024-10-26 RX ADMIN — BUMETANIDE 2 MILLIGRAM(S): 1 TABLET ORAL at 06:04

## 2024-10-26 RX ADMIN — CARVEDILOL 25 MILLIGRAM(S): 3.12 TABLET, FILM COATED ORAL at 06:04

## 2024-10-26 RX ADMIN — Medication 650 MILLIGRAM(S): at 06:06

## 2024-11-18 ENCOUNTER — APPOINTMENT (OUTPATIENT)
Dept: ENDOCRINOLOGY | Facility: CLINIC | Age: 44
End: 2024-11-18

## 2024-11-25 ENCOUNTER — INPATIENT (INPATIENT)
Facility: HOSPITAL | Age: 44
LOS: 1 days | Discharge: HOME | End: 2024-11-27
Attending: HOSPITALIST | Admitting: HOSPITALIST
Payer: MEDICAID

## 2024-11-25 VITALS
HEIGHT: 71 IN | DIASTOLIC BLOOD PRESSURE: 109 MMHG | WEIGHT: 240.08 LBS | HEART RATE: 106 BPM | RESPIRATION RATE: 18 BRPM | TEMPERATURE: 98 F | SYSTOLIC BLOOD PRESSURE: 182 MMHG | OXYGEN SATURATION: 100 %

## 2024-11-25 DIAGNOSIS — R10.9 UNSPECIFIED ABDOMINAL PAIN: ICD-10-CM

## 2024-11-25 LAB
ADD ON TEST-SPECIMEN IN LAB: SIGNIFICANT CHANGE UP
ALBUMIN SERPL ELPH-MCNC: 4.4 G/DL — SIGNIFICANT CHANGE UP (ref 3.3–5)
ALP SERPL-CCNC: 73 U/L — SIGNIFICANT CHANGE UP (ref 40–120)
ALT FLD-CCNC: 13 U/L — SIGNIFICANT CHANGE UP (ref 4–41)
ANION GAP SERPL CALC-SCNC: 17 MMOL/L — HIGH (ref 7–14)
APTT BLD: 34 SEC — SIGNIFICANT CHANGE UP (ref 24.5–35.6)
AST SERPL-CCNC: 19 U/L — SIGNIFICANT CHANGE UP (ref 4–40)
BASOPHILS # BLD AUTO: 0.04 K/UL — SIGNIFICANT CHANGE UP (ref 0–0.2)
BASOPHILS NFR BLD AUTO: 0.3 % — SIGNIFICANT CHANGE UP (ref 0–2)
BILIRUB SERPL-MCNC: 1 MG/DL — SIGNIFICANT CHANGE UP (ref 0.2–1.2)
BLOOD GAS VENOUS COMPREHENSIVE RESULT: SIGNIFICANT CHANGE UP
BUN SERPL-MCNC: 21 MG/DL — SIGNIFICANT CHANGE UP (ref 7–23)
CALCIUM SERPL-MCNC: 9.8 MG/DL — SIGNIFICANT CHANGE UP (ref 8.4–10.5)
CHLORIDE SERPL-SCNC: 101 MMOL/L — SIGNIFICANT CHANGE UP (ref 98–107)
CK MB CFR SERPL CALC: 8.3 NG/ML — HIGH
CO2 SERPL-SCNC: 21 MMOL/L — LOW (ref 22–31)
CREAT SERPL-MCNC: 1.12 MG/DL — SIGNIFICANT CHANGE UP (ref 0.5–1.3)
EGFR: 83 ML/MIN/1.73M2 — SIGNIFICANT CHANGE UP
EOSINOPHIL # BLD AUTO: 0.01 K/UL — SIGNIFICANT CHANGE UP (ref 0–0.5)
EOSINOPHIL NFR BLD AUTO: 0.1 % — SIGNIFICANT CHANGE UP (ref 0–6)
GLUCOSE BLDC GLUCOMTR-MCNC: 138 MG/DL — HIGH (ref 70–99)
GLUCOSE SERPL-MCNC: 172 MG/DL — HIGH (ref 70–99)
HCT VFR BLD CALC: 51.9 % — HIGH (ref 39–50)
HGB BLD-MCNC: 17.2 G/DL — HIGH (ref 13–17)
IANC: 10.61 K/UL — HIGH (ref 1.8–7.4)
IMM GRANULOCYTES NFR BLD AUTO: 0.4 % — SIGNIFICANT CHANGE UP (ref 0–0.9)
INR BLD: 1.14 RATIO — SIGNIFICANT CHANGE UP (ref 0.85–1.16)
LIDOCAIN IGE QN: 35 U/L — SIGNIFICANT CHANGE UP (ref 7–60)
LYMPHOCYTES # BLD AUTO: 1.75 K/UL — SIGNIFICANT CHANGE UP (ref 1–3.3)
LYMPHOCYTES # BLD AUTO: 13.4 % — SIGNIFICANT CHANGE UP (ref 13–44)
MAGNESIUM SERPL-MCNC: 2 MG/DL — SIGNIFICANT CHANGE UP (ref 1.6–2.6)
MCHC RBC-ENTMCNC: 29.4 PG — SIGNIFICANT CHANGE UP (ref 27–34)
MCHC RBC-ENTMCNC: 33.1 G/DL — SIGNIFICANT CHANGE UP (ref 32–36)
MCV RBC AUTO: 88.6 FL — SIGNIFICANT CHANGE UP (ref 80–100)
MONOCYTES # BLD AUTO: 0.59 K/UL — SIGNIFICANT CHANGE UP (ref 0–0.9)
MONOCYTES NFR BLD AUTO: 4.5 % — SIGNIFICANT CHANGE UP (ref 2–14)
NEUTROPHILS # BLD AUTO: 10.61 K/UL — HIGH (ref 1.8–7.4)
NEUTROPHILS NFR BLD AUTO: 81.3 % — HIGH (ref 43–77)
NRBC # BLD: 0 /100 WBCS — SIGNIFICANT CHANGE UP (ref 0–0)
NRBC # FLD: 0 K/UL — SIGNIFICANT CHANGE UP (ref 0–0)
PLATELET # BLD AUTO: 215 K/UL — SIGNIFICANT CHANGE UP (ref 150–400)
POTASSIUM SERPL-MCNC: 4.6 MMOL/L — SIGNIFICANT CHANGE UP (ref 3.5–5.3)
POTASSIUM SERPL-SCNC: 4.6 MMOL/L — SIGNIFICANT CHANGE UP (ref 3.5–5.3)
PROT SERPL-MCNC: 8.2 G/DL — SIGNIFICANT CHANGE UP (ref 6–8.3)
PROTHROM AB SERPL-ACNC: 13.2 SEC — SIGNIFICANT CHANGE UP (ref 9.9–13.4)
RBC # BLD: 5.86 M/UL — HIGH (ref 4.2–5.8)
RBC # FLD: 14.4 % — SIGNIFICANT CHANGE UP (ref 10.3–14.5)
SODIUM SERPL-SCNC: 139 MMOL/L — SIGNIFICANT CHANGE UP (ref 135–145)
TROPONIN T, HIGH SENSITIVITY RESULT: 45 NG/L — SIGNIFICANT CHANGE UP
TROPONIN T, HIGH SENSITIVITY RESULT: 62 NG/L — CRITICAL HIGH
WBC # BLD: 13.05 K/UL — HIGH (ref 3.8–10.5)
WBC # FLD AUTO: 13.05 K/UL — HIGH (ref 3.8–10.5)

## 2024-11-25 PROCEDURE — 99285 EMERGENCY DEPT VISIT HI MDM: CPT

## 2024-11-25 PROCEDURE — 99232 SBSQ HOSP IP/OBS MODERATE 35: CPT | Mod: GC

## 2024-11-25 PROCEDURE — 71045 X-RAY EXAM CHEST 1 VIEW: CPT | Mod: 26

## 2024-11-25 RX ORDER — LIDOCAINE 40 MG/G
10 CREAM TOPICAL ONCE
Refills: 0 | Status: COMPLETED | OUTPATIENT
Start: 2024-11-25 | End: 2024-11-25

## 2024-11-25 RX ORDER — INFLUENZA VIRUS VACCINE 15; 15; 15; 15 UG/.5ML; UG/.5ML; UG/.5ML; UG/.5ML
0.5 SUSPENSION INTRAMUSCULAR ONCE
Refills: 0 | Status: DISCONTINUED | OUTPATIENT
Start: 2024-11-25 | End: 2024-11-27

## 2024-11-25 RX ORDER — METOCLOPRAMIDE HYDROCHLORIDE 10 MG/1
10 TABLET ORAL ONCE
Refills: 0 | Status: COMPLETED | OUTPATIENT
Start: 2024-11-25 | End: 2024-11-25

## 2024-11-25 RX ORDER — MAGNESIUM, ALUMINUM HYDROXIDE 200-225/5
30 SUSPENSION, ORAL (FINAL DOSE FORM) ORAL ONCE
Refills: 0 | Status: COMPLETED | OUTPATIENT
Start: 2024-11-25 | End: 2024-11-25

## 2024-11-25 RX ORDER — MAGNESIUM, ALUMINUM HYDROXIDE 200-225/5
30 SUSPENSION, ORAL (FINAL DOSE FORM) ORAL EVERY 4 HOURS
Refills: 0 | Status: DISCONTINUED | OUTPATIENT
Start: 2024-11-25 | End: 2024-11-27

## 2024-11-25 RX ORDER — ONDANSETRON HYDROCHLORIDE 4 MG/1
4 TABLET, FILM COATED ORAL ONCE
Refills: 0 | Status: COMPLETED | OUTPATIENT
Start: 2024-11-25 | End: 2024-11-25

## 2024-11-25 RX ORDER — ENOXAPARIN SODIUM 30 MG/.3ML
40 INJECTION SUBCUTANEOUS EVERY 24 HOURS
Refills: 0 | Status: DISCONTINUED | OUTPATIENT
Start: 2024-11-25 | End: 2024-11-26

## 2024-11-25 RX ORDER — ONDANSETRON HYDROCHLORIDE 4 MG/1
4 TABLET, FILM COATED ORAL EVERY 8 HOURS
Refills: 0 | Status: DISCONTINUED | OUTPATIENT
Start: 2024-11-25 | End: 2024-11-27

## 2024-11-25 RX ORDER — ACETAMINOPHEN, DIPHENHYDRAMINE HCL, PHENYLEPHRINE HCL 325; 25; 5 MG/1; MG/1; MG/1
3 TABLET ORAL AT BEDTIME
Refills: 0 | Status: DISCONTINUED | OUTPATIENT
Start: 2024-11-25 | End: 2024-11-27

## 2024-11-25 RX ORDER — 0.9 % SODIUM CHLORIDE 0.9 %
1000 INTRAVENOUS SOLUTION INTRAVENOUS ONCE
Refills: 0 | Status: COMPLETED | OUTPATIENT
Start: 2024-11-25 | End: 2024-11-25

## 2024-11-25 RX ORDER — PANTOPRAZOLE SODIUM 40 MG/1
40 TABLET, DELAYED RELEASE ORAL
Refills: 0 | Status: DISCONTINUED | OUTPATIENT
Start: 2024-11-26 | End: 2024-11-26

## 2024-11-25 RX ORDER — PANTOPRAZOLE SODIUM 40 MG/1
40 TABLET, DELAYED RELEASE ORAL ONCE
Refills: 0 | Status: COMPLETED | OUTPATIENT
Start: 2024-11-25 | End: 2024-11-25

## 2024-11-25 RX ORDER — PANTOPRAZOLE SODIUM 40 MG/1
80 TABLET, DELAYED RELEASE ORAL ONCE
Refills: 0 | Status: COMPLETED | OUTPATIENT
Start: 2024-11-25 | End: 2024-11-25

## 2024-11-25 RX ORDER — ACETAMINOPHEN 500MG 500 MG/1
650 TABLET, COATED ORAL EVERY 6 HOURS
Refills: 0 | Status: DISCONTINUED | OUTPATIENT
Start: 2024-11-25 | End: 2024-11-27

## 2024-11-25 RX ADMIN — ONDANSETRON HYDROCHLORIDE 4 MILLIGRAM(S): 4 TABLET, FILM COATED ORAL at 23:40

## 2024-11-25 RX ADMIN — Medication 30 MILLILITER(S): at 15:41

## 2024-11-25 RX ADMIN — LIDOCAINE 10 MILLILITER(S): 40 CREAM TOPICAL at 18:12

## 2024-11-25 RX ADMIN — PANTOPRAZOLE SODIUM 80 MILLIGRAM(S): 40 TABLET, DELAYED RELEASE ORAL at 15:41

## 2024-11-25 RX ADMIN — ONDANSETRON HYDROCHLORIDE 4 MILLIGRAM(S): 4 TABLET, FILM COATED ORAL at 15:41

## 2024-11-25 RX ADMIN — Medication 1000 MILLILITER(S): at 15:41

## 2024-11-25 RX ADMIN — METOCLOPRAMIDE HYDROCHLORIDE 10 MILLIGRAM(S): 10 TABLET ORAL at 17:02

## 2024-11-25 RX ADMIN — PANTOPRAZOLE SODIUM 40 MILLIGRAM(S): 40 TABLET, DELAYED RELEASE ORAL at 23:28

## 2024-11-25 RX ADMIN — Medication 1000 MILLILITER(S): at 17:02

## 2024-11-25 NOTE — H&P ADULT - NSHPPHYSICALEXAM_GEN_ALL_CORE
Vital Signs Last 24 Hrs  T(C): 36.8 (25 Nov 2024 22:31), Max: 36.8 (25 Nov 2024 22:31)  T(F): 98.3 (25 Nov 2024 22:31), Max: 98.3 (25 Nov 2024 22:31)  HR: 105 (25 Nov 2024 22:31) (105 - 119)  BP: 157/106 (25 Nov 2024 22:31) (157/106 - 182/111)  BP(mean): --  RR: 18 (25 Nov 2024 22:31) (18 - 18)  SpO2: 97% (25 Nov 2024 22:31) (97% - 100%)    Parameters below as of 25 Nov 2024 22:31  Patient On (Oxygen Delivery Method): room air    =================================================== Vital Signs Last 24 Hrs  T(C): 36.8 (25 Nov 2024 22:31), Max: 36.8 (25 Nov 2024 22:31)  T(F): 98.3 (25 Nov 2024 22:31), Max: 98.3 (25 Nov 2024 22:31)  HR: 105 (25 Nov 2024 22:31) (105 - 119)  BP: 157/106 (25 Nov 2024 22:31) (157/106 - 182/111)  BP(mean): --  RR: 18 (25 Nov 2024 22:31) (18 - 18)  SpO2: 97% (25 Nov 2024 22:31) (97% - 100%)    Parameters below as of 25 Nov 2024 22:31  Patient On (Oxygen Delivery Method): room air    ===================================================  PHYSICAL EXAMINATION:    APPEARANCE: Adequately groomed, adequately nourished male, lying propped up in bed in NAD  HEENT: Dry lips.  Mildly dry oral mucosa.  Pupils reactive to light.  EOMI  LYMPHATIC: No lymphadenopathy appreciated  CARDIOVASCULAR: (+) S1 S2.  No JVD.  No murmurs.  Trace pitting edema  RESPIRATORY: No wheezing, rhonchi, crackles appreciated  GASTROINTESTINAL: Soft.  Non-tender.  (+) BS  GENITOURINARY: No suprapubic tenderness.  No CVA tenderness B/L  EXTREMITIES: Normal range of motion.  No clubbing or cyanosis.  Trace pitting edema  MUSCULOSKELETAL: No atrophy.  No asymmetry.  Good ROM  SKIN: No rashes. No ecchymoses.  No cyanosis  PSYCHIATRIC: A&O x 3.  Mood & affect appropriate to situation  NEUROLOGICAL: Non-focal, DASH x 4 against gravity  VASCULAR: Peripheral pulses palpable

## 2024-11-25 NOTE — H&P ADULT - PROBLEM SELECTOR PLAN 2
Onset ~ 3 to 4 hours after ingestion of prescription tablets, later [employer] sandwich and afterwards, an alcohol beverage.  9/10 maximum intensity.  Burning in nature.  Associated w/ multiple episodes of nausea and vomiting; at least 9 episodes - 8 of which had blood/blood clots  History of gastritis.  History also of combined systolic & diastolic heart failure (troponin = 62 -->> 45)  Now with jacinta blood in vomitus; streaks and clots  - initially received pantoprazole 80 mg IV x one in the ED.  Starting pantoprazole drip  - Zofran PRN (QTc = 457)  - GI consult e-mailed; please f/u  - Pain now at ~ 4 to 5/1o; if worsens, would give morphine IV  - HOB elevation

## 2024-11-25 NOTE — H&P ADULT - PROBLEM SELECTOR PLAN 4
Glucose = 172.  Last A1c = 11 on 10/04/2024  Patient on multiple medications; glipizide-metformin, Jardiance, Rybelsus; held  - m-ISS per FS Q6H  - consistent carb diet (when cleared for oral intake)  - f/u glucose closely, if becomes elevated may need basal insulin Glucose = 172.  Last A1c = 11 on 10/04/2024, per chart review  Patient on multiple medications; glipizide-metformin, Jardiance, Rybelsus; held  - m-ISS per FS Q6H  - consistent carb diet (when cleared for oral intake)  - f/u glucose closely, if becomes elevated may need basal insulin

## 2024-11-25 NOTE — ED ADULT NURSE REASSESSMENT NOTE - NS ED NURSE REASSESS COMMENT FT1
Report received from day RN: pt resting comfortably in stretcher, breathing even and unlabored. offers no complaints at this time. Instructed to call for assistance. Stretcher in lowest position, wheels locked, appropriate side rails in place, call bell in reach. Pending admitted bed assignment
Temporarily feeling better now starting to feel the same. Will Endorse to oncoming nurse.

## 2024-11-25 NOTE — H&P ADULT - HISTORY OF PRESENT ILLNESS
44-year-old male, with past history significant for HTN, Type-2 DM, CHF (EF = 19% and Grade 1 diastolic dysfunction), HLD, and Gastritis, presented to the ED secondary to epigastric pain.  Seen and evaluated at bedside;    Vital signs upon ED presentation as follows: BP = 182/109, HR = 106, RR = 18, T = 36.7 C (98.1 F), O2 Sat = 100% on RA.  Diagnosed with Abdominal Pain with Vomiting and prescribed LR one liter IV x 2, Maalox 30 mL, Lidocaine 2% viscous swish and spit, Reglan 10 mL IV, Zofran 4 mg IV and pantoprazole 80 mg IV in the ED. 44-year-old male, with past history significant for HTN, Type-2 DM, CHF (EF = 19% and Grade 1 diastolic dysfunction), HLD, and Gastritis, presented to the ED secondary to epigastric pain.  Seen and evaluated at bedside; NAD.  Patient reports onset of severe burning epigastric pain about 3 to four hours after taking his prescription medications, then consuming a sandwich from [employer], then having an alcoholic beverage.  Subsequently had nausea and recurrent vomiting - at least 9 episodes thus far, eight (8) of which contained red blood and blood clots.  No prior history of hematemesis.  Pain worsened today.  Has had repeated vomiting since coming to the ED.  Epigastric pain, which radiates up the mid chest, is rated at 9/10 maximum intensity and was ~ 4 to 5/10 when seen.  No diarrhea.    Vital signs upon ED presentation as follows: BP = 182/109, HR = 106, RR = 18, T = 36.7 C (98.1 F), O2 Sat = 100% on RA.  Diagnosed with Abdominal Pain with Vomiting and prescribed LR one liter IV x 2, Maalox 30 mL, Lidocaine 2% viscous swish and spit, Reglan 10 mL IV, Zofran 4 mg IV and pantoprazole 80 mg IV in the ED.

## 2024-11-25 NOTE — H&P ADULT - PROBLEM SELECTOR PLAN 3
No acute issues presently, but appears dehydrated  Received IVF of NaCl total  2 liters in the ED  Troponin = 62 -->> 45.  CK-MB = 8.9 -->> 8.3  - evaluate for signs of fluid overload; currently with trace leg edema  - intake/output Q6H, weight daily, HOB elevation.  Fluid restriction of one (1) liter daily  - continuing with cardioprotective meds as PTA; some meds, however, not prescribed - Bumex (due to dehydration), eplerenone (non-formulary; ?could try spironolactone, if necessary), aspirin (due to GIB)  - on carvedilol, Entresto, atorvastatin No acute issues presently, but appears dehydrated  Received IVF of NaCl total  2 liters in the ED  Troponin = 62 -->> 45.  CK-MB = 8.9 -->> 8.3  ECG as noted above  - evaluate for signs of fluid overload; currently with trace leg edema  - intake/output Q6H, weight daily, HOB elevation.  Fluid restriction of one (1) liter daily  - continuing with cardioprotective meds as PTA; some meds, however, not prescribed - Bumex (due to dehydration), eplerenone (non-formulary; ?could try spironolactone, if necessary), aspirin (due to GIB)  - on carvedilol, Entresto, atorvastatin No acute issues presently, but appears dehydrated  Received IVF of NaCl total  2 liters in the ED  Troponin = 62 -->> 45.  CK-MB = 8.9 -->> 8.3  ECG as noted above  - evaluate for signs of fluid overload; currently with trace leg edema  - intake/output Q6H, weight daily, HOB elevation.  Fluid restriction of one (1) liter daily  - continuing with cardioprotective meds as PTA; some meds, however, not prescribed - Bumex (due to dehydration), eplerenone (non-formulary; ?could try spironolactone, if necessary), aspirin (due to GIB)  - on carvedilol, Entresto, atorvastatin  - Cardiology Consult in the AM (patient likely needs evaluation prior to ?EGD) No acute issues presently, but appears dehydrated  Received IVF of NaCl total  2 liters in the ED  Troponin = 62 -->> 45.  CK-MB = 8.9 -->> 8.3  ECG as noted above  - evaluate for signs of fluid overload; currently with trace leg edema  - intake/output Q6H, weight daily, HOB elevation.  Fluid restriction of one (1) liter daily  - continuing with cardioprotective meds as PTA; some meds, however, not prescribed - Bumex (due to dehydration), eplerenone (non-formulary; ?could try spironolactone, if necessary), aspirin (due to GIB)  - on carvedilol, Entresto, atorvastatin  - Cardiology Consult in the AM (patient likely needs evaluation prior to ?EGD) - Cardiologist aware (please f/u)

## 2024-11-25 NOTE — H&P ADULT - NSICDXPASTMEDICALHX_GEN_ALL_CORE_FT
PAST MEDICAL HISTORY:  Asthma     Congestive heart failure (CHF) EF 22%    HTN (hypertension)     Hyperlipidemia     Type 2 diabetes mellitus      PAST MEDICAL HISTORY:  Asthma     Congestive heart failure (CHF) EF 22%    Current smoker     HTN (hypertension)     Hyperlipidemia     Type 2 diabetes mellitus

## 2024-11-25 NOTE — H&P ADULT - PROBLEM SELECTOR PLAN 6
WBC = 13.05  Afebrile and w/o other signs suggestive of infection.  No symptoms suggestive of infection  Lactate of 2.1 is being attributed chiefly to dehydration  - f/u WBC trend and f/u for any signs of smouldering infection WBC = 13.05  Afebrile and w/o other signs suggestive of infection.  No symptoms suggestive of infection  Lactate of 2.1 is being attributed chiefly to dehydration  CXR w/o signs of infection (personally reviewed)  - f/u WBC trend and f/u for any signs of smouldering infection

## 2024-11-25 NOTE — H&P ADULT - CONVERSATION DETAILS
Discussed with patient current medical state and plan.  Along with the recurrent nausea and vomiting, along with now dehydration, discussed with patient the need for GI consult due to report of hematemesis with clots and for possible EGD, per GI's determination.  Patient voices understanding re same, but is concerned due to his cardiac condition.  Acknowledged patient's concern and patient plans to discuss same further with GI Team in the AM.  Discussed also other current treatment, including fluid resuscitation, PPI indications.  Relative to Goals of Care, discussed with patient Health Care Proxy (HCP) and what it entails.  Patient responded that he has been thinking about it, and likely his mother would be his HCP, especially since his wife is .  Patient plans to continue thinking about same, and was encourage to complete the HCP form as soon as he is feels to do so, including during this admission.  All questions were encouraged and answered during the time and patient encouraged to ask other questions as they arise in the future.  Patient remains Full  Code.

## 2024-11-25 NOTE — H&P ADULT - PROBLEM SELECTOR PLAN 1
Onset ~ 3 to 4 hours after ingestion of prescription tablets, later [employer] sandwich and afterwards, an alcohol beverage  Multiple episodes (at least 9) - 8 of which have jacinta streaks of blood and blood clots  States no history of alcohol abuse  On aspirin 81 mg daily; holding for now (please re-evaluate for restart)  Hgb = 17.2 (was 17.1 on 10/26/2024).  Platelets = 215  S/p pantoprazole 80 mg IV in the ED  - starting pantoprazole IV  - GI consult in the AM (e-mailed); please f/u  - f/u hemoglobin trend Onset ~ 3 to 4 hours after ingestion of prescription tablets, later [employer] sandwich and afterwards, an alcohol beverage  Multiple episodes (at least 9) - 8 of which have jacinta streaks of blood and blood clots  States no history of alcohol abuse  On aspirin 81 mg daily; holding for now (please re-evaluate for restart)  Hgb = 17.2 (was 17.1 on 10/26/2024).  Platelets = 215  S/p pantoprazole 80 mg IV in the ED  - starting pantoprazole IV (patient refusing drip; prescribing pantoprazole 40 mg IV BID; to be re-evaluated)  - GI consult in the AM (e-mailed); please f/u  - f/u hemoglobin trend Onset ~ 3 to 4 hours after ingestion of prescription tablets, later [employer] sandwich and afterwards, an alcohol beverage  Multiple episodes (at least 9) - 8 of which have jacinta streaks of blood and blood clots  States no history of alcohol abuse  On aspirin 81 mg daily; holding for now (please re-evaluate for restart)  Hgb = 17.2 (was 17.1 on 10/26/2024).  Platelets = 215  S/p pantoprazole 80 mg IV in the ED  - starting pantoprazole IV (patient refusing drip; prescribing pantoprazole 40 mg IV BID; to be re-evaluated)  - f/u hemoglobin trend  - GI consult in the AM (e-mailed); please f/u  - Cardiology consult in the AM (pt with CHF - EF 19% and Grade I diastolic dysfunction); likely needs eval prior to ?EGD Onset ~ 3 to 4 hours after ingestion of prescription tablets, later [employer] sandwich and afterwards, an alcohol beverage  Multiple episodes (at least 9) - 8 of which have jacinta streaks of blood and blood clots  States no history of alcohol abuse  On aspirin 81 mg daily; holding for now (please re-evaluate for restart)  Hgb = 17.2 (was 17.1 on 10/26/2024).  Platelets = 215  S/p pantoprazole 80 mg IV in the ED  - starting pantoprazole IV (patient refusing drip; prescribing pantoprazole 40 mg IV BID; to be re-evaluated)  - f/u hemoglobin trend  - GI consult in the AM (e-mailed); please f/u  - Cardiology consult in the AM (pt with CHF - EF 19% and Grade I diastolic dysfunction); likely needs eval prior to ?EGD; Cardiologist aware (please f/u)

## 2024-11-25 NOTE — PATIENT PROFILE ADULT - FUNCTIONAL ASSESSMENT - BASIC MOBILITY 1.
Strep throat  In-clinic Strep test POSITIVE  In clinic Covid negative  In clinic Flu negative  Augmentin prescribed for antibiotic treatment.  Take the antibiotics until completed, do not stop unless otherwise directed by a healthcare provider.  Recommend daily yogurt or other probiotics while on antibiotics.  Can use ibuprofen and/or acetaminophen for relief of any fevers or pain, along with use of cold drinks and soft foods.  Suggested OTC remedies for symptom management:  ibuprofen (Advil, Motrin) and acetaminophen (Tylenol) for fevers and pain relief.  decongestants (specifically pseudoephedrine - found behind the pharmacy counter - does not need a prescription)  along with antihistamines (Claritin, Zyrtec, Allegra, or Xyzal) and nasal steroid sprays (such as Flonase) to help with nasal congestion and runny nose.  Saline Mist Sprays such as Arm [employer] Hammer Simply Saline to loosen and clear secretions  Pseudoephedrine recommended for congestion relief.  throat sprays (Cepacol, chloraseptic) for throat pain.  throat lozenges, and increased water intake for cough.  honey (1-2 teaspoons every hour) for relief of throat irritation and coughing fits.  warm teas, humidifiers, nasal lavages, and sleeping in an inclined position are also helpful options that can lessen symptoms.  Recommend warm compresses over the symptomatic ear(s) for 10-15 minutes, or a hot shower, followed by 1-2 minutes of massaging the area behind your ears and down the jaw-line to help with the ear congestion/ear pressure.    Change out your toothbrush and wash your bed linens between 24-48 hours of antibiotic treatment.  Follow up with your PCP within 5 days or, if unable, you can return to the clinic if symptoms persist beyond 5 days or if symptoms worsen.    New Prescriptions    AMOXICILLIN-CLAVULANATE (AUGMENTIN) 875-125 MG PER TABLET    Take 1 tablet by mouth 2 times daily for 7 days    PREDNISONE (DELTASONE) 20 MG TABLET    Take 1 tablet by 
4 = No assist / stand by assistance

## 2024-11-25 NOTE — H&P ADULT - SOCIAL HISTORY: TOBACCO USE
History of smoking (current); onset at ~ age 13 years.  Stopped then restarted.  Now at ~ 2 cigarettes daily while trying to quit.  Declines offer for nicotine patch

## 2024-11-25 NOTE — H&P ADULT - PROBLEM SELECTOR PLAN 5
BP = 182/109 initially ini the ED - likely impacted from stress due to severe epigastric pain, nausea, vomiting  - continuing with Entresto, carvedilol  - eplerenone non-formulary  - (Bumex held, in the setting of dehydration)  - modify therapy, as needed

## 2024-11-25 NOTE — H&P ADULT - NSHPSOCIALHISTORY_GEN_ALL_CORE
SOCIAL HISTORY:    Marital Status:  (  )    (  ) Single        (  )        (  )        (  )   Lives with:          (  ) Alone      (  ) Spouse     (  ) Children         (  ) Parents             (  ) Other    No history of smoking  No history of alcohol abuse  No history of illegal drug use    Occupation: SOCIAL HISTORY:    Marital Status:  (  )    (  ) Single        (  )        (  )        ( x )   Lives with:          (  ) Alone      (  ) Spouse     (  ) Children         (  ) Parents             (  ) Other    History of smoking; onset at ~ age 13 years.  Stopped then restarted.  Now at ~ 2 cigarettes daily while trying to quit.  Declines offer for nicotine patch  No history of alcohol abuse  No history of illegal drug use    Occupation:

## 2024-11-25 NOTE — PATIENT PROFILE ADULT - FALL HARM RISK - UNIVERSAL INTERVENTIONS
Bed in lowest position, wheels locked, appropriate side rails in place/Call bell, personal items and telephone in reach/Instruct patient to call for assistance before getting out of bed or chair/Non-slip footwear when patient is out of bed/[address] to call system/Physically safe environment - no spills, clutter or unnecessary equipment/Purposeful Proactive Rounding/Room/bathroom lighting operational, light cord in reach

## 2024-11-25 NOTE — ED PROVIDER NOTE - PROGRESS NOTE DETAILS
Matheus Lopez, PGY3 - patient PO challenge done, however patient still feels very nauseous and still has the epigastric pain. Will consult cardiology for troponemia. Will most likely be admitted for intractable nausea/vomiting after cardiology consult.

## 2024-11-25 NOTE — H&P ADULT - NSHPLABSRESULTS_GEN_ALL_CORE
LAB-WORK/STUDIES:                          17.2   13.05 )-----------( 215      ( 25 Nov 2024 15:20 )             51.9     139  |  101  |  21  ----------------------------<  172[H]     11-25  4.6   |  21[L]  |  1.12    Ca    9.8      25 Nov 2024 15:20  Mg     2.00     11-25    TPro  8.2  /  Alb  4.4  /  TBili  1.0  /  DBili  x   /  AST  19  /  ALT  13  /  AlkPhos  73  11-25    PT/INR: 13.2/1.14 (11-25-24 @ 15:20)  PTT: 34.0 (11-25-24 @ 15:20)    15:20 - VBG - pH: 7.43  | pCO2: 42    | pO2: 40    | Lactate: 2.1      hs Troponin, T - 45 ng/L (11-25-24 @ 19:41)  hs Troponin, T - 62 ng/L (11-25-24 @ 15:20)    =================================================        =================================================  . LAB-WORK/STUDIES:                          17.2   13.05 )-----------( 215      ( 25 Nov 2024 15:20 )             51.9     139  |  101  |  21  ----------------------------<  172[H]     11-25  4.6   |  21[L]  |  1.12    Ca    9.8      25 Nov 2024 15:20  Mg     2.00     11-25    TPro  8.2  /  Alb  4.4  /  TBili  1.0  /  DBili  x   /  AST  19  /  ALT  13  /  AlkPhos  73  11-25    PT/INR: 13.2/1.14 (11-25-24 @ 15:20)  PTT: 34.0 (11-25-24 @ 15:20)    15:20 - VBG - pH: 7.43  | pCO2: 42    | pO2: 40    | Lactate: 2.1      hs Troponin, T - 45 ng/L (11-25-24 @ 19:41)  hs Troponin, T - 62 ng/L (11-25-24 @ 15:20)    =================================================    ECG = sinus tachycardia at 104 bpm, LAFB, LVF, LAE  QTc = 457, TWI in aVL, V5, V6    =================================================  .

## 2024-11-25 NOTE — H&P ADULT - ASSESSMENT
[ x ]  Lab studies personally reviewed  [ x ]  Radiology personally reviewed  [ x ]  Old records personally reviewed    44-year-old male, with past history significant for HTN, Type-2 DM, CHF (EF = 19% and Grade 1 diastolic dysfunction), HLD, and Gastritis, presented to the ED secondary to epigastric pain.  Diagnosed with Abdominal Pain with Vomiting in the ED.

## 2024-11-25 NOTE — H&P ADULT - PROBLEM SELECTOR PLAN 7
- aspirin held, in the setting of GIB  - eplerenone non-formulary  - Bumex held, in the setting of dehydration  - oral antidiabetic meds on hold

## 2024-11-25 NOTE — ED ADULT TRIAGE NOTE - CHIEF COMPLAINT QUOTE
Pt c/o abdominal pain and hematemesis since last night. Reports about 10 episodes of bloody vomit. PMHx HTN, DM, aortic stenosis. Hypertensive in triage. Denies any chest pain, sob, dizziness or fevers.

## 2024-11-25 NOTE — ED ADULT NURSE NOTE - OBJECTIVE STATEMENT
C/o abdominal pain, vomiting with blood. Denies chest pain shortness of breath. Appears uncomfortable. Care ongoing.

## 2024-11-25 NOTE — ED PROVIDER NOTE - CLINICAL SUMMARY MEDICAL DECISION MAKING FREE TEXT BOX
44-year-old male with past medical history of hypertension, DM2, CHF, hyperlipidemia, presents for epigastric pain radiating up to the chest associated nausea and vomiting since last night.  Patient states symptoms started after he ate at [email or web address], took his home medications, and had a couple of drinks of alcohol.  Pain has persisted and worsened today.  Patient states that he does have a history of gastritis and has had heartburn symptoms in the past, although he has never had symptoms this bad before. Denies fevers.    Physical Exam  GEN: Alert and oriented x 3, in mild distress, speaking full clear sentences  HEENT: NC/AT, PERRL, EOMI, normal oropharynx  NECK: Supple, nontender, FROM  CV: RRR, no m/r/g  PULM: CTA bilat, no wheezing/rales/rhonchi  ABD: Soft, moderately tender epigastric area, nondistended. No organomegaly  EXTR: FROM to all extremities, nontender, no edema  SKIN: Warm, dry, no rash  NEURO: AOx3, speaking full clear sentences, DASH 5/5 strength, ambulating with stable gait    The EKG shows no ST segment elevations or depressions, no hyperacute T waves, or any other signs of acute ischemia. There is no malignant dysrhythmia. Has TW inversions in I, aVL, V5-6 all of which are already seen on prior EKG in Oct 21, 2024. DDx includes gastritis vs. ulcer vs ACS. PPI,. antiemetics, IV fluids, CXR, labs, reassess.

## 2024-11-25 NOTE — PATIENT PROFILE ADULT - FALL HARM RISK - FACTORS NURSING JUDGEMENT
October 6, 2023     Patient: Maria Esther Longoria   YOB: 2024   Date of Visit: 2023       To Whom It May Concern:    Maria Esther Longoria was seen in my clinic on 2023 at 9:30 am. Please excuse the parent of Maria Esther Bello for her absence from work on this day, and yesterday 10/5.     If you have any questions or concerns, please don't hesitate to call.         Sincerely,       Mari Gonzalez MD        CC: No Recipients  
No

## 2024-11-25 NOTE — H&P ADULT - NSICDXFAMILYHX_GEN_ALL_CORE_FT
FAMILY HISTORY:  FH: HTN (hypertension)     FAMILY HISTORY:  FH: HTN (hypertension)    Grandparent  Still living? Unknown  Family history of diabetes mellitus (DM), Age at diagnosis: Age Unknown

## 2024-11-25 NOTE — ED ADULT NURSE NOTE - NSFALLUNIVINTERV_ED_ALL_ED
Bed/Stretcher in lowest position, wheels locked, appropriate side rails in place/Call bell, personal items and telephone in reach/Instruct patient to call for assistance before getting out of bed/chair/stretcher/Non-slip footwear applied when patient is off stretcher/[place] to call system/Physically safe environment - no spills, clutter or unnecessary equipment/Purposeful proactive rounding/Room/bathroom lighting operational, light cord in reach

## 2024-11-25 NOTE — H&P ADULT - NSHPREVIEWOFSYSTEMS_GEN_ALL_CORE
REVIEW OF SYSTEMS:    CONSTITUTIONAL: No weakness, fever, chills or sweating  EYES/ENT: No visual changes.  No dysphagia  NECK: No pain or stiffness  RESPIRATORY: No cough or hemoptysis.  No shortness of breath  CARDIOVASCULAR: No chest pain or palpitations.  No lower extremity edema  GASTROINTESTINAL: Severe epigastric burning pain associated with nausea and multiple episodes of vomiting - hematemesis with streaks of blood and blood clots.  No diarrhea or constipation. No melena or hematochezia.  GENITOURINARY: No dysuria, frequency or hematuria  MUSCULOSKELETAL: No joint pain, swelling, decreased ROM, erythema, warmth  NEUROLOGICAL: No numbness or weakness  PSYCHIATRY: No anxiety, or depression.  SKIN: No itching, burning, rashes, or lesions   All other review of systems is negative unless indicated above.

## 2024-11-26 ENCOUNTER — TRANSCRIPTION ENCOUNTER (OUTPATIENT)
Age: 44
End: 2024-11-26

## 2024-11-26 DIAGNOSIS — Z79.899 OTHER LONG TERM (CURRENT) DRUG THERAPY: ICD-10-CM

## 2024-11-26 DIAGNOSIS — I50.42 CHRONIC COMBINED SYSTOLIC (CONGESTIVE) AND DIASTOLIC (CONGESTIVE) HEART FAILURE: ICD-10-CM

## 2024-11-26 DIAGNOSIS — Z29.9 ENCOUNTER FOR PROPHYLACTIC MEASURES, UNSPECIFIED: ICD-10-CM

## 2024-11-26 DIAGNOSIS — I10 ESSENTIAL (PRIMARY) HYPERTENSION: ICD-10-CM

## 2024-11-26 DIAGNOSIS — E11.65 TYPE 2 DIABETES MELLITUS WITH HYPERGLYCEMIA: ICD-10-CM

## 2024-11-26 DIAGNOSIS — R10.13 EPIGASTRIC PAIN: ICD-10-CM

## 2024-11-26 DIAGNOSIS — K92.2 GASTROINTESTINAL HEMORRHAGE, UNSPECIFIED: ICD-10-CM

## 2024-11-26 DIAGNOSIS — D72.829 ELEVATED WHITE BLOOD CELL COUNT, UNSPECIFIED: ICD-10-CM

## 2024-11-26 LAB
24R-OH-CALCIDIOL SERPL-MCNC: 6.4 NG/ML — LOW (ref 30–80)
ANION GAP SERPL CALC-SCNC: 14 MMOL/L — SIGNIFICANT CHANGE UP (ref 7–14)
BASOPHILS # BLD AUTO: 0.02 K/UL — SIGNIFICANT CHANGE UP (ref 0–0.2)
BASOPHILS NFR BLD AUTO: 0.2 % — SIGNIFICANT CHANGE UP (ref 0–2)
BUN SERPL-MCNC: 20 MG/DL — SIGNIFICANT CHANGE UP (ref 7–23)
CALCIUM SERPL-MCNC: 8.9 MG/DL — SIGNIFICANT CHANGE UP (ref 8.4–10.5)
CHLORIDE SERPL-SCNC: 100 MMOL/L — SIGNIFICANT CHANGE UP (ref 98–107)
CO2 SERPL-SCNC: 24 MMOL/L — SIGNIFICANT CHANGE UP (ref 22–31)
CREAT SERPL-MCNC: 0.98 MG/DL — SIGNIFICANT CHANGE UP (ref 0.5–1.3)
EGFR: 97 ML/MIN/1.73M2 — SIGNIFICANT CHANGE UP
EOSINOPHIL # BLD AUTO: 0.04 K/UL — SIGNIFICANT CHANGE UP (ref 0–0.5)
EOSINOPHIL NFR BLD AUTO: 0.3 % — SIGNIFICANT CHANGE UP (ref 0–6)
GLUCOSE BLDC GLUCOMTR-MCNC: 110 MG/DL — HIGH (ref 70–99)
GLUCOSE BLDC GLUCOMTR-MCNC: 169 MG/DL — HIGH (ref 70–99)
GLUCOSE BLDC GLUCOMTR-MCNC: 189 MG/DL — HIGH (ref 70–99)
GLUCOSE SERPL-MCNC: 91 MG/DL — SIGNIFICANT CHANGE UP (ref 70–99)
HCT VFR BLD CALC: 48.9 % — SIGNIFICANT CHANGE UP (ref 39–50)
HGB BLD-MCNC: 16.7 G/DL — SIGNIFICANT CHANGE UP (ref 13–17)
IANC: 7.95 K/UL — HIGH (ref 1.8–7.4)
IMM GRANULOCYTES NFR BLD AUTO: 0.3 % — SIGNIFICANT CHANGE UP (ref 0–0.9)
LACTATE SERPL-SCNC: 1.3 MMOL/L — SIGNIFICANT CHANGE UP (ref 0.5–2)
LYMPHOCYTES # BLD AUTO: 2.47 K/UL — SIGNIFICANT CHANGE UP (ref 1–3.3)
LYMPHOCYTES # BLD AUTO: 21.6 % — SIGNIFICANT CHANGE UP (ref 13–44)
MAGNESIUM SERPL-MCNC: 2.1 MG/DL — SIGNIFICANT CHANGE UP (ref 1.6–2.6)
MCHC RBC-ENTMCNC: 30.4 PG — SIGNIFICANT CHANGE UP (ref 27–34)
MCHC RBC-ENTMCNC: 34.2 G/DL — SIGNIFICANT CHANGE UP (ref 32–36)
MCV RBC AUTO: 89.1 FL — SIGNIFICANT CHANGE UP (ref 80–100)
MONOCYTES # BLD AUTO: 0.93 K/UL — HIGH (ref 0–0.9)
MONOCYTES NFR BLD AUTO: 8.1 % — SIGNIFICANT CHANGE UP (ref 2–14)
NEUTROPHILS # BLD AUTO: 7.95 K/UL — HIGH (ref 1.8–7.4)
NEUTROPHILS NFR BLD AUTO: 69.5 % — SIGNIFICANT CHANGE UP (ref 43–77)
NRBC # BLD: 0 /100 WBCS — SIGNIFICANT CHANGE UP (ref 0–0)
NRBC # FLD: 0 K/UL — SIGNIFICANT CHANGE UP (ref 0–0)
NT-PROBNP SERPL-SCNC: 1151 PG/ML — HIGH
PHOSPHATE SERPL-MCNC: 2.7 MG/DL — SIGNIFICANT CHANGE UP (ref 2.5–4.5)
PLATELET # BLD AUTO: 181 K/UL — SIGNIFICANT CHANGE UP (ref 150–400)
POTASSIUM SERPL-MCNC: 3.8 MMOL/L — SIGNIFICANT CHANGE UP (ref 3.5–5.3)
POTASSIUM SERPL-SCNC: 3.8 MMOL/L — SIGNIFICANT CHANGE UP (ref 3.5–5.3)
RBC # BLD: 5.49 M/UL — SIGNIFICANT CHANGE UP (ref 4.2–5.8)
RBC # FLD: 14.3 % — SIGNIFICANT CHANGE UP (ref 10.3–14.5)
SODIUM SERPL-SCNC: 138 MMOL/L — SIGNIFICANT CHANGE UP (ref 135–145)
TROPONIN T, HIGH SENSITIVITY RESULT: 75 NG/L — CRITICAL HIGH
WBC # BLD: 11.45 K/UL — HIGH (ref 3.8–10.5)
WBC # FLD AUTO: 11.45 K/UL — HIGH (ref 3.8–10.5)

## 2024-11-26 PROCEDURE — 99253 IP/OBS CNSLTJ NEW/EST LOW 45: CPT

## 2024-11-26 PROCEDURE — 99497 ADVNCD CARE PLAN 30 MIN: CPT | Mod: 25

## 2024-11-26 PROCEDURE — 99223 1ST HOSP IP/OBS HIGH 75: CPT

## 2024-11-26 RX ORDER — CHOLECALCIFEROL (VITAMIN D3) 10MCG/0.25
1 DROPS ORAL
Qty: 30 | Refills: 0
Start: 2024-11-26 | End: 2024-12-25

## 2024-11-26 RX ORDER — SODIUM CHLORIDE 9 MG/ML
1000 INJECTION, SOLUTION INTRAMUSCULAR; INTRAVENOUS; SUBCUTANEOUS
Refills: 0 | Status: DISCONTINUED | OUTPATIENT
Start: 2024-11-26 | End: 2024-11-26

## 2024-11-26 RX ORDER — SUCRALFATE 1 G/1
1 TABLET ORAL
Refills: 0 | Status: DISCONTINUED | OUTPATIENT
Start: 2024-11-26 | End: 2024-11-27

## 2024-11-26 RX ORDER — POLYETHYLENE GLYCOL 3350 17 G/17G
17 POWDER, FOR SOLUTION ORAL DAILY
Refills: 0 | Status: DISCONTINUED | OUTPATIENT
Start: 2024-11-26 | End: 2024-11-27

## 2024-11-26 RX ORDER — CARVEDILOL 25 MG/1
25 TABLET, FILM COATED ORAL EVERY 12 HOURS
Refills: 0 | Status: DISCONTINUED | OUTPATIENT
Start: 2024-11-26 | End: 2024-11-27

## 2024-11-26 RX ORDER — 0.9 % SODIUM CHLORIDE 0.9 %
1000 INTRAVENOUS SOLUTION INTRAVENOUS
Refills: 0 | Status: DISCONTINUED | OUTPATIENT
Start: 2024-11-26 | End: 2024-11-27

## 2024-11-26 RX ORDER — SENNOSIDES 8.6 MG
2 TABLET ORAL AT BEDTIME
Refills: 0 | Status: DISCONTINUED | OUTPATIENT
Start: 2024-11-26 | End: 2024-11-27

## 2024-11-26 RX ORDER — ERGOCALCIFEROL (VITAMIN D2) 200 MCG/ML
50000 DROPS ORAL
Refills: 0 | Status: DISCONTINUED | OUTPATIENT
Start: 2024-11-26 | End: 2024-11-27

## 2024-11-26 RX ORDER — PANTOPRAZOLE SODIUM 40 MG/1
1 TABLET, DELAYED RELEASE ORAL
Qty: 60 | Refills: 0
Start: 2024-11-26 | End: 2024-12-25

## 2024-11-26 RX ORDER — DIPHENHYDRAMINE HYDROCHLORIDE AND LIDOCAINE HYDROCHLORIDE AND ALUMINUM HYDROXIDE AND MAGNESIUM HYDRO
30 KIT ONCE
Refills: 0 | Status: COMPLETED | OUTPATIENT
Start: 2024-11-26 | End: 2024-11-26

## 2024-11-26 RX ORDER — GLUCAGON INJECTION, SOLUTION 0.5 MG/.1ML
1 INJECTION, SOLUTION SUBCUTANEOUS ONCE
Refills: 0 | Status: DISCONTINUED | OUTPATIENT
Start: 2024-11-26 | End: 2024-11-27

## 2024-11-26 RX ORDER — SACUBITRIL AND VALSARTAN 24; 26 MG/1; MG/1
1 TABLET, FILM COATED ORAL
Refills: 0 | Status: DISCONTINUED | OUTPATIENT
Start: 2024-11-26 | End: 2024-11-27

## 2024-11-26 RX ORDER — ALBUTEROL 90 MCG
2 AEROSOL (GRAM) INHALATION EVERY 6 HOURS
Refills: 0 | Status: DISCONTINUED | OUTPATIENT
Start: 2024-11-26 | End: 2024-11-27

## 2024-11-26 RX ORDER — PANTOPRAZOLE SODIUM 40 MG/1
8 TABLET, DELAYED RELEASE ORAL
Qty: 80 | Refills: 0 | Status: DISCONTINUED | OUTPATIENT
Start: 2024-11-26 | End: 2024-11-26

## 2024-11-26 RX ORDER — FLUTICASONE PROPIONATE AND SALMETEROL XINAFOATE 45; 21 UG/1; UG/1
1 AEROSOL, METERED RESPIRATORY (INHALATION)
Refills: 0 | Status: DISCONTINUED | OUTPATIENT
Start: 2024-11-26 | End: 2024-11-27

## 2024-11-26 RX ORDER — SUCRALFATE 1 G/1
10 TABLET ORAL
Qty: 1200 | Refills: 0
Start: 2024-11-26 | End: 2024-12-25

## 2024-11-26 RX ORDER — PANTOPRAZOLE SODIUM 40 MG/1
40 TABLET, DELAYED RELEASE ORAL
Refills: 0 | Status: DISCONTINUED | OUTPATIENT
Start: 2024-11-26 | End: 2024-11-27

## 2024-11-26 RX ADMIN — PANTOPRAZOLE SODIUM 40 MILLIGRAM(S): 40 TABLET, DELAYED RELEASE ORAL at 05:46

## 2024-11-26 RX ADMIN — SUCRALFATE 1 GRAM(S): 1 TABLET ORAL at 23:48

## 2024-11-26 RX ADMIN — SACUBITRIL AND VALSARTAN 1 TABLET(S): 24; 26 TABLET, FILM COATED ORAL at 05:47

## 2024-11-26 RX ADMIN — SUCRALFATE 1 GRAM(S): 1 TABLET ORAL at 18:32

## 2024-11-26 RX ADMIN — Medication 30 MILLILITER(S): at 12:36

## 2024-11-26 RX ADMIN — CARVEDILOL 25 MILLIGRAM(S): 25 TABLET, FILM COATED ORAL at 05:47

## 2024-11-26 RX ADMIN — ONDANSETRON HYDROCHLORIDE 4 MILLIGRAM(S): 4 TABLET, FILM COATED ORAL at 18:35

## 2024-11-26 RX ADMIN — SACUBITRIL AND VALSARTAN 1 TABLET(S): 24; 26 TABLET, FILM COATED ORAL at 18:32

## 2024-11-26 RX ADMIN — Medication 40 MILLIGRAM(S): at 21:04

## 2024-11-26 RX ADMIN — ACETAMINOPHEN 500MG 650 MILLIGRAM(S): 500 TABLET, COATED ORAL at 19:37

## 2024-11-26 RX ADMIN — CARVEDILOL 25 MILLIGRAM(S): 25 TABLET, FILM COATED ORAL at 18:35

## 2024-11-26 RX ADMIN — ACETAMINOPHEN 500MG 650 MILLIGRAM(S): 500 TABLET, COATED ORAL at 07:19

## 2024-11-26 RX ADMIN — ACETAMINOPHEN 500MG 650 MILLIGRAM(S): 500 TABLET, COATED ORAL at 08:19

## 2024-11-26 RX ADMIN — PANTOPRAZOLE SODIUM 40 MILLIGRAM(S): 40 TABLET, DELAYED RELEASE ORAL at 18:33

## 2024-11-26 RX ADMIN — ACETAMINOPHEN 500MG 650 MILLIGRAM(S): 500 TABLET, COATED ORAL at 18:37

## 2024-11-26 NOTE — PROGRESS NOTE ADULT - SUBJECTIVE AND OBJECTIVE BOX
PROGRESS NOTE:     Patient is a 44y old  Male who presents with a chief complaint of Abdominal pain with vomiting (26 Nov 2024 15:43)      SUBJECTIVE / OVERNIGHT EVENTS: no acute events.     ADDITIONAL REVIEW OF SYSTEMS:    MEDICATIONS  (STANDING):  atorvastatin 40 milliGRAM(s) Oral at bedtime  carvedilol 25 milliGRAM(s) Oral every 12 hours  dextrose 5%. 1000 milliLiter(s) (50 mL/Hr) IV Continuous <Continuous>  dextrose 5%. 1000 milliLiter(s) (100 mL/Hr) IV Continuous <Continuous>  dextrose 50% Injectable 25 Gram(s) IV Push once  dextrose 50% Injectable 12.5 Gram(s) IV Push once  dextrose 50% Injectable 25 Gram(s) IV Push once  fluticasone propionate/ salmeterol 250-50 MICROgram(s) Diskus 1 Dose(s) Inhalation two times a day  glucagon  Injectable 1 milliGRAM(s) IntraMuscular once  influenza   Vaccine 0.5 milliLiter(s) IntraMuscular once  insulin lispro (ADMELOG) corrective regimen sliding scale   SubCutaneous every 6 hours  pantoprazole  Injectable 40 milliGRAM(s) IV Push two times a day  sacubitril 97 mG/valsartan 103 mG 1 Tablet(s) Oral two times a day  sucralfate suspension 1 Gram(s) Oral four times a day    MEDICATIONS  (PRN):  acetaminophen     Tablet .. 650 milliGRAM(s) Oral every 6 hours PRN Mild Pain (1 - 3)  albuterol    90 MICROgram(s) HFA Inhaler 2 Puff(s) Inhalation every 6 hours PRN for shortness of breath and/or wheezing  aluminum hydroxide/magnesium hydroxide/simethicone Suspension 30 milliLiter(s) Oral every 4 hours PRN Dyspepsia  dextrose Oral Gel 15 Gram(s) Oral once PRN Blood Glucose LESS THAN 70 milliGRAM(s)/deciliter  melatonin 3 milliGRAM(s) Oral at bedtime PRN Insomnia  ondansetron Injectable 4 milliGRAM(s) IV Push every 8 hours PRN Nausea and/or Vomiting  polyethylene glycol 3350 17 Gram(s) Oral daily PRN Constipation  senna 2 Tablet(s) Oral at bedtime PRN Constipation      CAPILLARY BLOOD GLUCOSE      POCT Blood Glucose.: 169 mg/dL (26 Nov 2024 12:17)  POCT Blood Glucose.: 110 mg/dL (26 Nov 2024 05:55)  POCT Blood Glucose.: 138 mg/dL (25 Nov 2024 22:29)    I&O's Summary    26 Nov 2024 07:01  -  26 Nov 2024 18:10  --------------------------------------------------------  IN: 900 mL / OUT: 1000 mL / NET: -100 mL        PHYSICAL EXAM:  Vital Signs Last 24 Hrs  T(C): 36.7 (26 Nov 2024 11:37), Max: 36.9 (26 Nov 2024 05:30)  T(F): 98.1 (26 Nov 2024 11:37), Max: 98.5 (26 Nov 2024 05:30)  HR: 82 (26 Nov 2024 11:37) (82 - 119)  BP: 133/97 (26 Nov 2024 11:37) (128/87 - 182/111)  BP(mean): --  RR: 17 (26 Nov 2024 11:37) (17 - 18)  SpO2: 97% (26 Nov 2024 11:37) (97% - 98%)    Parameters below as of 26 Nov 2024 11:37  Patient On (Oxygen Delivery Method): room air      APPEARANCE: NAD   HEENT: Dry lips.  Mildly dry oral mucosa.  Pupils reactive to light.  EOMI  CARDIOVASCULAR: (+) S1 S2.  No JVD.  No murmurs.  Trace pitting edema  RESPIRATORY: No wheezing, rhonchi, crackles appreciated  GASTROINTESTINAL: Soft.  Non-tender.  (+) BS  GENITOURINARY: No suprapubic tenderness.  No CVA tenderness B/L  EXTREMITIES: Normal range of motion.  No clubbing or cyanosis.  Trace pitting edema  MUSCULOSKELETAL: No atrophy.  No asymmetry.  Good ROM  SKIN: No rashes. No ecchymoses.  No cyanosis  PSYCHIATRIC: A&O x 3.  Mood & affect appropriate to situation  NEUROLOGICAL: Non-focal, DASH x 4 against gravity  VASCULAR: Peripheral pulses palpable    LABS:                        16.7   11.45 )-----------( 181      ( 26 Nov 2024 06:23 )             48.9     11-26    138  |  100  |  20  ----------------------------<  91  3.8   |  24  |  0.98    Ca    8.9      26 Nov 2024 06:23  Phos  2.7     11-26  Mg     2.10     11-26    TPro  8.2  /  Alb  4.4  /  TBili  1.0  /  DBili  x   /  AST  19  /  ALT  13  /  AlkPhos  73  11-25    PT/INR - ( 25 Nov 2024 15:20 )   PT: 13.2 sec;   INR: 1.14 ratio         PTT - ( 25 Nov 2024 15:20 )  PTT:34.0 sec  CARDIAC MARKERS ( 25 Nov 2024 19:41 )  x     / x     / x     / x     / 8.3 ng/mL  CARDIAC MARKERS ( 25 Nov 2024 15:20 )  x     / x     / x     / x     / 8.9 ng/mL      Urinalysis Basic - ( 26 Nov 2024 06:23 )    Color: x / Appearance: x / SG: x / pH: x  Gluc: 91 mg/dL / Ketone: x  / Bili: x / Urobili: x   Blood: x / Protein: x / Nitrite: x   Leuk Esterase: x / RBC: x / WBC x   Sq Epi: x / Non Sq Epi: x / Bacteria: x          RADIOLOGY & ADDITIONAL TESTS:  Results Reviewed:   Imaging Personally Reviewed:  Electrocardiogram Personally Reviewed:    COORDINATION OF CARE:  Care Discussed with Consultants/Other Providers [Y/N]:  Prior or Outpatient Records Reviewed [Y/N]:

## 2024-11-26 NOTE — DISCHARGE NOTE PROVIDER - NSDCFUSCHEDAPPT_GEN_ALL_CORE_FT
Marcela Cole  [facility]  ENDOCRIN [address] [facility]  Scheduled Appointment: 02/25/2025

## 2024-11-26 NOTE — DISCHARGE NOTE PROVIDER - NSDCMRMEDTOKEN_GEN_ALL_CORE_FT
Albuterol (Eqv-ProAir HFA) 90 mcg/inh inhalation aerosol: 2 puff(s) inhaled every 6 hours as needed for  shortness of breath and/or wheezing  alcohol swabs: Apply topically to affected area 4 times a day  aspirin 81 mg oral tablet, chewable: 1 tab(s) orally once a day  atorvastatin 40 mg oral tablet: 1 tab(s) orally once a day  bumetanide 2 mg oral tablet: 2 milligram(s) orally once a day in AM  Coreg 25 mg oral tablet: 1 tab(s) orally 2 times a day  eplerenone 25 mg oral tablet: 1 tab(s) orally once a day  Free Style Jacques 3: Free Style Jacques 3  Freestyle Jacques 3 Sensors: Please change every 14 days  glipizide-metformin 5 mg-500 mg oral tablet: 1 tab(s) orally 2 times a day take before meals  glucometer (per patient&#x27;s insurance): Test blood sugars four times a day. Dispense #1 glucometer.  glucose 40% oral gel: 15 gram(s) orally prn  Jardiance 25 mg oral tablet: 1 tab(s) orally once a day in AM  lancets: 1 application subcutaneously 4 times a day  polyethylene glycol 3350 oral powder for reconstitution: 17 gram(s) orally once a day  Rybelsus 3 mg oral tablet: 1 tab(s) orally once a day take in AM w/ breakfast  sacubitril-valsartan 97 mg-103 mg oral tablet: 1 tab(s) orally 2 times a day  senna leaf extract oral tablet: 2 tab(s) orally once a day (at bedtime)  Symbicort 160 mcg-4.5 mcg/inh inhalation aerosol: 2 puff(s) inhaled 2 times a day  test strips (per patient&#x27;s insurance): 1 application subcutaneously 4 times a day. ** Compatible with patient&#x27;s glucometer **   Albuterol (Eqv-ProAir HFA) 90 mcg/inh inhalation aerosol: 2 puff(s) inhaled every 6 hours as needed for  shortness of breath and/or wheezing  aspirin 81 mg oral tablet, chewable: 1 tab(s) orally once a day  atorvastatin 40 mg oral tablet: 1 tab(s) orally once a day  bumetanide 2 mg oral tablet: 2 milligram(s) orally once a day in AM  cholecalciferol 50 mcg (2000 intl units) oral tablet: 1 tab(s) orally once a day  Coreg 25 mg oral tablet: 1 tab(s) orally 2 times a day  eplerenone 25 mg oral tablet: 1 tab(s) orally once a day  glipizide-metformin 5 mg-500 mg oral tablet: 1 tab(s) orally 2 times a day take before meals  Jardiance 25 mg oral tablet: 1 tab(s) orally once a day in AM  polyethylene glycol 3350 oral powder for reconstitution: 17 gram(s) orally once a day  Protonix 40 mg oral delayed release tablet: 1 tab(s) orally 2 times a day  Rybelsus 3 mg oral tablet: 1 tab(s) orally once a day take in AM w/ breakfast  sacubitril-valsartan 97 mg-103 mg oral tablet: 1 tab(s) orally 2 times a day  senna leaf extract oral tablet: 2 tab(s) orally once a day (at bedtime)  sucralfate 1 g/10 mL oral suspension: 10 milliliter(s) orally 4 times a day  Symbicort 160 mcg-4.5 mcg/inh inhalation aerosol: 2 puff(s) inhaled 2 times a day

## 2024-11-26 NOTE — CONSULT NOTE ADULT - ASSESSMENT
44-year-old male, with past history significant for HTN, Type-2 DM, CHF (EF 19% and Grade 1 diastolic dysfunction), non obstructive CAD, HLD, and Gastritis, presented to the ED secondary to epigastric pain, nausea and recurrent vomiting w/ blood  after taking his prescription medications, then consuming a sandwich from [employer], and having an alcoholic beverage. GI consulted for further evaluation of above.    In ED tachy up to 119 and hypertensive 182/111, currently afebrile, hr 102, bp 128/87. Labs: wbc 13k, hgb 17.2 (baseline hgb 14-17), plts/coags adequate, lfts wnl, bun/cr ratio = 18, lactate 2.1, trops 62-- 45, no abd imaging    # reported hematemesis w/ epigastric pain  # reported hx of gastritis   44-year-old male, with past history significant for HTN, Type-2 DM, CHF (EF 19% and Grade 1 diastolic dysfunction), non obstructive CAD, HLD, and Gastritis, presented to the ED secondary to epigastric pain, nausea and recurrent vomiting w/ blood. GI consulted for further evaluation of above.    In ED tachy up to 119 and hypertensive 182/111, currently afebrile, hr 102, bp 128/87. Labs: wbc 13k, hgb 17.2 (baseline hgb 14-17), plts/coags adequate, lfts wnl, bun/cr ratio = 18, lactate 2.1-- 1.3, trops 62-- 45, no abd imaging    # reported hematemesis w/ epigastric pain  - this past saturday in [facility], took his usual meds, had 2 alcoholic drinks followed by a chicken sandwich from [employer] and then 3 hrs later w/ acute onset of epigastric pain with nausea and multiple episodes of vomiting, vomiting initially non bloody followed by streaks of dark red blood mixed into emesis, no melena or hematochezia, no ac or nsaids, no prior egd; currently HDS w/ stable hgb at baseline wo further bloody emesis overnight, clinical hx suggestive of age, bleeding likely iso mwt, addtl ddx includes esophagitis, pud, avms, malignancy  # HFrEF (EF 19%, grade 1 diastolic dysfunction  # non obstructive CAD    Recommendations-  - continue supportive care  - maintain active t&s and 2 large bore IV access  - trend cbc, transfuse for hgb >/=8  - high dose PPI; avoid nsaids  - anti emetics prn if qtc ok/no med CI  - full liquids as per pt request, slowly advance as tolerated  - no immediate plans for endoscopic evaluation, however if develops recurrent overt gib/dropping counts, etc, will reassess plan  - rest of care per primary team     cecilia Darden PA-C, RD, CDN  available on TEAMS M/T/TH/F from 7am - 4pm    after hours/weekends: non urgent issues --> email [email or web address], urgent issues --> contact on-call GI fellow at [phone] gage

## 2024-11-26 NOTE — PROVIDER CONTACT NOTE (OTHER) - BACKGROUND
patient admitted for abdominal pain and nausea and vomiting. PMH of CHF, hypertension, diabetes, hyperlipidemia.
patient admitted for abdominal pain and nausea and vomiting. PMH of CHF, hypertension, diabetes, hyperlipidemia.

## 2024-11-26 NOTE — DISCHARGE NOTE NURSING/CASE MANAGEMENT/SOCIAL WORK - NSDCPEFALRISK_GEN_ALL_CORE
For information on Fall & Injury Prevention, visit: [email or web address] OR  [email or web address] OR  https://www.cdc.gov/steadi/patient.html

## 2024-11-26 NOTE — CONSULT NOTE ADULT - SUBJECTIVE AND OBJECTIVE BOX
Chief Complaint:  Patient is a 44y old  Male who presents with a chief complaint of Abdominal pain with vomiting (25 Nov 2024 23:01)    HPI: 44-year-old male, with past history significant for HTN, Type-2 DM, CHF (EF = 19% and Grade 1 diastolic dysfunction), non obstructive CAD, HLD, and Gastritis, presented to the ED secondary to epigastric pain, nausea and recurrent vomiting w/ blood. GI consulted for further evaluation of above.    pt seen and examined.      in Ed tachy up to 119 and hypertensive 182/111, currently afebrile, hr 102, bp 128/87  wbc 13k  hgb 17.2 (baseline hgb 14-17), plts/coags adequate, lfts wnl  bun/cr ratio = 18, lactate 2.1, trops 62-- 44  no abd imaging, nc ctap from 6/2024 showing chronic liver dz w/ steatosis  echo 10/2024 LVSF severely decreased with EF 19 %, mild (grade 1) left ventricular diastolic dysfunction.    Allergies:  shellfish (Swelling)  No Known Drug Allergies      PMHX/PSHX:     HTN (hypertension)    Asthma    Congestive heart failure (CHF)    Type 2 diabetes mellitus    Hyperlipidemia    Current smoker    No significant past surgical history      Family history:  No pertinent family history in first degree relatives    Social History: as above    Home Medications: pertinent meds reviewed with pt    Hospital Medications:  acetaminophen     Tablet .. 650 milliGRAM(s) Oral every 6 hours PRN  albuterol    90 MICROgram(s) HFA Inhaler 2 Puff(s) Inhalation every 6 hours PRN  aluminum hydroxide/magnesium hydroxide/simethicone Suspension 30 milliLiter(s) Oral every 4 hours PRN  atorvastatin 40 milliGRAM(s) Oral at bedtime  carvedilol 25 milliGRAM(s) Oral every 12 hours  dextrose 5%. 1000 milliLiter(s) IV Continuous <Continuous>  dextrose 5%. 1000 milliLiter(s) IV Continuous <Continuous>  dextrose 50% Injectable 25 Gram(s) IV Push once  dextrose 50% Injectable 12.5 Gram(s) IV Push once  dextrose 50% Injectable 25 Gram(s) IV Push once  dextrose Oral Gel 15 Gram(s) Oral once PRN  fluticasone propionate/ salmeterol 250-50 MICROgram(s) Diskus 1 Dose(s) Inhalation two times a day  glucagon  Injectable 1 milliGRAM(s) IntraMuscular once  influenza   Vaccine 0.5 milliLiter(s) IntraMuscular once  insulin lispro (ADMELOG) corrective regimen sliding scale   SubCutaneous every 6 hours  melatonin 3 milliGRAM(s) Oral at bedtime PRN  ondansetron Injectable 4 milliGRAM(s) IV Push every 8 hours PRN  pantoprazole  Injectable 40 milliGRAM(s) IV Push two times a day  polyethylene glycol 3350 17 Gram(s) Oral daily PRN  sacubitril 97 mG/valsartan 103 mG 1 Tablet(s) Oral two times a day  senna 2 Tablet(s) Oral at bedtime PRN  sodium chloride 0.9%. 1000 milliLiter(s) IV Continuous <Continuous>        Pertinent ROS as per HPI      Vital Signs:  Vital Signs Last 24 Hrs  T(C): 36.9 (26 Nov 2024 05:30), Max: 36.9 (26 Nov 2024 05:30)  T(F): 98.5 (26 Nov 2024 05:30), Max: 98.5 (26 Nov 2024 05:30)  HR: 102 (26 Nov 2024 05:30) (102 - 119)  BP: 128/87 (26 Nov 2024 05:30) (128/87 - 182/111)  BP(mean): --  RR: 18 (26 Nov 2024 05:30) (18 - 18)  SpO2: 98% (26 Nov 2024 05:30) (97% - 100%)    Parameters below as of 26 Nov 2024 05:30  Patient On (Oxygen Delivery Method): room air      Daily Height in cm: 180.34 (25 Nov 2024 14:09)    Daily     LABS:                        17.2   13.05 )-----------( 215      ( 25 Nov 2024 15:20 )             51.9     Mean Cell Volume: 88.6 fL (11-25-24 @ 15:20)    11-25    139  |  101  |  21  ----------------------------<  172[H]  4.6   |  21[L]  |  1.12    Ca    9.8      25 Nov 2024 15:20  Mg     2.00     11-25    TPro  8.2  /  Alb  4.4  /  TBili  1.0  /  DBili  x   /  AST  19  /  ALT  13  /  AlkPhos  73  11-25    LIVER FUNCTIONS - ( 25 Nov 2024 15:20 )  Alb: 4.4 g/dL / Pro: 8.2 g/dL / ALK PHOS: 73 U/L / ALT: 13 U/L / AST: 19 U/L / GGT: x           PT/INR - ( 25 Nov 2024 15:20 )   PT: 13.2 sec;   INR: 1.14 ratio         PTT - ( 25 Nov 2024 15:20 )  PTT:34.0 sec  Urinalysis Basic - ( 25 Nov 2024 15:20 )    Color: x / Appearance: x / SG: x / pH: x  Gluc: 172 mg/dL / Ketone: x  / Bili: x / Urobili: x   Blood: x / Protein: x / Nitrite: x   Leuk Esterase: x / RBC: x / WBC x   Sq Epi: x / Non Sq Epi: x / Bacteria: x      Amylase Serum--      Lipase serum35       Ammonia--                          17.2   13.05 )-----------( 215      ( 25 Nov 2024 15:20 )             51.9       PHYSICAL EXAM:   GENERAL:  Lying in bed in NAD  HEENT:  Normocephalic/atraumatic, no scleral icterus  NECK: Trachea midline  CHEST:  Resp even, non labored   ABDOMEN:  Soft, non-tender, non-distended  EXTREMITIES: WWP, no edema  SKIN:  No rash or jaundice  NEURO:  Alert and oriented x 3, no asterixis    Imaging: no abd imaging           Chief Complaint:  Patient is a 44y old  Male who presents with a chief complaint of Abdominal pain with vomiting (25 Nov 2024 23:01)    HPI: 44-year-old male, with past history significant for HTN, Type-2 DM, CHF (EF = 19% and Grade 1 diastolic dysfunction), non obstructive CAD, HLD, and Gastritis, presented to the ED secondary to epigastric pain, nausea and recurrent vomiting w/ blood. Reports onset of severe burning epigastric pain about 3 to four hours after taking his prescription medications, then consuming a sandwich from [employer], then having an alcoholic beverage. Subsequently had nausea and recurrent vomiting - at least 9 episodes some of which contained blood. Per rn had episodes of vomiting overnight, non bloody. GI consulted for further evaluation of above. pt seen and examined. c/o migraine and demanding to eat despite above symptoms.    in Ed tachy up to 119 and hypertensive 182/111, currently afebrile, hr 102, bp 128/87  wbc 13k  hgb 17.2 (baseline hgb 14-17), plts/coags adequate, lfts wnl  bun/cr ratio = 18, lactate 2.1, trops 62-- 44  no abd imaging, nc ctap from 6/2024 showing chronic liver dz w/ steatosis  echo 10/2024 LVSF severely decreased with EF 19 %, mild (grade 1) left ventricular diastolic dysfunction.    Allergies:  shellfish (Swelling)  No Known Drug Allergies      PMHX/PSHX:     HTN (hypertension)    Asthma    Congestive heart failure (CHF)    Type 2 diabetes mellitus    Hyperlipidemia    Current smoker    No significant past surgical history    Family history:  No pertinent family history in first degree relatives    Social History: as above    Home Medications: pertinent meds reviewed with pt    Hospital Medications:  acetaminophen     Tablet .. 650 milliGRAM(s) Oral every 6 hours PRN  albuterol    90 MICROgram(s) HFA Inhaler 2 Puff(s) Inhalation every 6 hours PRN  aluminum hydroxide/magnesium hydroxide/simethicone Suspension 30 milliLiter(s) Oral every 4 hours PRN  atorvastatin 40 milliGRAM(s) Oral at bedtime  carvedilol 25 milliGRAM(s) Oral every 12 hours  dextrose 5%. 1000 milliLiter(s) IV Continuous <Continuous>  dextrose 5%. 1000 milliLiter(s) IV Continuous <Continuous>  dextrose 50% Injectable 25 Gram(s) IV Push once  dextrose 50% Injectable 12.5 Gram(s) IV Push once  dextrose 50% Injectable 25 Gram(s) IV Push once  dextrose Oral Gel 15 Gram(s) Oral once PRN  fluticasone propionate/ salmeterol 250-50 MICROgram(s) Diskus 1 Dose(s) Inhalation two times a day  glucagon  Injectable 1 milliGRAM(s) IntraMuscular once  influenza   Vaccine 0.5 milliLiter(s) IntraMuscular once  insulin lispro (ADMELOG) corrective regimen sliding scale   SubCutaneous every 6 hours  melatonin 3 milliGRAM(s) Oral at bedtime PRN  ondansetron Injectable 4 milliGRAM(s) IV Push every 8 hours PRN  pantoprazole  Injectable 40 milliGRAM(s) IV Push two times a day  polyethylene glycol 3350 17 Gram(s) Oral daily PRN  sacubitril 97 mG/valsartan 103 mG 1 Tablet(s) Oral two times a day  senna 2 Tablet(s) Oral at bedtime PRN  sodium chloride 0.9%. 1000 milliLiter(s) IV Continuous <Continuous>        Pertinent ROS as per HPI      Vital Signs:  Vital Signs Last 24 Hrs  T(C): 36.9 (26 Nov 2024 05:30), Max: 36.9 (26 Nov 2024 05:30)  T(F): 98.5 (26 Nov 2024 05:30), Max: 98.5 (26 Nov 2024 05:30)  HR: 102 (26 Nov 2024 05:30) (102 - 119)  BP: 128/87 (26 Nov 2024 05:30) (128/87 - 182/111)  BP(mean): --  RR: 18 (26 Nov 2024 05:30) (18 - 18)  SpO2: 98% (26 Nov 2024 05:30) (97% - 100%)    Parameters below as of 26 Nov 2024 05:30  Patient On (Oxygen Delivery Method): room air      Daily Height in cm: 180.34 (25 Nov 2024 14:09)    Daily     LABS:                        17.2   13.05 )-----------( 215      ( 25 Nov 2024 15:20 )             51.9     Mean Cell Volume: 88.6 fL (11-25-24 @ 15:20)    11-25    139  |  101  |  21  ----------------------------<  172[H]  4.6   |  21[L]  |  1.12    Ca    9.8      25 Nov 2024 15:20  Mg     2.00     11-25    TPro  8.2  /  Alb  4.4  /  TBili  1.0  /  DBili  x   /  AST  19  /  ALT  13  /  AlkPhos  73  11-25    LIVER FUNCTIONS - ( 25 Nov 2024 15:20 )  Alb: 4.4 g/dL / Pro: 8.2 g/dL / ALK PHOS: 73 U/L / ALT: 13 U/L / AST: 19 U/L / GGT: x           PT/INR - ( 25 Nov 2024 15:20 )   PT: 13.2 sec;   INR: 1.14 ratio         PTT - ( 25 Nov 2024 15:20 )  PTT:34.0 sec  Urinalysis Basic - ( 25 Nov 2024 15:20 )    Color: x / Appearance: x / SG: x / pH: x  Gluc: 172 mg/dL / Ketone: x  / Bili: x / Urobili: x   Blood: x / Protein: x / Nitrite: x   Leuk Esterase: x / RBC: x / WBC x   Sq Epi: x / Non Sq Epi: x / Bacteria: x      Amylase Serum--      Lipase serum35       Ammonia--                          17.2   13.05 )-----------( 215      ( 25 Nov 2024 15:20 )             51.9       PHYSICAL EXAM:   GENERAL: lying in bed, in no apparent distress  HEENT: NCAT  EYES: anicteric sclerae, moist conjunctivae  NECK: trachea midline, FROM  CHEST:  respirations even, non labored   ABDOMEN:  soft, non-tender, non-distended  EXTREMITIES: WWP  SKIN:  warm/dry  PSYCH: A&O x 3  NEURO: no tremor noted     Imaging: no abd imaging           Chief Complaint:  Patient is a 44y old  Male who presents with a chief complaint of Abdominal pain with vomiting (25 Nov 2024 23:01)    HPI: 44-year-old male, with past history significant for HTN, Type-2 DM, CHF (EF = 19% and Grade 1 diastolic dysfunction), non obstructive CAD, HLD, and Gastritis, presented to the ED secondary to epigastric pain, nausea and recurrent vomiting w/ blood. Per rn had episodes of vomiting overnight, non bloody. GI consulted for further evaluation of above.     pt seen and examined. states this past [place] was in [place], took his usual meds, had 2 alcoholic drinks followed by a chicken sandwich from [employer] and then 3 hrs later started having burning epigastric pain followed by nausea and multiple episodes of vomiting. initial episode of vomiting non bloody, further episodes with streaks of dark red blood mixed in, denies jacinta hematemesis or large volume bloody emesis. last  bm saturday and brown. no prior episodes, denies recent travel or known sick contacts, no new meds. denies f/c, diarrhea, constipation, melena, hematochezia, no prior egd/colon. no prior abd sx. meds- no ac or nsaids, on asa and jardiance (last 11/23). fhx n/c. +social etoh use and smoke 2 cigarettes/day. per nursing vomited x2 overnight but non bloody, currently tolerating clears and requesting diet advancement.    in Ed tachy up to 119 and hypertensive 182/111, currently afebrile, hr 102, bp 128/87  wbc 13k  hgb 17.2 (baseline hgb 14-17), plts/coags adequate, lfts wnl  bun/cr ratio = 18, lactate 2.1, trops 62-- 44  no abd imaging, nc ctap from 6/2024 showing chronic liver dz w/ steatosis  echo 10/2024 LVSF severely decreased with EF 19 %, mild (grade 1) left ventricular diastolic dysfunction.    Allergies:  shellfish (Swelling)  No Known Drug Allergies      PMHX/PSHX:     HTN (hypertension)    Asthma    Congestive heart failure (CHF)    Type 2 diabetes mellitus    Hyperlipidemia    Current smoker    No significant past surgical history    Family history:  No pertinent family history in first degree relatives    Social History: as above    Home Medications: pertinent meds reviewed with pt    Hospital Medications:  acetaminophen     Tablet .. 650 milliGRAM(s) Oral every 6 hours PRN  albuterol    90 MICROgram(s) HFA Inhaler 2 Puff(s) Inhalation every 6 hours PRN  aluminum hydroxide/magnesium hydroxide/simethicone Suspension 30 milliLiter(s) Oral every 4 hours PRN  atorvastatin 40 milliGRAM(s) Oral at bedtime  carvedilol 25 milliGRAM(s) Oral every 12 hours  dextrose 5%. 1000 milliLiter(s) IV Continuous <Continuous>  dextrose 5%. 1000 milliLiter(s) IV Continuous <Continuous>  dextrose 50% Injectable 25 Gram(s) IV Push once  dextrose 50% Injectable 12.5 Gram(s) IV Push once  dextrose 50% Injectable 25 Gram(s) IV Push once  dextrose Oral Gel 15 Gram(s) Oral once PRN  fluticasone propionate/ salmeterol 250-50 MICROgram(s) Diskus 1 Dose(s) Inhalation two times a day  glucagon  Injectable 1 milliGRAM(s) IntraMuscular once  influenza   Vaccine 0.5 milliLiter(s) IntraMuscular once  insulin lispro (ADMELOG) corrective regimen sliding scale   SubCutaneous every 6 hours  melatonin 3 milliGRAM(s) Oral at bedtime PRN  ondansetron Injectable 4 milliGRAM(s) IV Push every 8 hours PRN  pantoprazole  Injectable 40 milliGRAM(s) IV Push two times a day  polyethylene glycol 3350 17 Gram(s) Oral daily PRN  sacubitril 97 mG/valsartan 103 mG 1 Tablet(s) Oral two times a day  senna 2 Tablet(s) Oral at bedtime PRN  sodium chloride 0.9%. 1000 milliLiter(s) IV Continuous <Continuous>        Pertinent ROS as per HPI      Vital Signs:  Vital Signs Last 24 Hrs  T(C): 36.9 (26 Nov 2024 05:30), Max: 36.9 (26 Nov 2024 05:30)  T(F): 98.5 (26 Nov 2024 05:30), Max: 98.5 (26 Nov 2024 05:30)  HR: 102 (26 Nov 2024 05:30) (102 - 119)  BP: 128/87 (26 Nov 2024 05:30) (128/87 - 182/111)  BP(mean): --  RR: 18 (26 Nov 2024 05:30) (18 - 18)  SpO2: 98% (26 Nov 2024 05:30) (97% - 100%)    Parameters below as of 26 Nov 2024 05:30  Patient On (Oxygen Delivery Method): room air      Daily Height in cm: 180.34 (25 Nov 2024 14:09)    Daily     LABS:                        17.2   13.05 )-----------( 215      ( 25 Nov 2024 15:20 )             51.9     Mean Cell Volume: 88.6 fL (11-25-24 @ 15:20)    11-25    139  |  101  |  21  ----------------------------<  172[H]  4.6   |  21[L]  |  1.12    Ca    9.8      25 Nov 2024 15:20  Mg     2.00     11-25    TPro  8.2  /  Alb  4.4  /  TBili  1.0  /  DBili  x   /  AST  19  /  ALT  13  /  AlkPhos  73  11-25    LIVER FUNCTIONS - ( 25 Nov 2024 15:20 )  Alb: 4.4 g/dL / Pro: 8.2 g/dL / ALK PHOS: 73 U/L / ALT: 13 U/L / AST: 19 U/L / GGT: x           PT/INR - ( 25 Nov 2024 15:20 )   PT: 13.2 sec;   INR: 1.14 ratio         PTT - ( 25 Nov 2024 15:20 )  PTT:34.0 sec  Urinalysis Basic - ( 25 Nov 2024 15:20 )    Color: x / Appearance: x / SG: x / pH: x  Gluc: 172 mg/dL / Ketone: x  / Bili: x / Urobili: x   Blood: x / Protein: x / Nitrite: x   Leuk Esterase: x / RBC: x / WBC x   Sq Epi: x / Non Sq Epi: x / Bacteria: x      Amylase Serum--      Lipase serum35       Ammonia--                          17.2   13.05 )-----------( 215      ( 25 Nov 2024 15:20 )             51.9       PHYSICAL EXAM:   GENERAL: lying in bed, in no apparent distress  HEENT: NCAT  EYES: anicteric sclerae, moist conjunctivae  NECK: trachea midline, FROM  CHEST:  respirations even, non labored   ABDOMEN:  soft, non-tender, non-distended  EXTREMITIES: WWP  SKIN:  warm/dry  PSYCH: A&O x 3  NEURO: no tremor noted     Imaging: no abd imaging

## 2024-11-26 NOTE — PROVIDER CONTACT NOTE (OTHER) - ASSESSMENT
patient has no complaints of distress
patient experiencing nausea, no complaints of pain at this time.

## 2024-11-26 NOTE — CONSULT NOTE ADULT - NS ATTEND AMEND GEN_ALL_CORE FT
Seen and examined, agree with above. 44M DM2, HFrEF. Sudden onset of N/V/abd pain on Saturday c/f infectious etiology/food poisoning, blood-streaked emesis after several episodes of vomiting c/f MWT. Only one episode of vomiting overnight, non-bloody, tolerating CLD. Hgb 16-17, BUN:Cr <25. ADAT, antiemetics PRN, no plan for luminal eval at this time (at elevated risk for procedure given HFrEF). PPI BID, continue trending hgb.

## 2024-11-26 NOTE — CONSULT NOTE ADULT - SUBJECTIVE AND OBJECTIVE BOX
The patient is a 44-year-old man with a non-ischemic cardiomyopathy and chronic heart failure with reduced LV function, LVEF 19% who presented after experiencing the acute onset of severe burning epigastric discomfort and fullness for several hours, which resulted in nausea and recurrent vomiting.    The patient notes that on his first episode of vomiting, he noted blood.     He had otherwise been feeling well. No changes in his exertional tolerance. No other chest discomfort, dyspnea, orthopnea or PND. No palpitations or syncope. The patient remarks that he feels that his legs are swollen.    The patient remarks that he has a significant headache and that he is hungry. The tolerated eating Jello this morning, but during my interview was experiencing recurrence of belching and epigastric fullness.    The patient’s most recent admission was one month ago for acute decompensated heart failure in the context of medication nonadherence. He has been admitted 4 times in last year.    PMH/PSH:  Chronic heart failure with reduced LV function, Stage C  Non-ischemic cardiomyopathy, LVEF 19%  Diabetes  Hypertension  Smoking    Comfortable-appearing man in no acute distress  Alert and oriented  Afebrile  Vital signs stable  Weight 108.9kg  No carotid bruits  JVP is not elevated  Clear lungs  Normal heart sounds  Extremities are warm and perfused  No appreciable peripheral edema       Leukocytosis 13K  Erythrocytosis Hb 17.2  Normal coagulation  GFR 83  Unremarkable hepatic panel  Lactate 2.1    Hs-troponin 45, 62  CK-MB 8.3, 8.9    ECG demonstrates sinus rhythm, left atrial enlargement, LAFB, LVH with secondary ST changes; no significant changes noted compared to prior ECG     CXR demonstrates clear lungs    Echocardiography on 10/22/2024, demonstrated severe globally reduced LV function, LVEF 19%, reduced RV systolic function, and no significant valve disease.

## 2024-11-26 NOTE — DISCHARGE NOTE PROVIDER - NSFOLLOWUPCLINICS_GEN_ALL_ED_FT
[facility]  Gastroenterology  [address] [place] [address]  Phone: [phone]  Fax:   Follow Up Time: 2 weeks

## 2024-11-26 NOTE — DISCHARGE NOTE PROVIDER - HOSPITAL COURSE
44-year-old male, with past history significant for HTN, Type-2 DM, CHF (EF = 19% and Grade 1 diastolic dysfunction), HLD, and Gastritis, presented to the ED secondary to epigastric pain.  Diagnosed with Abdominal Pain with Vomiting in the ED.    ·  Problem: Acute upper gastrointestinal bleeding   ·  Plan: Vomiting w/ streaks of blood and blood clots, now resolved   States no history of alcohol abuse  - c/w high dose PPI   - monitored CBC, hgb stable   - GI consulted, no intervention at this time     ·  Problem: Acute epigastric pain.   ·  Plan: Likely d/t gastritis   - c/w high dose PPI   - will add Carafate   - diet advanced and tolerated   - no GI intervention at this time     ·  Problem: Chronic combined systolic and diastolic CHF, NYHA class 1.   ·  Plan: Troponin = 62 -->> 45.  CK-MB = 8.9 -->> 8.3  - appears euvolemic   - may resume Bumex and eplerenone   - on carvedilol, Entresto  - outpatient Cardiology f/up     ·  Problem: Uncontrolled type 2 diabetes mellitus with hyperglycemia, without long-term current use of insulin.   ·  Plan: Patient on multiple medications; glipizide-metformin, Jardiance, Rybelsus  - Hgb A1c 11  - ISS   - consistent carb diet   - plan to resume PO meds on discharge. Patient refuses insulin, understands risks of possible complications     ·  Problem: HTN.   ·  Plan: - continuing with Entresto, carvedilol  - monitor BP    ·  Problem: Leukocytosis, unspecified type.   ·  Plan: WBC = 13.05  Afebrile. No symptoms suggestive of infection  CXR w/o signs of infection (personally reviewed)  Stable off antibiotics          44-year-old male, with past history significant for HTN, Type-2 DM, CHF (EF = 19% and Grade 1 diastolic dysfunction), HLD, and Gastritis, presented to the ED secondary to epigastric pain.  Diagnosed with Abdominal Pain with Vomiting in the ED.    ·  Problem: Acute upper gastrointestinal bleeding   ·  Plan: Vomiting w/ streaks of blood and blood clots, now resolved   States no history of alcohol abuse  - c/w high dose PPI   - monitor CBC, hgb stable   - GI consulted, no intervention at this time.    ·  Problem: Acute epigastric pain.   ·  Plan:  Likely d/t gastritis   - c/w high dose PPI   - added Carafate   - diet advanced   - no GI intervention at this time.    ·  Problem: Chronic combined systolic and diastolic CHF, NYHA class 1.   ·  Plan: Troponin = 62 -->> 45.  CK-MB = 8.9 -->> 8.3  - appears euvolemic   - may resume Bumex and eplerenone   - on carvedilol, Entresto  - outpatient Cardiology f/up     ·  Problem: Uncontrolled type 2 diabetes mellitus with hyperglycemia, without long-term current use of insulin.   ·  Plan: Patient on multiple medications; glipizide-metformin, Jardiance, Rybelsus  - Hgb A1c 11  - ISS   - consistent carb diet   - plan to resume PO meds on discharge. Patient refuses insulin, understands risks of possible complications     ·  Problem: HTN.   ·  Plan: - continuing with Entresto, carvedilol  - monitor BP    ·  Problem: Leukocytosis, unspecified type.   ·  Plan: WBC = 13.05  Afebrile. No symptoms suggestive of infection  CXR w/o signs of infection (personally reviewed)  Stable off antibiotics

## 2024-11-26 NOTE — DISCHARGE NOTE PROVIDER - ATTENDING DISCHARGE PHYSICAL EXAMINATION:
APPEARANCE: NAD  HEENT: EOMI, MMM  CARDIOVASCULAR: (+) S1 S2.  No JVD.  No murmurs.  Trace pitting edema  RESPIRATORY: No wheezing, rhonchi, crackles appreciated  GASTROINTESTINAL: Soft.  Non-tender.  (+) BS  EXTREMITIES: Normal range of motion.  No clubbing or cyanosis.  Trace pitting edema  MUSCULOSKELETAL: No atrophy.  No asymmetry.  Good ROM  SKIN: No rashes. No ecchymoses.  No cyanosis  PSYCHIATRIC: A&O x 3.  Mood & affect appropriate to situation  NEUROLOGICAL: Non-focal, DASH x 4 against gravity  VASCULAR: Peripheral pulses palpable

## 2024-11-26 NOTE — PROVIDER CONTACT NOTE (OTHER) - SITUATION
patient /111 and 
patient refusing protonix gtt and continuos fluids. patient states he does not want to be connected to anything continuously while asleep.

## 2024-11-26 NOTE — PROVIDER CONTACT NOTE (OTHER) - ACTION/TREATMENT ORDERED:
provider aware of patients refusal. patient educated on importance of infusions. Will reattempt to administer to patient in the morning
provider aware of patients vital signs, no further orders at this time

## 2024-11-26 NOTE — DISCHARGE NOTE NURSING/CASE MANAGEMENT/SOCIAL WORK - PATIENT PORTAL LINK FT
You can access the FollowMyHealth Patient Portal offered by [facility] by registering at the following website: [email or web address]. By joining [employer]’s FollowMyHealth portal, you will also be able to view your health information using other applications (apps) compatible with our system.

## 2024-11-26 NOTE — DISCHARGE NOTE PROVIDER - NSDCCPCAREPLAN_GEN_ALL_CORE_FT
PRINCIPAL DISCHARGE DIAGNOSIS  Diagnosis: Abdominal pain with vomiting  Assessment and Plan of Treatment: Bleeding resolved. Abdominal pain likely gastritis. Continue Pantoprazole. Follow up with GI as outpatient.      SECONDARY DISCHARGE DIAGNOSES  Diagnosis: Uncontrolled type 2 diabetes mellitus with hyperglycemia, without long-term current use of insulin  Assessment and Plan of Treatment: Continue glipizide-metformin, Jardiance, Rybelsus. Monitor sugars at home. Follow up with PCP.    Diagnosis: Chronic combined systolic and diastolic CHF, NYHA class 1  Assessment and Plan of Treatment: Continue Bumex, Entresto, Coreg, and Eplerenone. Follow up with Cardiology.

## 2024-11-26 NOTE — CONSULT NOTE ADULT - ASSESSMENT
44-year-old man with a non-ischemic cardiomyopathy and chronic heart failure with reduced LV function, LVEF 19% who presented after experiencing the acute onset of severe burning epigastric discomfort and fullness for several hours, which resulted in nausea and recurrent vomiting.    The patient appears euvolemic on my examination, although his initial labs are suggestive of possible volume depletion--though the erythrocytosis may also be related to smoking.     The patient's outpatient cardiac medications include sacubitril-valsartan, carvedilol, and eplerenone. He is also on maintenance diuretics using bumetanide 2mg. I would continue all of these medications is he is tolerating PO.     He is also maintained on aspirin and atorvastatin, presumably for his cardiovascular risk factor management. Aspirin can be held if needed. He had no obstructive coronary disease or history of PCI.     I do not believe his initial minimal troponin elevation is suggestive of acute coronary syndrome, and would not initiation ACS management, particularly given his presenting symptoms.     Should the patient require endoscopy or colonoscopy. There are no absolute contraindications to these procedures. There is no evidence of ongoing myocardial ischemia, decompensated heart failure, or unstable cardiac arrhythmia.     The patient's Revised Cardiac Risk Index estimates a 6.0% 30-day risk of death, MI or cardiac arrest. The patient's Gold Perioperative Risk is 0.8% for 30-day risk of myocardial infarction or cardiac arrest.     I suspect that Gold is likely a more accurate assessment of his true risk, given the non-invasive nature of endoscopy.     These risk estimates should be incorporated into a shared decision making conversation between the patient or their surrogate and the performing practitioner regarding the risks, benefits and alternatives to the procedure and whether to proceed. Additional cardiac testing is unlikely to change this risk assessment or procedural outcomes from a cardiac perspective.     Please reconsult cardiology with additional questions or concerns. The patient can follow-up with his established outpatient cardiologist following discharge.    Andrea Avalos MD [facility] FACP  Cardiology  [phone]  None

## 2024-11-26 NOTE — DISCHARGE NOTE NURSING/CASE MANAGEMENT/SOCIAL WORK - FINANCIAL ASSISTANCE
[facility] provides services at a reduced cost to those who are determined to be eligible through [facility]’s financial assistance program. Information regarding [facility]’s financial assistance program can be found by going to [email or web address] or by calling [phone].

## 2024-11-26 NOTE — DISCHARGE NOTE NURSING/CASE MANAGEMENT/SOCIAL WORK - NSTOBACCONEVERSMOKERY/N_GEN_A
Patient set up appointment 9/21/17  Has order for Glyco  Any other labs you want to order  He plans on having labs prior   No

## 2024-11-27 VITALS
HEART RATE: 79 BPM | DIASTOLIC BLOOD PRESSURE: 90 MMHG | TEMPERATURE: 98 F | OXYGEN SATURATION: 97 % | SYSTOLIC BLOOD PRESSURE: 125 MMHG | RESPIRATION RATE: 18 BRPM

## 2024-11-27 DIAGNOSIS — E55.9 VITAMIN D DEFICIENCY, UNSPECIFIED: ICD-10-CM

## 2024-11-27 LAB
GLUCOSE BLDC GLUCOMTR-MCNC: 186 MG/DL — HIGH (ref 70–99)
GLUCOSE BLDC GLUCOMTR-MCNC: 192 MG/DL — HIGH (ref 70–99)

## 2024-11-27 PROCEDURE — 99239 HOSP IP/OBS DSCHRG MGMT >30: CPT

## 2024-11-27 RX ADMIN — CARVEDILOL 25 MILLIGRAM(S): 25 TABLET, FILM COATED ORAL at 05:31

## 2024-11-27 RX ADMIN — SACUBITRIL AND VALSARTAN 1 TABLET(S): 24; 26 TABLET, FILM COATED ORAL at 05:30

## 2024-11-27 RX ADMIN — Medication 1: at 09:04

## 2024-11-27 RX ADMIN — Medication 1: at 13:10

## 2024-11-27 RX ADMIN — SUCRALFATE 1 GRAM(S): 1 TABLET ORAL at 05:31

## 2024-11-27 RX ADMIN — DIPHENHYDRAMINE HYDROCHLORIDE AND LIDOCAINE HYDROCHLORIDE AND ALUMINUM HYDROXIDE AND MAGNESIUM HYDRO 30 MILLILITER(S): KIT at 01:35

## 2024-11-27 RX ADMIN — Medication 50000 UNIT(S): at 15:24

## 2024-11-27 RX ADMIN — PANTOPRAZOLE SODIUM 40 MILLIGRAM(S): 40 TABLET, DELAYED RELEASE ORAL at 05:30

## 2024-11-27 RX ADMIN — SUCRALFATE 1 GRAM(S): 1 TABLET ORAL at 11:44

## 2024-11-27 NOTE — PROGRESS NOTE ADULT - PROBLEM SELECTOR PROBLEM 5
Elevated blood pressure reading with diagnosis of hypertension
Elevated blood pressure reading with diagnosis of hypertension

## 2024-11-27 NOTE — PROGRESS NOTE ADULT - PROBLEM SELECTOR PLAN 1
Vomiting w/ streaks of blood and blood clots, now resolved   States no history of alcohol abuse  - c/w high dose PPI   - monitor CBC, hgb stable   - GI consulted, no intervention at this time
Vomiting w/ streaks of blood and blood clots, now resolved   States no history of alcohol abuse  - c/w high dose PPI   - monitor CBC, hgb stable   - GI consulted, no intervention at this time

## 2024-11-27 NOTE — PHARMACOTHERAPY INTERVENTION NOTE - COMMENTS
Discharge medications reviewed with the patient. Current medication schedule was discussed in detail including: medication name, indication, dose, administration times, treatment duration, side effects, and special instructions. Also discussed CHF and DM management. Patient counseled on heart failure medication indications, administration, and side effects. Also discussed fluid and salt restrictions, and maintaining a log of daily weights. Patient does not currently weigh himself - provided patient with a scale to take home. Discussed warning signs of fluid overload (SOB, orthopnea, ALAS, edema, etc.) and when to seek medical attention. Patient verbalized understanding. Questions and concerns were answered and addressed. Patient provided with CHF booklet.    Patient also educated on A1c, hypoglycemia and treatment, healthy plate, exercise, and blood glucose monitoring. Reinforced adherence to medications and diet changes. Gave pt DM2 handouts (A1c, hypoglycemia & treatment, healthy plate). Patient encouraged for outpt follow up with medicine and endocrine. Advised patient to separate sucralfate from his other medications by at least 2 hours to avoid interactions.    New medications: pantoprazole, sucralfate    Kizzy Grant PharmD, BCPS  Clinical Pharmacy Specialist  [phone]

## 2024-11-27 NOTE — PROGRESS NOTE ADULT - PROBLEM SELECTOR PROBLEM 4
Uncontrolled type 2 diabetes mellitus with hyperglycemia, without long-term current use of insulin
Uncontrolled type 2 diabetes mellitus with hyperglycemia, without long-term current use of insulin

## 2024-11-27 NOTE — PROGRESS NOTE ADULT - PROBLEM SELECTOR PLAN 2
Likely d/t gastritis   - c/w high dose PPI   - will add Carafate   - diet advanced and tolerated   - no GI intervention at this time
Likely d/t gastritis   - c/w high dose PPI   - added Carafate   - diet advanced   - no GI intervention at this time

## 2024-11-27 NOTE — PROGRESS NOTE ADULT - PROBLEM SELECTOR PLAN 4
Patient on multiple medications; glipizide-metformin, Renee Caro  - Hgb A1c 11  - ISS   - consistent carb diet   - plan to resume PO meds on discharge. Patient refuses insulin, understands risks of possible complications
Patient on multiple medications; glipizide-metformin, Renee Caro  - Hgb A1c 11  - ISS   - consistent carb diet   - plan to resume PO meds on discharge. Patient refuses insulin, understands risks of possible complications

## 2024-11-27 NOTE — PROGRESS NOTE ADULT - PROBLEM SELECTOR PLAN 5
- continuing with Entresto, carvedilol  - monitor BP
- continuing with Entresto, carvedilol  - monitor BP

## 2024-11-27 NOTE — PROGRESS NOTE ADULT - PROBLEM SELECTOR PROBLEM 3
Chronic combined systolic and diastolic CHF, NYHA class 1
Chronic combined systolic and diastolic CHF, NYHA class 1

## 2024-11-27 NOTE — PROGRESS NOTE ADULT - PROBLEM SELECTOR PLAN 6
WBC = 13.05  Afebrile. No symptoms suggestive of infection  CXR w/o signs of infection  Stable off antibiotics
WBC = 13.05  Afebrile. No symptoms suggestive of infection  CXR w/o signs of infection (personally reviewed)  Stable off antibiotics

## 2024-11-27 NOTE — PROGRESS NOTE ADULT - ASSESSMENT
44-year-old male, with past history significant for HTN, Type-2 DM, CHF (EF 19% and Grade 1 diastolic dysfunction), non obstructive CAD, HLD, and Gastritis, presented to the ED secondary to epigastric pain, nausea and recurrent vomiting w/ blood. GI consulted for further evaluation of above.    # reported hematemesis w/ epigastric pain - clinically improved  - this past saturday in [place], took his usual meds, had 2 alcoholic drinks followed by a chicken sandwich from [employer] and then 3 hrs later w/ acute onset of epigastric pain with nausea and multiple episodes of vomiting, vomiting initially non bloody followed by streaks of dark red blood mixed into emesis, no melena or hematochezia, no ac or nsaids, no prior egd; currently HDS w/ stable hgb at baseline wo further bloody emesis overnight, clinical hx suggestive of age, bleeding likely iso mwt, addtl ddx includes esophagitis, pud, avms, malignancy  - 11/27 reports improvement in symptoms, no further overt gib, tolerating some po  # HFrEF (EF 19%, grade 1 diastolic dysfunction)  # non obstructive CAD    Recommendations-  - continue supportive care  - f/u AM labs  - maintain active t&s and 2 large bore IV access  - trend cbc, transfuse for hgb >/=8  - high dose PPI; avoid nsaids  - anti emetics prn if qtc ok/no med CI  - diet as tolerated  - no present plans for endoscopic evaluation, however if develops recurrent overt gib/dropping counts, etc- pls notify GI  - if hh stable wo further overt gib and able to tolerate PO, no GI objection to dc  - rest of care per primary team     *all recommendations are tentative until note is attested by attending*    JONATAN Darden PA-C, RD, CDN  available on TEAMS M/T/TH/F from 7am - 4pm    after hours/weekends: non urgent issues --> email [email or web address], urgent issues --> contact on-call GI fellow at [phone] 
44-year-old male, with past history significant for HTN, Type-2 DM, CHF (EF = 19% and Grade 1 diastolic dysfunction), HLD, and Gastritis, presented to the ED secondary to epigastric pain.  Diagnosed with Abdominal Pain with Vomiting in the ED.
44-year-old male, with past history significant for HTN, Type-2 DM, CHF (EF = 19% and Grade 1 diastolic dysfunction), HLD, and Gastritis, presented to the ED secondary to epigastric pain.  Diagnosed with Abdominal Pain with Vomiting in the ED.

## 2024-11-27 NOTE — PROGRESS NOTE ADULT - SUBJECTIVE AND OBJECTIVE BOX
Interval Events:       Allergies:  shellfish (Swelling)  No Known Drug Allergies      Hospital Medications:  acetaminophen     Tablet .. 650 milliGRAM(s) Oral every 6 hours PRN  albuterol    90 MICROgram(s) HFA Inhaler 2 Puff(s) Inhalation every 6 hours PRN  aluminum hydroxide/magnesium hydroxide/simethicone Suspension 30 milliLiter(s) Oral every 4 hours PRN  atorvastatin 40 milliGRAM(s) Oral at bedtime  carvedilol 25 milliGRAM(s) Oral every 12 hours  dextrose 5%. 1000 milliLiter(s) IV Continuous <Continuous>  dextrose 5%. 1000 milliLiter(s) IV Continuous <Continuous>  dextrose 50% Injectable 25 Gram(s) IV Push once  dextrose 50% Injectable 12.5 Gram(s) IV Push once  dextrose 50% Injectable 25 Gram(s) IV Push once  dextrose Oral Gel 15 Gram(s) Oral once PRN  ergocalciferol 27479 Unit(s) Oral <User Schedule>  fluticasone propionate/ salmeterol 250-50 MICROgram(s) Diskus 1 Dose(s) Inhalation two times a day  glucagon  Injectable 1 milliGRAM(s) IntraMuscular once  influenza   Vaccine 0.5 milliLiter(s) IntraMuscular once  insulin lispro (ADMELOG) corrective regimen sliding scale   SubCutaneous three times a day before meals  insulin lispro (ADMELOG) corrective regimen sliding scale   SubCutaneous at bedtime  melatonin 3 milliGRAM(s) Oral at bedtime PRN  ondansetron Injectable 4 milliGRAM(s) IV Push every 8 hours PRN  pantoprazole  Injectable 40 milliGRAM(s) IV Push two times a day  polyethylene glycol 3350 17 Gram(s) Oral daily PRN  sacubitril 97 mG/valsartan 103 mG 1 Tablet(s) Oral two times a day  senna 2 Tablet(s) Oral at bedtime PRN  sucralfate suspension 1 Gram(s) Oral four times a day      ROS: As per HPI, otherwise 10-point ROS reviewed and negative.    Vital Signs:  Vital Signs Last 24 Hrs  T(C): 36.9 (27 Nov 2024 05:20), Max: 36.9 (27 Nov 2024 05:20)  T(F): 98.4 (27 Nov 2024 05:20), Max: 98.4 (27 Nov 2024 05:20)  HR: 75 (27 Nov 2024 05:20) (75 - 82)  BP: 120/79 (27 Nov 2024 05:20) (112/82 - 147/89)  BP(mean): --  RR: 17 (27 Nov 2024 05:20) (17 - 17)  SpO2: 98% (27 Nov 2024 05:20) (97% - 98%)    Parameters below as of 27 Nov 2024 05:20  Patient On (Oxygen Delivery Method): room air      Daily     Daily       11-26-24 @ 07:01  -  11-27-24 @ 07:00  --------------------------------------------------------  IN: 900 mL / OUT: 1000 mL / NET: -100 mL        LABS:                        16.7   11.45 )-----------( 181      ( 26 Nov 2024 06:23 )             48.9       11-26    138  |  100  |  20  ----------------------------<  91  3.8   |  24  |  0.98    Ca    8.9      26 Nov 2024 06:23  Phos  2.7     11-26  Mg     2.10     11-26    TPro  8.2  /  Alb  4.4  /  TBili  1.0  /  DBili  x   /  AST  19  /  ALT  13  /  AlkPhos  73  11-25    LIVER FUNCTIONS - ( 25 Nov 2024 15:20 )  Alb: 4.4 g/dL / Pro: 8.2 g/dL / ALK PHOS: 73 U/L / ALT: 13 U/L / AST: 19 U/L / GGT: x           PT/INR - ( 25 Nov 2024 15:20 )   PT: 13.2 sec;   INR: 1.14 ratio         PTT - ( 25 Nov 2024 15:20 )  PTT:34.0 sec  Urinalysis Basic - ( 26 Nov 2024 06:23 )    Color: x / Appearance: x / SG: x / pH: x  Gluc: 91 mg/dL / Ketone: x  / Bili: x / Urobili: x   Blood: x / Protein: x / Nitrite: x   Leuk Esterase: x / RBC: x / WBC x   Sq Epi: x / Non Sq Epi: x / Bacteria: x                              16.7   11.45 )-----------( 181      ( 26 Nov 2024 06:23 )             48.9                         17.2   13.05 )-----------( 215      ( 25 Nov 2024 15:20 )             51.9       PHYSICAL EXAM  GENERAL:  Lying in bed in NAD  HEENT:  NCAT, no scleral icterus  NECK: Trachea midline  CHEST:  Resp even, non labored  ABDOMEN:  Soft, non-tender, non-distended  EXTREMITIES:  WWP, no edema  SKIN:  No visible rash or jaundice  NEURO:  Alert and oriented x 3, no asterixis    Imaging:           Interval Events: pt seen and examined. reports improvement in nausea and epigastric pain. tolerated liquids yesterday. last episode of vomiting was last night, non bloody, able to keep down an apple after that, no melena or hematochezia      Allergies:  shellfish (Swelling)  No Known Drug Allergies      Hospital Medications:  acetaminophen     Tablet .. 650 milliGRAM(s) Oral every 6 hours PRN  albuterol    90 MICROgram(s) HFA Inhaler 2 Puff(s) Inhalation every 6 hours PRN  aluminum hydroxide/magnesium hydroxide/simethicone Suspension 30 milliLiter(s) Oral every 4 hours PRN  atorvastatin 40 milliGRAM(s) Oral at bedtime  carvedilol 25 milliGRAM(s) Oral every 12 hours  dextrose 5%. 1000 milliLiter(s) IV Continuous <Continuous>  dextrose 5%. 1000 milliLiter(s) IV Continuous <Continuous>  dextrose 50% Injectable 25 Gram(s) IV Push once  dextrose 50% Injectable 12.5 Gram(s) IV Push once  dextrose 50% Injectable 25 Gram(s) IV Push once  dextrose Oral Gel 15 Gram(s) Oral once PRN  ergocalciferol 07570 Unit(s) Oral <User Schedule>  fluticasone propionate/ salmeterol 250-50 MICROgram(s) Diskus 1 Dose(s) Inhalation two times a day  glucagon  Injectable 1 milliGRAM(s) IntraMuscular once  influenza   Vaccine 0.5 milliLiter(s) IntraMuscular once  insulin lispro (ADMELOG) corrective regimen sliding scale   SubCutaneous three times a day before meals  insulin lispro (ADMELOG) corrective regimen sliding scale   SubCutaneous at bedtime  melatonin 3 milliGRAM(s) Oral at bedtime PRN  ondansetron Injectable 4 milliGRAM(s) IV Push every 8 hours PRN  pantoprazole  Injectable 40 milliGRAM(s) IV Push two times a day  polyethylene glycol 3350 17 Gram(s) Oral daily PRN  sacubitril 97 mG/valsartan 103 mG 1 Tablet(s) Oral two times a day  senna 2 Tablet(s) Oral at bedtime PRN  sucralfate suspension 1 Gram(s) Oral four times a day      ROS: As per HPI, otherwise 10-point ROS reviewed and negative.    Vital Signs:  Vital Signs Last 24 Hrs  T(C): 36.9 (27 Nov 2024 05:20), Max: 36.9 (27 Nov 2024 05:20)  T(F): 98.4 (27 Nov 2024 05:20), Max: 98.4 (27 Nov 2024 05:20)  HR: 75 (27 Nov 2024 05:20) (75 - 82)  BP: 120/79 (27 Nov 2024 05:20) (112/82 - 147/89)  BP(mean): --  RR: 17 (27 Nov 2024 05:20) (17 - 17)  SpO2: 98% (27 Nov 2024 05:20) (97% - 98%)    Parameters below as of 27 Nov 2024 05:20  Patient On (Oxygen Delivery Method): room air      Daily     Daily       11-26-24 @ 07:01  -  11-27-24 @ 07:00  --------------------------------------------------------  IN: 900 mL / OUT: 1000 mL / NET: -100 mL        LABS:                        16.7   11.45 )-----------( 181      ( 26 Nov 2024 06:23 )             48.9       11-26    138  |  100  |  20  ----------------------------<  91  3.8   |  24  |  0.98    Ca    8.9      26 Nov 2024 06:23  Phos  2.7     11-26  Mg     2.10     11-26    TPro  8.2  /  Alb  4.4  /  TBili  1.0  /  DBili  x   /  AST  19  /  ALT  13  /  AlkPhos  73  11-25    LIVER FUNCTIONS - ( 25 Nov 2024 15:20 )  Alb: 4.4 g/dL / Pro: 8.2 g/dL / ALK PHOS: 73 U/L / ALT: 13 U/L / AST: 19 U/L / GGT: x           PT/INR - ( 25 Nov 2024 15:20 )   PT: 13.2 sec;   INR: 1.14 ratio         PTT - ( 25 Nov 2024 15:20 )  PTT:34.0 sec  Urinalysis Basic - ( 26 Nov 2024 06:23 )    Color: x / Appearance: x / SG: x / pH: x  Gluc: 91 mg/dL / Ketone: x  / Bili: x / Urobili: x   Blood: x / Protein: x / Nitrite: x   Leuk Esterase: x / RBC: x / WBC x   Sq Epi: x / Non Sq Epi: x / Bacteria: x                              16.7   11.45 )-----------( 181      ( 26 Nov 2024 06:23 )             48.9                         17.2   13.05 )-----------( 215      ( 25 Nov 2024 15:20 )             51.9       PHYSICAL EXAM  GENERAL: lying in bed, in no apparent distress  HEENT: NCAT  EYES: anicteric sclerae, moist conjunctivae  NECK: trachea midline, FROM  CHEST:  respirations even, non labored   ABDOMEN:  soft, non-tender, non-distended  EXTREMITIES: WWP  SKIN:  warm/dry  PSYCH: A&O x 3  NEURO: no tremor noted

## 2024-11-27 NOTE — PROGRESS NOTE ADULT - SUBJECTIVE AND OBJECTIVE BOX
PROGRESS NOTE:     Patient is a 44y old  Male who presents with a chief complaint of Abdominal pain with vomiting (27 Nov 2024 07:01)    SUBJECTIVE / OVERNIGHT EVENTS: abdominal pain is better. +nausea, one episode of vomiting last night.     ADDITIONAL REVIEW OF SYSTEMS:    MEDICATIONS  (STANDING):  atorvastatin 40 milliGRAM(s) Oral at bedtime  carvedilol 25 milliGRAM(s) Oral every 12 hours  dextrose 5%. 1000 milliLiter(s) (50 mL/Hr) IV Continuous <Continuous>  dextrose 5%. 1000 milliLiter(s) (100 mL/Hr) IV Continuous <Continuous>  dextrose 50% Injectable 25 Gram(s) IV Push once  dextrose 50% Injectable 12.5 Gram(s) IV Push once  dextrose 50% Injectable 25 Gram(s) IV Push once  ergocalciferol 29138 Unit(s) Oral <User Schedule>  fluticasone propionate/ salmeterol 250-50 MICROgram(s) Diskus 1 Dose(s) Inhalation two times a day  glucagon  Injectable 1 milliGRAM(s) IntraMuscular once  influenza   Vaccine 0.5 milliLiter(s) IntraMuscular once  insulin lispro (ADMELOG) corrective regimen sliding scale   SubCutaneous three times a day before meals  insulin lispro (ADMELOG) corrective regimen sliding scale   SubCutaneous at bedtime  pantoprazole  Injectable 40 milliGRAM(s) IV Push two times a day  sacubitril 97 mG/valsartan 103 mG 1 Tablet(s) Oral two times a day  sucralfate suspension 1 Gram(s) Oral four times a day    MEDICATIONS  (PRN):  acetaminophen     Tablet .. 650 milliGRAM(s) Oral every 6 hours PRN Mild Pain (1 - 3)  albuterol    90 MICROgram(s) HFA Inhaler 2 Puff(s) Inhalation every 6 hours PRN for shortness of breath and/or wheezing  aluminum hydroxide/magnesium hydroxide/simethicone Suspension 30 milliLiter(s) Oral every 4 hours PRN Dyspepsia  dextrose Oral Gel 15 Gram(s) Oral once PRN Blood Glucose LESS THAN 70 milliGRAM(s)/deciliter  melatonin 3 milliGRAM(s) Oral at bedtime PRN Insomnia  ondansetron Injectable 4 milliGRAM(s) IV Push every 8 hours PRN Nausea and/or Vomiting  polyethylene glycol 3350 17 Gram(s) Oral daily PRN Constipation  senna 2 Tablet(s) Oral at bedtime PRN Constipation      CAPILLARY BLOOD GLUCOSE      POCT Blood Glucose.: 186 mg/dL (27 Nov 2024 08:26)  POCT Blood Glucose.: 189 mg/dL (26 Nov 2024 21:44)  POCT Blood Glucose.: 169 mg/dL (26 Nov 2024 12:17)    I&O's Summary    26 Nov 2024 07:01  -  27 Nov 2024 07:00  --------------------------------------------------------  IN: 900 mL / OUT: 1000 mL / NET: -100 mL        PHYSICAL EXAM:  Vital Signs Last 24 Hrs  T(C): 36.9 (27 Nov 2024 05:20), Max: 36.9 (27 Nov 2024 05:20)  T(F): 98.4 (27 Nov 2024 05:20), Max: 98.4 (27 Nov 2024 05:20)  HR: 75 (27 Nov 2024 05:20) (75 - 82)  BP: 120/79 (27 Nov 2024 05:20) (112/82 - 147/89)  BP(mean): --  RR: 17 (27 Nov 2024 05:20) (17 - 17)  SpO2: 98% (27 Nov 2024 05:20) (98% - 98%)    Parameters below as of 27 Nov 2024 05:20  Patient On (Oxygen Delivery Method): room air        CONSTITUTIONAL: NAD, well-developed  RESPIRATORY: Normal respiratory effort; lungs are clear to auscultation bilaterally  CARDIOVASCULAR: Regular rate and rhythm, normal S1 and S2, no murmur/rub/gallop; trace lower extremity edema; Peripheral pulses are 2+ bilaterally  ABDOMEN: Nontender to palpation, normoactive bowel sounds, no rebound/guarding; No hepatosplenomegaly  MUSCLOSKELETAL: no clubbing or cyanosis of digits; no joint swelling or tenderness to palpation  PSYCH: A+O to person, place, and time; affect appropriate    LABS:                        16.7   11.45 )-----------( 181      ( 26 Nov 2024 06:23 )             48.9     11-26    138  |  100  |  20  ----------------------------<  91  3.8   |  24  |  0.98    Ca    8.9      26 Nov 2024 06:23  Phos  2.7     11-26  Mg     2.10     11-26    TPro  8.2  /  Alb  4.4  /  TBili  1.0  /  DBili  x   /  AST  19  /  ALT  13  /  AlkPhos  73  11-25    PT/INR - ( 25 Nov 2024 15:20 )   PT: 13.2 sec;   INR: 1.14 ratio         PTT - ( 25 Nov 2024 15:20 )  PTT:34.0 sec  CARDIAC MARKERS ( 25 Nov 2024 19:41 )  x     / x     / x     / x     / 8.3 ng/mL  CARDIAC MARKERS ( 25 Nov 2024 15:20 )  x     / x     / x     / x     / 8.9 ng/mL      Urinalysis Basic - ( 26 Nov 2024 06:23 )    Color: x / Appearance: x / SG: x / pH: x  Gluc: 91 mg/dL / Ketone: x  / Bili: x / Urobili: x   Blood: x / Protein: x / Nitrite: x   Leuk Esterase: x / RBC: x / WBC x   Sq Epi: x / Non Sq Epi: x / Bacteria: x          RADIOLOGY & ADDITIONAL TESTS:  Results Reviewed:   Imaging Personally Reviewed:  Electrocardiogram Personally Reviewed:    COORDINATION OF CARE:  Care Discussed with Consultants/Other Providers [Y/N]:  Prior or Outpatient Records Reviewed [Y/N]:

## 2024-11-27 NOTE — PROGRESS NOTE ADULT - PROBLEM SELECTOR PLAN 3
Troponin = 62 -->> 45.  CK-MB = 8.9 -->> 8.3  - appears euvolemic   - may resume Bumex and eplerenone on discharge   - on carvedilol, Entresto  - outpatient Cardiology f/up
Troponin = 62 -->> 45.  CK-MB = 8.9 -->> 8.3  - appears euvolemic   - may resume Bumex and eplerenone on discharge   - on carvedilol, Entresto  - outpatient Cardiology f/up

## 2024-11-27 NOTE — PROGRESS NOTE ADULT - NS ATTEND AMEND GEN_ALL_CORE FT
44-year-old male, with past history significant for HTN, Type-2 DM, CHF (EF 19% and Grade 1 diastolic dysfunction), non obstructive CAD, HLD, and Gastritis, presented to the ED secondary to epigastric pain, nausea and recurrent vomiting w/ blood.    MWT  no further bleed

## 2025-01-28 NOTE — DISCHARGE NOTE PROVIDER - DISCHARGE DIET
p/w 1 month of decrease oral intake, with the past 2-3 days of no food intake and minimal liquid intake, and has not taken medications in the past 4-5 days  - CK: 159 (wnl)  s/p 1L LR bolus in ED  - encourage oral intake, start pureed diet  - consulted nutrition  - consulted PT  - fall precaution, ambulate with assistance
DASH Diet/Consistent Carbohydrate Diabetic Diets/Other Diet Instructions

## 2025-02-06 ENCOUNTER — INPATIENT (INPATIENT)
Facility: HOSPITAL | Age: 45
LOS: 2 days | Discharge: LEFT BEFORE DISCHARGE | End: 2025-02-09
Attending: STUDENT IN AN ORGANIZED HEALTH CARE EDUCATION/TRAINING PROGRAM | Admitting: STUDENT IN AN ORGANIZED HEALTH CARE EDUCATION/TRAINING PROGRAM
Payer: MEDICAID

## 2025-02-06 VITALS
WEIGHT: 240.08 LBS | DIASTOLIC BLOOD PRESSURE: 119 MMHG | HEART RATE: 108 BPM | RESPIRATION RATE: 18 BRPM | OXYGEN SATURATION: 97 % | TEMPERATURE: 98 F | SYSTOLIC BLOOD PRESSURE: 153 MMHG

## 2025-02-06 PROCEDURE — 99285 EMERGENCY DEPT VISIT HI MDM: CPT

## 2025-02-06 NOTE — ED ADULT TRIAGE NOTE - PAIN RATING/NUMBER SCALE (0-10): ACTIVITY
Post operative call s/p surgery on   w/ DR Maty Billings s/p ;5/25 Replacement left buttockIPG   Post operative pain:controlled Morphine 15 mg on in am and 1 in pm   Antibiotic:Septra started on same day as surgery postop   Gastrointestinal (BM, NVD,  Appetite /Fluid intake),    --denies issues , no constipation                 Call if you develop any signs or symptoms of incision (s) redness, drainage, dehiscence, or  fever of 101 or higher , uncontrolled nausea and /or vomiting  --reinforced   Wound/dressing; as per postoperative discharge instructions remove dressings on Saturday   Post operative Hygiene: Gently wash surgical incision(s) with a clean wash cloth and pat dry using a clean wash towel  Reinforced use clean wash cloth and towel daily, use antibacterial soap, change bed linens 1-2 times per week and pajamas several times per week  --reinforced   Post operative Activity:  Ambulate as tolerate, Lift no greater than 10 LBS until 6 weeks, Avoid submersion in water x 3 weeks, and refrain for bending or twisting until about 4 weeks -light duty until then  --reinforced   No NSAID (aspirin, Motrin, ibuprofen, OTC, supplements etc ) for 2 weeks, may resume after 2 week postoperative visit --reinforced    2 week post op visit 5/9  Patent verbalized an understanding of information communicated 8 (severe pain)

## 2025-02-06 NOTE — ED ADULT TRIAGE NOTE - CHIEF COMPLAINT QUOTE
Pt st" I have chest pain and shortness of breath for 4 days. Pain is constant. The shortness of breath worse when moving around. I have heart condition sarcoidosis." Hx htn, Dm2

## 2025-02-07 DIAGNOSIS — K59.00 CONSTIPATION, UNSPECIFIED: ICD-10-CM

## 2025-02-07 DIAGNOSIS — Z29.9 ENCOUNTER FOR PROPHYLACTIC MEASURES, UNSPECIFIED: ICD-10-CM

## 2025-02-07 DIAGNOSIS — J45.909 UNSPECIFIED ASTHMA, UNCOMPLICATED: ICD-10-CM

## 2025-02-07 DIAGNOSIS — E11.9 TYPE 2 DIABETES MELLITUS WITHOUT COMPLICATIONS: ICD-10-CM

## 2025-02-07 DIAGNOSIS — I10 ESSENTIAL (PRIMARY) HYPERTENSION: ICD-10-CM

## 2025-02-07 DIAGNOSIS — I50.9 HEART FAILURE, UNSPECIFIED: ICD-10-CM

## 2025-02-07 DIAGNOSIS — R06.02 SHORTNESS OF BREATH: ICD-10-CM

## 2025-02-07 PROBLEM — F17.200 NICOTINE DEPENDENCE, UNSPECIFIED, UNCOMPLICATED: Chronic | Status: ACTIVE | Noted: 2024-11-26

## 2025-02-07 LAB
ALBUMIN SERPL ELPH-MCNC: 4 G/DL — SIGNIFICANT CHANGE UP (ref 3.3–5)
ALP SERPL-CCNC: 75 U/L — SIGNIFICANT CHANGE UP (ref 40–120)
ALT FLD-CCNC: 14 U/L — SIGNIFICANT CHANGE UP (ref 4–41)
ANION GAP SERPL CALC-SCNC: 11 MMOL/L — SIGNIFICANT CHANGE UP (ref 7–14)
APPEARANCE UR: CLEAR — SIGNIFICANT CHANGE UP
AST SERPL-CCNC: 22 U/L — SIGNIFICANT CHANGE UP (ref 4–40)
BACTERIA # UR AUTO: NEGATIVE /HPF — SIGNIFICANT CHANGE UP
BASOPHILS # BLD AUTO: 0 K/UL — SIGNIFICANT CHANGE UP (ref 0–0.2)
BASOPHILS NFR BLD AUTO: 0 % — SIGNIFICANT CHANGE UP (ref 0–2)
BILIRUB SERPL-MCNC: 0.7 MG/DL — SIGNIFICANT CHANGE UP (ref 0.2–1.2)
BILIRUB UR-MCNC: NEGATIVE — SIGNIFICANT CHANGE UP
BUN SERPL-MCNC: 14 MG/DL — SIGNIFICANT CHANGE UP (ref 7–23)
CALCIUM SERPL-MCNC: 9 MG/DL — SIGNIFICANT CHANGE UP (ref 8.4–10.5)
CAST: 1 /LPF — SIGNIFICANT CHANGE UP (ref 0–4)
CHLORIDE SERPL-SCNC: 101 MMOL/L — SIGNIFICANT CHANGE UP (ref 98–107)
CO2 SERPL-SCNC: 26 MMOL/L — SIGNIFICANT CHANGE UP (ref 22–31)
COLOR SPEC: YELLOW — SIGNIFICANT CHANGE UP
CREAT SERPL-MCNC: 0.9 MG/DL — SIGNIFICANT CHANGE UP (ref 0.5–1.3)
DIFF PNL FLD: NEGATIVE — SIGNIFICANT CHANGE UP
EGFR: 107 ML/MIN/1.73M2 — SIGNIFICANT CHANGE UP
EOSINOPHIL # BLD AUTO: 0 K/UL — SIGNIFICANT CHANGE UP (ref 0–0.5)
EOSINOPHIL NFR BLD AUTO: 0 % — SIGNIFICANT CHANGE UP (ref 0–6)
GIANT PLATELETS BLD QL SMEAR: PRESENT — SIGNIFICANT CHANGE UP
GLUCOSE SERPL-MCNC: 231 MG/DL — HIGH (ref 70–99)
GLUCOSE UR QL: >=1000 MG/DL
HCT VFR BLD CALC: 50.2 % — HIGH (ref 39–50)
HGB BLD-MCNC: 16.2 G/DL — SIGNIFICANT CHANGE UP (ref 13–17)
IANC: 4.56 K/UL — SIGNIFICANT CHANGE UP (ref 1.8–7.4)
KETONES UR-MCNC: ABNORMAL MG/DL
LEUKOCYTE ESTERASE UR-ACNC: NEGATIVE — SIGNIFICANT CHANGE UP
LYMPHOCYTES # BLD AUTO: 2.61 K/UL — SIGNIFICANT CHANGE UP (ref 1–3.3)
LYMPHOCYTES # BLD AUTO: 30.7 % — SIGNIFICANT CHANGE UP (ref 13–44)
MANUAL SMEAR VERIFICATION: SIGNIFICANT CHANGE UP
MCHC RBC-ENTMCNC: 29.3 PG — SIGNIFICANT CHANGE UP (ref 27–34)
MCHC RBC-ENTMCNC: 32.3 G/DL — SIGNIFICANT CHANGE UP (ref 32–36)
MCV RBC AUTO: 90.9 FL — SIGNIFICANT CHANGE UP (ref 80–100)
MONOCYTES # BLD AUTO: 0.22 K/UL — SIGNIFICANT CHANGE UP (ref 0–0.9)
MONOCYTES NFR BLD AUTO: 2.6 % — SIGNIFICANT CHANGE UP (ref 2–14)
NEUTROPHILS # BLD AUTO: 5.21 K/UL — SIGNIFICANT CHANGE UP (ref 1.8–7.4)
NEUTROPHILS NFR BLD AUTO: 58.8 % — SIGNIFICANT CHANGE UP (ref 43–77)
NEUTS BAND # BLD: 2.6 % — SIGNIFICANT CHANGE UP (ref 0–6)
NEUTS BAND NFR BLD: 2.6 % — SIGNIFICANT CHANGE UP (ref 0–6)
NITRITE UR-MCNC: NEGATIVE — SIGNIFICANT CHANGE UP
NT-PROBNP SERPL-SCNC: 1173 PG/ML — HIGH
PH UR: 5.5 — SIGNIFICANT CHANGE UP (ref 5–8)
PLAT MORPH BLD: NORMAL — SIGNIFICANT CHANGE UP
PLATELET # BLD AUTO: 197 K/UL — SIGNIFICANT CHANGE UP (ref 150–400)
PLATELET COUNT - ESTIMATE: NORMAL — SIGNIFICANT CHANGE UP
POTASSIUM SERPL-MCNC: 4.7 MMOL/L — SIGNIFICANT CHANGE UP (ref 3.5–5.3)
POTASSIUM SERPL-SCNC: 4.7 MMOL/L — SIGNIFICANT CHANGE UP (ref 3.5–5.3)
PROT SERPL-MCNC: 7.6 G/DL — SIGNIFICANT CHANGE UP (ref 6–8.3)
PROT UR-MCNC: 30 MG/DL
RBC # BLD: 5.52 M/UL — SIGNIFICANT CHANGE UP (ref 4.2–5.8)
RBC # FLD: 14.3 % — SIGNIFICANT CHANGE UP (ref 10.3–14.5)
RBC BLD AUTO: NORMAL — SIGNIFICANT CHANGE UP
RBC CASTS # UR COMP ASSIST: 0 /HPF — SIGNIFICANT CHANGE UP (ref 0–4)
SODIUM SERPL-SCNC: 138 MMOL/L — SIGNIFICANT CHANGE UP (ref 135–145)
SP GR SPEC: 1.04 — HIGH (ref 1–1.03)
SQUAMOUS # UR AUTO: 0 /HPF — SIGNIFICANT CHANGE UP (ref 0–5)
TROPONIN T, HIGH SENSITIVITY RESULT: 43 NG/L — SIGNIFICANT CHANGE UP
TROPONIN T, HIGH SENSITIVITY RESULT: 46 NG/L — SIGNIFICANT CHANGE UP
UROBILINOGEN FLD QL: 1 MG/DL — SIGNIFICANT CHANGE UP (ref 0.2–1)
VARIANT LYMPHS # BLD: 5.3 % — SIGNIFICANT CHANGE UP (ref 0–6)
VARIANT LYMPHS NFR BLD MANUAL: 5.3 % — SIGNIFICANT CHANGE UP (ref 0–6)
WBC # BLD: 8.49 K/UL — SIGNIFICANT CHANGE UP (ref 3.8–10.5)
WBC # FLD AUTO: 8.49 K/UL — SIGNIFICANT CHANGE UP (ref 3.8–10.5)
WBC UR QL: 0 /HPF — SIGNIFICANT CHANGE UP (ref 0–5)

## 2025-02-07 PROCEDURE — 71046 X-RAY EXAM CHEST 2 VIEWS: CPT | Mod: 26

## 2025-02-07 PROCEDURE — 99223 1ST HOSP IP/OBS HIGH 75: CPT

## 2025-02-07 RX ORDER — HYDRALAZINE HCL 100 MG
25 TABLET ORAL
Refills: 0 | Status: DISCONTINUED | OUTPATIENT
Start: 2025-02-07 | End: 2025-02-08

## 2025-02-07 RX ORDER — ACETAMINOPHEN 160 MG/5ML
650 SUSPENSION ORAL EVERY 6 HOURS
Refills: 0 | Status: DISCONTINUED | OUTPATIENT
Start: 2025-02-07 | End: 2025-02-09

## 2025-02-07 RX ORDER — ACETAMINOPHEN, DIPHENHYDRAMINE HCL, PHENYLEPHRINE HCL 325; 25; 5 MG/1; MG/1; MG/1
3 TABLET ORAL AT BEDTIME
Refills: 0 | Status: DISCONTINUED | OUTPATIENT
Start: 2025-02-07 | End: 2025-02-09

## 2025-02-07 RX ORDER — MAGNESIUM, ALUMINUM HYDROXIDE 200-225/5
30 SUSPENSION, ORAL (FINAL DOSE FORM) ORAL EVERY 4 HOURS
Refills: 0 | Status: DISCONTINUED | OUTPATIENT
Start: 2025-02-07 | End: 2025-02-09

## 2025-02-07 RX ORDER — DM/PSEUDOEPHED/ACETAMINOPHEN 10-30-250
12.5 CAPSULE ORAL ONCE
Refills: 0 | Status: DISCONTINUED | OUTPATIENT
Start: 2025-02-07 | End: 2025-02-09

## 2025-02-07 RX ORDER — INSULIN LISPRO 100/ML
VIAL (ML) SUBCUTANEOUS AT BEDTIME
Refills: 0 | Status: DISCONTINUED | OUTPATIENT
Start: 2025-02-07 | End: 2025-02-09

## 2025-02-07 RX ORDER — BUDESONIDE AND FORMOTEROL FUMARATE DIHYDRATE 80; 4.5 UG/1; UG/1
2 AEROSOL RESPIRATORY (INHALATION)
Refills: 0 | DISCHARGE

## 2025-02-07 RX ORDER — DM/PSEUDOEPHED/ACETAMINOPHEN 10-30-250
15 CAPSULE ORAL ONCE
Refills: 0 | Status: DISCONTINUED | OUTPATIENT
Start: 2025-02-07 | End: 2025-02-09

## 2025-02-07 RX ORDER — INSULIN LISPRO 100/ML
VIAL (ML) SUBCUTANEOUS
Refills: 0 | Status: DISCONTINUED | OUTPATIENT
Start: 2025-02-07 | End: 2025-02-09

## 2025-02-07 RX ORDER — ATORVASTATIN CALCIUM 80 MG/1
40 TABLET, FILM COATED ORAL AT BEDTIME
Refills: 0 | Status: DISCONTINUED | OUTPATIENT
Start: 2025-02-07 | End: 2025-02-09

## 2025-02-07 RX ORDER — ENOXAPARIN SODIUM 100 MG/ML
40 INJECTION SUBCUTANEOUS EVERY 24 HOURS
Refills: 0 | Status: DISCONTINUED | OUTPATIENT
Start: 2025-02-07 | End: 2025-02-09

## 2025-02-07 RX ORDER — SENNOSIDES 8.6 MG
2 TABLET ORAL AT BEDTIME
Refills: 0 | Status: DISCONTINUED | OUTPATIENT
Start: 2025-02-07 | End: 2025-02-09

## 2025-02-07 RX ORDER — POLYETHYLENE GLYCOL 3350 17 G/17G
17 POWDER, FOR SOLUTION ORAL DAILY
Refills: 0 | Status: DISCONTINUED | OUTPATIENT
Start: 2025-02-07 | End: 2025-02-09

## 2025-02-07 RX ORDER — GLUCAGON 3 MG/1
1 POWDER NASAL ONCE
Refills: 0 | Status: DISCONTINUED | OUTPATIENT
Start: 2025-02-07 | End: 2025-02-09

## 2025-02-07 RX ORDER — BISACODYL 5 MG
10 TABLET, DELAYED RELEASE (ENTERIC COATED) ORAL ONCE
Refills: 0 | Status: DISCONTINUED | OUTPATIENT
Start: 2025-02-07 | End: 2025-02-09

## 2025-02-07 RX ORDER — ACETAMINOPHEN 160 MG/5ML
1000 SUSPENSION ORAL ONCE
Refills: 0 | Status: COMPLETED | OUTPATIENT
Start: 2025-02-07 | End: 2025-02-07

## 2025-02-07 RX ORDER — SODIUM CHLORIDE 9 G/ML
1000 INJECTION, SOLUTION INTRAVENOUS
Refills: 0 | Status: DISCONTINUED | OUTPATIENT
Start: 2025-02-07 | End: 2025-02-09

## 2025-02-07 RX ORDER — SACUBITRIL AND VALSARTAN 49; 51 MG/1; MG/1
1 TABLET, FILM COATED ORAL
Refills: 0 | Status: DISCONTINUED | OUTPATIENT
Start: 2025-02-07 | End: 2025-02-09

## 2025-02-07 RX ORDER — FLUTICASONE PROPIONATE AND SALMETEROL 113; 14 UG/1; UG/1
1 POWDER, METERED RESPIRATORY (INHALATION)
Refills: 0 | Status: DISCONTINUED | OUTPATIENT
Start: 2025-02-07 | End: 2025-02-09

## 2025-02-07 RX ORDER — DM/PSEUDOEPHED/ACETAMINOPHEN 10-30-250
25 CAPSULE ORAL ONCE
Refills: 0 | Status: DISCONTINUED | OUTPATIENT
Start: 2025-02-07 | End: 2025-02-09

## 2025-02-07 RX ORDER — CARVEDILOL 6.25 MG
25 TABLET ORAL EVERY 12 HOURS
Refills: 0 | Status: DISCONTINUED | OUTPATIENT
Start: 2025-02-07 | End: 2025-02-09

## 2025-02-07 RX ADMIN — Medication 80 MILLIGRAM(S): at 14:53

## 2025-02-07 RX ADMIN — Medication 40 MILLIGRAM(S): at 03:28

## 2025-02-07 RX ADMIN — ACETAMINOPHEN 1000 MILLIGRAM(S): 160 SUSPENSION ORAL at 10:27

## 2025-02-07 RX ADMIN — Medication 1: at 16:00

## 2025-02-07 RX ADMIN — ATORVASTATIN CALCIUM 40 MILLIGRAM(S): 80 TABLET, FILM COATED ORAL at 22:23

## 2025-02-07 RX ADMIN — Medication 25 MILLIGRAM(S): at 20:42

## 2025-02-07 RX ADMIN — ACETAMINOPHEN 1000 MILLIGRAM(S): 160 SUSPENSION ORAL at 08:30

## 2025-02-07 RX ADMIN — Medication 2 TABLET(S): at 22:23

## 2025-02-07 RX ADMIN — SACUBITRIL AND VALSARTAN 1 TABLET(S): 49; 51 TABLET, FILM COATED ORAL at 22:13

## 2025-02-07 RX ADMIN — ACETAMINOPHEN 400 MILLIGRAM(S): 160 SUSPENSION ORAL at 03:29

## 2025-02-07 NOTE — ED ADULT NURSE REASSESSMENT NOTE - NS ED NURSE REASSESS COMMENT FT1
Patient expressed concerns about fluid retention, states that he "feels like I'm holding onto my water, I should be getting more medications by now," patient's admitting team called at [phone] and made aware of patient's concerns, RN informed that patient will receive a visit from the admitting physician after morning rounding, patient made aware, safety precautions maintained.

## 2025-02-07 NOTE — H&P ADULT - NSHPPHYSICALEXAM_GEN_ALL_CORE
GENERAL: NAD, resting in bed   HEAD:  Atraumatic, Normocephalic  EYES: EOMI, PERRLA, conjunctiva and sclera clear  CHEST/LUNG: Crackles bilaterally   HEART: Regular rate and rhythm; No murmurs, rubs, or gallops  ABDOMEN: Soft, Nontender, Nondistended; Bowel sounds present  EXTREMITIES:  1+ LE edema   PSYCH: AAOx3  NEUROLOGY: non-focal  SKIN: No rashes or lesions

## 2025-02-07 NOTE — ED ADULT NURSE NOTE - NSFALLUNIVINTERV_ED_ALL_ED
Bed/Stretcher in lowest position, wheels locked, appropriate side rails in place/Call bell, personal items and telephone in reach/Instruct patient to call for assistance before getting out of bed/chair/stretcher/Non-slip footwear applied when patient is off stretcher/[place] to call system/Physically safe environment - no spills, clutter or unnecessary equipment/Purposeful proactive rounding/Room/bathroom lighting operational, light cord in reach

## 2025-02-07 NOTE — ED PROVIDER NOTE - OBJECTIVE STATEMENT
Patient is a-year-old man, presenting with shortness of breath.  Patient has a past medical history of nonischemic cardiomyopathy, chronic heart failure with reduced ejection fraction, of 19%.  Patient was last seen and admitted for decompensated heart failure in the context of medication nonadherence, and also had GI upset.  Patient's outpatient cardiac medications include Entresto, carvedilol and eplerenone.  He is on maintenance diuretics.  Patient states he has not been able to  his medications, and has not been adherent.  Patient states today he is presenting for 2 to 3 days of worsening shortness of breath and some chest discomfort, radiating to the left shoulder.  Patient states he is presenting for symptoms similar to his last visit.  Patient states he also has been experiencing a cough, that is a white productive cough.  Patient states he is more comfortable seated up and is experiencing orthopnea.  Patient denies any recent illness, travel, nausea, vomiting, dysuria diarrhea or fever.

## 2025-02-07 NOTE — ED ADULT NURSE NOTE - OBJECTIVE STATEMENT
Pt received in room 20. pt alert and oriented x 4, ambulatory at baseline. Hx of DM2, HTN, sarcoidosis, CHF. Pt c/o chest pain and shortness of breath that began 4 days ago. Pt reports shortness of breath is worse on exertion. Pt appears comfortable, satting 99% on room air. Respirations even and unlabored, NAD. Sinus tachycardia on the monitor. Pt denies N/v/D, headache, dizziness, weakness, fever, chills,  symptoms. 20G IV in the right AC, labs drawn per MD orders. Pt refusing swab at this time, Scotty ARIAS aware.

## 2025-02-07 NOTE — H&P ADULT - PROBLEM SELECTOR PLAN 1
Patient p/w 4 days of SOB, ALAS, LE edema, orthopnea found to be in decompensated HF 2/2 medication non-adherence. Patient ran out of medications 4 days ago, has not seen his Cardiologist in over 8 months. Reports dry weight 220lbs. OP Cardiologist- Dr. Leong in [facility]   - ProBNP 1173, CXR clear   - 10/22 TTE: LV systolic function severely decreased, EF 19%, global LV hypokinesis, grade 1 diastolic dysfunction, reduced RV function   - S/p IV Lasix 40 in ED w/o sufficient effect, will dose Lasix IV 80   - Cont IV Lasix 80 BID   - Cont home Coreg, Entresto, Gen, Hydral (pt taking BID)   - Patient deferred prior ICD evaluation, would encourage pt to reconsider after meeting w/ Cardiology   [ ] Cardiology consult- please call in AM  [ ] Nutrition c/s   [ ] Strict I/O's, daily weights   [ ] TTE

## 2025-02-07 NOTE — ED PROVIDER NOTE - ATTENDING CONTRIBUTION TO CARE
Attending MD Fajardo: I have seen and examined this patient and fully participated in the care of this patient as the teaching attending. I personally made/approved the management plan and take responsibility for the patient management.     Patient is a 44-year-old male with history of nonischemic cardiomyopathy, chronic heart failure with ejection fraction 19% presenting to emergency department with chest pain.  Has been having for the last 3 days, associate with shortness of breath.  Has had multiple admissions for exacerbations.  Patient is not compliant with his medication.  Patient states that he is definitely fluid overloaded at this time.  Concern for another CHF exacerbation.  Will get labs, chest x-ray, diuresis if indicated.    *The above represents an initial assessment/impression. Please refer to progress notes for potential changes in patient clinical course*

## 2025-02-07 NOTE — H&P ADULT - ASSESSMENT
44 [facility]. [facility], DM2, HLD, gastritis, asthma who presents with SOB found to be in HF exacerbation 2/2 medication non-adherence.

## 2025-02-07 NOTE — ED ADULT NURSE REASSESSMENT NOTE - NS ED NURSE REASSESS COMMENT FT1
Report received from ZURI Barakat. Pt is A&Ox4, ambulatory. Pt has no complaints at this time. Denies chest pain, SOB, headache, N/V. Respirations even and unlabored. Awaiting report to inpatient nurse. NSR on cardiac monitor. Safety maintained.

## 2025-02-07 NOTE — ED ADULT NURSE REASSESSMENT NOTE - NS ED NURSE REASSESS COMMENT FT1
Pt laying in bed, NAD, respirations even and unlabored, VS in flow sheet. Pt offers no complaints at this time. Bed in lowest position, safety maintained.

## 2025-02-07 NOTE — ED ADULT NURSE REASSESSMENT NOTE - NS ED NURSE REASSESS COMMENT FT1
Noted that b/g did not scan at the time it was given (at 1600) and entered manually later in the shift.

## 2025-02-07 NOTE — ED ADULT NURSE REASSESSMENT NOTE - NS ED NURSE REASSESS COMMENT FT1
The physician from medicine has completed her consultation with patient, she has informed RN that diuretics will be ordered for patient in order to facilitate urination, RN awaiting medication orders, safety precautions maintained.

## 2025-02-07 NOTE — H&P ADULT - PROBLEM SELECTOR PLAN 3
Patient w/ hx of uncontrolled DM2, A1C was 11 in October, was seen by Endocrinology and strongly recommended to start OP Insulin therapy but patient refused.   - Patient on Metformin, Jardiance, Semaglutide as an OP   [ ] A1C in AM   - Sliding scale Insulin for now

## 2025-02-07 NOTE — ED PROVIDER NOTE - NSICDXPASTMEDICALHX_GEN_ALL_CORE_FT
PAST MEDICAL HISTORY:  Asthma     Congestive heart failure (CHF) EF 22%    Current smoker     HTN (hypertension)     Hyperlipidemia     Type 2 diabetes mellitus

## 2025-02-07 NOTE — ED ADULT NURSE REASSESSMENT NOTE - NS ED NURSE REASSESS COMMENT FT1
Patient endorsed from ZURI Cunningham for continuity of care, no morning orders for bloodwork at this time, patient AAOx4 and resting comfortably in bed, safety precautions maintained.

## 2025-02-07 NOTE — ED ADULT NURSE REASSESSMENT NOTE - NS ED NURSE REASSESS COMMENT FT1
Advair requested from pharmacy. Still awaiting med. Assessment/Plan:    No problem-specific Assessment & Plan notes found for this encounter  Diagnoses and all orders for this visit:    Jaundice of   -     Bilirubin, ; Future    Weight gain    Infant exclusively     Congenital tongue-tie        Patient Instructions   Adeel Renner gained almost 4 oz in the last 2 days, fantastic weight gain! Keep putting her to the breast every 1 5 to 2 5 hours and ok to use supplemental formula as needed  Bili was 15 8 today, high intermediate zone, so a quick bili check tomorrow and I will call with results  The more she feeds and poops, the faster this will resolve  Weight check next week! Well visit at 1 month  Subjective:      Patient ID: Ray Gonzalez is a 5 days female  Adeel Renner is here for weight check  Mom's milk came in afternoon 3/24, after her initial visit, and infant nursing and getting some formula  Mom does formula when she does not seem satisfied from nursing, usually during night  She gained 57 grams/day in the last 2 days! She made 3 seedy yellow stools today so far and 3 wet diapers in last 3 hours  Not spitty  She has a new rash  The following portions of the patient's history were reviewed and updated as appropriate: allergies, current medications, past family history, past medical history, past social history, past surgical history and problem list     Review of Systems   Constitutional: Negative for activity change, appetite change, fever and irritability  HENT: Negative for congestion, ear discharge and rhinorrhea  Eyes: Negative for discharge and redness  Respiratory: Negative for cough  Cardiovascular: Negative for fatigue with feeds and cyanosis  Gastrointestinal: Negative for abdominal distention, constipation, diarrhea and vomiting  Genitourinary: Negative for decreased urine volume  Musculoskeletal: Negative for joint swelling  Skin: Negative for rash     Allergic/Immunologic: Negative for food allergies  Neurological: Negative for seizures  Hematological: Negative for adenopathy  Objective:      Pulse 124   Resp 38   Ht 19 25" (48 9 cm)   Wt 2975 g (6 lb 8 9 oz)   HC 35 cm (13 78")   BMI 12 44 kg/m²          Physical Exam  Vitals signs and nursing note reviewed  Constitutional:       General: She is active  Appearance: Normal appearance  She is well-developed  Comments: Alert, active   HENT:      Head: Normocephalic and atraumatic  Anterior fontanelle is flat  Right Ear: Tympanic membrane normal       Left Ear: Tympanic membrane normal       Nose: Nose normal       Mouth/Throat:      Mouth: Mucous membranes are moist       Pharynx: Oropharynx is clear  Eyes:      General: Red reflex is present bilaterally  Extraocular Movements: Extraocular movements intact  Conjunctiva/sclera: Conjunctivae normal       Pupils: Pupils are equal, round, and reactive to light  Comments: Mild scleral icterus   Neck:      Musculoskeletal: Normal range of motion and neck supple  Cardiovascular:      Rate and Rhythm: Normal rate and regular rhythm  Pulses: Normal pulses  Heart sounds: Normal heart sounds, S1 normal and S2 normal  No murmur  Pulmonary:      Effort: Pulmonary effort is normal  No respiratory distress  Breath sounds: Normal breath sounds  No wheezing or rhonchi  Abdominal:      General: Bowel sounds are normal  There is no distension  Palpations: Abdomen is soft  There is no mass  Tenderness: There is no abdominal tenderness  There is no guarding or rebound  Comments: umb stump dry  Genitourinary:     Comments: Murphy 1 female  Musculoskeletal: Normal range of motion  Negative right Ortolani, left Ortolani, right Lewis and left Laura Ponds  Lymphadenopathy:      Cervical: No cervical adenopathy  Skin:     General: Skin is warm  Coloration: Skin is jaundiced  Findings: Rash present  No petechiae  Rash is not purpuric  Comments: Mild facial jaundice, erythema toxicum rash noted on face, upper chest     Neurological:      General: No focal deficit present  Mental Status: She is alert  Primitive Reflexes: Suck normal  Symmetric Hafsa

## 2025-02-07 NOTE — H&P ADULT - HISTORY OF PRESENT ILLNESS
44 y.o. M w/ a hx of [facility] HFrEF, HTN, DM2, HLD, gastritis, asthma who presents with SOB.   Patient has had multiple admissions for HF exacerbations in the past 2/2 medication non-adherence. Was most recently admitted in November for abdominal pain. Patient reports since discharge he has been doing well until he ran out of his medications 4 days ago. Since then he noted worsening SOB, ALAS, orthopnea, LE edema. Symptoms progressed to the point where he feels he cannot even turn in bed without feeling short of breath. Patient also notes L sided chest pain, sensation of "chest congestion". Believes dry weight is 220lbs. Has not seen his Cardiologist in 8 months.

## 2025-02-07 NOTE — H&P ADULT - NSHPREVIEWOFSYSTEMS_GEN_ALL_CORE
ROS:    Constitutional: [ ] fevers [ ] chills [ ] weight loss [x ] weight gain  HEENT: [ ] dry eyes [ ] eye irritation [ ] postnasal drip [ ] nasal congestion  CV: [x ] chest pain [x ] orthopnea [ ] palpitations [ ] murmur  Resp: [x ] cough [ x] shortness of breath [ ] dyspnea [ ] wheezing [ ] sputum [ ] hemoptysis  GI: [ ] nausea [ ] vomiting [ ] diarrhea [ ] constipation [ ] abd pain [ ] dysphagia   : [ ] dysuria [ ] nocturia [ ] hematuria [ ] increased urinary frequency  Musculoskeletal: [ ] back pain [ ] myalgias [ ] arthralgias [ ] fracture  Skin: [ ] rash [ ] itch  Neurological: [ ] headache [ ] dizziness [ ] syncope [ ] weakness [ ] numbness  Psychiatric: [ ] anxiety [ ] depression  Endocrine: [ x] diabetes [ ] thyroid problem  Hematologic/Lymphatic: [ ] anemia [ ] bleeding problem  Allergic/Immunologic: [ ] itchy eyes [ ] nasal discharge [ ] hives [ ] angioedema  [x ] All other systems negative  [ ] Unable to assess ROS because ________

## 2025-02-07 NOTE — H&P ADULT - NSHPLABSRESULTS_GEN_ALL_CORE
.  LABS:                         16.2   8.49  )-----------( 197      ( 2025 00:12 )             50.2     -    138  |  101  |  14  ----------------------------<  231[H]  4.7   |  26  |  0.90    Ca    9.0      2025 00:12    TPro  7.6  /  Alb  4.0  /  TBili  0.7  /  DBili  x   /  AST  22  /  ALT  14  /  AlkPhos  75  02-07      Urinalysis Basic - ( 2025 03:36 )    Color: Yellow / Appearance: Clear / S.039 / pH: x  Gluc: x / Ketone: Trace mg/dL  / Bili: Negative / Urobili: 1.0 mg/dL   Blood: x / Protein: 30 mg/dL / Nitrite: Negative   Leuk Esterase: Negative / RBC: 0 /HPF / WBC 0 /HPF   Sq Epi: x / Non Sq Epi: 0 /HPF / Bacteria: Negative /HPF            RADIOLOGY, EKG & ADDITIONAL TESTS: Reviewed.

## 2025-02-07 NOTE — ED PROVIDER NOTE - PROGRESS NOTE DETAILS
Patient Pro-BNP 1173, Trop 46. Repeating Troponin at 3AM then likely admitting for high risk chest pain and diuresis. - MD Misty PGY1 Repeat trop stable, patient to be admitted for further diuresis. 40 of Lasix given. Patient also given Ofirmev for pain. Amendable to staying and discussed plan with patient. - MD Misty PGY1

## 2025-02-07 NOTE — H&P ADULT - PROBLEM SELECTOR PLAN 6
Lovenox   Patient had pictures of medication names but did not have dosing. Neida not answering call. Med rec pharmacist emailed

## 2025-02-07 NOTE — ED ADULT NURSE REASSESSMENT NOTE - NS ED NURSE REASSESS COMMENT FT1
Patient refuses COVID swab, informed RN that he refused the swab with previous nurse, safety precautions maintained.

## 2025-02-07 NOTE — ED PROVIDER NOTE - NS ED ROS FT
GENERAL: No fever or chills  EYES: No change in vision  HEENT: No trouble swallowing or speaking  CARDIAC: +chest pain  PULMONARY: +Cough, +SOB  GI: No abdominal pain, no nausea or no vomiting, no diarrhea or constipation  : No changes in urination  SKIN: No rashes  NEURO: No headache, no numbness  MSK: No joint pain  Otherwise as HPI or negative.

## 2025-02-07 NOTE — ED PROVIDER NOTE - CLINICAL SUMMARY MEDICAL DECISION MAKING FREE TEXT BOX
Patient is a-44 year-old man, presenting with shortness of breath.  Patient has a past medical history of nonischemic cardiomyopathy, chronic heart failure with reduced ejection fraction, of 19%.  Patient was last seen and admitted for decompensated heart failure in the context of medication nonadherence, and also had GI upset.  Patient's outpatient cardiac medications include Entresto, carvedilol and eplerenone.  He is on maintenance diuretics.  Patient states he has not been able to  his medications, and has not been adherent.  Patient states today he is presenting for 2 to 3 days of worsening shortness of breath and some chest discomfort, radiating to the left shoulder.  Patient states he is presenting for symptoms similar to his last visit.  Patient states he also has been experiencing a cough, that is a white productive cough.  Patient states he is more comfortable seated up and is experiencing orthopnea.  Patient denies any recent illness, travel, nausea, vomiting, dysuria diarrhea or fever.    Patient will be evaluated for differential diagnoses of CHF exacerbation, given medication nonadherence, and low ejection fraction.  Patient appears to be fluid overloaded, with crackles heard on physical exam.  Patient's vitals are remarkable for tachycardia (heart rate of 108), and hypertension (153/119).  Patient is satting at 97.  Differential diagnoses also include atypical ACS, COPD, pneumonia (unlikely given patient's afebrile, nonproductive cough), pulmonary embolism (tachycardic, but no recent travel, Wells score 1.5)..  Patient's last admission.  Plan for cardiac workup including CBC, CMP, proBNP, troponin, Fluvid swab,chest x-ray.

## 2025-02-07 NOTE — PHARMACOTHERAPY INTERVENTION NOTE - COMMENTS
Medication list in Outpatient Medication Review (OMR) initially updated with medications provided by patient (pictures) however missing and unclear dosages.   Called [facility] and [facility] was able to clarify however all medications are past due for refills (with last refill dates in June/2024 and Oct/2024 - all for 30 day supply only)     Home Medications: (last filled date listed below for reference).   atorvastatin 40 mg oral tablet: 1 tab(s) orally once a day (Filled in Oct/2024 x 30 days) - [facility]  Bumex 1 mg oral tablet: 1 tab(s) orally 2 times a day (Last filled Nati/2024 x 30 days) - [facility]  Coreg 25 mg oral tablet: 1 tab(s) orally 2 times a day (Last filled Oct/2024 x 30 days) - [facility]  glipizide-metformin 5 mg-500 mg oral tablet: 1 tab(s) orally 2 times a day take before meals (Last filled Oct/2024 x 30 days) - [facility]  hydrALAZINE 25 mg oral tablet: 1 tab(s) orally 2 times a day patient states takes twice daily (prescribed as Q8H) - (Last filled Oct/2024 x 30 days) - [facility]   Jardiance 25 mg oral tablet: 1 tab(s) orally once a day in AM (Last filled Oct/2024 x 30 days) - [facility]  Rybelsus 3 mg oral tablet: 1 tab(s) orally once a day take in AM w/ breakfast (Last filled Oct/2024 x 30 days) - [facility]  sacubitril-valsartan 97 mg-103 mg oral tablet: 1 tab(s) orally 2 times a day (Last filled Oct/2024 x 30 days) - [facility]  spironolactone 25 mg oral tablet: 1 tab(s) orally once a day (Last filled Nati/2024 x 30 days) - [facility]  Symbicort 160 mcg-4.5 mcg/inh inhalation aerosol: 2 puff(s) inhaled 2 times a day (Last filled Oct/2024 x 30 days) - [facility]

## 2025-02-07 NOTE — ED ADULT NURSE REASSESSMENT NOTE - NS ED NURSE REASSESS COMMENT FT1
ACP made aware of current BP- pending ACP eval for further interventions break coverage RN- VS as charted, no distress on exam, ACP made aware of current BP- pending ACP eval for further interventions

## 2025-02-07 NOTE — ED ADULT NURSE NOTE - NSICDXFAMILYHX_GEN_ALL_CORE_FT
FAMILY HISTORY:  FH: HTN (hypertension)    Grandparent  Still living? Unknown  Family history of diabetes mellitus (DM), Age at diagnosis: Age Unknown

## 2025-02-08 LAB
A1C WITH ESTIMATED AVERAGE GLUCOSE RESULT: 8.7 % — HIGH (ref 4–5.6)
ANION GAP SERPL CALC-SCNC: 14 MMOL/L — SIGNIFICANT CHANGE UP (ref 7–14)
BUN SERPL-MCNC: 21 MG/DL — SIGNIFICANT CHANGE UP (ref 7–23)
CALCIUM SERPL-MCNC: 9.1 MG/DL — SIGNIFICANT CHANGE UP (ref 8.4–10.5)
CHLORIDE SERPL-SCNC: 99 MMOL/L — SIGNIFICANT CHANGE UP (ref 98–107)
CO2 SERPL-SCNC: 24 MMOL/L — SIGNIFICANT CHANGE UP (ref 22–31)
CREAT SERPL-MCNC: 0.99 MG/DL — SIGNIFICANT CHANGE UP (ref 0.5–1.3)
EGFR: 96 ML/MIN/1.73M2 — SIGNIFICANT CHANGE UP
ESTIMATED AVERAGE GLUCOSE: 203 — SIGNIFICANT CHANGE UP
GLUCOSE SERPL-MCNC: 214 MG/DL — HIGH (ref 70–99)
HCT VFR BLD CALC: 51.7 % — HIGH (ref 39–50)
HGB BLD-MCNC: 16.8 G/DL — SIGNIFICANT CHANGE UP (ref 13–17)
MAGNESIUM SERPL-MCNC: 1.8 MG/DL — SIGNIFICANT CHANGE UP (ref 1.6–2.6)
MCHC RBC-ENTMCNC: 29.3 PG — SIGNIFICANT CHANGE UP (ref 27–34)
MCHC RBC-ENTMCNC: 32.5 G/DL — SIGNIFICANT CHANGE UP (ref 32–36)
MCV RBC AUTO: 90.2 FL — SIGNIFICANT CHANGE UP (ref 80–100)
NRBC # BLD AUTO: 0 K/UL — SIGNIFICANT CHANGE UP (ref 0–0)
NRBC # BLD: 0 /100 WBCS — SIGNIFICANT CHANGE UP (ref 0–0)
NRBC # FLD: 0 K/UL — SIGNIFICANT CHANGE UP (ref 0–0)
NRBC BLD-RTO: 0 /100 WBCS — SIGNIFICANT CHANGE UP (ref 0–0)
PHOSPHATE SERPL-MCNC: 2.9 MG/DL — SIGNIFICANT CHANGE UP (ref 2.5–4.5)
PLATELET # BLD AUTO: 195 K/UL — SIGNIFICANT CHANGE UP (ref 150–400)
POTASSIUM SERPL-MCNC: 3.7 MMOL/L — SIGNIFICANT CHANGE UP (ref 3.5–5.3)
POTASSIUM SERPL-SCNC: 3.7 MMOL/L — SIGNIFICANT CHANGE UP (ref 3.5–5.3)
RBC # BLD: 5.73 M/UL — SIGNIFICANT CHANGE UP (ref 4.2–5.8)
RBC # FLD: 14.2 % — SIGNIFICANT CHANGE UP (ref 10.3–14.5)
SODIUM SERPL-SCNC: 137 MMOL/L — SIGNIFICANT CHANGE UP (ref 135–145)
WBC # BLD: 9.63 K/UL — SIGNIFICANT CHANGE UP (ref 3.8–10.5)
WBC # FLD AUTO: 9.63 K/UL — SIGNIFICANT CHANGE UP (ref 3.8–10.5)

## 2025-02-08 PROCEDURE — 99233 SBSQ HOSP IP/OBS HIGH 50: CPT

## 2025-02-08 RX ORDER — IPRATROPIUM BROMIDE AND ALBUTEROL SULFATE .5; 2.5 MG/3ML; MG/3ML
3 SOLUTION RESPIRATORY (INHALATION) EVERY 6 HOURS
Refills: 0 | Status: DISCONTINUED | OUTPATIENT
Start: 2025-02-08 | End: 2025-02-09

## 2025-02-08 RX ORDER — HYDRALAZINE HCL 100 MG
25 TABLET ORAL THREE TIMES A DAY
Refills: 0 | Status: DISCONTINUED | OUTPATIENT
Start: 2025-02-08 | End: 2025-02-09

## 2025-02-08 RX ORDER — ASPIRIN 81 MG/1
81 TABLET, COATED ORAL DAILY
Refills: 0 | Status: DISCONTINUED | OUTPATIENT
Start: 2025-02-08 | End: 2025-02-09

## 2025-02-08 RX ADMIN — FLUTICASONE PROPIONATE AND SALMETEROL 1 DOSE(S): 113; 14 POWDER, METERED RESPIRATORY (INHALATION) at 11:45

## 2025-02-08 RX ADMIN — Medication 80 MILLIGRAM(S): at 13:40

## 2025-02-08 RX ADMIN — Medication 2: at 09:21

## 2025-02-08 RX ADMIN — ACETAMINOPHEN, DIPHENHYDRAMINE HCL, PHENYLEPHRINE HCL 3 MILLIGRAM(S): 325; 25; 5 TABLET ORAL at 22:35

## 2025-02-08 RX ADMIN — IPRATROPIUM BROMIDE AND ALBUTEROL SULFATE 3 MILLILITER(S): .5; 2.5 SOLUTION RESPIRATORY (INHALATION) at 03:14

## 2025-02-08 RX ADMIN — Medication 2: at 18:17

## 2025-02-08 RX ADMIN — Medication 2: at 13:35

## 2025-02-08 RX ADMIN — POLYETHYLENE GLYCOL 3350 17 GRAM(S): 17 POWDER, FOR SOLUTION ORAL at 11:47

## 2025-02-08 RX ADMIN — Medication 25 MILLIGRAM(S): at 22:35

## 2025-02-08 RX ADMIN — Medication 25 MILLIGRAM(S): at 14:44

## 2025-02-08 RX ADMIN — Medication 25 MILLIGRAM(S): at 10:26

## 2025-02-08 RX ADMIN — Medication 80 MILLIGRAM(S): at 04:30

## 2025-02-08 RX ADMIN — Medication 40 MILLIGRAM(S): at 22:37

## 2025-02-08 RX ADMIN — ASPIRIN 81 MILLIGRAM(S): 81 TABLET, COATED ORAL at 18:52

## 2025-02-08 RX ADMIN — ATORVASTATIN CALCIUM 40 MILLIGRAM(S): 80 TABLET, FILM COATED ORAL at 22:35

## 2025-02-08 RX ADMIN — SACUBITRIL AND VALSARTAN 1 TABLET(S): 49; 51 TABLET, FILM COATED ORAL at 22:36

## 2025-02-08 RX ADMIN — SACUBITRIL AND VALSARTAN 1 TABLET(S): 49; 51 TABLET, FILM COATED ORAL at 11:44

## 2025-02-08 RX ADMIN — Medication 25 MILLIGRAM(S): at 04:30

## 2025-02-08 RX ADMIN — Medication 25 MILLIGRAM(S): at 18:47

## 2025-02-08 NOTE — DIETITIAN INITIAL EVALUATION ADULT - ADD RECOMMEND
1) Recommend continue with current diet, which remains appropriate at this time.   2) Recommend cont. to provide bowel regimen to promote bowel regularity.   3) Monitor PO intake, Labs, weights, BMs, and skin integrity.   4) RD to remain available for further nutritional interventions as indicated.

## 2025-02-08 NOTE — ED ADULT NURSE REASSESSMENT NOTE - NS ED NURSE REASSESS COMMENT FT1
Break covering RN: patient received, appears to be resting comfortably in bed, no complaints noted at this time. Breathing even and unlabored, pallor/diaphoresis not noted. waiting for transportation for floor assignment, will continue to monitor.

## 2025-02-08 NOTE — PATIENT PROFILE ADULT - VISION (WITH CORRECTIVE LENSES IF THE PATIENT USUALLY WEARS THEM):
Partially impaired: cannot see medication labels or newsprint, but can see obstacles in path, and the surrounding layout; can count fingers at arm's length
40.4

## 2025-02-08 NOTE — PROGRESS NOTE ADULT - ASSESSMENT
44 [facility]. M [facility], DM2, HLD, gastritis, asthma who presents with SOB found to be in HF exacerbation 2/2 medication non-adherence.  44 y.o. M w/ a hx of [facility] HFrEF (EF 19%), HTN, DM2, HLD, gastritis, asthma who presents with SOB found to be in HF exacerbation 2/2 medication non-adherence. Cardiology consulted, recs appreciated.

## 2025-02-08 NOTE — DIETITIAN INITIAL EVALUATION ADULT - ORAL INTAKE PTA/DIET HISTORY
Patient reports has a good appetite in general, consumes three meals daily. Pt lives with his family but has to prepare his own meals. Pt denies any specific diet following at home. Pt reports drinks ~7cups of fluid daily.

## 2025-02-08 NOTE — PROGRESS NOTE ADULT - PROBLEM SELECTOR PLAN 1
Patient p/w 4 days of SOB, ALAS, LE edema, orthopnea found to be in decompensated HF 2/2 medication non-adherence. Patient ran out of medications 4 days ago, has not seen his Cardiologist in over 8 months. Reports dry weight 220lbs. OP Cardiologist- Dr. Leong in [facility]   - ProBNP 1173, CXR clear   - 10/22 TTE: LV systolic function severely decreased, EF 19%, global LV hypokinesis, grade 1 diastolic dysfunction, reduced RV function   - S/p IV Lasix 40 in ED w/o sufficient effect, will dose Lasix IV 80   - Cont IV Lasix 80 BID   - Cont home Coreg, Entresto, Gen, Hydral (pt taking BID)   - Patient deferred prior ICD evaluation, would encourage pt to reconsider after meeting w/ Cardiology   [ ] Cardiology consult- please call in AM  [ ] Nutrition c/s   [ ] Strict I/O's, daily weights   [ ] TTE Patient p/w 4 days of SOB, ALAS, LE edema, orthopnea found to be in decompensated HF 2/2 medication non-adherence. Patient ran out of medications 4 days ago, has not seen his Cardiologist in over 8 months. Reports dry weight 220lbs. OP Cardiologist- Dr. Leong in [facility]     - ProBNP 1173, CXR clear   - 10/22 TTE: LV systolic function severely decreased, EF 19%, global LV hypokinesis, grade 1 diastolic dysfunction, reduced RV function   - S/p IV Lasix 40 in ED w/o sufficient effect, will dose Lasix IV 80   - Cont IV Lasix 80 BID , will give extra dose today  - Cont home Coreg, Entresto, Gen, Hydral (pt taking BID)   - Patient deferred prior ICD evaluation, would encourage pt to reconsider after meeting w/ Cardiology   - Cardiology consulted  - Strict I/O's, daily weights   - TTE

## 2025-02-08 NOTE — CONSULT NOTE ADULT - SUBJECTIVE AND OBJECTIVE BOX
Avtar Dukes MD  Cardiology Fellow  [phone]  All Cardiology service information can be found 24/7 on [email or web address], password: [email or web address]    Patient seen and evaluated at bedside    HPI:  44 y.o. M w/ a hx of [facility] HFrEF (E 20%), HTN, DM2, HLD, gastritis, asthma presenting with ADHF in the setting of med noncompliance, has not filled his medications for some time, has not seen a cardiologist for many months. Has had frequent admissions in the past for ADHF. Feeling SOB with minimal exertion and orthopnea. Weight last admission was 240, is 255 today.    Cardiac Meds: coreg 25, entresto 97/103, ritchie 25, atorva 40, hydral 25 bid    PMHx:   No pertinent past medical history    HTN (hypertension)    Asthma    Congestive heart failure (CHF)    Type 2 diabetes mellitus    Hyperlipidemia    Current smoker        PSHx:   No significant past surgical history    No significant past surgical history    No significant past surgical history    No significant past surgical history        Allergies:  shellfish (Swelling)  No Known Drug Allergies      Current Medications:   acetaminophen     Tablet .. 650 milliGRAM(s) Oral every 6 hours PRN  albuterol/ipratropium for Nebulization 3 milliLiter(s) Nebulizer every 6 hours PRN  aluminum hydroxide/magnesium hydroxide/simethicone Suspension 30 milliLiter(s) Oral every 4 hours PRN  atorvastatin 40 milliGRAM(s) Oral at bedtime  bisacodyl Suppository 10 milliGRAM(s) Rectal once  carvedilol 25 milliGRAM(s) Oral every 12 hours  dextrose 5%. 1000 milliLiter(s) IV Continuous <Continuous>  dextrose 5%. 1000 milliLiter(s) IV Continuous <Continuous>  dextrose 50% Injectable 25 Gram(s) IV Push once  dextrose 50% Injectable 12.5 Gram(s) IV Push once  dextrose 50% Injectable 25 Gram(s) IV Push once  dextrose Oral Gel 15 Gram(s) Oral once PRN  enoxaparin Injectable 40 milliGRAM(s) SubCutaneous every 24 hours  fluticasone propionate/ salmeterol 100-50 MICROgram(s) Diskus 1 Dose(s) Inhalation two times a day  furosemide   Injectable 80 milliGRAM(s) IV Push two times a day  glucagon  Injectable 1 milliGRAM(s) IntraMuscular once  hydrALAZINE 25 milliGRAM(s) Oral two times a day  insulin lispro (ADMELOG) corrective regimen sliding scale   SubCutaneous three times a day before meals  insulin lispro (ADMELOG) corrective regimen sliding scale   SubCutaneous at bedtime  melatonin 3 milliGRAM(s) Oral at bedtime PRN  polyethylene glycol 3350 17 Gram(s) Oral daily  sacubitril 97 mG/valsartan 103 mG 1 Tablet(s) Oral two times a day  senna 2 Tablet(s) Oral at bedtime  spironolactone 25 milliGRAM(s) Oral daily      FAMILY HISTORY:  FH: HTN (hypertension)    Family history of diabetes mellitus (DM) (Grandparent)          REVIEW OF SYSTEMS:  CONSTITUTIONAL: No weakness, fevers or chills  EYES/ENT: No visual changes;  No dysphagia  NECK: No pain or stiffness  RESPIRATORY: No cough, wheezing, hemoptysis; No shortness of breath  CARDIOVASCULAR: No chest pain or palpitations; No lower extremity edema  GASTROINTESTINAL: No abdominal or epigastric pain. No nausea, vomiting, or hematemesis; No diarrhea or constipation. No melena or hematochezia.  BACK: No back pain  GENITOURINARY: No dysuria, frequency or hematuria  NEUROLOGICAL: No numbness or weakness  SKIN: No itching, burning, rashes, or lesions   All other review of systems is negative unless indicated above.    Physical Exam:  T(F): 97.2 (02-08), Max: 98 (02-07)  HR: 90 (02-08) (87 - 101)  BP: 140/96 (02-08) (133/100 - 171/120)  RR: 18 (02-08)  SpO2: 98% (02-08)  GEN: NAD  HEENT: EOMI, clear sclera  PULM: CTA b/l, no wheeze  CV: RRR S1 S2, no murmur, no JVD  ABD: S, NT, ND  EXT: WWP, no edema  PSYCH: normal affect  SKIN: No rash    CXR: Personally reviewed    Labs: Personally reviewed                        16.8   9.63  )-----------( 195      ( 08 Feb 2025 06:50 )             51.7     02-08    137  |  99  |  21  ----------------------------<  214[H]  3.7   |  24  |  0.99    Ca    9.1      08 Feb 2025 06:50  Phos  2.9     02-08  Mg     1.80     02-08    TPro  7.6  /  Alb  4.0  /  TBili  0.7  /  DBili  x   /  AST  22  /  ALT  14  /  AlkPhos  75  02-07        CARDIAC MARKERS ( 07 Feb 2025 03:36 )  43 ng/L / x     / x     / x     / x     / x      CARDIAC MARKERS ( 07 Feb 2025 00:12 )  46 ng/L / x     / x     / x     / x     / x

## 2025-02-08 NOTE — PROGRESS NOTE ADULT - PROBLEM SELECTOR PLAN 6
Lovenox   Patient had pictures of medication names but did not have dosing. Neida not answering call. Med rec pharmacist emailed Lovenox       -Med rec - pharmacy noted, patient has not picked up medications from pharmacy (per med rec since October 2024)

## 2025-02-08 NOTE — PROGRESS NOTE ADULT - SUBJECTIVE AND OBJECTIVE BOX
PROGRESS NOTE:     Patient is a 44y old  Male who presents with a chief complaint of Edema (07 Feb 2025 15:27)      SUBJECTIVE / OVERNIGHT EVENTS:    ADDITIONAL REVIEW OF SYSTEMS:    MEDICATIONS  (STANDING):  atorvastatin 40 milliGRAM(s) Oral at bedtime  bisacodyl Suppository 10 milliGRAM(s) Rectal once  carvedilol 25 milliGRAM(s) Oral every 12 hours  dextrose 5%. 1000 milliLiter(s) (100 mL/Hr) IV Continuous <Continuous>  dextrose 5%. 1000 milliLiter(s) (50 mL/Hr) IV Continuous <Continuous>  dextrose 50% Injectable 25 Gram(s) IV Push once  dextrose 50% Injectable 12.5 Gram(s) IV Push once  dextrose 50% Injectable 25 Gram(s) IV Push once  enoxaparin Injectable 40 milliGRAM(s) SubCutaneous every 24 hours  fluticasone propionate/ salmeterol 100-50 MICROgram(s) Diskus 1 Dose(s) Inhalation two times a day  furosemide   Injectable 80 milliGRAM(s) IV Push two times a day  glucagon  Injectable 1 milliGRAM(s) IntraMuscular once  hydrALAZINE 25 milliGRAM(s) Oral two times a day  insulin lispro (ADMELOG) corrective regimen sliding scale   SubCutaneous three times a day before meals  insulin lispro (ADMELOG) corrective regimen sliding scale   SubCutaneous at bedtime  polyethylene glycol 3350 17 Gram(s) Oral daily  sacubitril 97 mG/valsartan 103 mG 1 Tablet(s) Oral two times a day  senna 2 Tablet(s) Oral at bedtime  spironolactone 25 milliGRAM(s) Oral daily    MEDICATIONS  (PRN):  acetaminophen     Tablet .. 650 milliGRAM(s) Oral every 6 hours PRN Temp greater or equal to 38C (100.4F), Mild Pain (1 - 3)  albuterol/ipratropium for Nebulization 3 milliLiter(s) Nebulizer every 6 hours PRN Shortness of Breath and/or Wheezing  aluminum hydroxide/magnesium hydroxide/simethicone Suspension 30 milliLiter(s) Oral every 4 hours PRN Dyspepsia  dextrose Oral Gel 15 Gram(s) Oral once PRN Blood Glucose LESS THAN 70 milliGRAM(s)/deciliter  melatonin 3 milliGRAM(s) Oral at bedtime PRN Insomnia      CAPILLARY BLOOD GLUCOSE      POCT Blood Glucose.: 209 mg/dL (08 Feb 2025 09:08)  POCT Blood Glucose.: 225 mg/dL (08 Feb 2025 00:13)  POCT Blood Glucose.: 183 mg/dL (07 Feb 2025 22:15)  POCT Blood Glucose.: 182 mg/dL (07 Feb 2025 16:03)    I&O's Summary    07 Feb 2025 07:01  -  08 Feb 2025 07:00  --------------------------------------------------------  IN: 270 mL / OUT: 1400 mL / NET: -1130 mL        PHYSICAL EXAM:  Vital Signs Last 24 Hrs  T(C): 36.7 (08 Feb 2025 04:27), Max: 36.7 (07 Feb 2025 12:37)  T(F): 98 (08 Feb 2025 04:27), Max: 98 (07 Feb 2025 12:37)  HR: 88 (08 Feb 2025 04:40) (87 - 101)  BP: 136/103 (08 Feb 2025 04:40) (133/100 - 171/120)  BP(mean): --  RR: 19 (08 Feb 2025 04:40) (17 - 22)  SpO2: 96% (08 Feb 2025 04:40) (95% - 100%)    Parameters below as of 08 Feb 2025 04:40  Patient On (Oxygen Delivery Method): room air        CONSTITUTIONAL: NAD, well-developed  RESPIRATORY: Normal respiratory effort; lungs are clear to auscultation bilaterally  CARDIOVASCULAR: Regular rate and rhythm, normal S1 and S2, no murmur/rub/gallop; No lower extremity edema; Peripheral pulses are 2+ bilaterally  ABDOMEN: Nontender to palpation, normoactive bowel sounds, no rebound/guarding; No hepatosplenomegaly  MUSCLOSKELETAL: no clubbing or cyanosis of digits; no joint swelling or tenderness to palpation  PSYCH: A+O to person, place, and time; affect appropriate  NEURO:  SKIN:    LABS:                        16.8   9.63  )-----------( 195      ( 08 Feb 2025 06:50 )             51.7     02-08    137  |  99  |  21  ----------------------------<  214[H]  3.7   |  24  |  0.99    Ca    9.1      08 Feb 2025 06:50  Phos  2.9     02-08  Mg     1.80     02-08    TPro  7.6  /  Alb  4.0  /  TBili  0.7  /  DBili  x   /  AST  22  /  ALT  14  /  AlkPhos  75  02-07          Urinalysis Basic - ( 08 Feb 2025 06:50 )    Color: x / Appearance: x / SG: x / pH: x  Gluc: 214 mg/dL / Ketone: x  / Bili: x / Urobili: x   Blood: x / Protein: x / Nitrite: x   Leuk Esterase: x / RBC: x / WBC x   Sq Epi: x / Non Sq Epi: x / Bacteria: x        Urinalysis with Rflx Culture (collected 07 Feb 2025 03:36)        RADIOLOGY & ADDITIONAL TESTS:  Results Reviewed:   Imaging Personally Reviewed:  Electrocardiogram Personally Reviewed:    COORDINATION OF CARE:  Care Discussed with Consultants/Other Providers [Y/N]:  Prior or Outpatient Records Reviewed [Y/N]:   PROGRESS NOTE:     Patient is a 44y old  Male who presents with a chief complaint of Edema (07 Feb 2025 15:27)      SUBJECTIVE / OVERNIGHT EVENTS:   NAEON. Patient examined at bedside, reports that he ran out of medications 3-4 days ago and has not seen a cardiologist in 8 months. Reports that he initially thought his symptoms were due to "common cold" but became persistent SOB. Reports some improvement in SOB since admission    ADDITIONAL REVIEW OF SYSTEMS:    MEDICATIONS  (STANDING):  atorvastatin 40 milliGRAM(s) Oral at bedtime  bisacodyl Suppository 10 milliGRAM(s) Rectal once  carvedilol 25 milliGRAM(s) Oral every 12 hours  dextrose 5%. 1000 milliLiter(s) (100 mL/Hr) IV Continuous <Continuous>  dextrose 5%. 1000 milliLiter(s) (50 mL/Hr) IV Continuous <Continuous>  dextrose 50% Injectable 25 Gram(s) IV Push once  dextrose 50% Injectable 12.5 Gram(s) IV Push once  dextrose 50% Injectable 25 Gram(s) IV Push once  enoxaparin Injectable 40 milliGRAM(s) SubCutaneous every 24 hours  fluticasone propionate/ salmeterol 100-50 MICROgram(s) Diskus 1 Dose(s) Inhalation two times a day  furosemide   Injectable 80 milliGRAM(s) IV Push two times a day  glucagon  Injectable 1 milliGRAM(s) IntraMuscular once  hydrALAZINE 25 milliGRAM(s) Oral two times a day  insulin lispro (ADMELOG) corrective regimen sliding scale   SubCutaneous three times a day before meals  insulin lispro (ADMELOG) corrective regimen sliding scale   SubCutaneous at bedtime  polyethylene glycol 3350 17 Gram(s) Oral daily  sacubitril 97 mG/valsartan 103 mG 1 Tablet(s) Oral two times a day  senna 2 Tablet(s) Oral at bedtime  spironolactone 25 milliGRAM(s) Oral daily    MEDICATIONS  (PRN):  acetaminophen     Tablet .. 650 milliGRAM(s) Oral every 6 hours PRN Temp greater or equal to 38C (100.4F), Mild Pain (1 - 3)  albuterol/ipratropium for Nebulization 3 milliLiter(s) Nebulizer every 6 hours PRN Shortness of Breath and/or Wheezing  aluminum hydroxide/magnesium hydroxide/simethicone Suspension 30 milliLiter(s) Oral every 4 hours PRN Dyspepsia  dextrose Oral Gel 15 Gram(s) Oral once PRN Blood Glucose LESS THAN 70 milliGRAM(s)/deciliter  melatonin 3 milliGRAM(s) Oral at bedtime PRN Insomnia      CAPILLARY BLOOD GLUCOSE      POCT Blood Glucose.: 209 mg/dL (08 Feb 2025 09:08)  POCT Blood Glucose.: 225 mg/dL (08 Feb 2025 00:13)  POCT Blood Glucose.: 183 mg/dL (07 Feb 2025 22:15)  POCT Blood Glucose.: 182 mg/dL (07 Feb 2025 16:03)    I&O's Summary    07 Feb 2025 07:01  -  08 Feb 2025 07:00  --------------------------------------------------------  IN: 270 mL / OUT: 1400 mL / NET: -1130 mL        PHYSICAL EXAM:  Vital Signs Last 24 Hrs  T(C): 36.7 (08 Feb 2025 04:27), Max: 36.7 (07 Feb 2025 12:37)  T(F): 98 (08 Feb 2025 04:27), Max: 98 (07 Feb 2025 12:37)  HR: 88 (08 Feb 2025 04:40) (87 - 101)  BP: 136/103 (08 Feb 2025 04:40) (133/100 - 171/120)  BP(mean): --  RR: 19 (08 Feb 2025 04:40) (17 - 22)  SpO2: 96% (08 Feb 2025 04:40) (95% - 100%)    Parameters below as of 08 Feb 2025 04:40  Patient On (Oxygen Delivery Method): room air        CONSTITUTIONAL: NAD, speaking in full sentences   RESPIRATORY: Normal respiratory effort; lungs are clear to auscultation bilaterally  CARDIOVASCULAR: Regular rate and rhythm, normal S1 and S2, no murmur/rub/gallop; +LE edema ; Peripheral pulses are 2+ bilaterally  ABDOMEN: Nontender to palpation, normoactive bowel sounds, no rebound/guarding; No hepatosplenomegaly  MUSCLOSKELETAL: no clubbing or cyanosis of digits; no joint swelling or tenderness to palpation  PSYCH: A+O to person, place, and time; affect appropriate  NEURO: no focal neurological deficits      LABS:                        16.8   9.63  )-----------( 195      ( 08 Feb 2025 06:50 )             51.7     02-08    137  |  99  |  21  ----------------------------<  214[H]  3.7   |  24  |  0.99    Ca    9.1      08 Feb 2025 06:50  Phos  2.9     02-08  Mg     1.80     02-08    TPro  7.6  /  Alb  4.0  /  TBili  0.7  /  DBili  x   /  AST  22  /  ALT  14  /  AlkPhos  75  02-07          Urinalysis Basic - ( 08 Feb 2025 06:50 )    Color: x / Appearance: x / SG: x / pH: x  Gluc: 214 mg/dL / Ketone: x  / Bili: x / Urobili: x   Blood: x / Protein: x / Nitrite: x   Leuk Esterase: x / RBC: x / WBC x   Sq Epi: x / Non Sq Epi: x / Bacteria: x        Urinalysis with Rflx Culture (collected 07 Feb 2025 03:36)        RADIOLOGY & ADDITIONAL TESTS:  Results Reviewed:   Imaging Personally Reviewed:  Electrocardiogram Personally Reviewed:    COORDINATION OF CARE:  Care Discussed with Consultants/Other Providers [Y/N]:  Prior or Outpatient Records Reviewed [Y/N]:

## 2025-02-08 NOTE — CONSULT NOTE ADULT - ASSESSMENT
44 y.o. M w/ a hx of NICM HFrEF (E 20%), HTN, DM2, HLD, gastritis, asthma presenting with ADHF in the setting of med noncompliance.     [facility] 2019: mild athero in mid LAD  TTE 10/2024: EF 19%, normal RV size and reduced function, trave MR, grade I diastolic dysfunction.  cMR 2019: Normal LV size with globally depressed LV function. LVEF = 22%. No late gadolinium enhancement to suggest scar.     Recs:  -c/w IV lasix 80mg; would continue to diurese until euvolemia  -document daily weights, I&Os, goal neg 2L  -start ASA, atorva 40 for nonobs CAD  -c/w coreg 25, entresto 97/103, ritchie 25  -would switch ritchie to eplerenone on discharge given patient's documented preference in the past  -c/w hydral 25, would switch to TID dosing  -if BPs still elevated on above, would add isordil 10 TID  -start jardiance on discharge  -ensure close f/u with cardiology 44 y.o. M w/ a hx of NICM HFrEF (E 20%), HTN, DM2, HLD, gastritis, asthma presenting with ADHF in the setting of med noncompliance.     [facility] 2019: mild athero in mid LAD  TTE 10/2024: EF 19%, normal RV size and reduced function, trave MR, grade I diastolic dysfunction.  cMR 2019: Normal LV size with globally depressed LV function. LVEF = 22%. No late gadolinium enhancement to suggest scar.     Recs:  -c/w IV lasix 80mg; would continue to diurese until euvolemia  -document daily weights, I&Os, goal neg 2L  -start ASA, atorva 40 for nonobs CAD  -c/w coreg 25, entresto 97/103, ritchie 25  -would switch ritchie to eplerenone on discharge given patient's documented preference in the past  -c/w hydral 25, would switch to TID dosing  -if BPs still elevated on above, would add isordil 10 TID  -start jardiance on discharge  -would give patient torsemide 20mg QD on discharge to take PRN if weight gain of 2lb or more, 2 days in a row  -please  on med compliance  -ensure close f/u with cardiology 44 y.o. M w/ a hx of NICM HFrEF (E 20%), HTN, DM2, HLD, gastritis, asthma presenting with ADHF in the setting of med noncompliance.     [facility] 2019: mild athero in mid LAD  TTE 10/2024: EF 19%, normal RV size and reduced function, trave MR, grade I diastolic dysfunction.  cMR 2019: Normal LV size with globally depressed LV function. LVEF = 22%. No late gadolinium enhancement to suggest scar.     Recs:  -c/w IV lasix 80mg; would continue to diurese until euvolemia  -give extra dose of IV lasix this afternoon  -document daily weights, I&Os, goal neg 2L  -start ASA, atorva 40 for nonobs CAD  -c/w coreg 25, entresto 97/103, ritchie 25  -would switch ritchie to eplerenone on discharge given patient's documented preference in the past  -c/w hydral 25, would switch to TID dosing  -if BPs still elevated on above, would add isordil 10 TID  -start jardiance on discharge  -would give patient torsemide 20mg QD on discharge to take PRN if weight gain of 2lb or more, 2 days in a row  -please  on med compliance  -ensure close f/u with cardiology

## 2025-02-08 NOTE — PATIENT PROFILE ADULT - HOME ACCESSIBILITY CONCERNS
I, Alberto Killian, performed a history and physical exam of the patient and discussed their management with the resident, student, and/or fellow. I reviewed the note and agree with the documented findings and plan of care. I was present and available for all procedures.
none

## 2025-02-08 NOTE — DIETITIAN INITIAL EVALUATION ADULT - NS FNS DIET ORDER
Diet, DASH/TLC:   Sodium & Cholesterol Restricted  Consistent Carbohydrate {Evening Snack} (CSTCHOSN)  1000mL Fluid Restriction ([id number]) (02-07-25 @ 10:23) [Active]

## 2025-02-08 NOTE — PROGRESS NOTE ADULT - PROBLEM SELECTOR PLAN 3
Patient w/ hx of uncontrolled DM2, A1C was 11 in October, was seen by Endocrinology and strongly recommended to start OP Insulin therapy but patient refused.   - Patient on Metformin, Jardiance, Semaglutide as an OP   [ ] A1C in AM   - Sliding scale Insulin for now Patient w/ hx of uncontrolled DM2, A1C was 11 in October, was seen by Endocrinology and strongly recommended to start OP Insulin therapy but patient refused.   - Patient on Metformin, Jardiance, Semaglutide as an OP   - A1C   - Sliding scale Insulin for now

## 2025-02-08 NOTE — DIETITIAN INITIAL EVALUATION ADULT - PROBLEM SELECTOR PLAN 1
Patient p/w 4 days of SOB, ALAS, LE edema, orthopnea found to be in decompensated HF 2/2 medication non-adherence. Patient ran out of medications 4 days ago, has not seen his Cardiologist in over 8 months. Reports dry weight 220lbs. OP Cardiologist- Dr. Leong in [facility]   - ProBNP 1173, CXR clear   - 10/22 TTE: LV systolic function severely decreased, EF 19%, global LV hypokinesis, grade 1 diastolic dysfunction, reduced RV function   - S/p IV Lasix 40 in ED w/o sufficient effect, will dose Lasix IV 80   - Cont IV Lasix 80 BID   - Cont home Coreg, Entresto, Gen, Hydral (pt taking BID)   - Patient deferred prior ICD evaluation, would encourage pt to reconsider after meeting w/ Cardiology   [ ] Cardiology consult- please call in AM  [ ] Nutrition c/s   [ ] Strict I/O's, daily weights   [ ] TTE

## 2025-02-08 NOTE — DIETITIAN INITIAL EVALUATION ADULT - NSPROEDALEARNPREF_GEN_A_NUR
Patient was counseled on standard seizure precautions including no driving for at least three months from the last seizure with impaired consciousness or coordination, and no swimming, bathing, climbing to heights or operation of machinery until her spells are controlled and cleared by Neurology.  
verbal instruction

## 2025-02-08 NOTE — PROGRESS NOTE ADULT - PROBLEM SELECTOR PLAN 4
Cont home Coreg, [place], Entresto Cont home Coreg, Entresto, Spironolactone  will switch from spironolactone to eplerenone on discharge per patient's preference

## 2025-02-08 NOTE — DIETITIAN INITIAL EVALUATION ADULT - PERTINENT MEDS FT
MEDICATIONS  (STANDING):  atorvastatin 40 milliGRAM(s) Oral at bedtime  bisacodyl Suppository 10 milliGRAM(s) Rectal once  carvedilol 25 milliGRAM(s) Oral every 12 hours  dextrose 5%. 1000 milliLiter(s) (100 mL/Hr) IV Continuous <Continuous>  dextrose 5%. 1000 milliLiter(s) (50 mL/Hr) IV Continuous <Continuous>  dextrose 50% Injectable 25 Gram(s) IV Push once  dextrose 50% Injectable 12.5 Gram(s) IV Push once  dextrose 50% Injectable 25 Gram(s) IV Push once  enoxaparin Injectable 40 milliGRAM(s) SubCutaneous every 24 hours  fluticasone propionate/ salmeterol 100-50 MICROgram(s) Diskus 1 Dose(s) Inhalation two times a day  furosemide   Injectable 80 milliGRAM(s) IV Push two times a day  glucagon  Injectable 1 milliGRAM(s) IntraMuscular once  hydrALAZINE 25 milliGRAM(s) Oral two times a day  insulin lispro (ADMELOG) corrective regimen sliding scale   SubCutaneous three times a day before meals  insulin lispro (ADMELOG) corrective regimen sliding scale   SubCutaneous at bedtime  polyethylene glycol 3350 17 Gram(s) Oral daily  sacubitril 97 mG/valsartan 103 mG 1 Tablet(s) Oral two times a day  senna 2 Tablet(s) Oral at bedtime  spironolactone 25 milliGRAM(s) Oral daily    MEDICATIONS  (PRN):  acetaminophen     Tablet .. 650 milliGRAM(s) Oral every 6 hours PRN Temp greater or equal to 38C (100.4F), Mild Pain (1 - 3)  albuterol/ipratropium for Nebulization 3 milliLiter(s) Nebulizer every 6 hours PRN Shortness of Breath and/or Wheezing  aluminum hydroxide/magnesium hydroxide/simethicone Suspension 30 milliLiter(s) Oral every 4 hours PRN Dyspepsia  dextrose Oral Gel 15 Gram(s) Oral once PRN Blood Glucose LESS THAN 70 milliGRAM(s)/deciliter  melatonin 3 milliGRAM(s) Oral at bedtime PRN Insomnia

## 2025-02-08 NOTE — PATIENT PROFILE ADULT - FALL HARM RISK - HARM RISK INTERVENTIONS
Communicate Risk of Fall with Harm to all staff/Monitor gait and stability/Reinforce activity limits and safety measures with patient and family/Tailored Fall Risk Interventions/Visual Cue: Yellow wristband and red socks/Bed in lowest position, wheels locked, appropriate side rails in place/Call bell, personal items and telephone in reach/Instruct patient to call for assistance before getting out of bed or chair/Non-slip footwear when patient is out of bed/[place] to call system/Physically safe environment - no spills, clutter or unnecessary equipment/Purposeful Proactive Rounding/Room/bathroom lighting operational, light cord in reach

## 2025-02-08 NOTE — DIETITIAN INITIAL EVALUATION ADULT - OTHER INFO
Per chart review, 44 y.o. M w/ a hx of NICM HFrEF (EF 19%), HTN, DM2, HLD, gastritis, asthma who presents with SOB found to be in HF exacerbation 2/2 medication non-adherence. Cardiology consulted, recs appreciated.    Patient seen at bedside. Reports his appetite has decreased for couple days due to constipation. Pt with bowel regimen in use and had one BM yesterday. Pt's lunch visibly seen untouched at the time of visit. Pt currently following Consistent Carbohydrate, DASH/TLC diet. Pt confirmed his food allergy to shellfish. Pt with Lasix and spironolactone in use which can cause weight fluctuations. Pt labs notable with hyperglycemia POCT ranging from 110-225H, and A1C 8.7%. Insulin regimen ordered for glycemic management. Discussed importance to follow low sodium diet, and reduce fluid intake daily. Advised pt to obtain and trend his weight daily at home. Reviewed nutrition label reading for sodium and carbohydrate with patient. However, pt shows uninterested with the information provided. RD offered nutrition handout, pt states "I have the package at home from last hospital stay". Pt made aware RD remains available PRN. RD to remain available for further nutritional interventions as indicated.

## 2025-02-08 NOTE — DIETITIAN INITIAL EVALUATION ADULT - NUTRITIONGOAL OUTCOME1
Pt will cont. to follow therapeutic diet and medication management to gradually loss his fluid weight

## 2025-02-08 NOTE — DIETITIAN INITIAL EVALUATION ADULT - NS FNS WEIGHT CHANGE REASON
Pt reported his dry weight 226lbs. Adm weight 240lbs (2/6). Weight trend reflects weight gain of 14lbs/6.19%, weight gain likely due to fluid accumulation 2/2 pt dx with HF exacerbation./unintentional

## 2025-02-08 NOTE — PATIENT PROFILE ADULT - AVIAN FLU SYMPTOMS
Interval History: Patient lying comfortably in bed. She is still complaining of some back pain but states it is improving overall. She still has a drain in as well. Hopefully discharge in the next couple of days with home health.     Review of Systems   Constitutional:  Negative for chills, diaphoresis, fatigue and fever.   HENT:  Negative for congestion, rhinorrhea, sinus pressure, sinus pain and sore throat.    Respiratory:  Negative for cough, chest tightness, shortness of breath and wheezing.    Cardiovascular:  Negative for chest pain, palpitations and leg swelling.   Gastrointestinal:  Negative for abdominal distention, abdominal pain, constipation, diarrhea, nausea and vomiting.   Musculoskeletal:  Positive for back pain. Negative for arthralgias and myalgias.   Skin:  Negative for rash and wound.   Neurological:  Positive for weakness. Negative for dizziness, syncope, light-headedness, numbness and headaches.   All other systems reviewed and are negative.  Objective:     Vital Signs (Most Recent):  Temp: 98 °F (36.7 °C) (01/28/23 0700)  Pulse: 82 (01/28/23 0700)  Resp: 20 (01/28/23 0942)  BP: (!) 132/58 (01/28/23 0700)  SpO2: 96 % (01/28/23 0700)   Vital Signs (24h Range):  Temp:  [98 °F (36.7 °C)-98.7 °F (37.1 °C)] 98 °F (36.7 °C)  Pulse:  [70-88] 82  Resp:  [16-20] 20  SpO2:  [95 %-97 %] 96 %  BP: (132-189)/(58-89) 132/58     Weight: 61.7 kg (136 lb 0.4 oz)  Body mass index is 21.95 kg/m².    Intake/Output Summary (Last 24 hours) at 1/28/2023 0958  Last data filed at 1/27/2023 1707  Gross per 24 hour   Intake 240 ml   Output --   Net 240 ml      Physical Exam  Vitals and nursing note reviewed.   Constitutional:       General: She is awake.      Appearance: Normal appearance. She is well-developed, well-groomed and normal weight.      Interventions: Nasal cannula in place.   HENT:      Head: Normocephalic and atraumatic.      Right Ear: External ear normal.      Left Ear: External ear normal.       Mouth/Throat:      Mouth: Mucous membranes are moist.      Pharynx: Oropharynx is clear.   Eyes:      Extraocular Movements: Extraocular movements intact.      Pupils: Pupils are equal, round, and reactive to light.   Cardiovascular:      Rate and Rhythm: Regular rhythm.      Pulses: Normal pulses.      Heart sounds: Normal heart sounds.   Pulmonary:      Effort: Pulmonary effort is normal.      Breath sounds: Normal breath sounds.   Abdominal:      General: Bowel sounds are normal.      Palpations: Abdomen is soft.   Musculoskeletal:      Cervical back: Neck supple.      Thoracic back: Tenderness present.      Comments: tenderness to palpation from T6 to sacral area   Skin:     General: Skin is warm and dry.             Comments: Stage 1 pressure wound over sacrum   Neurological:      Mental Status: She is alert and oriented to person, place, and time.   Psychiatric:         Mood and Affect: Mood normal.         Behavior: Behavior normal. Behavior is cooperative.         Thought Content: Thought content normal.       Significant Labs: All pertinent labs within the past 24 hours have been reviewed.    Significant Imaging: I have reviewed all pertinent imaging results/findings within the past 24 hours.   No

## 2025-02-09 VITALS
DIASTOLIC BLOOD PRESSURE: 72 MMHG | OXYGEN SATURATION: 95 % | HEART RATE: 86 BPM | SYSTOLIC BLOOD PRESSURE: 102 MMHG | TEMPERATURE: 97 F | RESPIRATION RATE: 17 BRPM

## 2025-02-09 LAB
ANION GAP SERPL CALC-SCNC: 14 MMOL/L — SIGNIFICANT CHANGE UP (ref 7–14)
BUN SERPL-MCNC: 24 MG/DL — HIGH (ref 7–23)
CALCIUM SERPL-MCNC: 9.7 MG/DL — SIGNIFICANT CHANGE UP (ref 8.4–10.5)
CHLORIDE SERPL-SCNC: 97 MMOL/L — LOW (ref 98–107)
CO2 SERPL-SCNC: 27 MMOL/L — SIGNIFICANT CHANGE UP (ref 22–31)
CREAT SERPL-MCNC: 1.01 MG/DL — SIGNIFICANT CHANGE UP (ref 0.5–1.3)
EGFR: 93 ML/MIN/1.73M2 — SIGNIFICANT CHANGE UP
GLUCOSE SERPL-MCNC: 261 MG/DL — HIGH (ref 70–99)
HCT VFR BLD CALC: 53.8 % — HIGH (ref 39–50)
HGB BLD-MCNC: 17.5 G/DL — HIGH (ref 13–17)
MAGNESIUM SERPL-MCNC: 2 MG/DL — SIGNIFICANT CHANGE UP (ref 1.6–2.6)
MCHC RBC-ENTMCNC: 29.8 PG — SIGNIFICANT CHANGE UP (ref 27–34)
MCHC RBC-ENTMCNC: 32.5 G/DL — SIGNIFICANT CHANGE UP (ref 32–36)
MCV RBC AUTO: 91.7 FL — SIGNIFICANT CHANGE UP (ref 80–100)
NRBC # BLD AUTO: 0 K/UL — SIGNIFICANT CHANGE UP (ref 0–0)
NRBC # BLD: 0 /100 WBCS — SIGNIFICANT CHANGE UP (ref 0–0)
NRBC # FLD: 0 K/UL — SIGNIFICANT CHANGE UP (ref 0–0)
NRBC BLD-RTO: 0 /100 WBCS — SIGNIFICANT CHANGE UP (ref 0–0)
PHOSPHATE SERPL-MCNC: 3.6 MG/DL — SIGNIFICANT CHANGE UP (ref 2.5–4.5)
PLATELET # BLD AUTO: 216 K/UL — SIGNIFICANT CHANGE UP (ref 150–400)
POTASSIUM SERPL-MCNC: 3.9 MMOL/L — SIGNIFICANT CHANGE UP (ref 3.5–5.3)
POTASSIUM SERPL-SCNC: 3.9 MMOL/L — SIGNIFICANT CHANGE UP (ref 3.5–5.3)
RBC # BLD: 5.87 M/UL — HIGH (ref 4.2–5.8)
RBC # FLD: 14.3 % — SIGNIFICANT CHANGE UP (ref 10.3–14.5)
SODIUM SERPL-SCNC: 138 MMOL/L — SIGNIFICANT CHANGE UP (ref 135–145)
WBC # BLD: 9.4 K/UL — SIGNIFICANT CHANGE UP (ref 3.8–10.5)
WBC # FLD AUTO: 9.4 K/UL — SIGNIFICANT CHANGE UP (ref 3.8–10.5)

## 2025-02-09 RX ORDER — HYDRALAZINE HCL 100 MG
1 TABLET ORAL
Refills: 0 | DISCHARGE

## 2025-02-09 RX ORDER — EMPAGLIFLOZIN 10 MG/1
1 TABLET, FILM COATED ORAL
Qty: 30 | Refills: 0
Start: 2025-02-09 | End: 2025-03-10

## 2025-02-09 RX ORDER — CARVEDILOL 6.25 MG
1 TABLET ORAL
Qty: 60 | Refills: 0
Start: 2025-02-09 | End: 2025-03-10

## 2025-02-09 RX ORDER — BUMETANIDE 2 MG/1
1 TABLET ORAL
Qty: 60 | Refills: 0
Start: 2025-02-09 | End: 2025-03-10

## 2025-02-09 RX ORDER — ASPIRIN 81 MG/1
1 TABLET, COATED ORAL
Qty: 30 | Refills: 0
Start: 2025-02-09 | End: 2025-03-10

## 2025-02-09 RX ORDER — EPLERENONE 25 MG/1
1 TABLET, FILM COATED ORAL
Qty: 30 | Refills: 0
Start: 2025-02-09 | End: 2025-03-10

## 2025-02-09 RX ORDER — SACUBITRIL AND VALSARTAN 49; 51 MG/1; MG/1
1 TABLET, FILM COATED ORAL
Qty: 60 | Refills: 0
Start: 2025-02-09 | End: 2025-03-10

## 2025-02-09 RX ORDER — GLIPIZIDE/METFORMIN HCL 5 MG-500MG
1 TABLET ORAL
Qty: 60 | Refills: 0
Start: 2025-02-09 | End: 2025-03-10

## 2025-02-09 RX ORDER — SENNOSIDES 8.6 MG
2 TABLET ORAL
Qty: 0 | Refills: 0 | DISCHARGE
Start: 2025-02-09

## 2025-02-09 RX ORDER — BUDESONIDE AND FORMOTEROL FUMARATE DIHYDRATE 80; 4.5 UG/1; UG/1
2 AEROSOL RESPIRATORY (INHALATION)
Refills: 0 | DISCHARGE

## 2025-02-09 RX ORDER — HYDRALAZINE HCL 100 MG
1 TABLET ORAL
Qty: 90 | Refills: 0
Start: 2025-02-09 | End: 2025-03-10

## 2025-02-09 RX ORDER — BUDESONIDE AND FORMOTEROL FUMARATE DIHYDRATE 80; 4.5 UG/1; UG/1
2 AEROSOL RESPIRATORY (INHALATION)
Qty: 1 | Refills: 0
Start: 2025-02-09 | End: 2025-03-10

## 2025-02-09 RX ORDER — POLYETHYLENE GLYCOL 3350 17 G/17G
17 POWDER, FOR SOLUTION ORAL
Qty: 0 | Refills: 0 | DISCHARGE
Start: 2025-02-09

## 2025-02-09 RX ORDER — ATORVASTATIN CALCIUM 80 MG/1
1 TABLET, FILM COATED ORAL
Qty: 30 | Refills: 0
Start: 2025-02-09 | End: 2025-03-10

## 2025-02-09 RX ORDER — BUMETANIDE 2 MG/1
1 TABLET ORAL
Refills: 0 | DISCHARGE

## 2025-02-09 RX ADMIN — Medication 80 MILLIGRAM(S): at 06:49

## 2025-02-09 RX ADMIN — Medication 25 MILLIGRAM(S): at 07:44

## 2025-02-09 RX ADMIN — ASPIRIN 81 MILLIGRAM(S): 81 TABLET, COATED ORAL at 11:36

## 2025-02-09 RX ADMIN — SACUBITRIL AND VALSARTAN 1 TABLET(S): 49; 51 TABLET, FILM COATED ORAL at 09:13

## 2025-02-09 RX ADMIN — Medication 25 MILLIGRAM(S): at 11:36

## 2025-02-09 NOTE — DISCHARGE NOTE PROVIDER - CARE PROVIDER_API CALL
Caleb Leong.  Cardiovascular Disease  [address] [place] [address]  Phone: [phone]  Fax: [phone]  Follow Up Time:     Sukhdeep Scott.  Internal Medicine  [address] [place] [address]  Phone: [phone]  Fax: [phone]  Follow Up Time:

## 2025-02-09 NOTE — DISCHARGE NOTE PROVIDER - NSDCCPCAREPLAN_GEN_ALL_CORE_FT
PRINCIPAL DISCHARGE DIAGNOSIS  Diagnosis: Congestive heart failure (CHF)  Assessment and Plan of Treatment: *YOU ARE LEAVING AGAINST MEDICAL ADVICE. IT IS RECOMMENDED THAT YOU STAY ADMITTED FOR FURTHER MANAGEMENT.*  You came to the hospital with shortness of breath due to exacerbation of your heart failure in the setting of medication non-compliance.  Continue medications as prescribed. Follow up with your primary care provider and cardiologist within 1 week of discharge, ideally within 2-3 days of discharge.  Medications have been sent to your pharmacy for you in order to avoid further non-adherence with management of your heart failure and diabetes, however, you MUST follow up closely with both your primary care provider and cardiologist for further management of your conditions as you have not been medically optimized for discharge from the hospital yet and it is recommended that you stay admitted until optimized.

## 2025-02-09 NOTE — DISCHARGE NOTE PROVIDER - NSDCMRMEDTOKEN_GEN_ALL_CORE_FT
aspirin 81 mg oral delayed release tablet: 1 tab(s) orally once a day  atorvastatin 40 mg oral tablet: 1 tab(s) orally once a day (at bedtime)  bumetanide 1 mg oral tablet: 1 tab(s) orally 2 times a day  carvedilol 25 mg oral tablet: 1 tab(s) orally every 12 hours  eplerenone 25 mg oral tablet: 1 tab(s) orally once a day  glipizide-metformin 5 mg-500 mg oral tablet: 1 tab(s) orally 2 times a day take before meals (Last filled Oct/2024 x 30 days)  hydrALAZINE 25 mg oral tablet: 1 tab(s) orally 3 times a day  Jardiance 25 mg oral tablet: 1 tab(s) orally once a day in AM (Last filled Oct/2024 x 30 days)  polyethylene glycol 3350 oral powder for reconstitution: 17 gram(s) orally once a day  Rybelsus 3 mg oral tablet: 1 tab(s) orally once a day take in AM w/ breakfast (Last filled Oct/2024 x 30 days)  sacubitril-valsartan 97 mg-103 mg oral tablet: 1 tab(s) orally 2 times a day  senna leaf extract oral tablet: 2 tab(s) orally once a day (at bedtime)  Symbicort 160 mcg-4.5 mcg/inh inhalation aerosol: 2 puff(s) inhaled 2 times a day (Last filled Oct/2024 x 30 days)   aspirin 81 mg oral delayed release tablet: 1 tab(s) orally once a day  atorvastatin 40 mg oral tablet: 1 tab(s) orally once a day (at bedtime)  bumetanide 1 mg oral tablet: 1 tab(s) orally 2 times a day  carvedilol 25 mg oral tablet: 1 tab(s) orally every 12 hours  eplerenone 25 mg oral tablet: 1 tab(s) orally once a day  glipizide-metformin 5 mg-500 mg oral tablet: 1 tab(s) orally 2 times a day  hydrALAZINE 25 mg oral tablet: 1 tab(s) orally 3 times a day  Jardiance 25 mg oral tablet: 1 tab(s) orally once a day  polyethylene glycol 3350 oral powder for reconstitution: 17 gram(s) orally once a day  sacubitril-valsartan 97 mg-103 mg oral tablet: 1 tab(s) orally 2 times a day  senna leaf extract oral tablet: 2 tab(s) orally once a day (at bedtime)  Symbicort 160 mcg-4.5 mcg/inh inhalation aerosol: 2 puff(s) inhaled 2 times a day

## 2025-02-09 NOTE — PROGRESS NOTE ADULT - TIME BILLING
Time-based billing (NON-critical care).     The necessity of the time spent during the encounter on this date of service was due to:     - Ordering, reviewing, and interpreting labs, testing, and imaging.  - Independently obtaining a review of systems and performing a physical exam  - Reviewing prior hospitalization and where necessary, outpatient records.  - Counselling and educating patient and family regarding interpretation of aforementioned items and plan of care. The necessity of the time spent during the encounter on this date of service was due to:   -  Patient documentation  - Ordering, reviewing, and interpreting labs, testing, and imaging   - Reviewing prior hospitalization and where necessary, outpatient records.  - Discussion with consultants (cardiology), support staff

## 2025-02-09 NOTE — PROGRESS NOTE ADULT - PROBLEM SELECTOR PLAN 6
Lovenox       -Med rec - pharmacy noted, patient has not picked up medications from pharmacy (per med rec since October 2024)

## 2025-02-09 NOTE — PROGRESS NOTE ADULT - PROBLEM SELECTOR PLAN 1
Patient p/w 4 days of SOB, ALAS, LE edema, orthopnea found to be in decompensated HF 2/2 medication non-adherence. Patient ran out of medications 4 days ago, has not seen his Cardiologist in over 8 months. Reports dry weight 220lbs. OP Cardiologist- [facility]. Leong [facility]     - ProBNP 1173, CXR clear   - 10/22 TTE: LV systolic function severely decreased, EF 19%, global LV hypokinesis, grade 1 diastolic dysfunction, reduced RV function   - S/p IV Lasix 40 in ED w/o sufficient effect, will dose Lasix IV 80   - Cont IV Lasix 80 BID , will give extra dose today  - Cont home Coreg, Entresto, Gen, Hydral (pt taking BID)   - Patient deferred prior ICD evaluation, would encourage pt to reconsider after meeting w/ Cardiology   - Cardiology consulted  - Strict I/O's, daily weights   - TTE

## 2025-02-09 NOTE — PROGRESS NOTE ADULT - PROBLEM SELECTOR PLAN 4
Cont home Coreg, Entresto, Spironolactone  will switch from spironolactone to eplerenone on discharge per patient's preference

## 2025-02-09 NOTE — DISCHARGE NOTE NURSING/CASE MANAGEMENT/SOCIAL WORK - NSDCPEFALRISK_GEN_ALL_CORE
For information on Fall & Injury Prevention, visit: [email or web address] OR  [email or web address] OR  https://www.cdc.gov/steadi/patient.html

## 2025-02-09 NOTE — DISCHARGE NOTE NURSING/CASE MANAGEMENT/SOCIAL WORK - PATIENT PORTAL LINK FT
You can access the FollowMyHealth Patient Portal offered by [facility] by registering at the following website: [email or web address]. By joining [employer]’s FollowMyHealth portal, you will also be able to view your health information using other applications (apps) compatible with our system.

## 2025-02-09 NOTE — PROGRESS NOTE ADULT - ASSESSMENT
44 y.o. M w/ a hx of NICM HFrEF (EF 19%), HTN, DM2, HLD, gastritis, asthma who presents with SOB found to be in HF exacerbation 2/2 medication non-adherence. Cardiology consulted, recs appreciated.

## 2025-02-09 NOTE — PROGRESS NOTE ADULT - PROBLEM SELECTOR PLAN 3
Patient w/ hx of uncontrolled DM2, A1C was 11 in October, was seen by Endocrinology and strongly recommended to start OP Insulin therapy but patient refused.   - Patient on Metformin, Jardiance, Semaglutide as an OP   - A1C   - Sliding scale Insulin for now

## 2025-02-09 NOTE — PROGRESS NOTE ADULT - SUBJECTIVE AND OBJECTIVE BOX
PROGRESS NOTE:     Patient is a 44y old  Male who presents with a chief complaint of Edema (08 Feb 2025 12:24)      SUBJECTIVE / OVERNIGHT EVENTS:    ADDITIONAL REVIEW OF SYSTEMS:    MEDICATIONS  (STANDING):  aspirin enteric coated 81 milliGRAM(s) Oral daily  atorvastatin 40 milliGRAM(s) Oral at bedtime  bisacodyl Suppository 10 milliGRAM(s) Rectal once  carvedilol 25 milliGRAM(s) Oral every 12 hours  dextrose 5%. 1000 milliLiter(s) (100 mL/Hr) IV Continuous <Continuous>  dextrose 5%. 1000 milliLiter(s) (50 mL/Hr) IV Continuous <Continuous>  dextrose 50% Injectable 25 Gram(s) IV Push once  dextrose 50% Injectable 12.5 Gram(s) IV Push once  dextrose 50% Injectable 25 Gram(s) IV Push once  enoxaparin Injectable 40 milliGRAM(s) SubCutaneous every 24 hours  fluticasone propionate/ salmeterol 100-50 MICROgram(s) Diskus 1 Dose(s) Inhalation two times a day  furosemide   Injectable 80 milliGRAM(s) IV Push two times a day  glucagon  Injectable 1 milliGRAM(s) IntraMuscular once  hydrALAZINE 25 milliGRAM(s) Oral three times a day  insulin lispro (ADMELOG) corrective regimen sliding scale   SubCutaneous three times a day before meals  insulin lispro (ADMELOG) corrective regimen sliding scale   SubCutaneous at bedtime  polyethylene glycol 3350 17 Gram(s) Oral daily  sacubitril 97 mG/valsartan 103 mG 1 Tablet(s) Oral two times a day  senna 2 Tablet(s) Oral at bedtime  spironolactone 25 milliGRAM(s) Oral daily    MEDICATIONS  (PRN):  acetaminophen     Tablet .. 650 milliGRAM(s) Oral every 6 hours PRN Temp greater or equal to 38C (100.4F), Mild Pain (1 - 3)  albuterol/ipratropium for Nebulization 3 milliLiter(s) Nebulizer every 6 hours PRN Shortness of Breath and/or Wheezing  aluminum hydroxide/magnesium hydroxide/simethicone Suspension 30 milliLiter(s) Oral every 4 hours PRN Dyspepsia  dextrose Oral Gel 15 Gram(s) Oral once PRN Blood Glucose LESS THAN 70 milliGRAM(s)/deciliter  melatonin 3 milliGRAM(s) Oral at bedtime PRN Insomnia      CAPILLARY BLOOD GLUCOSE      POCT Blood Glucose.: 241 mg/dL (08 Feb 2025 20:46)  POCT Blood Glucose.: 235 mg/dL (08 Feb 2025 17:19)  POCT Blood Glucose.: 217 mg/dL (08 Feb 2025 12:46)  POCT Blood Glucose.: 209 mg/dL (08 Feb 2025 09:08)    I&O's Summary    08 Feb 2025 07:01  -  09 Feb 2025 07:00  --------------------------------------------------------  IN: 120 mL / OUT: 3500 mL / NET: -3380 mL        PHYSICAL EXAM:  Vital Signs Last 24 Hrs  T(C): 36.7 (09 Feb 2025 05:10), Max: 36.7 (08 Feb 2025 12:24)  T(F): 98 (09 Feb 2025 05:10), Max: 98 (08 Feb 2025 12:24)  HR: 82 (09 Feb 2025 07:03) (82 - 92)  BP: 108/75 (09 Feb 2025 07:03) (100/72 - 149/105)  BP(mean): --  RR: 18 (09 Feb 2025 07:03) (17 - 18)  SpO2: 94% (09 Feb 2025 07:03) (94% - 100%)    Parameters below as of 09 Feb 2025 07:03  Patient On (Oxygen Delivery Method): room air        CONSTITUTIONAL: NAD, well-developed  RESPIRATORY: Normal respiratory effort; lungs are clear to auscultation bilaterally  CARDIOVASCULAR: Regular rate and rhythm, normal S1 and S2, no murmur/rub/gallop; No lower extremity edema; Peripheral pulses are 2+ bilaterally  ABDOMEN: Nontender to palpation, normoactive bowel sounds, no rebound/guarding; No hepatosplenomegaly  MUSCLOSKELETAL: no clubbing or cyanosis of digits; no joint swelling or tenderness to palpation  PSYCH: A+O to person, place, and time; affect appropriate  NEURO:  SKIN:    LABS:                        17.5   9.40  )-----------( 216      ( 09 Feb 2025 06:40 )             53.8     02-09    138  |  97[L]  |  24[H]  ----------------------------<  261[H]  3.9   |  27  |  1.01    Ca    9.7      09 Feb 2025 06:40  Phos  3.6     02-09  Mg     2.00     02-09            Urinalysis Basic - ( 09 Feb 2025 06:40 )    Color: x / Appearance: x / SG: x / pH: x  Gluc: 261 mg/dL / Ketone: x  / Bili: x / Urobili: x   Blood: x / Protein: x / Nitrite: x   Leuk Esterase: x / RBC: x / WBC x   Sq Epi: x / Non Sq Epi: x / Bacteria: x        Urinalysis with Rflx Culture (collected 07 Feb 2025 03:36)        RADIOLOGY & ADDITIONAL TESTS:  Results Reviewed:   Imaging Personally Reviewed:  Electrocardiogram Personally Reviewed:    COORDINATION OF CARE:  Care Discussed with Consultants/Other Providers [Y/N]:  Prior or Outpatient Records Reviewed [Y/N]:   PROGRESS NOTE:     Patient is a 44y old  Male who presents with a chief complaint of Edema (08 Feb 2025 12:24)      SUBJECTIVE / OVERNIGHT EVENTS:  NAEON.   Informed by ACP, that patient expressed desire to leave AMA, understanding risk associated with leaving. Left prior to being seen.  ADDITIONAL REVIEW OF SYSTEMS:    MEDICATIONS  (STANDING):  aspirin enteric coated 81 milliGRAM(s) Oral daily  atorvastatin 40 milliGRAM(s) Oral at bedtime  bisacodyl Suppository 10 milliGRAM(s) Rectal once  carvedilol 25 milliGRAM(s) Oral every 12 hours  dextrose 5%. 1000 milliLiter(s) (100 mL/Hr) IV Continuous <Continuous>  dextrose 5%. 1000 milliLiter(s) (50 mL/Hr) IV Continuous <Continuous>  dextrose 50% Injectable 25 Gram(s) IV Push once  dextrose 50% Injectable 12.5 Gram(s) IV Push once  dextrose 50% Injectable 25 Gram(s) IV Push once  enoxaparin Injectable 40 milliGRAM(s) SubCutaneous every 24 hours  fluticasone propionate/ salmeterol 100-50 MICROgram(s) Diskus 1 Dose(s) Inhalation two times a day  furosemide   Injectable 80 milliGRAM(s) IV Push two times a day  glucagon  Injectable 1 milliGRAM(s) IntraMuscular once  hydrALAZINE 25 milliGRAM(s) Oral three times a day  insulin lispro (ADMELOG) corrective regimen sliding scale   SubCutaneous three times a day before meals  insulin lispro (ADMELOG) corrective regimen sliding scale   SubCutaneous at bedtime  polyethylene glycol 3350 17 Gram(s) Oral daily  sacubitril 97 mG/valsartan 103 mG 1 Tablet(s) Oral two times a day  senna 2 Tablet(s) Oral at bedtime  spironolactone 25 milliGRAM(s) Oral daily    MEDICATIONS  (PRN):  acetaminophen     Tablet .. 650 milliGRAM(s) Oral every 6 hours PRN Temp greater or equal to 38C (100.4F), Mild Pain (1 - 3)  albuterol/ipratropium for Nebulization 3 milliLiter(s) Nebulizer every 6 hours PRN Shortness of Breath and/or Wheezing  aluminum hydroxide/magnesium hydroxide/simethicone Suspension 30 milliLiter(s) Oral every 4 hours PRN Dyspepsia  dextrose Oral Gel 15 Gram(s) Oral once PRN Blood Glucose LESS THAN 70 milliGRAM(s)/deciliter  melatonin 3 milliGRAM(s) Oral at bedtime PRN Insomnia      CAPILLARY BLOOD GLUCOSE      POCT Blood Glucose.: 241 mg/dL (08 Feb 2025 20:46)  POCT Blood Glucose.: 235 mg/dL (08 Feb 2025 17:19)  POCT Blood Glucose.: 217 mg/dL (08 Feb 2025 12:46)  POCT Blood Glucose.: 209 mg/dL (08 Feb 2025 09:08)    I&O's Summary    08 Feb 2025 07:01  -  09 Feb 2025 07:00  --------------------------------------------------------  IN: 120 mL / OUT: 3500 mL / NET: -3380 mL        PHYSICAL EXAM:  Vital Signs Last 24 Hrs  T(C): 36.7 (09 Feb 2025 05:10), Max: 36.7 (08 Feb 2025 12:24)  T(F): 98 (09 Feb 2025 05:10), Max: 98 (08 Feb 2025 12:24)  HR: 82 (09 Feb 2025 07:03) (82 - 92)  BP: 108/75 (09 Feb 2025 07:03) (100/72 - 149/105)  BP(mean): --  RR: 18 (09 Feb 2025 07:03) (17 - 18)  SpO2: 94% (09 Feb 2025 07:03) (94% - 100%)    Parameters below as of 09 Feb 2025 07:03  Patient On (Oxygen Delivery Method): room air      Physical exam - unable to perform as patient left AMA    LABS:                        17.5   9.40  )-----------( 216      ( 09 Feb 2025 06:40 )             53.8     02-09    138  |  97[L]  |  24[H]  ----------------------------<  261[H]  3.9   |  27  |  1.01    Ca    9.7      09 Feb 2025 06:40  Phos  3.6     02-09  Mg     2.00     02-09            Urinalysis Basic - ( 09 Feb 2025 06:40 )    Color: x / Appearance: x / SG: x / pH: x  Gluc: 261 mg/dL / Ketone: x  / Bili: x / Urobili: x   Blood: x / Protein: x / Nitrite: x   Leuk Esterase: x / RBC: x / WBC x   Sq Epi: x / Non Sq Epi: x / Bacteria: x        Urinalysis with Rflx Culture (collected 07 Feb 2025 03:36)        RADIOLOGY & ADDITIONAL TESTS:  Results Reviewed:   Imaging Personally Reviewed:  Electrocardiogram Personally Reviewed:    COORDINATION OF CARE:  Care Discussed with Consultants/Other Providers [Y/N]:  Prior or Outpatient Records Reviewed [Y/N]:

## 2025-02-09 NOTE — PROGRESS NOTE ADULT - PROBLEM SELECTOR PLAN 2
Patient reports he has not had a BM in 7 days   - Senna, Miralax   - Bisacodyl supp x 1
Patient reports he has not had a BM in 7 days   - Senna, Miralax   - Bisacodyl supp x 1

## 2025-02-09 NOTE — DISCHARGE NOTE PROVIDER - CARE PROVIDERS DIRECT ADDRESSES
[email or web address]

## 2025-02-09 NOTE — DISCHARGE NOTE PROVIDER - NSDCFUADDAPPT_GEN_ALL_CORE_FT
*YOU ARE LEAVING AGAINST MEDICAL ADVICE.*    Please follow up with cardiology and your primary care provider within 2-3 days of discharge.

## 2025-02-09 NOTE — DISCHARGE NOTE NURSING/CASE MANAGEMENT/SOCIAL WORK - FINANCIAL ASSISTANCE
[facility] provides services at a reduced cost to those who are determined to be eligible through [facility]’s financial assistance program. Information regarding [facility]’s financial assistance program can be found by going to [email or web address] or by calling [phone].

## 2025-02-09 NOTE — DISCHARGE NOTE PROVIDER - HOSPITAL COURSE
44M PMH of [facility] HFrEF (EF 19%), HTN, DM2, HLD, gastritis, asthma who presents with SOB found to be in HF exacerbation 2/2 medication non-adherence.    Congestive heart failure (CHF)  - p/w 4 days of SOB, ALAS, LE edema, orthopnea found to be in decompensated HF 2/2 medication non-adherence  - pBNP >1K; prior TTE w/ EF 19%, global LV hypokinesis, G1 DD, reduced RV function  - pt refused to stay admitted for further cardiology work up and management, including TTE  - cards consulted: c/w coreg, entresto, eplerenone, jardiance, bumex, hydralazine; c/w  ASA, atorvastatin  - outpatient follow up with cardiologist  - *patient is leaving against medical advice*    Type 2 diabetes mellitus  - hx of uncontrolled DM2, A1C was 11% in October, was seen by endocrinology and strongly recommended to start insulin therapy, but patient refused  - c/w glipizide-metformin, jardiance  - outpatient follow up with PCP for further management    HTN (hypertension)  - c/w medications as above    Asthma  - c/w symbicort    Case discussed with Dr. Soto on 2/9. Patient is NOT medically stable and optimized for discharge as per attending. Patient is leaving against medical advice. Discharge plan reviewed with patient.

## 2025-02-25 ENCOUNTER — APPOINTMENT (OUTPATIENT)
Dept: ENDOCRINOLOGY | Facility: CLINIC | Age: 45
End: 2025-02-25

## 2025-03-14 NOTE — PROGRESS NOTE ADULT - PROBLEM SELECTOR PLAN 7
[facility] Medical Oncology and Hematology Team  [place] - [phone]    Your Team Member:  Advanced Practitioner:  Saloni Ralph PA-C    Please answer Private and Unavailable Calls - this may be your team(s) contacting you.  If you have medical questions/concerns/issues - contact us either by (1) My Chart (2) [facility]     
-Continue albuterol PRN for shortness of breath and wheezing.

## 2025-05-08 ENCOUNTER — NON-APPOINTMENT (OUTPATIENT)
Age: 45
End: 2025-05-08

## 2025-06-12 NOTE — ED ADULT NURSE NOTE - CAS EDN DISCHARGE ASSESSMENT
LAST REFILL 3-5-2024  AMOUNT 16 g     3 REFILLS  LAST VISIT 10-3-2024  NEXT VISIT n/a    
Alert and oriented to person, place and time

## 2025-06-12 NOTE — DIETITIAN INITIAL EVALUATION ADULT - PERTINENT LABORATORY DATA
oral 02-08    137  |  99  |  21  ----------------------------<  214[H]  3.7   |  24  |  0.99    Ca    9.1      08 Feb 2025 06:50  Phos  2.9     02-08  Mg     1.80     02-08    TPro  7.6  /  Alb  4.0  /  TBili  0.7  /  DBili  x   /  AST  22  /  ALT  14  /  AlkPhos  75  02-07  POCT Blood Glucose.: 209 mg/dL (02-08-25 @ 09:08)  A1C with Estimated Average Glucose Result: 8.7 % (02-08-25 @ 06:50)  A1C with Estimated Average Glucose Result: 11.0 % (10-04-24 @ 06:29)  A1C with Estimated Average Glucose Result: 9.0 % (06-21-24 @ 06:54)

## 2025-09-19 ENCOUNTER — TRANSCRIPTION ENCOUNTER (OUTPATIENT)
Age: 45
End: 2025-09-19